# Patient Record
Sex: FEMALE | Race: WHITE | NOT HISPANIC OR LATINO | Employment: UNEMPLOYED | URBAN - METROPOLITAN AREA
[De-identification: names, ages, dates, MRNs, and addresses within clinical notes are randomized per-mention and may not be internally consistent; named-entity substitution may affect disease eponyms.]

---

## 2017-01-05 ENCOUNTER — RADIANT APPOINTMENT (OUTPATIENT)
Dept: GENERAL RADIOLOGY | Facility: CLINIC | Age: 22
End: 2017-01-05
Attending: ORTHOPAEDIC SURGERY
Payer: COMMERCIAL

## 2017-01-05 ENCOUNTER — TELEPHONE (OUTPATIENT)
Dept: ORTHOPEDICS | Facility: CLINIC | Age: 22
End: 2017-01-05

## 2017-01-05 ENCOUNTER — OFFICE VISIT (OUTPATIENT)
Dept: ORTHOPEDICS | Facility: CLINIC | Age: 22
End: 2017-01-05
Payer: COMMERCIAL

## 2017-01-05 VITALS — HEIGHT: 69 IN | WEIGHT: 167.5 LBS | BODY MASS INDEX: 24.81 KG/M2 | TEMPERATURE: 97.5 F

## 2017-01-05 DIAGNOSIS — M25.361 PATELLOFEMORAL INSTABILITY OF RIGHT KNEE WITH PAIN: ICD-10-CM

## 2017-01-05 DIAGNOSIS — M22.8X1 MALTRACKING OF RIGHT PATELLA: Primary | ICD-10-CM

## 2017-01-05 DIAGNOSIS — M25.561 PATELLOFEMORAL INSTABILITY OF RIGHT KNEE WITH PAIN: ICD-10-CM

## 2017-01-05 PROCEDURE — 99214 OFFICE O/P EST MOD 30 MIN: CPT | Performed by: ORTHOPAEDIC SURGERY

## 2017-01-05 PROCEDURE — 73560 X-RAY EXAM OF KNEE 1 OR 2: CPT | Mod: TC

## 2017-01-05 ASSESSMENT — PAIN SCALES - GENERAL: PAINLEVEL: SEVERE PAIN (7)

## 2017-01-05 NOTE — NURSING NOTE
"Chief Complaint   Patient presents with     RECHECK     right knee patellofemoral maltracking       Initial Temp(Src) 97.5  F (36.4  C) (Temporal)  Ht 1.753 m (5' 9\")  Wt 75.978 kg (167 lb 8 oz)  BMI 24.72 kg/m2 Estimated body mass index is 24.72 kg/(m^2) as calculated from the following:    Height as of this encounter: 1.753 m (5' 9\").    Weight as of this encounter: 75.978 kg (167 lb 8 oz).  BP completed using cuff size: NA (Not Taken)  Nettie/CATHY     "

## 2017-01-05 NOTE — TELEPHONE ENCOUNTER
Surgery Scheduled    Date of Surgery 01/20/17 Time of Surgery   Procedure: Right knee arthroscopy with arthroscopic lateral retinacular release  Hospital/Surgical Facility: [facility]  Surgeon: Dr. Graf  Type of Anesthesia : General  Pre-op 01/10/17 with Dr. Gray  Post op:02/01/17 with Dr. Graf        Surgery packet mailed to patient's home address. Patients instructed to arrive 1 hours prior to surgery. Patient understood and agrees to plan.    Yvonne Brian  Surgery Scheduler

## 2017-01-05 NOTE — PROGRESS NOTES
Office Visit-Follow up    Chief Complaint: Mary Valdez is a 21 year old female who is being seen for   Chief Complaint   Patient presents with     RECHECK     right knee patellofemoral maltracking       History of Present Illness:   Today's visit:  Arrives for continued right anterior knee pain.  Pain is been present for  Approximately 1 year. To date she has attempted formal physical therapy, home physical therapy, anti-inflammatories, rest, activity modification,  Aspirin, bracing, and taping. Taping provided the most relief. Continues to have pain that impacts the quality of her life. It bothers her with most activities including walking at times. She is grown very frustrated ofHer symptoms. She's never had a kneecap dislocation.  March 7, 2016 visit:  Returns for evaluation of right knee. She reports continues with some anterior pain. He has gotten better with the meloxicam. Currently rates it as moderate. He describes it as achy. She also reports she feels a transient quick catch with extension of the knee. Physical therapy has been on hold since last visit.  February 22, 2016:  Patient is seen today for follow-up of right knee pain. She complains pain is worsening since last office visit. The pain is located lateral, anterior. She has been attending physical therapy in [place] for approximately three weeks; she is doing many side leg raises. She is using patellofemoral stabilizing brace, which does help. She is using Mobic.    2/4/2016 office visit:  Mechanism of Injury: No trauma or inciting event.  Location: right knee anterior  Duration of Pain: 3 week(s)  Rating of Pain: moderate.   Pain Quality: aching and sharp  Pain is better with: Rest  Pain is worse with: squatting  Treatment so far consists of: rest, Ibuprofen.   Associated Features: Weakness  Prior history of related problems:   Problems with her knees on and off over the years. This become much more consistent over the last 3  "weeks.        REVIEW OF SYSTEMS  General: negative for, night sweats, dizziness, fatigue  Resp: No shortness of breath and no cough  CV: negative for chest pain, syncope or near-syncope  GI: negative for nausea, vomiting and diarrhea  : negative for dysuria and hematuria  Musculoskeletal: as above  Neurologic: negative for syncope   Hematologic: negative for bleeding disorder    Physical Exam:  Vitals: Temp(Src) 97.5  F (36.4  C) (Temporal)  Ht 5' 9\" (1.753 m)  Wt 167 lb 8 oz (75.978 kg)  BMI 24.72 kg/m2  BMI= Body mass index is 24.72 kg/(m^2).  Constitutional: healthy, alert and no acute distress   Psychiatric: mentation appears normal and affect normal/bright  NEURO: no focal deficits  RESP: Normal with easy respirations and no use of accessory muscles to breathe, no audible wheezing or retractions  CV: RLE: no edema         Regular rate and rhythm by palpation  SKIN: No erythema, rashes, excoriation, or breakdown. No evidence of infection.   JOINT/EXTREMITIES:right Knee: 0-130  active range of motion. Tenderness along the lateral patellar facet. No joint line pain. No effusion. Lateral patellar maltracking. Some tightness of lateral retinaculum when compared to the contralateral knee. Increased tibia tubercle sulcus angle.  Negative Onel. Negative Lachman. No instability of varus and valgus testing.    GAIT: non-antalgic            Diagnostic Modalities:  My previous diagnostic modalities results were reviewed.  Independent visualization of the images was performed.      Impression: right Knee patellar maltracking    Plan:  All of the above pertinent physical exam and imaging modalities findings was reviewed with Mary.    We discussed her treatment options. Pain is been present for approximately 1 year. She has attempted formal physical therapy, home physical therapy, anti-inflammatories, rest, activity modification,  Aspirin, bracing, and taping. She continues to have symptoms impact the quality of her " life on a daily basis. We discussed surgical treatment options. We discussed lateral retinacular release, patellar realignment, medial patellofemoral ligament reconstruction.I reviewed the pros and cons and risks and benefits of all. We discussed isolated procedure such as lateral retinacular release. I reviewed the recovery for the procedures. Once fully reviewed. Proceed with right knee arthroscopy with arthroscopic lateral retinacular release. She has not had any patellofemoral dislocations.She understands she may continue having symptoms and progressed to having a patellar realignment surgery. However based in recovery she is willing to take that chance.    Risks, benefits, complications, limitations, and anticipated postoperative course were discussed. Risks including infections (requiring possible repeat surgery and antibiotics), bleeding, blood clots, hemarthrosis, cartilage degeneration (arthritis), scar, scar tenderness, stiffness, skin problems and failure to relieve all symptoms were reviewed. I also reviewed the risks of heart attack, stroke, death. Anticipated anesthesia is general    We also Discussed recovery taking up to 8-12 weeks.    Return to clinic 10 days post-operatively. , or sooner as needed for changes.  Re-x-ray on return: No    Magdaleno Graf D.O.          Please schedule for surgery, pre op H&P, and post ops.      Patient Name:  Mary Valdez ([id number]).  :  1995  Gender:  female  Patient Type:  Same Day Surgery  Surgeon:  Yevgeniy Graf DO  Physician requests assist from:  Single scrub tech    Procedures:    Right knee arthroscopy with arthroscopic lateral retinacular release   Approach:  NA  Diagnosis:  Maltracking of right patella  C-arm:  No  Mini C-arm:   No  Pathology Scheduled:  No  Special instruments/supplies:  None  Vendor Rep:  na  Anesthesia:  General  Block:  No   Block type:na  Time needed:  30 minutes    [facility] Discussed:  Not Applicable    Post  op 1:

## 2017-01-10 ENCOUNTER — OFFICE VISIT (OUTPATIENT)
Dept: FAMILY MEDICINE | Facility: CLINIC | Age: 22
End: 2017-01-10
Payer: COMMERCIAL

## 2017-01-10 VITALS
TEMPERATURE: 97.4 F | BODY MASS INDEX: 24.75 KG/M2 | HEART RATE: 130 BPM | HEIGHT: 69 IN | OXYGEN SATURATION: 98 % | DIASTOLIC BLOOD PRESSURE: 56 MMHG | WEIGHT: 167.1 LBS | SYSTOLIC BLOOD PRESSURE: 114 MMHG | RESPIRATION RATE: 16 BRPM

## 2017-01-10 DIAGNOSIS — Z01.818 PREOP GENERAL PHYSICAL EXAM: Primary | ICD-10-CM

## 2017-01-10 DIAGNOSIS — M22.8X1 MALTRACKING OF RIGHT PATELLA: ICD-10-CM

## 2017-01-10 PROCEDURE — 99214 OFFICE O/P EST MOD 30 MIN: CPT | Performed by: FAMILY MEDICINE

## 2017-01-10 ASSESSMENT — PAIN SCALES - GENERAL: PAINLEVEL: SEVERE PAIN (7)

## 2017-01-10 NOTE — MR AVS SNAPSHOT
After Visit Summary   1/10/2017    Mary Valdez    MRN: [id number]           Patient Information     Date Of Birth          1995        Visit Information        Provider Department      1/10/2017 11:20 AM Estela Gray MD [facility]        Today's Diagnoses     Preop general physical exam    -  1     Maltracking of right patella           Care Instructions      Before Your Surgery      Call your surgeon if there is any change in your health. This includes signs of a cold or flu (such as a sore throat, runny nose, cough, rash or fever).    Do not smoke, drink alcohol or take over the counter medicine (unless your surgeon or primary care doctor tells you to) for the 24 hours before and after surgery.    If you take prescribed drugs: Follow your doctor s orders about which medicines to take and which to stop until after surgery.    Eating and drinking prior to surgery: follow the instructions from your surgeon    Take a shower or bath the night before surgery. Use the soap your surgeon gave you to gently clean your skin. If you do not have soap from your surgeon, use your regular soap. Do not shave or scrub the surgery site.  Wear clean pajamas and have clean sheets on your bed.         Follow-ups after your visit        Your next 10 appointments already scheduled     Jan 20, 2017   [facility]    [address]  [place] [id number] [phone]           From [address]: Exit at [address] on [place] side of [place]. Turn right on [address]. Turn left at stoplight on [address]. [facility] will be in view two blocks ahead            Feb 01, 2017  9:00 AM   [facility] Visit with Yevgeniy Graf DO   [facility]    [address]  [place] [id number] [phone]              Who to contact      "If you have questions or need follow up information about today's clinic visit or your schedule please contact [facility] directly at [phone].  Normal or non-critical lab and imaging results will be communicated to you by MyChart, letter or phone within 4 business days after the clinic has received the results. If you do not hear from us within 7 days, please contact the clinic through HowDoh[facility] or phone. If you have a critical or abnormal lab result, we will notify you by phone as soon as possible.  Submit refill requests through [employer] or call your pharmacy and they will forward the refill request to us. Please allow 3 business days for your refill to be completed.          Additional Information About Your Visit        HowDoharPaktor Information     [employer] gives you secure access to your electronic health record. If you see a primary care provider, you can also send messages to your care team and make appointments. If you have questions, please call your primary care clinic.  If you do not have a primary care provider, please call [phone] and they will assist you.        Care EveryWhere ID     This is your Care EveryWhere ID. This could be used by other organizations to access your [facility] medical records  [id number]        Your Vitals Were     Pulse Temperature Respirations    130 97.4  F (36.3  C) (Temporal) 16    Height BMI (Body Mass Index) Pulse Oximetry    5' 8.75\" (1.746 m) 24.86 kg/m2 98%    Breastfeeding?          No         Blood Pressure from Last 3 Encounters:   01/10/17 114/56   05/19/16 115/76   04/15/16 112/68    Weight from Last 3 Encounters:   01/10/17 167 lb 1.6 oz (75.796 kg) (91.21 %*)   01/05/17 167 lb 8 oz (75.978 kg) (91.37 %*)   05/19/16 156 lb (70.761 kg) (87.06 %*)     * Growth percentiles are based on CDC 2-20 Years data.              Today, you had the following     No orders found for display         Today's Medication Changes          These changes are accurate " as of: 1/10/17 11:34 AM.  If you have any questions, ask your nurse or doctor.               Stop taking these medicines if you haven't already. Please contact your care team if you have questions.     ibuprofen 200 MG tablet   Generic drug:  ibuprofen   Stopped by:  Estela Gray MD           indomethacin 25 MG capsule   Commonly known as:  INDOCIN   Stopped by:  Estela Gray MD                    Primary Care Provider Office Phone # Fax #    Estela Mcfadden Mai, [phone]       [address]   [facility] [address]        Thank you!     Thank you for choosing [facility]  for your care. Our goal is always to provide you with excellent care. Hearing back from our patients is one way we can continue to improve our services. Please take a few minutes to complete the written survey that you may receive in the mail after your visit with us. Thank you!             Your Updated Medication List - Protect others around you: Learn how to safely use, store and throw away your medicines at www.disposemymeds.org.          This list is accurate as of: 1/10/17 11:34 AM.  Always use your most recent med list.                   Brand Name Dispense Instructions for use    MULTI-VITAMIN DAILY PO      Take 1 tablet by mouth daily.

## 2017-01-10 NOTE — PROGRESS NOTES
[address] [address]  [phone]  Dept: [phone]    PRE-OP EVALUATION:  Today's date: 1/10/2017    Mary Valdez (: 1995) presents for pre-operative evaluation assessment as requested by Dr. Graf.  She requires evaluation and anesthesia risk assessment prior to undergoing surgery/procedure for treatment of Maltracking of right patella .  Proposed procedure: Right knee arthroscopy with arthroscopic lateral retinacular release    Date of Surgery/ Procedure: 17  Time of Surgery/ Procedure: [facility]  Hospital/Surgical Facility: [facility]  Primary Physician: Estela Gray  Type of Anesthesia Anticipated: to be determined    Patient has a Health Care Directive or Living Will:  NO    1. NO - Do you have a history of heart attack, stroke, stent, bypass or surgery on an artery in the head, neck, heart or legs?  2. NO - Do you ever have any pain or discomfort in your chest?  3. NO - Do you have a history of  Heart Failure?  4. NO - Are you troubled by shortness of breath when: walking on the level, up a slight hill or at night?  5. NO - Do you currently have a cold, bronchitis or other respiratory infection?  6. NO - Do you have a cough, shortness of breath or wheezing?  7. NO - Do you sometimes get pains in the calves of your legs when you walk?  8. NO - Do you or anyone in your family have previous history of blood clots?  9. NO - Do you or does anyone in your family have a serious bleeding problem such as prolonged bleeding following surgeries or cuts?  10. YES - Have you ever had problems with anemia or been told to take iron pills?  11. NO - Have you had any abnormal blood loss such as black, tarry or bloody stools, or abnormal vaginal bleeding?  12. NO - Have you ever had a blood transfusion?  13. YES - Have you or any of your relatives ever had problems with anesthesia? Shaking and panic  14. NO - Do you have sleep  apnea, excessive snoring or daytime drowsiness?  15. NO - Do you have any prosthetic heart valves?  16. NO - Do you have prosthetic joints?  17. NO - Is there any chance that you may be pregnant?      HPI:                                                      Brief HPI related to upcoming procedure:     Stated that that she has the right knee pain for a while that did not responded to the conservative treatment, including PT.  She has been seeing Orthopedics who recommended her for arthroscopy. It expected be the same day procedure with general anesthesia. There is no personal although she get panicking and shaking when she gets out of anesthesia. No family history of anesthesia complication. There is no family or personal history of pre-matured CAD or MI. Generally is healthy, not on any medication chronically.  Has not been on steroid orally in the last 6 months.  She takes aspirin and Ibuprofen as needed for the pain and the last dose was couple days ago.   Stated that she was well informed about the procedure and is ready to have the procedure done.    She generally is doing well and has no concern today. No headache or dizziness. No URI symptoms include running nose, nasal congestion, ST, coughing, fever or chill.  No chest pain or SOB.  No N/V/D/C and denies of having problem with urination.  She does not smoke and denies of having breathing problem.         MEDICAL HISTORY:                                                      Patient Active Problem List    Diagnosis Date Noted     Maltracking of right patella 01/05/2017     Priority: Medium     NENO (generalized anxiety disorder) 05/05/2016     Priority: Medium     PTSD (post-traumatic stress disorder) 05/05/2016     Priority: Medium     Contraception 12/05/2014     Priority: Medium     Anxiety state 07/07/2014     Priority: Medium     Problem list name updated by automated process. Provider to review       Major depression in complete remission (H) 07/07/2014  "    Priority: Medium     Migratory pain 10/13/2013     Priority: Medium     Psoriasis 07/23/2010     Priority: Medium      Past Medical History   Diagnosis Date     Depression      Venice Meyer, psychologist, [employer] in [place]     Bell's palsy 7 y/o     Due to trauma, neg Lyme     Psoriasis      scalp only     Major depression in complete remission (H) 7/7/2014     Past Surgical History   Procedure Laterality Date     C nonspecific procedure  02/12/01     adenoidectomy     Dental surgery       Current Outpatient Prescriptions   Medication Sig Dispense Refill     ibuprofen (IBUPROFEN) 200 MG tablet Take 400 mg by mouth as needed for mild pain       indomethacin (INDOCIN) 25 MG capsule Take 1 capsule (25 mg) by mouth 3 times daily as needed for moderate pain Take with food 90 capsule 1     Multiple Vitamin (MULTI-VITAMIN DAILY PO) Take 1 tablet by mouth daily.       OTC products: None, except as noted above    Allergies   Allergen Reactions     No Known Drug Allergies       Latex Allergy: NO    Social History   Substance Use Topics     Smoking status: Never Smoker      Smokeless tobacco: Never Used     Alcohol Use: No     History   Drug Use No       REVIEW OF SYSTEMS:                                                    Constitutional, HEENT, cardiovascular, pulmonary, gi and gu systems are negative, except as otherwise noted.    EXAM:                                                    /56 mmHg  Pulse 130  Temp(Src) 97.4  F (36.3  C) (Temporal)  Resp 16  Ht 5' 8.75\" (1.746 m)  Wt 167 lb 1.6 oz (75.796 kg)  BMI 24.86 kg/m2  SpO2 98%  Breastfeeding? No      GENERAL APPEARANCE: healthy, alert and no distress     EYES: EOMI,- PERRL     HENT: ear canals and TM's normal and nose and mouth without ulcers or lesions. Nares are non-congested. Oropharynx is pink and moist. No tender with palpation to the sinuses.     NECK: no adenopathy, no asymmetry and thyroid normal to palpation.  No tender with palpation to " the cervical spine and its para-spinous muscle bilaterally.     RESP: lungs clear to auscultation - no rales, rhonchi or wheezes     CV: regular rates and rhythm and no murmur.     ABDOMEN:  soft, nontender, no palpable masses and bowel sounds normal     MS: extremities normal- no gross deformities noted.  No edema.  All 4 extremities  are equally in strength.     NEURO: Normal strength and tone,  mentation intact and speech normal     PSYCH: mentation appears normal. and affect normal/bright     LYMPHATICS: No cervical adenopathy.      DIAGNOSTICS:                                                      EKG: Not indicated due to non-vascular surgery and low risk of event (age <65 and without cardiac risk factors)  Labs Resulted Today:   Results for orders placed or performed in visit on 01/05/17   X-ray rt knee 1 to 2 view*    Narrative    RIGHT KNEE ONE TO TWO VIEWS  1/5/2017 9:49 AM     HISTORY:  Other instability, right knee. Pain in right knee.    COMPARISON: A single sunrise view is compared with 2/4/2016.      Impression    IMPRESSION: Minimal medial patellar subluxation, improved.    JENNIFER ADMAS MD     Labs Drawn and in Process:   Unresulted Labs Ordered in the Past 30 Days of this Admission     No orders found from 11/12/2016 to 1/11/2017.          Recent Labs   Lab Test  04/07/16   1528  03/13/16   1108  10/26/15   1905   HGB   --   11.5*  11.9   PLT   --   197  226   NA  141   --   138   POTASSIUM  4.3   --   3.7   CR  0.68   --   0.72        IMPRESSION:                                                    Reason for surgery/procedure: maltracking of the right patella.  Diagnosis/reason for consult: pre-op physical to evaluate for anesthesia and its fercho-operative risks.    The proposed surgical procedure is considered INTERMEDIATE risk.    REVISED CARDIAC RISK INDEX  The patient has the following serious cardiovascular risks for perioperative complications such as (MI, PE, VFib and 3  AV Block):  No serious  cardiac risks  INTERPRETATION: 1 risks: Class II (low risk - 0.9% complication rate)    The patient has the following additional risks for perioperative complications:  No identified additional risks      ICD-10-CM    1. Preop general physical exam Z01.818    2. Maltracking of right patella M22.8X1        RECOMMENDATIONS:                                                    APPROVAL GIVEN to proceed with proposed procedure, without further diagnostic evaluation    Mary is overall doing well.  She is clear for the arthroscopy procedure as scheduled.  No further work up is needed.  No pregnancy test is needed as she on a liable BC method (Depo provera injection).  Instructed her to fast at least 8 hrs before the procedure time. Not take any blood thinner.   I recommend to stay away from ASA and NSAIDs for 7 days before the procedure. She does not take medication chronically. A written instruction was given as well. Recommend appropriate DVT prophylactic during and after the surgery per hospital's protocol.  All of her questions were answered.      Signed Electronically by: Estela Mcfadden Mai, MD    Copy of this evaluation report is provided to requesting physician.    [facility] Preop Guidelines

## 2017-01-10 NOTE — NURSING NOTE
"Chief Complaint   Patient presents with     Pre-Op Exam       Initial /56 mmHg  Pulse 130  Temp(Src) 97.4  F (36.3  C) (Temporal)  Resp 16  Ht 5' 8.75\" (1.746 m)  Wt 167 lb 1.6 oz (75.796 kg)  BMI 24.86 kg/m2  SpO2 98%  Breastfeeding? No Estimated body mass index is 24.86 kg/(m^2) as calculated from the following:    Height as of this encounter: 5' 8.75\" (1.746 m).    Weight as of this encounter: 167 lb 1.6 oz (75.796 kg).  BP completed using cuff size: regular    Health Maintenance Due   Topic Date Due     HPV IMMUNIZATION (1 of 3 - Female 3 Dose Series) 11/12/2008     INFLUENZA VACCINE (SYSTEM ASSIGNED)  09/01/2016     PHQ-9 Q6 MONTHS (NO INBASKET)  11/04/2016       Logan Solorio CMA      "

## 2017-01-11 ASSESSMENT — PATIENT HEALTH QUESTIONNAIRE - PHQ9: SUM OF ALL RESPONSES TO PHQ QUESTIONS 1-9: 0

## 2017-01-19 ENCOUNTER — ANESTHESIA EVENT (OUTPATIENT)
Dept: SURGERY | Facility: CLINIC | Age: 22
End: 2017-01-19
Payer: COMMERCIAL

## 2017-01-19 NOTE — H&P (VIEW-ONLY)
[address] [address]  [phone]  Dept: [phone]    PRE-OP EVALUATION:  Today's date: 1/10/2017    Mary Valdez (: 1995) presents for pre-operative evaluation assessment as requested by Dr. Graf.  She requires evaluation and anesthesia risk assessment prior to undergoing surgery/procedure for treatment of Maltracking of right patella .  Proposed procedure: Right knee arthroscopy with arthroscopic lateral retinacular release    Date of Surgery/ Procedure: 17  Time of Surgery/ Procedure: [facility]  Hospital/Surgical Facility: [facility]  Primary Physician: Estela Gray  Type of Anesthesia Anticipated: to be determined    Patient has a Health Care Directive or Living Will:  NO    1. NO - Do you have a history of heart attack, stroke, stent, bypass or surgery on an artery in the head, neck, heart or legs?  2. NO - Do you ever have any pain or discomfort in your chest?  3. NO - Do you have a history of  Heart Failure?  4. NO - Are you troubled by shortness of breath when: walking on the level, up a slight hill or at night?  5. NO - Do you currently have a cold, bronchitis or other respiratory infection?  6. NO - Do you have a cough, shortness of breath or wheezing?  7. NO - Do you sometimes get pains in the calves of your legs when you walk?  8. NO - Do you or anyone in your family have previous history of blood clots?  9. NO - Do you or does anyone in your family have a serious bleeding problem such as prolonged bleeding following surgeries or cuts?  10. YES - Have you ever had problems with anemia or been told to take iron pills?  11. NO - Have you had any abnormal blood loss such as black, tarry or bloody stools, or abnormal vaginal bleeding?  12. NO - Have you ever had a blood transfusion?  13. YES - Have you or any of your relatives ever had problems with anesthesia? Shaking and panic  14. NO - Do you have sleep  apnea, excessive snoring or daytime drowsiness?  15. NO - Do you have any prosthetic heart valves?  16. NO - Do you have prosthetic joints?  17. NO - Is there any chance that you may be pregnant?      HPI:                                                      Brief HPI related to upcoming procedure:     Stated that that she has the right knee pain for a while that did not responded to the conservative treatment, including PT.  She has been seeing Orthopedics who recommended her for arthroscopy. It expected be the same day procedure with general anesthesia. There is no personal although she get panicking and shaking when she gets out of anesthesia. No family history of anesthesia complication. There is no family or personal history of pre-matured CAD or MI. Generally is healthy, not on any medication chronically.  Has not been on steroid orally in the last 6 months.  She takes aspirin and Ibuprofen as needed for the pain and the last dose was couple days ago.   Stated that she was well informed about the procedure and is ready to have the procedure done.    She generally is doing well and has no concern today. No headache or dizziness. No URI symptoms include running nose, nasal congestion, ST, coughing, fever or chill.  No chest pain or SOB.  No N/V/D/C and denies of having problem with urination.  She does not smoke and denies of having breathing problem.         MEDICAL HISTORY:                                                      Patient Active Problem List    Diagnosis Date Noted     Maltracking of right patella 01/05/2017     Priority: Medium     NENO (generalized anxiety disorder) 05/05/2016     Priority: Medium     PTSD (post-traumatic stress disorder) 05/05/2016     Priority: Medium     Contraception 12/05/2014     Priority: Medium     Anxiety state 07/07/2014     Priority: Medium     Problem list name updated by automated process. Provider to review       Major depression in complete remission (H) 07/07/2014  "    Priority: Medium     Migratory pain 10/13/2013     Priority: Medium     Psoriasis 07/23/2010     Priority: Medium      Past Medical History   Diagnosis Date     Depression      Venice Meyer, psychologist, [facility] in [place]     Bell's palsy 5 y/o     Due to trauma, neg Lyme     Psoriasis      scalp only     Major depression in complete remission (H) 7/7/2014     Past Surgical History   Procedure Laterality Date     C nonspecific procedure  02/12/01     adenoidectomy     Dental surgery       Current Outpatient Prescriptions   Medication Sig Dispense Refill     ibuprofen (IBUPROFEN) 200 MG tablet Take 400 mg by mouth as needed for mild pain       indomethacin (INDOCIN) 25 MG capsule Take 1 capsule (25 mg) by mouth 3 times daily as needed for moderate pain Take with food 90 capsule 1     Multiple Vitamin (MULTI-VITAMIN DAILY PO) Take 1 tablet by mouth daily.       OTC products: None, except as noted above    Allergies   Allergen Reactions     No Known Drug Allergies       Latex Allergy: NO    Social History   Substance Use Topics     Smoking status: Never Smoker      Smokeless tobacco: Never Used     Alcohol Use: No     History   Drug Use No       REVIEW OF SYSTEMS:                                                    Constitutional, HEENT, cardiovascular, pulmonary, gi and gu systems are negative, except as otherwise noted.    EXAM:                                                    /56 mmHg  Pulse 130  Temp(Src) 97.4  F (36.3  C) (Temporal)  Resp 16  Ht 5' 8.75\" (1.746 m)  Wt 167 lb 1.6 oz (75.796 kg)  BMI 24.86 kg/m2  SpO2 98%  Breastfeeding? No      GENERAL APPEARANCE: healthy, alert and no distress     EYES: EOMI,- PERRL     HENT: ear canals and TM's normal and nose and mouth without ulcers or lesions. Nares are non-congested. Oropharynx is pink and moist. No tender with palpation to the sinuses.     NECK: no adenopathy, no asymmetry and thyroid normal to palpation.  No tender with palpation to " the cervical spine and its para-spinous muscle bilaterally.     RESP: lungs clear to auscultation - no rales, rhonchi or wheezes     CV: regular rates and rhythm and no murmur.     ABDOMEN:  soft, nontender, no palpable masses and bowel sounds normal     MS: extremities normal- no gross deformities noted.  No edema.  All 4 extremities  are equally in strength.     NEURO: Normal strength and tone,  mentation intact and speech normal     PSYCH: mentation appears normal. and affect normal/bright     LYMPHATICS: No cervical adenopathy.      DIAGNOSTICS:                                                      EKG: Not indicated due to non-vascular surgery and low risk of event (age <65 and without cardiac risk factors)  Labs Resulted Today:   Results for orders placed or performed in visit on 01/05/17   X-ray rt knee 1 to 2 view*    Narrative    RIGHT KNEE ONE TO TWO VIEWS  1/5/2017 9:49 AM     HISTORY:  Other instability, right knee. Pain in right knee.    COMPARISON: A single sunrise view is compared with 2/4/2016.      Impression    IMPRESSION: Minimal medial patellar subluxation, improved.    JENNIFER ADAMS MD     Labs Drawn and in Process:   Unresulted Labs Ordered in the Past 30 Days of this Admission     No orders found from 11/12/2016 to 1/11/2017.          Recent Labs   Lab Test  04/07/16   1528  03/13/16   1108  10/26/15   1905   HGB   --   11.5*  11.9   PLT   --   197  226   NA  141   --   138   POTASSIUM  4.3   --   3.7   CR  0.68   --   0.72        IMPRESSION:                                                    Reason for surgery/procedure: maltracking of the right patella.  Diagnosis/reason for consult: pre-op physical to evaluate for anesthesia and its fercho-operative risks.    The proposed surgical procedure is considered INTERMEDIATE risk.    REVISED CARDIAC RISK INDEX  The patient has the following serious cardiovascular risks for perioperative complications such as (MI, PE, VFib and 3  AV Block):  No serious  cardiac risks  INTERPRETATION: 1 risks: Class II (low risk - 0.9% complication rate)    The patient has the following additional risks for perioperative complications:  No identified additional risks      ICD-10-CM    1. Preop general physical exam Z01.818    2. Maltracking of right patella M22.8X1        RECOMMENDATIONS:                                                    APPROVAL GIVEN to proceed with proposed procedure, without further diagnostic evaluation    Mary is overall doing well.  She is clear for the arthroscopy procedure as scheduled.  No further work up is needed.  No pregnancy test is needed as she on a liable BC method (Depo provera injection).  Instructed her to fast at least 8 hrs before the procedure time. Not take any blood thinner.   I recommend to stay away from ASA and NSAIDs for 7 days before the procedure. She does not take medication chronically. A written instruction was given as well. Recommend appropriate DVT prophylactic during and after the surgery per hospital's protocol.  All of her questions were answered.      Signed Electronically by: Estela Mcfadden Mai, MD    Copy of this evaluation report is provided to requesting physician.    [facility] Preop Guidelines

## 2017-01-20 ENCOUNTER — ANESTHESIA (OUTPATIENT)
Dept: SURGERY | Facility: CLINIC | Age: 22
End: 2017-01-20
Payer: COMMERCIAL

## 2017-01-20 ENCOUNTER — HOSPITAL ENCOUNTER (OUTPATIENT)
Facility: CLINIC | Age: 22
Discharge: HOME | End: 2017-01-20
Attending: ORTHOPAEDIC SURGERY | Admitting: ORTHOPAEDIC SURGERY
Payer: COMMERCIAL

## 2017-01-20 VITALS
SYSTOLIC BLOOD PRESSURE: 122 MMHG | DIASTOLIC BLOOD PRESSURE: 70 MMHG | OXYGEN SATURATION: 96 % | RESPIRATION RATE: 12 BRPM | TEMPERATURE: 97.9 F

## 2017-01-20 DIAGNOSIS — M22.8X1 MALTRACKING OF RIGHT PATELLA: Primary | ICD-10-CM

## 2017-01-20 LAB — HCG SERPL QL: NEGATIVE

## 2017-01-20 PROCEDURE — 25000566 ZZH SEVOFLURANE, EA 15 MIN: Performed by: ORTHOPAEDIC SURGERY

## 2017-01-20 PROCEDURE — 29873 ARTHRS KNEE SURG W/LAT RLS: CPT | Mod: RT | Performed by: ORTHOPAEDIC SURGERY

## 2017-01-20 PROCEDURE — 37000008 ZZH ANESTHESIA TECHNICAL FEE, 1ST 30 MIN: Performed by: ORTHOPAEDIC SURGERY

## 2017-01-20 PROCEDURE — 25000125 ZZHC RX 250: Mod: ZNDC | Performed by: ORTHOPAEDIC SURGERY

## 2017-01-20 PROCEDURE — 27210794 ZZH OR GENERAL SUPPLY STERILE: Performed by: ORTHOPAEDIC SURGERY

## 2017-01-20 PROCEDURE — 25000125 ZZHC RX 250: Performed by: NURSE ANESTHETIST, CERTIFIED REGISTERED

## 2017-01-20 PROCEDURE — 37000009 ZZH ANESTHESIA TECHNICAL FEE, EACH ADDTL 15 MIN: Performed by: ORTHOPAEDIC SURGERY

## 2017-01-20 PROCEDURE — 84703 CHORIONIC GONADOTROPIN ASSAY: CPT | Performed by: ORTHOPAEDIC SURGERY

## 2017-01-20 PROCEDURE — 71000014 ZZH RECOVERY PHASE 1 LEVEL 2 FIRST HR: Performed by: ORTHOPAEDIC SURGERY

## 2017-01-20 PROCEDURE — 40000306 ZZH STATISTIC PRE PROC ASSESS II: Performed by: ORTHOPAEDIC SURGERY

## 2017-01-20 PROCEDURE — 36000058 ZZH SURGERY LEVEL 3 EA 15 ADDTL MIN: Performed by: ORTHOPAEDIC SURGERY

## 2017-01-20 PROCEDURE — 25000125 ZZHC RX 250: Performed by: ORTHOPAEDIC SURGERY

## 2017-01-20 PROCEDURE — 36000056 ZZH SURGERY LEVEL 3 1ST 30 MIN: Performed by: ORTHOPAEDIC SURGERY

## 2017-01-20 PROCEDURE — 25800025 ZZH RX 258: Performed by: NURSE ANESTHETIST, CERTIFIED REGISTERED

## 2017-01-20 PROCEDURE — 71000027 ZZH RECOVERY PHASE 2 EACH 15 MINS: Performed by: ORTHOPAEDIC SURGERY

## 2017-01-20 RX ORDER — DIMENHYDRINATE 50 MG/ML
25 INJECTION, SOLUTION INTRAMUSCULAR; INTRAVENOUS
Status: DISCONTINUED | OUTPATIENT
Start: 2017-01-20 | End: 2017-01-20 | Stop reason: HOSPADM

## 2017-01-20 RX ORDER — MEPERIDINE HYDROCHLORIDE 25 MG/ML
12.5 INJECTION INTRAMUSCULAR; INTRAVENOUS; SUBCUTANEOUS
Status: DISCONTINUED | OUTPATIENT
Start: 2017-01-20 | End: 2017-01-20 | Stop reason: HOSPADM

## 2017-01-20 RX ORDER — DEXAMETHASONE SODIUM PHOSPHATE 10 MG/ML
INJECTION, SOLUTION INTRAMUSCULAR; INTRAVENOUS PRN
Status: DISCONTINUED | OUTPATIENT
Start: 2017-01-20 | End: 2017-01-20

## 2017-01-20 RX ORDER — NALOXONE HYDROCHLORIDE 0.4 MG/ML
.1-.4 INJECTION, SOLUTION INTRAMUSCULAR; INTRAVENOUS; SUBCUTANEOUS
Status: DISCONTINUED | OUTPATIENT
Start: 2017-01-20 | End: 2017-01-20 | Stop reason: HOSPADM

## 2017-01-20 RX ORDER — OXYCODONE HYDROCHLORIDE 5 MG/1
5-10 TABLET ORAL EVERY 4 HOURS PRN
Qty: 60 TABLET | Refills: 0 | Status: SHIPPED | OUTPATIENT
Start: 2017-01-20 | End: 2017-03-08

## 2017-01-20 RX ORDER — OXYCODONE HYDROCHLORIDE 5 MG/1
15 TABLET ORAL EVERY 4 HOURS PRN
Status: DISCONTINUED | OUTPATIENT
Start: 2017-01-20 | End: 2017-01-20 | Stop reason: HOSPADM

## 2017-01-20 RX ORDER — LIDOCAINE 40 MG/G
CREAM TOPICAL
Status: DISCONTINUED | OUTPATIENT
Start: 2017-01-20 | End: 2017-01-20 | Stop reason: HOSPADM

## 2017-01-20 RX ORDER — CEFAZOLIN SODIUM 1 G/3ML
1 INJECTION, POWDER, FOR SOLUTION INTRAMUSCULAR; INTRAVENOUS SEE ADMIN INSTRUCTIONS
Status: DISCONTINUED | OUTPATIENT
Start: 2017-01-20 | End: 2017-01-20 | Stop reason: HOSPADM

## 2017-01-20 RX ORDER — SODIUM CHLORIDE, SODIUM LACTATE, POTASSIUM CHLORIDE, CALCIUM CHLORIDE 600; 310; 30; 20 MG/100ML; MG/100ML; MG/100ML; MG/100ML
500 INJECTION, SOLUTION INTRAVENOUS CONTINUOUS
Status: DISCONTINUED | OUTPATIENT
Start: 2017-01-20 | End: 2017-01-20 | Stop reason: HOSPADM

## 2017-01-20 RX ORDER — LIDOCAINE HYDROCHLORIDE 20 MG/ML
INJECTION, SOLUTION INFILTRATION; PERINEURAL PRN
Status: DISCONTINUED | OUTPATIENT
Start: 2017-01-20 | End: 2017-01-20

## 2017-01-20 RX ORDER — ONDANSETRON 4 MG/1
4 TABLET, ORALLY DISINTEGRATING ORAL EVERY 30 MIN PRN
Status: DISCONTINUED | OUTPATIENT
Start: 2017-01-20 | End: 2017-01-20 | Stop reason: HOSPADM

## 2017-01-20 RX ORDER — MEDROXYPROGESTERONE ACETATE 150 MG/ML
150 INJECTION, SUSPENSION INTRAMUSCULAR
COMMUNITY
End: 2018-02-06

## 2017-01-20 RX ORDER — ONDANSETRON 2 MG/ML
INJECTION INTRAMUSCULAR; INTRAVENOUS PRN
Status: DISCONTINUED | OUTPATIENT
Start: 2017-01-20 | End: 2017-01-20

## 2017-01-20 RX ORDER — PROPOFOL 10 MG/ML
INJECTION, EMULSION INTRAVENOUS PRN
Status: DISCONTINUED | OUTPATIENT
Start: 2017-01-20 | End: 2017-01-20

## 2017-01-20 RX ORDER — MORPHINE SULFATE 1 MG/ML
INJECTION, SOLUTION EPIDURAL; INTRATHECAL; INTRAVENOUS PRN
Status: DISCONTINUED | OUTPATIENT
Start: 2017-01-20 | End: 2017-01-20 | Stop reason: HOSPADM

## 2017-01-20 RX ORDER — ONDANSETRON 2 MG/ML
4 INJECTION INTRAMUSCULAR; INTRAVENOUS EVERY 30 MIN PRN
Status: DISCONTINUED | OUTPATIENT
Start: 2017-01-20 | End: 2017-01-20 | Stop reason: HOSPADM

## 2017-01-20 RX ORDER — CEFAZOLIN SODIUM 2 G/100ML
2 INJECTION, SOLUTION INTRAVENOUS
Status: COMPLETED | OUTPATIENT
Start: 2017-01-20 | End: 2017-01-20

## 2017-01-20 RX ORDER — HYDROMORPHONE HYDROCHLORIDE 1 MG/ML
.2-.5 INJECTION, SOLUTION INTRAMUSCULAR; INTRAVENOUS; SUBCUTANEOUS EVERY 10 MIN PRN
Status: DISCONTINUED | OUTPATIENT
Start: 2017-01-20 | End: 2017-01-20 | Stop reason: HOSPADM

## 2017-01-20 RX ORDER — OXYCODONE HYDROCHLORIDE 5 MG/1
5-10 TABLET ORAL
Status: DISCONTINUED | OUTPATIENT
Start: 2017-01-20 | End: 2017-01-20 | Stop reason: HOSPADM

## 2017-01-20 RX ORDER — BUPIVACAINE HYDROCHLORIDE 2.5 MG/ML
INJECTION, SOLUTION INFILTRATION; PERINEURAL PRN
Status: DISCONTINUED | OUTPATIENT
Start: 2017-01-20 | End: 2017-01-20 | Stop reason: HOSPADM

## 2017-01-20 RX ORDER — ACETAMINOPHEN 10 MG/ML
INJECTION, SOLUTION INTRAVENOUS PRN
Status: DISCONTINUED | OUTPATIENT
Start: 2017-01-20 | End: 2017-01-20

## 2017-01-20 RX ORDER — SODIUM CHLORIDE, SODIUM LACTATE, POTASSIUM CHLORIDE, CALCIUM CHLORIDE 600; 310; 30; 20 MG/100ML; MG/100ML; MG/100ML; MG/100ML
1000 INJECTION, SOLUTION INTRAVENOUS CONTINUOUS
Status: DISCONTINUED | OUTPATIENT
Start: 2017-01-20 | End: 2017-01-20 | Stop reason: HOSPADM

## 2017-01-20 RX ORDER — FENTANYL CITRATE 50 UG/ML
25-50 INJECTION, SOLUTION INTRAMUSCULAR; INTRAVENOUS
Status: DISCONTINUED | OUTPATIENT
Start: 2017-01-20 | End: 2017-01-20 | Stop reason: HOSPADM

## 2017-01-20 RX ORDER — FENTANYL CITRATE 50 UG/ML
INJECTION, SOLUTION INTRAMUSCULAR; INTRAVENOUS PRN
Status: DISCONTINUED | OUTPATIENT
Start: 2017-01-20 | End: 2017-01-20

## 2017-01-20 RX ORDER — OXYCODONE HYDROCHLORIDE 5 MG/1
10 TABLET ORAL EVERY 4 HOURS PRN
Status: DISCONTINUED | OUTPATIENT
Start: 2017-01-20 | End: 2017-01-20 | Stop reason: HOSPADM

## 2017-01-20 RX ADMIN — ACETAMINOPHEN 1000 MG: 10 INJECTION, SOLUTION INTRAVENOUS at 07:45

## 2017-01-20 RX ADMIN — FENTANYL CITRATE 50 MCG: 50 INJECTION, SOLUTION INTRAMUSCULAR; INTRAVENOUS at 07:29

## 2017-01-20 RX ADMIN — DEXAMETHASONE SODIUM PHOSPHATE 10 MG: 10 INJECTION, SOLUTION INTRAMUSCULAR; INTRAVENOUS at 07:44

## 2017-01-20 RX ADMIN — MIDAZOLAM HYDROCHLORIDE 2 MG: 1 INJECTION, SOLUTION INTRAMUSCULAR; INTRAVENOUS at 07:22

## 2017-01-20 RX ADMIN — CEFAZOLIN SODIUM 2 G: 2 INJECTION, SOLUTION INTRAVENOUS at 07:32

## 2017-01-20 RX ADMIN — ONDANSETRON 4 MG: 2 INJECTION INTRAMUSCULAR; INTRAVENOUS at 07:46

## 2017-01-20 RX ADMIN — FENTANYL CITRATE 50 MCG: 50 INJECTION, SOLUTION INTRAMUSCULAR; INTRAVENOUS at 08:04

## 2017-01-20 RX ADMIN — LIDOCAINE HYDROCHLORIDE 40 MG: 20 INJECTION, SOLUTION INFILTRATION; PERINEURAL at 07:29

## 2017-01-20 RX ADMIN — FENTANYL CITRATE 100 MCG: 50 INJECTION, SOLUTION INTRAMUSCULAR; INTRAVENOUS at 07:46

## 2017-01-20 RX ADMIN — HYDROMORPHONE HYDROCHLORIDE 0.5 MG: 1 INJECTION, SOLUTION INTRAMUSCULAR; INTRAVENOUS; SUBCUTANEOUS at 08:44

## 2017-01-20 RX ADMIN — PROPOFOL 200 MG: 10 INJECTION, EMULSION INTRAVENOUS at 07:29

## 2017-01-20 RX ADMIN — FENTANYL CITRATE 50 MCG: 50 INJECTION, SOLUTION INTRAMUSCULAR; INTRAVENOUS at 08:07

## 2017-01-20 RX ADMIN — SODIUM CHLORIDE, POTASSIUM CHLORIDE, SODIUM LACTATE AND CALCIUM CHLORIDE: 600; 310; 30; 20 INJECTION, SOLUTION INTRAVENOUS at 06:58

## 2017-01-20 NOTE — BRIEF OP NOTE
[facility] Brief Operative Note    Pre-operative diagnosis: maltracking right patella   Post-operative diagnosis: right knee patellar maltracking    Procedure: Procedure(s):  ARTHROSCOPY KNEE WITH RETINACULAR RELEASE LATERAL   Surgeon: Yevgeniy Graf DO   Assistant(s): None   Estimated blood loss:  Fluids: Less than 10 ml  900 ml Crystalloids   Specimens: None   Findings: See dictated operative report for full details [id number]     Magdaleno Graf D.O.

## 2017-01-20 NOTE — IP AVS SNAPSHOT
MRN:[id number]                      After Visit Summary   1/20/2017    Mary Valdez    MRN: [id number]           Thank you!     Thank you for choosing [facility] for your care. Our goal is always to provide you with excellent care. Hearing back from our patients is one way we can continue to improve our services. Please take a few minutes to complete the written survey that you may receive in the mail after you visit with us. Thank you!        Patient Information     Date Of Birth          1995        About your hospital stay     You were admitted on:  January 20, 2017 You last received care in the:  [facility]    You were discharged on:  January 20, 2017       Who to Call     For medical emergencies, please call 911.  For non-urgent questions about your medical care, please call your primary care provider or clinic, [phone]  For questions related to your surgery, please call your surgery clinic        Attending Provider     Provider    Yevgeniy Graf,        Primary Care Provider Office Phone # Fax #    Estela Mcfadden Mai, [phone]       [address]   [facility] [address]        After Care Instructions      Diet as Tolerated       Return to diet before surgery, unless instructed otherwise.            Discharge Instructions - Lifting Limit (specify)       Lifting limit  0 pounds until seen at Post-op follow up appointment.            Discharge Instructions       Review outpatient procedure discharge instructions with patient as directed by Provider            Dressing Change       Change dressing on third day after surgery.            Ice to affected area       Ice pack to surgical site every 15 minutes per hour for 24 hours            No Alcohol       For 24 hours post procedure            No driving or operating machinery        until the day after procedure            Notify Provider       For signs and  symptoms of infection: Fever greater than 101, redness, swelling, heat at site, drainage, pus.            Return to clinic       Return to clinic in 10 days            Weight bearing - As tolerated           Wound care       Do not immerse wound in water until sutures removed                  Your next 10 appointments already scheduled     Feb 01, 2017  9:00 AM   [facility]    [address] [facility] [phone] [phone]              Further instructions from your care team       General Knee Arthroscopy Discharge Instructions                                     [phone]  Bone and Joint Service Line for issues or concerns          General Care:  After surgery you may feel tired/sleepy. This is normal. Please have someone stay with you for 24 hours after surgery. You should avoid driving for 1-2 days after surgery, as your reaction time may be slow. You should not drive at all if you have had surgery on your arms, right leg and/or are taking narcotic pain medications until released by your doctor. If you have any question along the way please contact the office. If you feel it is an issue cannot wait for normal office hours, contact the on-call physician.  Elevate your leg with a couple of pillows placed under your ankle/calf area. Do this for the first couple days frequently.     Bandages:   Change your bandage after the first 72 hours. You may use Band-Aids or sterile gauze with a small amount of tape. It s normal to have some blood-tinged fluid on your bandages, this will usually continue for the first day or two. Keep the area clean and dry. Do not apply any ointments. Use the ACE wrap from your foot to thigh until you are seen at your follow up.     Bathing:  Do not submerge your incision in water such as a bath or pool. It is ok to shower after removing your initial bandage after the first 2 days from  surgery. Avoid any excessively hot showers, baths, or hot tubes after surgery.     Follow up:  Your follow up appointment should already be scheduled. If its not, please call the office to verify an appointment 10 days after surgery.     Diet:  Start with non-alcoholic liquids at first, particularly water or sports drinks after surgery. Progress to bland foods such as crackers and bread and finally to your normal diet if you have no problems.     Pain control:  Take your pain medications as prescribed. These medications may make you sleepy. Do not drive, operate equipment, or drink alcohol when taking these.  You may take Tylenol (Generic name is acetaminophen) or NSAIDs (Motrin, Ibuprofen, Aleve, Naproxen) as directed on the bottle for additional relief or in place of the prescribed pain medications as your pain gets better. If the medications cause a reaction such as nausea or skin rash, stop taking them and contact your doctor. Please plan accordingly, pain medications will not be re-filled on the weekends or at night. Call the office during the day if you need more medications.    Crutches:  Use crutches/walker/cane only if needed after surgery. You can stop using these when you feel stable on your feet.     Braces:  Some surgeries will require the use of a brace. Use this brace as directed.         Physical Therapy:  Depending on your surgery, physical therapy may start within a few days or be delayed 4-6 weeks. At your first post-operative visit, your doctor will direct you on your personal therapy program if needed.     Activity:  Unless otherwise instructed, you can weight bear as tolerated. While laying or sitting down you should straighten your knee all the way out and then gently work on bending the knee back. Do not worry at first if your knee feels stiff and will not bend like normal, this will get better.     Normal findings after surgery:  Numbness and tenderness around the incisions is normal.  You  may have bruising around the incisions.  Your knee will be swollen for 3 weeks after surgery. It will feel  tight  to move.   Low grade fevers less than 100.5 degrees Fahrenheit are normal.       When to call the Office:  Temperature greater than 101.5 degrees Fahreheit.  Fever, chills, and increasing pain in the knee.  Excessive drainage from the incisions that include bright red blood.  Drainage from the incisions sites that appear yellow, pus-like, or foul smelling.  Increasing pain the knee not relieved by the prescribed pain medications or ice.  Pain or swelling in your calf area (in back above your ankle)  Any other effects you feel are significant.    [facility]   Adult Discharge Orders & Instructions     For 24 hours after surgery    1. Get plenty of rest.  A responsible adult must stay with you for at least 24 hours after you leave the hospital.   2. Do not drive or use heavy equipment.  If you have weakness or tingling, don't drive or use heavy equipment until this feeling goes away.  3. Do not drink alcohol.  4. Avoid strenuous or risky activities.  Ask for help when climbing stairs.   5. You may feel lightheaded.  IF so, sit for a few minutes before standing.  Have someone help you get up.   6. If you have nausea (feel sick to your stomach): Drink only clear liquids such as apple juice, ginger ale, broth or 7-Up.  Rest may also help.  Be sure to drink enough fluids.  Move to a regular diet as you feel able.  7. You may have a slight fever. Call the doctor if your fever is over 100 F (37.7 C) (taken under the tongue) or lasts longer than 24 hours.  8. You may have a dry mouth, a sore throat, muscle aches or trouble sleeping.  These should go away after 24 hours.  9. Do not make important or legal decisions.   Call your doctor for any of the followin.  Signs of infection (fever, growing tenderness at the surgery site, a large amount of drainage or bleeding, severe pain, foul-smelling  drainage, redness, swelling).    2. It has been over 8 to 10 hours since surgery and you are still not able to urinate (pass water).        Pending Results     No orders found from 1/19/2017 to 1/21/2017.            Admission Information        Provider Department Dept Phone    1/20/2017 Yevgeniy Graf, DO Ph Preop/Phase [phone]      Your Vitals Were     Blood Pressure Temperature Respirations Pulse Oximetry          123/74 mmHg 97.9  F (36.6  C) (Axillary) 17 96%        MyChart Information     [employer] gives you secure access to your electronic health record. If you see a primary care provider, you can also send messages to your care team and make appointments. If you have questions, please call your primary care clinic.  If you do not have a primary care provider, please call [phone] and they will assist you.        Care EveryWhere ID     This is your Care EveryWhere ID. This could be used by other organizations to access your [facility] medical records  [id number]           Review of your medicines      START taking        Dose / Directions    oxyCODONE 5 MG IR tablet   Commonly known as:  ROXICODONE   Used for:  Maltracking of right patella        Dose:  5-10 mg   Take 1-2 tablets (5-10 mg) by mouth every 4 hours as needed for pain or other (Moderate to Severe)   Quantity:  60 tablet   Refills:  0         CONTINUE these medicines which have NOT CHANGED        Dose / Directions    IRON SUPPLEMENT PO        Dose:  325 mg   Take 325 mg by mouth daily (with breakfast)   Refills:  0       medroxyPROGESTERone 150 MG/ML injection   Commonly known as:  DEPO-PROVERA        Dose:  150 mg   Inject 150 mg into the muscle every 3 months   Refills:  0       MULTI-VITAMIN DAILY PO        Dose:  1 tablet   Take 1 tablet by mouth daily.   Refills:  0            Where to get your medicines      Some of these will need a paper prescription and others can be bought over the counter. Ask your nurse if you have  questions.     Bring a paper prescription for each of these medications    - oxyCODONE 5 MG IR tablet             Protect others around you: Learn how to safely use, store and throw away your medicines at www.disposemymeds.org.             Medication List: This is a list of all your medications and when to take them. Check marks below indicate your daily home schedule. Keep this list as a reference.      Medications           Morning Afternoon Evening Bedtime As Needed    IRON SUPPLEMENT PO   Take 325 mg by mouth daily (with breakfast)                                medroxyPROGESTERone 150 MG/ML injection   Commonly known as:  DEPO-PROVERA   Inject 150 mg into the muscle every 3 months                                MULTI-VITAMIN DAILY PO   Take 1 tablet by mouth daily.                                oxyCODONE 5 MG IR tablet   Commonly known as:  ROXICODONE   Take 1-2 tablets (5-10 mg) by mouth every 4 hours as needed for pain or other (Moderate to Severe)

## 2017-01-20 NOTE — DISCHARGE INSTRUCTIONS
General Knee Arthroscopy Discharge Instructions                                     [phone]  [facility] Line for issues or concerns          General Care:  After surgery you may feel tired/sleepy. This is normal. Please have someone stay with you for 24 hours after surgery. You should avoid driving for 1-2 days after surgery, as your reaction time may be slow. You should not drive at all if you have had surgery on your arms, right leg and/or are taking narcotic pain medications until released by your doctor. If you have any question along the way please contact the office. If you feel it is an issue cannot wait for normal office hours, contact the on-call physician.  Elevate your leg with a couple of pillows placed under your ankle/calf area. Do this for the first couple days frequently.     Bandages:   Change your bandage after the first 72 hours. You may use Band-Aids or sterile gauze with a small amount of tape. It s normal to have some blood-tinged fluid on your bandages, this will usually continue for the first day or two. Keep the area clean and dry. Do not apply any ointments. Use the ACE wrap from your foot to thigh until you are seen at your follow up.     Bathing:  Do not submerge your incision in water such as a bath or pool. It is ok to shower after removing your initial bandage after the first 2 days from surgery. Avoid any excessively hot showers, baths, or hot tubes after surgery.     Follow up:  Your follow up appointment should already be scheduled. If its not, please call the office to verify an appointment 10 days after surgery.     Diet:  Start with non-alcoholic liquids at first, particularly water or sports drinks after surgery. Progress to bland foods such as crackers and bread and finally to your normal diet if you have no problems.     Pain control:  Take your pain medications as prescribed. These medications may make you sleepy. Do not drive, operate equipment, or drink  alcohol when taking these.  You may take Tylenol (Generic name is acetaminophen) or NSAIDs (Motrin, Ibuprofen, Aleve, Naproxen) as directed on the bottle for additional relief or in place of the prescribed pain medications as your pain gets better. If the medications cause a reaction such as nausea or skin rash, stop taking them and contact your doctor. Please plan accordingly, pain medications will not be re-filled on the weekends or at night. Call the office during the day if you need more medications.    Crutches:  Use crutches/walker/cane only if needed after surgery. You can stop using these when you feel stable on your feet.     Braces:  Some surgeries will require the use of a brace. Use this brace as directed.         Physical Therapy:  Depending on your surgery, physical therapy may start within a few days or be delayed 4-6 weeks. At your first post-operative visit, your doctor will direct you on your personal therapy program if needed.     Activity:  Unless otherwise instructed, you can weight bear as tolerated. While laying or sitting down you should straighten your knee all the way out and then gently work on bending the knee back. Do not worry at first if your knee feels stiff and will not bend like normal, this will get better.     Normal findings after surgery:  Numbness and tenderness around the incisions is normal.  You may have bruising around the incisions.  Your knee will be swollen for 3 weeks after surgery. It will feel  tight  to move.   Low grade fevers less than 100.5 degrees Fahrenheit are normal.       When to call the Office:  Temperature greater than 101.5 degrees Fahreheit.  Fever, chills, and increasing pain in the knee.  Excessive drainage from the incisions that include bright red blood.  Drainage from the incisions sites that appear yellow, pus-like, or foul smelling.  Increasing pain the knee not relieved by the prescribed pain medications or ice.  Pain or swelling in your calf  area (in back above your ankle)  Any other effects you feel are significant.    [facility]   Adult Discharge Orders & Instructions     For 24 hours after surgery    1. Get plenty of rest.  A responsible adult must stay with you for at least 24 hours after you leave the hospital.   2. Do not drive or use heavy equipment.  If you have weakness or tingling, don't drive or use heavy equipment until this feeling goes away.  3. Do not drink alcohol.  4. Avoid strenuous or risky activities.  Ask for help when climbing stairs.   5. You may feel lightheaded.  IF so, sit for a few minutes before standing.  Have someone help you get up.   6. If you have nausea (feel sick to your stomach): Drink only clear liquids such as apple juice, ginger ale, broth or 7-Up.  Rest may also help.  Be sure to drink enough fluids.  Move to a regular diet as you feel able.  7. You may have a slight fever. Call the doctor if your fever is over 100 F (37.7 C) (taken under the tongue) or lasts longer than 24 hours.  8. You may have a dry mouth, a sore throat, muscle aches or trouble sleeping.  These should go away after 24 hours.  9. Do not make important or legal decisions.   Call your doctor for any of the followin.  Signs of infection (fever, growing tenderness at the surgery site, a large amount of drainage or bleeding, severe pain, foul-smelling drainage, redness, swelling).    2. It has been over 8 to 10 hours since surgery and you are still not able to urinate (pass water).

## 2017-01-20 NOTE — IP AVS SNAPSHOT
[facility] Phase II    [address]     [place] [address]    Phone:  [phone]                                       After Visit Summary   1/20/2017    Mary Valdez    MRN: [id number]           After Visit Summary Signature Page     I have received my discharge instructions, and my questions have been answered. I have discussed any challenges I see with this plan with the nurse or doctor.    ..........................................................................................................................................  Patient/Patient Representative Signature      ..........................................................................................................................................  Patient Representative Print Name and Relationship to Patient    ..................................................               ................................................  Date                                            Time    ..........................................................................................................................................  Reviewed by Signature/Title    ...................................................              ..............................................  Date                                                            Time

## 2017-01-20 NOTE — ANESTHESIA POSTPROCEDURE EVALUATION
Patient: Mary Valdez    ARTHROSCOPY KNEE WITH RETINACULAR RELEASE MEDIAL OR LATERAL (Right Knee)  Additional InformationProcedure(s):  right knee arthroscopy with arthroscopic lateral retinacular release - Wound Class: I-Clean    Diagnosis:maltracking right patella  Diagnosis Additional Information: No value filed.    Anesthesia Type:  General, LMA    Note:  Anesthesia Post Evaluation    Patient location during evaluation: Phase 2  Patient participation: Able to fully participate in evaluation  Level of consciousness: awake and alert  Pain management: adequate  Airway patency: patent  Cardiovascular status: acceptable  Respiratory status: acceptable  Hydration status: acceptable  PONV: none     Anesthetic complications: None          Last vitals:  Filed Vitals:    01/20/17 0638 01/20/17 0805   BP: 125/80 134/72   Temp: 98.4  F (36.9  C) 97.7  F (36.5  C)   Resp: 16 20   SpO2: 97%        Electronically Signed By: MERVIN Davidson CRNA  January 20, 2017  8:44 AM

## 2017-01-20 NOTE — ANESTHESIA PREPROCEDURE EVALUATION
Anesthesia Evaluation     .        ROS/MED HX    ENT/Pulmonary:  - neg pulmonary ROS     Neurologic: Comment: Bell's Palsy      Cardiovascular:  - neg cardiovascular ROS       METS/Exercise Tolerance:     Hematologic:  - neg hematologic  ROS       Musculoskeletal:   (+) , , other musculoskeletal- Knee problems      GI/Hepatic:  - neg GI/hepatic ROS       Renal/Genitourinary:  - ROS Renal section negative       Endo:  - neg endo ROS       Psychiatric: Comment: PTSD    (+) anxiety and depression      Infectious Disease:  - neg infectious disease ROS       Malignancy:      - no malignancy   Other:    - neg other ROS           Physical Exam  Normal systems: cardiovascular, pulmonary and dental    Airway   Mallampati: I  TM distance: >3 FB  Neck ROM: full    Dental     Cardiovascular   Rhythm and rate: regular and normal      Pulmonary    breath sounds clear to auscultation                    Anesthesia Plan      History & Physical Review  History and physical reviewed and following examination; no interval change.    ASA Status:  2 .    NPO Status:  > 8 hours    Plan for General and LMA with Intravenous and Propofol induction. Maintenance will be Inhalation.    PONV prophylaxis:  Ondansetron (or other 5HT-3) and Dexamethasone or Solumedrol       Postoperative Care  Postoperative pain management:  IV analgesics and Oral pain medications.      Consents  Anesthetic plan, risks, benefits and alternatives discussed with:  Patient..                          .

## 2017-01-20 NOTE — OP NOTE
DATE OF PROCEDURE:  01/20/2017.      PREOPERATIVE DIAGNOSIS:  Right knee patellar maltracking.      POSTOPERATIVE DIAGNOSIS:  Right knee patellar maltracking.      PROCEDURE:  Right knee arthroscopy with arthroscopic lateral retinacular release.      SURGEON:  Yevgeniy Graf DO      FIRST ASSISTANT:  None.      ANESTHESIA:  General.      COMPLICATIONS:  None.      ESTIMATED BLOOD LOSS:  Less than 50 mL.      FLUIDS:  900 mL crystalloids.      COUNTS:  Correct.      DISPOSITION:  To PACU in stable condition.      GROSS FINDINGS:  The articular cartilage was pristine in all 3 compartments.  Medial meniscus was intact with no tearing.  The lateral meniscus did show to be hypermobile; however, there were no discrete tears.  Given her preoperative symptoms and clinical exam which was more consistent with patellar maltracking, I opted to forego any fixation to the lateral meniscus.  ACL, PCL were stable with probing.  There was gross lateral subluxation of the patella.  With the release with flexion, the patella tracked more medial and central down the trochlea.      NOTE:  Mary Valdez is a pleasant 21-year-old female with complaints of right knee pain.  She has had pain in this knee since early 2016.  She has attempted formal physical therapy, home physical therapy, anti-inflammatories, rest, activity modification, aspirin, bracing, Johnson taping.  The taping provided the most relief; however, it was transient once the tape was removed.  Pain impacted the quality of her life.  She had pain that bothered her with most activities including walking.  She was undergoing frustrations from her symptoms.  Exam showed gross lateral maltracking.  We discussed treatment options from lateral retinacular release to tibial tubercle osteotomy.  I reviewed the risks, benefits, pros and cons of each.  I also discussed recovery time with each.  Once we reviewed this, she opted to proceed with lateral retinacular release.  Risks,  benefits, complications, limitations and anticipated postoperative course discussed.  Risks including infection, bleeding, blood clots, hemarthrosis, cartilage degeneration, scar, scar tenderness, skin problems, failure to relieve all symptoms reviewed.  I also discussed the risks of anesthesia, as well as the postoperative time for recovery.  Once thoroughly reviewed, she opted to proceed.      DETAILS OF PROCEDURE:  She was met preoperatively, and again, informed consent was verified.  Appropriate extremity was marked.  She was wheeled to OP suite #1, transferred off the cart to the OR table without incidence.  All bony prominences were padded.  When deemed appropriate anesthesia, right lower extremity was prepped and draped in a normal manner.  Prior to incision, a timeout was performed and the patient, surgery and extremity were verified, antibiotics administered.  Portal sites were superficially anesthetized with some Marcaine.  A lateral portal was established.  The trocar was gradually easily introduced intra-articular.  No significant resistance.  Under direct visualization with an outside-in technique using a spinal needle, a medial portal was established.  The medial and lateral compartments were thoroughly probed.  The posterior horn of the lateral meniscus was hypermobile.  However, there were no definitive tears.  Based on her preoperative exam and symptoms, I opted to forego any treatment here as her symptoms were more consistent with a patellar maltracking.  Patellofemoral compartment was carefully probed and visualized.  There was lateral subluxation of patella.  The lateral retinacular area was visualized.  I placed a spinal needle on the superior pole of the patella for marking.  I then utilized an Ambient wand and performed a lateral release.  Great care was taken to preserve hemostasis.  Once completed, I gently flexed the knee and the patella tracked more normally.  The knee was copiously  irrigated, suctioned dry multiple times.  There were no bleeders noted.  The instruments removed.  Portal sites were closed with nylon and sterile dressings applied.  The knee was injected with a mixture of morphine and Marcaine.  Subsequently transferred to the PACU in stable condition.      PLAN:  She will be discharged home on oxycodone for pain.  Followup has been scheduled, discharge instructions provided.         JERRELL YOUNGBLOOD DO             D: 2017 08:17   T: 2017 10:21   MT:       Name:     OLLIE TALAMANTES   MRN:      [id number]        Account:        [id number]   :      1995           Procedure Date: 2017      Document: [id number]       cc: Jerrell Youngblood DO

## 2017-01-20 NOTE — ANESTHESIA CARE TRANSFER NOTE
Patient: Mary Hollins Ulferyayo    ARTHROSCOPY KNEE WITH RETINACULAR RELEASE MEDIAL OR LATERAL (Right Knee)  Additional InformationProcedure(s):  right knee arthroscopy with arthroscopic lateral retinacular release - Wound Class: I-Clean    Diagnosis: maltracking right patella  Diagnosis Additional Information: No value filed.    Anesthesia Type:   General, LMA     Note:  Airway :Nasal Cannula  Patient transferred to:PACU        Vitals: (Last set prior to Anesthesia Care Transfer)              Electronically Signed By: MERVIN Davidson CRNA  January 20, 2017  8:09 AM

## 2017-01-20 NOTE — INTERVAL H&P NOTE
This H&P has been reviewed and there are no clinically significant changes in the patient s condition.  The patient was evaluated by myself as well as Dr. Estela Gray prior to surgery. The Patient is approved for surgery.

## 2017-02-01 ENCOUNTER — OFFICE VISIT (OUTPATIENT)
Dept: ORTHOPEDICS | Facility: CLINIC | Age: 22
End: 2017-02-01
Payer: COMMERCIAL

## 2017-02-01 VITALS — TEMPERATURE: 97.7 F | HEIGHT: 69 IN | WEIGHT: 167 LBS | BODY MASS INDEX: 24.73 KG/M2

## 2017-02-01 DIAGNOSIS — M22.8X1 MALTRACKING OF RIGHT PATELLA: Primary | ICD-10-CM

## 2017-02-01 PROCEDURE — 99024 POSTOP FOLLOW-UP VISIT: CPT | Performed by: ORTHOPAEDIC SURGERY

## 2017-02-01 ASSESSMENT — PAIN SCALES - GENERAL: PAINLEVEL: NO PAIN (1)

## 2017-02-01 NOTE — PROGRESS NOTES
Orthopedic Clinic Post-Operative Note    CHIEF COMPLAINT:   Chief Complaint   Patient presents with     Surgical Followup     DOS: 1/20/17~Right knee arthroscopy with arthroscopic lateral retinacular release~12 days postop       HISTORY OF PRESENT ILLNESS  Location: right knee   Rating of Pain:  mild  Pain is better with:  Rest  Pain improving overall: Yes  Associated Features: None  Patient concerns: Very happy with outcome.    Patient's past medical, surgical, social and family histories reviewed.     Past Medical History   Diagnosis Date     Depression      Venice Meyer, psychologist, [employer] in [place]     Bell's palsy 5 y/o     Due to trauma, neg Lyme     Psoriasis      scalp only     Major depression in complete remission (H) 7/7/2014     Depressive disorder        Past Surgical History   Procedure Laterality Date     C nonspecific procedure  02/12/01     adenoidectomy     Dental surgery       Adenoidectomy       Mouth surgery       Arthroscopy knee with retinacular release medial or lateral Right 1/20/2017     Procedure: ARTHROSCOPY KNEE WITH RETINACULAR RELEASE MEDIAL OR LATERAL;  Surgeon: Yevgeniy Graf DO;  Location: [facility] OR       Medications:    Current Outpatient Prescriptions on File Prior to Visit:  medroxyPROGESTERone (DEPO-PROVERA) 150 MG/ML injection Inject 150 mg into the muscle every 3 months   oxyCODONE (ROXICODONE) 5 MG IR tablet Take 1-2 tablets (5-10 mg) by mouth every 4 hours as needed for pain or other (Moderate to Severe)   Multiple Vitamin (MULTI-VITAMIN DAILY PO) Take 1 tablet by mouth daily.   Ferrous Sulfate (IRON SUPPLEMENT PO) Take 325 mg by mouth daily (with breakfast)     No current facility-administered medications on file prior to visit.    Allergies   Allergen Reactions     No Known Drug Allergies        Social History     Occupational History     Not on file.     Social History Main Topics     Smoking status: Never Smoker      Smokeless tobacco: Never Used      "Alcohol Use: No     Drug Use: No     Sexual Activity:     Partners: Male     Birth Control/ Protection: Injection      Comment: single, no children.  work - home health care       Family History   Problem Relation Age of Onset     Other - See Comments Father      benign nerve spinal tumor      Hypertension Father      DIABETES Maternal Grandmother      Depression Maternal Grandmother      Seizure Disorder Brother        REVIEW OF SYSTEMS  General: negative for, night sweats, dizziness, fatigue  Resp: No shortness of breath and no cough  CV: negative for chest pain, syncope or near-syncope  GI: negative for nausea, vomiting and diarrhea  : negative for dysuria and hematuria  Musculoskeletal: as above  Neurologic: negative for syncope   Hematologic: negative for bleeding disorder    Physical Exam:  Vitals: Temp(Src) 97.7  F (36.5  C) (Temporal)  Ht 5' 8.75\" (1.746 m)  Wt 167 lb (75.751 kg)  BMI 24.85 kg/m2  BMI= Body mass index is 24.85 kg/(m^2).  Constitutional: healthy, alert and no acute distress   Psychiatric: mentation appears normal and affect normal/bright  NEURO: no focal deficits  SKIN: .healing well, well approximated skin edges, without signs of infection including no erythema, incision breakdown or purlent drainage  JOINT/EXTREMITIES: some terminal extension lag. Small effusion. Appropriate tenderness around the knee.No peripheral edema.  GAIT: antalgic    Diagnostic Modalities:  None today.  Independent visualization of the images was performed.      Impression:   Chief Complaint   Patient presents with     Surgical Followup     DOS: 1/20/17~Right knee arthroscopy with arthroscopic lateral retinacular release~12 days postop   Doing well. Arthroscopy pictures reviewed.    Plan:   Activity: Limited repetitive use  Staples/Sutures out: Yes.  Pain controlled: Yes. Continue to use: Nothing  Immobilzation: No  Physical Therapy: Address the anterior knee program status post lateral retinacular " release  Rest, Ice  Return to clinic 4-6 , week(s), or sooner as needed for changes.    Re-x-ray on return: No    Magdaleno Graf D.O.

## 2017-02-01 NOTE — NURSING NOTE
"Chief Complaint   Patient presents with     Surgical Followup     DOS: 1/20/17~Right knee arthroscopy with arthroscopic lateral retinacular release~12 days postop       Initial Temp(Src) 97.7  F (36.5  C) (Temporal)  Ht 1.746 m (5' 8.75\")  Wt 75.751 kg (167 lb)  BMI 24.85 kg/m2 Estimated body mass index is 24.85 kg/(m^2) as calculated from the following:    Height as of this encounter: 1.746 m (5' 8.75\").    Weight as of this encounter: 75.751 kg (167 lb).  BP completed using cuff size: NA (Not Taken)  Nettie/CATHY       "

## 2017-02-08 ENCOUNTER — HOSPITAL ENCOUNTER (OUTPATIENT)
Dept: PHYSICAL THERAPY | Facility: CLINIC | Age: 22
Setting detail: THERAPIES SERIES
End: 2017-02-08
Attending: FAMILY MEDICINE
Payer: COMMERCIAL

## 2017-02-08 DIAGNOSIS — M22.8X1 MALTRACKING OF RIGHT PATELLA: Primary | ICD-10-CM

## 2017-02-08 PROCEDURE — 97110 THERAPEUTIC EXERCISES: CPT | Mod: GP | Performed by: PHYSICAL THERAPIST

## 2017-02-08 PROCEDURE — 40000718 ZZHC STATISTIC PT DEPARTMENT ORTHO VISIT: Performed by: PHYSICAL THERAPIST

## 2017-02-08 PROCEDURE — 97161 PT EVAL LOW COMPLEX 20 MIN: CPT | Mod: GP | Performed by: PHYSICAL THERAPIST
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
## 2017-02-28 ENCOUNTER — TELEPHONE (OUTPATIENT)
Dept: PHYSICAL THERAPY | Facility: CLINIC | Age: 22
End: 2017-02-28

## 2017-03-02 ENCOUNTER — TELEPHONE (OUTPATIENT)
Dept: FAMILY MEDICINE | Facility: CLINIC | Age: 22
End: 2017-03-02

## 2017-03-02 NOTE — TELEPHONE ENCOUNTER
RN tried to reach out to patient to discuss her current SX and to see if waiting until her scheduled appt with Janis Harrell, PAC, on 3/8/17 is appropriate, but NA. Unable to leave message.........LEONARD Gonzalez

## 2017-03-02 NOTE — TELEPHONE ENCOUNTER
----- Message from Ollie Valdez sent at 3/1/2017  5:37 PM CST -----  Regarding: Appointment scheduled from [facility]  Contact: [phone]  Appointment For: OLLIE VALDEZ ([id number])  Visit Type: [facility] OFFICE VISIT SHORT (910)    3/8/2017     2:15 PM  15 mins.  Janis vEans, PAAlkaC  [facility]    Patient Comments:  Primary Care  Episodes of blurry vision, headache, dizziness,   confusion and fatigue. Also, always hungry even if I   just ate, and usually tired regardless of sleep.

## 2017-03-03 NOTE — TELEPHONE ENCOUNTER
Second attempt to contact patient today, but NA. Unable to leave message................................LEONARD Gonzalez

## 2017-03-03 NOTE — TELEPHONE ENCOUNTER
Patient is informed via MyChart that if she is having these symptoms, she should go to the ED ASAP.  She is informed to call back and speak to a triage nurse.    2 calls have been made, and no answer.  Therefore, MyChart sent.  Leaving open for return call or MyChart.  Kierra Mcdowell RN

## 2017-03-08 ENCOUNTER — MYC MEDICAL ADVICE (OUTPATIENT)
Dept: FAMILY MEDICINE | Facility: CLINIC | Age: 22
End: 2017-03-08

## 2017-03-08 ENCOUNTER — OFFICE VISIT (OUTPATIENT)
Dept: FAMILY MEDICINE | Facility: CLINIC | Age: 22
End: 2017-03-08
Payer: COMMERCIAL

## 2017-03-08 VITALS
SYSTOLIC BLOOD PRESSURE: 116 MMHG | TEMPERATURE: 98.2 F | HEART RATE: 85 BPM | RESPIRATION RATE: 18 BRPM | DIASTOLIC BLOOD PRESSURE: 70 MMHG | WEIGHT: 170 LBS | BODY MASS INDEX: 25.29 KG/M2 | OXYGEN SATURATION: 98 %

## 2017-03-08 DIAGNOSIS — R63.1 EXCESSIVE THIRST: ICD-10-CM

## 2017-03-08 DIAGNOSIS — H53.9 VISUAL DISTURBANCE: Primary | ICD-10-CM

## 2017-03-08 LAB
B-HCG SERPL-ACNC: <1 IU/L (ref 0–5)
BASOPHILS # BLD AUTO: 0 10E9/L (ref 0–0.2)
BASOPHILS NFR BLD AUTO: 0.5 %
CRP SERPL-MCNC: <2.9 MG/L (ref 0–8)
DIFFERENTIAL METHOD BLD: NORMAL
EOSINOPHIL # BLD AUTO: 0.3 10E9/L (ref 0–0.7)
EOSINOPHIL NFR BLD AUTO: 2.9 %
ERYTHROCYTE [DISTWIDTH] IN BLOOD BY AUTOMATED COUNT: 13.1 % (ref 10–15)
ERYTHROCYTE [SEDIMENTATION RATE] IN BLOOD BY WESTERGREN METHOD: 7 MM/H (ref 0–20)
HCT VFR BLD AUTO: 40.3 % (ref 35–47)
HGB BLD-MCNC: 13.1 G/DL (ref 11.7–15.7)
IMM GRANULOCYTES # BLD: 0 10E9/L (ref 0–0.4)
IMM GRANULOCYTES NFR BLD: 0.2 %
LYMPHOCYTES # BLD AUTO: 3.5 10E9/L (ref 0.8–5.3)
LYMPHOCYTES NFR BLD AUTO: 39.7 %
MCH RBC QN AUTO: 28.1 PG (ref 26.5–33)
MCHC RBC AUTO-ENTMCNC: 32.5 G/DL (ref 31.5–36.5)
MCV RBC AUTO: 86 FL (ref 78–100)
MONOCYTES # BLD AUTO: 0.7 10E9/L (ref 0–1.3)
MONOCYTES NFR BLD AUTO: 8 %
NEUTROPHILS # BLD AUTO: 4.3 10E9/L (ref 1.6–8.3)
NEUTROPHILS NFR BLD AUTO: 48.7 %
PLATELET # BLD AUTO: 244 10E9/L (ref 150–450)
RBC # BLD AUTO: 4.67 10E12/L (ref 3.8–5.2)
TSH SERPL DL<=0.05 MIU/L-ACNC: 1.58 MU/L (ref 0.4–4)
WBC # BLD AUTO: 8.8 10E9/L (ref 4–11)

## 2017-03-08 PROCEDURE — 84702 CHORIONIC GONADOTROPIN TEST: CPT | Performed by: PHYSICIAN ASSISTANT

## 2017-03-08 PROCEDURE — 99214 OFFICE O/P EST MOD 30 MIN: CPT | Performed by: PHYSICIAN ASSISTANT

## 2017-03-08 PROCEDURE — 84443 ASSAY THYROID STIM HORMONE: CPT | Performed by: PHYSICIAN ASSISTANT

## 2017-03-08 PROCEDURE — 86140 C-REACTIVE PROTEIN: CPT | Performed by: PHYSICIAN ASSISTANT

## 2017-03-08 PROCEDURE — 85652 RBC SED RATE AUTOMATED: CPT | Performed by: PHYSICIAN ASSISTANT

## 2017-03-08 PROCEDURE — 85025 COMPLETE CBC W/AUTO DIFF WBC: CPT | Performed by: PHYSICIAN ASSISTANT

## 2017-03-08 PROCEDURE — 80053 COMPREHEN METABOLIC PANEL: CPT | Performed by: PHYSICIAN ASSISTANT

## 2017-03-08 PROCEDURE — 36415 COLL VENOUS BLD VENIPUNCTURE: CPT | Performed by: PHYSICIAN ASSISTANT

## 2017-03-08 ASSESSMENT — PAIN SCALES - GENERAL: PAINLEVEL: NO PAIN (0)

## 2017-03-08 NOTE — MR AVS SNAPSHOT
After Visit Summary   3/8/2017    Mary Valdez    MRN: [id number]           Patient Information     Date Of Birth          1995        Visit Information        Provider Department      3/8/2017 2:15 PM Janis Evans PA-C [facility]        Care Instructions    Suggested Magnesium 400mg daily along with Riboflavin (Vitamin B2) 200mg daily.  Both of these products help prevent migraine headaches.     Migraines with aura          Follow-ups after your visit        Your next 10 appointments already scheduled     Mar 14, 2017 11:15 AM CDT   Treatment 45 with Julianne Garcia, PT   [facility] ([facility])    [address] [place] [id number] [phone]            Mar 21, 2017 11:15 AM CDT   Treatment 45 with Julianne Garcia, PT   [facility] ([facility])    [address] [place] [address] [phone]            Mar 28, 2017 10:45 AM CDT   Treatment 45 with Julianne Garcia PT   [facility] ([facility])    [address] [place] [address]   [phone]              Who to contact     If you have questions or need follow up information about today's clinic visit or your schedule please contact [facility] directly at [phone].  Normal or non-critical lab and imaging results will be communicated to you by MyChart, letter or phone within 4 business days after the clinic has received the results. If you do not hear from us within 7 days, please contact the clinic through MyChart or phone. If you have a critical or abnormal lab result, we will notify you by phone as soon as possible.  Submit refill requests through [email or web address] or call your pharmacy and they will forward the refill request to us. Please allow 3 business days for your refill to be completed.          Additional Information About Your Visit         [employer]x Information     [employer] gives you secure access to your electronic health record. If you see a primary care provider, you can also send messages to your care team and make appointments. If you have questions, please call your primary care clinic.  If you do not have a primary care provider, please call [phone] and they will assist you.        Care EveryWhere ID     This is your Care EveryWhere ID. This could be used by other organizations to access your [place] medical records  [id number]        Your Vitals Were     Pulse Temperature Respirations Pulse Oximetry BMI (Body Mass Index)       85 98.2  F (36.8  C) (Tympanic) 18 98% 25.29 kg/m2        Blood Pressure from Last 3 Encounters:   03/08/17 116/70   01/20/17 122/70   01/10/17 114/56    Weight from Last 3 Encounters:   03/08/17 170 lb (77.1 kg) (92 %)*   02/01/17 167 lb (75.8 kg) (91 %)*   01/10/17 167 lb 1.6 oz (75.8 kg) (91 %)*     * Growth percentiles are based on [facility] 2-20 Years data.              Today, you had the following     No orders found for display       Primary Care Provider Office Phone # Fax #    Estela Mcfadden Mai, [phone]       [address] [place] [address]        Thank you!     Thank you for choosing [facility]  for your care. Our goal is always to provide you with excellent care. Hearing back from our patients is one way we can continue to improve our services. Please take a few minutes to complete the written survey that you may receive in the mail after your visit with us. Thank you!             Your Updated Medication List - Protect others around you: Learn how to safely use, store and throw away your medicines at www.disposemymeds.org.          This list is accurate as of: 3/8/17  3:11 PM.  Always use your most recent med list.                   Brand Name Dispense Instructions for use    medroxyPROGESTERone 150 MG/ML injection    DEPO-PROVERA     Inject 150  mg into the muscle every 3 months       MULTI-VITAMIN DAILY PO      Take 1 tablet by mouth daily Reported on 3/8/2017

## 2017-03-08 NOTE — NURSING NOTE
"Chief Complaint   Patient presents with     Dizziness     blurry vision,headaches        Initial /70  Pulse 85  Temp 98.2  F (36.8  C) (Tympanic)  Resp 18  Wt 170 lb (77.1 kg)  SpO2 98%  BMI 25.29 kg/m2 Estimated body mass index is 25.29 kg/(m^2) as calculated from the following:    Height as of 2/1/17: 5' 8.75\" (1.746 m).    Weight as of this encounter: 170 lb (77.1 kg).  BP completed using cuff size: marixa Alonso MA      "

## 2017-03-08 NOTE — PROGRESS NOTES
"  SUBJECTIVE:                                                    Mary Valdez is a 21 year old female here today with her s.o. who presents to clinic today for the following health issues:      Blurry vision,headaches and dizziness - feels \"lesser version my whole self\". Past 2 months, intensified symptoms, but now noticing similar symptoms even after eating. Feels shaky.  Feels dazed, confused, fatigued  - not daily & new symptoms. 1-2x weekly minimumquiets down, stares in one spot, spacy.     Does not use OTC supplements, illegal or prescribed drugs, or drink EtOH.  Is worried about diabetes.  Was in mid 2016 and had a big set of labs done - found to be hypoglycemic with glucose of 63.  Uses Depo provera (x1.5 yrs)  for contraception. Does not have periods. Is sexually active. Has noted a slight weight gain since starting Depo went from 145 & is now 170.      Family hx positive for grand and petit mal seizures in her brother & MGMo with Type 2 diabetes.         Problem list and histories reviewed & adjusted, as indicated.  Additional history: as documented    Past Medical History   Diagnosis Date     Bell's palsy 7 y/o     Due to trauma, neg Lyme     Depression      Venice Meyer, psychologist, [employer] in [place]     Depressive disorder      Major depression in complete remission (H) 7/7/2014     Psoriasis      scalp only     Past Surgical History   Procedure Laterality Date     C nonspecific procedure  02/12/01     adenoidectomy     Dental surgery       Adenoidectomy       Mouth surgery       Arthroscopy knee with retinacular release medial or lateral Right 1/20/2017     Procedure: ARTHROSCOPY KNEE WITH RETINACULAR RELEASE MEDIAL OR LATERAL;  Surgeon: Yevgeniy Graf DO;  Location:  OR     Social History   Substance Use Topics     Smoking status: Never Smoker     Smokeless tobacco: Never Used     Alcohol use No     Family History   Problem Relation Age of Onset     Other - See Comments Father "      benign nerve spinal tumor      Hypertension Father      DIABETES Maternal Grandmother      Depression Maternal Grandmother      Seizure Disorder Brother         Allergies   Allergen Reactions     No Known Drug Allergies      Current Outpatient Prescriptions   Medication Sig Dispense Refill     medroxyPROGESTERone (DEPO-PROVERA) 150 MG/ML injection Inject 150 mg into the muscle every 3 months       Multiple Vitamin (MULTI-VITAMIN DAILY PO) Take 1 tablet by mouth daily Reported on 3/8/2017             Reviewed and updated as needed this visit by clinical staff       Reviewed and updated as needed this visit by Provider         ROS:  Constitutional, HEENT, cardiovascular, pulmonary, GI, , musculoskeletal, neuro, skin, endocrine and psych systems are negative, except as otherwise noted.    OBJECTIVE:                                                    /70  Pulse 85  Temp 98.2  F (36.8  C) (Tympanic)  Resp 18  Wt 170 lb (77.1 kg)  SpO2 98%  BMI 25.29 kg/m2  Body mass index is 25.29 kg/(m^2).   GENERAL: healthy, alert and no distress  EYES: Eyes grossly normal to inspection, PERRL and conjunctivae and sclerae normal  HENT: ear canals and TM's normal, nose and mouth without ulcers or lesions  NECK: no adenopathy, no asymmetry, masses, or scars and thyroid normal to palpation  RESP: lungs clear to auscultation - no rales, rhonchi or wheezes  CV: regular rate and rhythm, normal S1 S2, no S3 or S4, no murmur, click or rub, no peripheral edema and peripheral pulses strong  ABDOMEN: soft, nontender, no hepatosplenomegaly, no masses and bowel sounds normal  MS: no gross musculoskeletal defects noted, no edema  SKIN: no suspicious lesions or rashes  NEURO: Normal strength and tone, sensory exam grossly normal, mentation intact, oriented times 3, speech normal, cranial nerves 2-12 intact, DTR's normal and symmetric , gait normal including heel/toe/tandem walking, Romberg normal and rapid alternating movements  normal  PSYCH: mentation appears normal, affect normal/bright    Diagnostic Test Results:  Results for orders placed or performed in visit on 03/08/17   HCG quantitative pregnancy   Result Value Ref Range    HCG Quantitative Serum <1 0 - 5 IU/L   CBC with platelets differential   Result Value Ref Range    WBC 8.8 4.0 - 11.0 10e9/L    RBC Count 4.67 3.8 - 5.2 10e12/L    Hemoglobin 13.1 11.7 - 15.7 g/dL    Hematocrit 40.3 35.0 - 47.0 %    MCV 86 78 - 100 fl    MCH 28.1 26.5 - 33.0 pg    MCHC 32.5 31.5 - 36.5 g/dL    RDW 13.1 10.0 - 15.0 %    Platelet Count 244 150 - 450 10e9/L    Diff Method Automated Method     % Neutrophils 48.7 %    % Lymphocytes 39.7 %    % Monocytes 8.0 %    % Eosinophils 2.9 %    % Basophils 0.5 %    % Immature Granulocytes 0.2 %    Absolute Neutrophil 4.3 1.6 - 8.3 10e9/L    Absolute Lymphocytes 3.5 0.8 - 5.3 10e9/L    Absolute Monocytes 0.7 0.0 - 1.3 10e9/L    Absolute Eosinophils 0.3 0.0 - 0.7 10e9/L    Absolute Basophils 0.0 0.0 - 0.2 10e9/L    Abs Immature Granulocytes 0.0 0 - 0.4 10e9/L   CRP inflammation   Result Value Ref Range    CRP Inflammation <2.9 0.0 - 8.0 mg/L   Erythrocyte sedimentation rate auto   Result Value Ref Range    Sed Rate 7 0 - 20 mm/h   TSH   Result Value Ref Range    TSH 1.58 0.40 - 4.00 mU/L   Comprehensive metabolic panel (BMP + Alb, Alk Phos, ALT, AST, Total. Bili, TP)   Result Value Ref Range    Sodium 143 133 - 144 mmol/L    Potassium 4.4 3.4 - 5.3 mmol/L    Chloride 109 96 - 110 mmol/L    Carbon Dioxide 23 20 - 32 mmol/L    Anion Gap 11 3 - 14 mmol/L    Glucose 74 70 - 99 mg/dL    Urea Nitrogen 11 7 - 30 mg/dL    Creatinine 0.75 0.50 - 1.00 mg/dL    GFR Estimate >90  Non  GFR Calc   >60 mL/min/1.7m2    GFR Estimate If Black >90   GFR Calc   >60 mL/min/1.7m2    Calcium 8.7 8.5 - 10.1 mg/dL    Bilirubin Total 0.3 0.2 - 1.3 mg/dL    Albumin 4.4 3.4 - 5.0 g/dL    Protein Total 8.1 6.8 - 8.8 g/dL    Alkaline Phosphatase 64 40 - 150  U/L    ALT 24 0 - 50 U/L    AST 16 0 - 35 U/L        ASSESSMENT:                                                       Visual disturbance  Excessive thirst      PLAN:                                                        ICD-10-CM    1. Visual disturbance H53.9 HCG quantitative pregnancy     CBC with platelets differential     CRP inflammation     Erythrocyte sedimentation rate auto     TSH     Comprehensive metabolic panel (BMP + Alb, Alk Phos, ALT, AST, Total. Bili, TP)   2. Excessive thirst R63.1 HCG quantitative pregnancy     CBC with platelets differential     CRP inflammation     Erythrocyte sedimentation rate auto     TSH     Comprehensive metabolic panel (BMP + Alb, Alk Phos, ALT, AST, Total. Bili, TP)           These symptoms may be related to aura surrounding silent migraines.  Did suggest she have a visual exam done by specialist. Will have her start Suggested Magnesium 400mg daily along with Riboflavin (Vitamin B2) 200mg daily.  Both of these products help prevent migraine headaches.   If over the next 2-3 months, this does not seem to help, may consider MRI of head and move forward with possible neurology consultation.      Continue with frequent meals every 2 hours to maintain blood glucose levels. Water intake reviewed with her as well. journaling symptoms may be beneficial in sorting through any triggers.     Janis Evans PA-C  [facility]    GREATER THAN 50% OF TIME SPENT IN COUNSELING & CARE COORDINATION - TOTAL FACE TO FACE TIME  30 MINUTES.    Orders Placed This Encounter     HCG quantitative pregnancy     CBC with platelets differential     CRP inflammation     Erythrocyte sedimentation rate auto     TSH     Comprehensive metabolic panel (BMP + Alb, Alk Phos, ALT, AST, Total. Bili, TP)       Chart documentation done in part with Dragon Voice recognition Software. Although reviewed after completion, some word and grammatical error may remain.  AVS given to patient upon  discharge today.  Electronically signed by Janis Evans PA-C  March 10, 2017  3:12 PM

## 2017-03-08 NOTE — PATIENT INSTRUCTIONS
Suggested Magnesium 400mg daily along with Riboflavin (Vitamin B2) 200mg daily.  Both of these products help prevent migraine headaches.     Migraines with aura

## 2017-03-08 NOTE — PROGRESS NOTES
Mary - your labs are completely normal. Will proceed with our plan & if worsening symptoms will get MRI.

## 2017-03-09 LAB
ALBUMIN SERPL-MCNC: 4.4 G/DL (ref 3.4–5)
ALP SERPL-CCNC: 64 U/L (ref 40–150)
ALT SERPL W P-5'-P-CCNC: 24 U/L (ref 0–50)
ANION GAP SERPL CALCULATED.3IONS-SCNC: 11 MMOL/L (ref 3–14)
AST SERPL W P-5'-P-CCNC: 16 U/L (ref 0–35)
BILIRUB SERPL-MCNC: 0.3 MG/DL (ref 0.2–1.3)
BUN SERPL-MCNC: 11 MG/DL (ref 7–30)
CALCIUM SERPL-MCNC: 8.7 MG/DL (ref 8.5–10.1)
CHLORIDE SERPL-SCNC: 109 MMOL/L (ref 96–110)
CO2 SERPL-SCNC: 23 MMOL/L (ref 20–32)
CREAT SERPL-MCNC: 0.75 MG/DL (ref 0.5–1)
GFR SERPL CREATININE-BSD FRML MDRD: NORMAL ML/MIN/1.7M2
GLUCOSE SERPL-MCNC: 74 MG/DL (ref 70–99)
POTASSIUM SERPL-SCNC: 4.4 MMOL/L (ref 3.4–5.3)
PROT SERPL-MCNC: 8.1 G/DL (ref 6.8–8.8)
SODIUM SERPL-SCNC: 143 MMOL/L (ref 133–144)

## 2017-03-09 NOTE — PROGRESS NOTES
Mary - your chemistry panel is completely normal.  Glucose is 74 and normal so no diabetes - all indices normal.  Plan will be to have you use the Vit B2 & Magnesium for the next few weeks, journal your symptoms and if continuing to have problems mychart me and I will order an MRI of your head.

## 2017-03-20 NOTE — DISCHARGE SUMMARY
Outpatient Physical Therapy Discharge Note     Patient: Mary Valdez  : 1995    Beginning/End Dates of Reporting Period:  One visit-initial evaluation    Referring Provider: Dr. Yevgeniy Graf    Therapy Diagnosis: Decreased R knee extension and ability to perform functional activities for work eg squat, kneel and descending steps     Client Self Report: Please see initial evaluation. She has not kept her follow up appts and did not respond to voice message left on cell phone.     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   Target Date 17   Date Met      Progress:     Goal Identifier Descending steps   Goal Description Mary will be able to descend 10-12 steps with reciprocal pattern, with 1 rail as needed for balance and safely for negotiating steps at home and curbs in community   Target Date 17   Date Met      Progress:     Goal Identifier Yoga   Goal Description Mary will be able to psrticipate in her Yoga class for flexibility and strength at 50% effort with all poses   Target Date 17   Date Met      Progress:     Goal Identifier Ice Skating   Goal Description Mary will be able to ice skate x 10 minute intervals x 2 for her enjoyment without increased knee pain   Target Date 17   Date Met      Progress:     Goal Identifier Paddle Boarding   Goal Description Mary will have good  control and 1 minute balance bilaterally so she can paddle board x 30 minutes this summer   Target Date 17   Date Met      Progress:           Progress Toward Goals:   Not assessed this period.          Plan:  Discharge from therapy.    Discharge:    Reason for Discharge: Patient has failed to schedule further appointments.     Equipment Issued: none    Discharge Plan: Patient to  continue home program.    Thank you for referring Mary  to [facility]    Julianne Garcia, PT  [phone]

## 2017-04-17 ENCOUNTER — TELEPHONE (OUTPATIENT)
Dept: FAMILY MEDICINE | Facility: OTHER | Age: 22
End: 2017-04-17

## 2017-04-17 ENCOUNTER — HOSPITAL ENCOUNTER (EMERGENCY)
Facility: CLINIC | Age: 22
Discharge: HOME | End: 2017-04-17
Attending: PHYSICIAN ASSISTANT | Admitting: PHYSICIAN ASSISTANT
Payer: MEDICAID

## 2017-04-17 VITALS
DIASTOLIC BLOOD PRESSURE: 94 MMHG | SYSTOLIC BLOOD PRESSURE: 135 MMHG | TEMPERATURE: 98.4 F | RESPIRATION RATE: 16 BRPM | OXYGEN SATURATION: 98 %

## 2017-04-17 DIAGNOSIS — R10.9 FLANK PAIN: ICD-10-CM

## 2017-04-17 DIAGNOSIS — N39.0 URINARY TRACT INFECTION, SITE UNSPECIFIED: ICD-10-CM

## 2017-04-17 LAB
ALBUMIN UR-MCNC: NEGATIVE MG/DL
APPEARANCE UR: CLEAR
BILIRUB UR QL STRIP: NEGATIVE
COLOR UR AUTO: ABNORMAL
GLUCOSE UR STRIP-MCNC: NEGATIVE MG/DL
HCG UR QL: NEGATIVE
HGB UR QL STRIP: NEGATIVE
KETONES UR STRIP-MCNC: NEGATIVE MG/DL
LEUKOCYTE ESTERASE UR QL STRIP: NEGATIVE
MUCOUS THREADS #/AREA URNS LPF: PRESENT /LPF
NITRATE UR QL: NEGATIVE
PH UR STRIP: 6 PH (ref 5–7)
RBC #/AREA URNS AUTO: 0 /HPF (ref 0–2)
SP GR UR STRIP: 1 (ref 1–1.03)
SQUAMOUS #/AREA URNS AUTO: <1 /HPF (ref 0–1)
URN SPEC COLLECT METH UR: ABNORMAL
UROBILINOGEN UR STRIP-MCNC: 0 MG/DL (ref 0–2)
WBC #/AREA URNS AUTO: 0 /HPF (ref 0–2)

## 2017-04-17 PROCEDURE — 81025 URINE PREGNANCY TEST: CPT | Performed by: PHYSICIAN ASSISTANT

## 2017-04-17 PROCEDURE — 99283 EMERGENCY DEPT VISIT LOW MDM: CPT | Mod: Z6 | Performed by: PHYSICIAN ASSISTANT

## 2017-04-17 PROCEDURE — 99283 EMERGENCY DEPT VISIT LOW MDM: CPT | Performed by: PHYSICIAN ASSISTANT

## 2017-04-17 PROCEDURE — 81001 URINALYSIS AUTO W/SCOPE: CPT | Performed by: PHYSICIAN ASSISTANT

## 2017-04-17 RX ORDER — CIPROFLOXACIN 500 MG/1
500 TABLET, FILM COATED ORAL 2 TIMES DAILY
Qty: 6 TABLET | Refills: 0 | Status: SHIPPED | OUTPATIENT
Start: 2017-04-17 | End: 2017-04-20

## 2017-04-17 ASSESSMENT — ENCOUNTER SYMPTOMS
FEVER: 1
VOMITING: 0
APPETITE CHANGE: 1
FREQUENCY: 1
FLANK PAIN: 1
NAUSEA: 1
DYSURIA: 0

## 2017-04-17 NOTE — ED AVS SNAPSHOT
[facility] Emergency Department    [address] [place] [address]    Phone:  [phone]    Fax:  [phone]                                       Mary Valdez   MRN: [id number]    Department:  [facility] Emergency Department   Date of Visit:  4/17/2017           Patient Information     Date Of Birth          1995        Your diagnoses for this visit were:     Flank pain     Urinary tract infection, site unspecified        You were seen by Ruiz Negrete PA-C.      Follow-up Information     Follow up with Estela Gray MD.    Specialty:  Family Practice    Why:  As needed, If symptoms worsen or do not improve    Contact information:    [facility]  [address] [place] [address] [phone]          Discharge Instructions       1.  Please start taking the Ciprofloxacin ( antibiotic ).  2.  Please keep drinking a lot of water to cleanse your urinary tract.  3.  Please return for repeat evaluation if your symptoms are not improving with the antibiotic treatment.      Future Appointments        Provider Department Dept Phone Center    4/18/2017 11:00 AM MERVIN Gomez [facility] [phone] [facility]      24 Hour Appointment Hotline       To make an appointment at any [facility], call [phone] ([phone]). If you don't have a family doctor or clinic, we will help you find one. [facility] are conveniently located to serve the needs of you and your family.             Review of your medicines      START taking        Dose / Directions Last dose taken    ciprofloxacin 500 MG tablet   Commonly known as:  CIPRO   Dose:  500 mg   Quantity:  6 tablet        Take 1 tablet (500 mg) by mouth 2 times daily for 3 days   Refills:  0          Our records show that you are taking the medicines listed below. If these are incorrect, please call your family doctor or clinic.        Dose / Directions Last dose  taken    medroxyPROGESTERone 150 MG/ML injection   Commonly known as:  DEPO-PROVERA   Dose:  150 mg        Inject 150 mg into the muscle every 3 months   Refills:  0        MULTI-VITAMIN DAILY PO   Dose:  1 tablet        Take 1 tablet by mouth daily Reported on 3/8/2017   Refills:  0                Prescriptions were sent or printed at these locations (1 Prescription)                   [facility] - [place], [place] - [address]   [address], [facility] [address]    Telephone:  [phone]   Fax:  [phone]   Hours:                  E-Prescribed (1 of 1)         ciprofloxacin (CIPRO) 500 MG tablet                Procedures and tests performed during your visit     HCG qualitative urine    UA with Microscopic      Orders Needing Specimen Collection     None      Pending Results     No orders found from 4/15/2017 to 4/18/2017.            Pending Culture Results     No orders found from 4/15/2017 to 4/18/2017.            Thank you for choosing [facility]       Thank you for choosing [facility] for your care. Our goal is always to provide you with excellent care. Hearing back from our patients is one way we can continue to improve our services. Please take a few minutes to complete the written survey that you may receive in the mail after you visit with us. Thank you!        [facility] Information     [facility] gives you secure access to your electronic health record. If you see a primary care provider, you can also send messages to your care team and make appointments. If you have questions, please call your primary care clinic.  If you do not have a primary care provider, please call [phone] and they will assist you.        Care EveryWhere ID     This is your Care EveryWhere ID. This could be used by other organizations to access your [facility] medical records  [id number]        After Visit Summary       This is your record. Keep this with you and show to your community pharmacist(s) and doctor(s) at your next  visit.

## 2017-04-17 NOTE — ED AVS SNAPSHOT
[facility] Emergency Department    [address] [place] [address]    Phone:  [phone]    Fax:  [phone]                                       Mary Valdez   MRN: [id number]    Department:  [facility] Emergency Department   Date of Visit:  4/17/2017           After Visit Summary Signature Page     I have received my discharge instructions, and my questions have been answered. I have discussed any challenges I see with this plan with the nurse or doctor.    ..........................................................................................................................................  Patient/Patient Representative Signature      ..........................................................................................................................................  Patient Representative Print Name and Relationship to Patient    ..................................................               ................................................  Date                                            Time    ..........................................................................................................................................  Reviewed by Signature/Title    ...................................................              ..............................................  Date                                                            Time

## 2017-04-17 NOTE — ED PROVIDER NOTES
"  History     Chief Complaint   Patient presents with     Flank Pain     The history is provided by the patient.     Mary Valdez is a 21 year old female who presents to the emergency department with bilateral flank pain. She states that for the last 3 weeks she has had worsening bilateral flank pain. Today, the pain is at its worst. She says movement and touch to flanks causes significant pain. Patient also reports frequency, urgency, decreased urine output, foul odor with urine and decreased appetite. She says that she had a fever two days ago and yesterday (highest of 101.6 degrees) with associated nausea but the fever and nausea have cleared today. She denies dysuria, rashes or vomiting. Patient has not had ibuprofen or Tylenol today. Patient states she has drinking \"lots of water\" and cranberry juice.     I have reviewed the Medications, Allergies, Past Medical and Surgical History, and Social History in the Epic system.    Patient Active Problem List   Diagnosis     Psoriasis     Migratory pain     Anxiety state     Major depression in complete remission (H)     Contraception     NENO (generalized anxiety disorder)     PTSD (post-traumatic stress disorder)     Maltracking of right patella     Past Medical History:   Diagnosis Date     Bell's palsy 5 y/o    Due to trauma, neg Lyme     Depression     Venice Meyer, psychologist, [employer] in [place]     Depressive disorder      Major depression in complete remission (H) 7/7/2014     Psoriasis     scalp only       Past Surgical History:   Procedure Laterality Date     ADENOIDECTOMY       ARTHROSCOPY KNEE WITH RETINACULAR RELEASE Right 1/20/2017    Procedure: ARTHROSCOPY KNEE WITH RETINACULAR RELEASE MEDIAL OR LATERAL;  Surgeon: Yevgeniy Graf DO;  Location: [facility] OR     C NONSPECIFIC PROCEDURE  02/12/01    adenoidectomy     DENTAL SURGERY       MOUTH SURGERY         Family History   Problem Relation Age of Onset     Other - See Comments Father      " benign nerve spinal tumor      Hypertension Father      DIABETES Maternal Grandmother      Depression Maternal Grandmother      Seizure Disorder Brother        Social History   Substance Use Topics     Smoking status: Never Smoker     Smokeless tobacco: Never Used     Alcohol use No        Immunization History   Administered Date(s) Administered     DPT 01/13/1998, 03/19/1998, 05/13/1998, 11/17/1998     DTAP (<7y) 03/24/2003     HIB 01/13/1998, 03/19/1998, 09/30/1998, 11/17/1998     Hepatitis B 1995, 01/13/1998, 09/30/1998     IPV 03/24/2003     Influenza (IIV3) 10/26/2012     MMR 11/17/1998, 03/24/2003     Mantoux 11/26/2012, 03/04/2013     Meningococcal (Menactra ) 07/23/2010     OPV 01/13/1998, 03/19/1998, 05/13/1998     TDAP Vaccine (Boostrix) 07/23/2010     Varicella 11/17/1998, 12/30/2011     Varicella Not Indicated - By Hx 01/01/1999          Allergies   Allergen Reactions     No Known Drug Allergies        Current Outpatient Prescriptions   Medication Sig Dispense Refill     ciprofloxacin (CIPRO) 500 MG tablet Take 1 tablet (500 mg) by mouth 2 times daily for 3 days 6 tablet 0     medroxyPROGESTERone (DEPO-PROVERA) 150 MG/ML injection Inject 150 mg into the muscle every 3 months       Multiple Vitamin (MULTI-VITAMIN DAILY PO) Take 1 tablet by mouth daily Reported on 3/8/2017       Review of Systems   Constitutional: Positive for appetite change (decreased) and fever.   Gastrointestinal: Positive for nausea. Negative for vomiting.   Genitourinary: Positive for decreased urine volume, flank pain (bilateral), frequency and urgency. Negative for dysuria.   Skin: Negative for rash.   All other systems reviewed and are negative.      Physical Exam   BP: (!) 135/94  Heart Rate: 78  Temp: 98.4  F (36.9  C)  Resp: 16  SpO2: 98 %    Physical Exam   Constitutional: She is oriented to person, place, and time. She appears well-developed and well-nourished. No distress.   HENT:   Head: Normocephalic and atraumatic.    Eyes: Conjunctivae and EOM are normal.   Neck: Normal range of motion. Neck supple.   Cardiovascular: Normal rate, regular rhythm, normal heart sounds and intact distal pulses.    No murmur heard.  Pulmonary/Chest: Effort normal and breath sounds normal. No respiratory distress. She has no wheezes. She has no rales. She exhibits no tenderness.   Abdominal: Soft. Bowel sounds are normal. She exhibits no distension and no mass. There is no tenderness. There is CVA tenderness (right greater than left). There is no rebound and no guarding.   Musculoskeletal: Normal range of motion.   Neurological: She is alert and oriented to person, place, and time.   Skin: Skin is warm and dry. No rash noted. No erythema.   Psychiatric: She has a normal mood and affect. Her behavior is normal.   Nursing note and vitals reviewed.      ED Course     ED Course     Procedures             Critical Care time:  none              Results for orders placed or performed during the hospital encounter of 04/17/17   UA with Microscopic   Result Value Ref Range    Color Urine Straw     Appearance Urine Clear     Glucose Urine Negative NEG mg/dL    Bilirubin Urine Negative NEG    Ketones Urine Negative NEG mg/dL    Specific Gravity Urine 1.004 1.003 - 1.035    Blood Urine Negative NEG    pH Urine 6.0 5.0 - 7.0 pH    Protein Albumin Urine Negative NEG mg/dL    Urobilinogen mg/dL 0.0 0.0 - 2.0 mg/dL    Nitrite Urine Negative NEG    Leukocyte Esterase Urine Negative NEG    Source Midstream Urine     WBC Urine 0 0 - 2 /HPF    RBC Urine 0 0 - 2 /HPF    Squamous Epithelial /HPF Urine <1 0 - 1 /HPF    Mucous Urine Present (A) NEG /LPF   HCG qualitative urine   Result Value Ref Range    HCG Qual Urine Negative NEG        No results found for this or any previous visit (from the past 24 hour(s)).    Medications - No data to display    Assessments & Plan (with Medical Decision Making)     Flank pain  Urinary tract infection, site unspecified     Mary burroughs  21 year old female who presents to the ED with complaints of worsening bilateral flank pain over the last 3 weeks with associated urgency, frequency, decreased urine volume, and foul urine odor. Upon arrival to the ED, patient was vitally stable and afebrile. On exam, patient exhibited bilateral CVA tenderness but no other abnormalities on exam. A urinalysis and HCG were ordered. HCG was negative. Urinalysis was negative. Patient states that she's been drinking an excessive amount of water to try and cleanse her urinary tract. I question if we have an accurate specimen due to this. She has significant historical evidence to support UTI given frequency, urgency, foul urine odor, reported low grade fever at home, and worsening flank discomfort. No hematuria on exam to suggest stones. Given these findings, I think treatment with a short course of ciprofloxacin is most appropriate. If this does not help her symptoms, then she should return for further workup and evaluation. The patient was in agreement with this plan and was suitable for discharge.       I have reviewed the nursing notes.    I have reviewed the findings, diagnosis, plan and need for follow up with the patient.    Discharge Medication List as of 4/17/2017  1:37 PM      START taking these medications    Details   ciprofloxacin (CIPRO) 500 MG tablet Take 1 tablet (500 mg) by mouth 2 times daily for 3 days, Disp-6 tablet, R-0, E-Prescribe             Final diagnoses:   Flank pain   Urinary tract infection, site unspecified   This document serves as a record of services personally performed by Ruiz Negrete PA-C. It was created on their behalf by Roula Edwards, a trained medical scribe. The creation of this record is based on the provider's personal observations and the statements of the patient. This document has been checked and approved by the attending provider.     Note: Chart documentation done in part with Dragon Voice Recognition software.  Although reviewed after completion, some word and grammatical errors may remain.    4/17/2017   Ruiz Negrete PA-C   [facility] EMERGENCY DEPARTMENT     Ruiz Negrete PA-C  04/18/17 2023

## 2017-04-17 NOTE — DISCHARGE INSTRUCTIONS
1.  Please start taking the Ciprofloxacin ( antibiotic ).  2.  Please keep drinking a lot of water to cleanse your urinary tract.  3.  Please return for repeat evaluation if your symptoms are not improving with the antibiotic treatment.

## 2017-04-17 NOTE — ED NOTES
Has had kidney pain for over 3 weeks, fever this weekend, states urine has a foul odor and is voiding frequently. Here today because the pain has gotten worse.

## 2017-04-20 ENCOUNTER — TELEPHONE (OUTPATIENT)
Dept: FAMILY MEDICINE | Facility: CLINIC | Age: 22
End: 2017-04-20

## 2017-04-20 NOTE — TELEPHONE ENCOUNTER
Spoke to the patient. She said she was seen in the ED on Sunday for urinary symptoms and flank pain. Patient said she was also running a fever but that broke in the ED. She said she was started on Cipro (UA was negative but provider started based on her symptoms). Patient said she finished the antibiotics. She said all week her flank pain has not gone away. She said she has no more urinary symptoms. She said today she spiked a fever again. Highest has been 100.3. Patient said she just cant seem to get comfortable because of the pain.     Patient denies any pain that radiates to the back or abdomen. She also denies cool/clammy skin or profuse sweating. Patient denies any urinary symptoms as well.     Patient said she does not know what to do about this flank pain. She said it has not gotten any better since Sunday. She thinks it is actually slightly worse. No openings in clinic. Patient does not want to go back to the ED at this time. Patient is requesting a message to be sent to Dr. Gray to see what he would recommend.     Will route to provider to advise.     Cassandra Mendoza RN  [facility]

## 2017-04-24 ENCOUNTER — OFFICE VISIT (OUTPATIENT)
Dept: FAMILY MEDICINE | Facility: CLINIC | Age: 22
End: 2017-04-24
Payer: MEDICAID

## 2017-04-24 VITALS
TEMPERATURE: 98.4 F | SYSTOLIC BLOOD PRESSURE: 112 MMHG | DIASTOLIC BLOOD PRESSURE: 70 MMHG | WEIGHT: 177.4 LBS | HEART RATE: 60 BPM | BODY MASS INDEX: 26.39 KG/M2

## 2017-04-24 DIAGNOSIS — R10.9 FLANK PAIN: Primary | ICD-10-CM

## 2017-04-24 LAB
ALBUMIN SERPL-MCNC: 4.1 G/DL (ref 3.4–5)
ALBUMIN UR-MCNC: NEGATIVE MG/DL
ALP SERPL-CCNC: 75 U/L (ref 40–150)
ALT SERPL W P-5'-P-CCNC: 60 U/L (ref 0–50)
ANION GAP SERPL CALCULATED.3IONS-SCNC: 8 MMOL/L (ref 3–14)
APPEARANCE UR: CLEAR
AST SERPL W P-5'-P-CCNC: 33 U/L (ref 0–35)
BILIRUB SERPL-MCNC: 0.3 MG/DL (ref 0.2–1.3)
BILIRUB UR QL STRIP: NEGATIVE
BUN SERPL-MCNC: 10 MG/DL (ref 7–30)
CALCIUM SERPL-MCNC: 8.7 MG/DL (ref 8.5–10.1)
CHLORIDE SERPL-SCNC: 109 MMOL/L (ref 96–110)
CO2 SERPL-SCNC: 25 MMOL/L (ref 20–32)
COLOR UR AUTO: YELLOW
CREAT SERPL-MCNC: 0.69 MG/DL (ref 0.5–1)
ERYTHROCYTE [DISTWIDTH] IN BLOOD BY AUTOMATED COUNT: 12.6 % (ref 10–15)
GFR SERPL CREATININE-BSD FRML MDRD: ABNORMAL ML/MIN/1.7M2
GLUCOSE SERPL-MCNC: 86 MG/DL (ref 70–99)
GLUCOSE UR STRIP-MCNC: NEGATIVE MG/DL
HCT VFR BLD AUTO: 40.5 % (ref 35–47)
HGB BLD-MCNC: 13.2 G/DL (ref 11.7–15.7)
HGB UR QL STRIP: NEGATIVE
KETONES UR STRIP-MCNC: NEGATIVE MG/DL
LEUKOCYTE ESTERASE UR QL STRIP: NEGATIVE
MCH RBC QN AUTO: 28.9 PG (ref 26.5–33)
MCHC RBC AUTO-ENTMCNC: 32.6 G/DL (ref 31.5–36.5)
MCV RBC AUTO: 89 FL (ref 78–100)
MUCOUS THREADS #/AREA URNS LPF: PRESENT /LPF
NITRATE UR QL: NEGATIVE
PH UR STRIP: 5 PH (ref 5–7)
PLATELET # BLD AUTO: 271 10E9/L (ref 150–450)
POTASSIUM SERPL-SCNC: 4.2 MMOL/L (ref 3.4–5.3)
PROT SERPL-MCNC: 7.9 G/DL (ref 6.8–8.8)
RBC # BLD AUTO: 4.56 10E12/L (ref 3.8–5.2)
RBC #/AREA URNS AUTO: <1 /HPF (ref 0–2)
SODIUM SERPL-SCNC: 142 MMOL/L (ref 133–144)
SP GR UR STRIP: 1.01 (ref 1–1.03)
SQUAMOUS #/AREA URNS AUTO: <1 /HPF (ref 0–1)
URN SPEC COLLECT METH UR: ABNORMAL
UROBILINOGEN UR STRIP-MCNC: 0 MG/DL (ref 0–2)
WBC # BLD AUTO: 8.9 10E9/L (ref 4–11)
WBC #/AREA URNS AUTO: 0 /HPF (ref 0–2)

## 2017-04-24 PROCEDURE — 85027 COMPLETE CBC AUTOMATED: CPT | Performed by: FAMILY MEDICINE

## 2017-04-24 PROCEDURE — 36415 COLL VENOUS BLD VENIPUNCTURE: CPT | Performed by: FAMILY MEDICINE

## 2017-04-24 PROCEDURE — 99213 OFFICE O/P EST LOW 20 MIN: CPT | Performed by: FAMILY MEDICINE

## 2017-04-24 PROCEDURE — 80053 COMPREHEN METABOLIC PANEL: CPT | Performed by: FAMILY MEDICINE

## 2017-04-24 PROCEDURE — 81001 URINALYSIS AUTO W/SCOPE: CPT | Performed by: FAMILY MEDICINE

## 2017-04-24 RX ORDER — MAGNESIUM 200 MG
TABLET ORAL
COMMUNITY
End: 2019-05-24

## 2017-04-24 ASSESSMENT — PAIN SCALES - GENERAL: PAINLEVEL: SEVERE PAIN (6)

## 2017-04-24 NOTE — PROGRESS NOTES
SUBJECTIVE:                                                    Mary Valdez is a 21 year old female who presents to clinic today for the following health issues:    ED/UC Followup:    Facility:  [facility]  Date of visit: 4/17/17  Reason for visit: Flank Pain  Current Status: not better     Mary reports 5 weeks of flank pain bilaterally. She remembers it was preceded with one week of foul smelling urine.  The smell cleared and the flank pain ensued.  Pain described as a swollen or full feeling that is constant and worsens when lying on sides or side bending.  Nothing has helped the pain.  Chemistries, HCG, TSH, Glucose, CBC, Sed Rate and Urine were all unrevealing.  Patient admits fever just prior to being seen in the ED, no recent illness otherwise, headache, vision changes, chest pain, shortness of breath, cough, abdominal pain, diarrhea, dysuria, hematuria, pyuria, foul smelling urine presently. No trauma.  No lower back pain. Stated that she has had similar symptoms in the past and work up include kidney ultrasound was normal.  Normal appetite. No other concerns.    Problem list and histories reviewed & adjusted, as indicated.  Additional history: as documented    Patient Active Problem List   Diagnosis     Psoriasis     Migratory pain     Anxiety state     Major depression in complete remission (H)     Contraception     NENO (generalized anxiety disorder)     PTSD (post-traumatic stress disorder)     Maltracking of right patella     Past Surgical History:   Procedure Laterality Date     ADENOIDECTOMY       ARTHROSCOPY KNEE WITH RETINACULAR RELEASE Right 1/20/2017    Procedure: ARTHROSCOPY KNEE WITH RETINACULAR RELEASE MEDIAL OR LATERAL;  Surgeon: Yevgeniy Graf DO;  Location: [facility] OR     C NONSPECIFIC PROCEDURE  02/12/01    adenoidectomy     DENTAL SURGERY       MOUTH SURGERY         Social History   Substance Use Topics     Smoking status: Never Smoker     Smokeless tobacco: Never Used      Alcohol use No     Family History   Problem Relation Age of Onset     Other - See Comments Father      benign nerve spinal tumor      Hypertension Father      DIABETES Maternal Grandmother      Depression Maternal Grandmother      Seizure Disorder Brother          Current Outpatient Prescriptions   Medication Sig Dispense Refill     magnesium 200 MG TABS        Cyanocobalamin (B-12) 1000 MCG CAPS        medroxyPROGESTERone (DEPO-PROVERA) 150 MG/ML injection Inject 150 mg into the muscle every 3 months       Multiple Vitamin (MULTI-VITAMIN DAILY PO) Take 1 tablet by mouth daily Reported on 3/8/2017       Allergies   Allergen Reactions     No Known Drug Allergies      Recent Labs   Lab Test  03/08/17   1515  04/07/16   1528   09/13/13   1527   ALT  24  23   --   24   CR  0.75  0.68   < >  0.68   GFRESTIMATED  >90  Non  GFR Calc    >90  Non  GFR Calc     < >  GFR not calculated, patient <16 years old.   GFRESTBLACK  >90   GFR Calc    >90   GFR Calc     < >  GFR not calculated, patient <16 years old.   POTASSIUM  4.4  4.3   < >  4.1   TSH  1.58  1.33   --    --     < > = values in this interval not displayed.      Labs reviewed in EPIC    Reviewed and updated as needed this visit by clinical staff  Tobacco  Allergies  Soc Hx      Reviewed and updated as needed this visit by Provider         ROS:  Constitutional, HEENT, cardiovascular, pulmonary, gi and gu systems are negative, except as otherwise noted.    OBJECTIVE:                                                    /70  Pulse 60  Temp 98.4  F (36.9  C) (Tympanic)  Wt 177 lb 6.4 oz (80.5 kg)  BMI 26.39 kg/m2  Body mass index is 26.39 kg/(m^2).  General: A&Ox3, not toxic appearing, in no acute distress  Lungs: Respirations easy, CTA bilaterally with good respiratory effort bilaterally  Heart: RRR w/o rubs, murmurs, S3 or S4  Abdomen: Bowel sounds active, no tenderness to palpation.  No CVA  or flank tenderness with palpation.  Integument: Intact, no rashes, no edema or sores present       Diagnostic Test Results:  No results found for this or any previous visit (from the past 24 hour(s)).     ASSESSMENT/PLAN:                                                    1. Flank pain  Patient has a history of frequency and flank pain a few years ago.  Renal US at that time was unrevealing.  Patient's symptoms are not improving.  Urinalysis will be repeated today, along with CBC and CMP.  Renal ultrasound also ordered.  DDx includes pyelonephritis (unlikley), renal abscess/mass, hydronephrosis, kidney stones, musculoskeletal strain vs other.  - US Renal Complete  - UA with Microscopic reflex to Culture  - CBC with platelets  - Comprehensive metabolic panel (BMP + Alb, Alk Phos, ALT, AST, Total. Bili, TP)  - If all testing is unrevealing and patient symptoms remain will consider abdominal CT.  - Will call patient with results as they return.  - Follow up to be discussed as results return.    Note scribed by Camron Aguilar, MS4.  Patient examined, interviewed, as well as patient note and plan reviewed by attending physician Estela Mcfadden Mai, MD.    Estela Mcfadden Mai, MD  [facility]

## 2017-04-24 NOTE — NURSING NOTE
"Chief Complaint   Patient presents with     ER F/U       Initial /70  Pulse 60  Temp 98.4  F (36.9  C) (Tympanic)  Wt 177 lb 6.4 oz (80.5 kg)  BMI 26.39 kg/m2 Estimated body mass index is 26.39 kg/(m^2) as calculated from the following:    Height as of 2/1/17: 5' 8.75\" (1.746 m).    Weight as of this encounter: 177 lb 6.4 oz (80.5 kg).  Medication Reconciliation: complete      Ashia KRISHNANA    "

## 2017-04-24 NOTE — MR AVS SNAPSHOT
After Visit Summary   4/24/2017    Mary Valdez    MRN: [id number]           Patient Information     Date Of Birth          1995        Visit Information        Provider Department      4/24/2017 2:00 PM Estela Gray MD [facility]        Today's Diagnoses     Flank pain    -  1       Follow-ups after your visit        Follow-up notes from your care team     Return if symptoms worsen or fail to improve.      Who to contact     If you have questions or need follow up information about today's clinic visit or your schedule please contact [facility] directly at [phone].  Normal or non-critical lab and imaging results will be communicated to you by MyChart, letter or phone within 4 business days after the clinic has received the results. If you do not hear from us within 7 days, please contact the clinic through [employer] or phone. If you have a critical or abnormal lab result, we will notify you by phone as soon as possible.  Submit refill requests through [employer] or call your pharmacy and they will forward the refill request to us. Please allow 3 business days for your refill to be completed.          Additional Information About Your Visit        MyChart Information     [employer] gives you secure access to your electronic health record. If you see a primary care provider, you can also send messages to your care team and make appointments. If you have questions, please call your primary care clinic.  If you do not have a primary care provider, please call [phone] and they will assist you.        Care EveryWhere ID     This is your Care EveryWhere ID. This could be used by other organizations to access your [facility] medical records  [id number]        Your Vitals Were     Pulse Temperature BMI (Body Mass Index)             60 98.4  F (36.9  C) (Tympanic) 26.39 kg/m2          Blood Pressure from Last 3 Encounters:   04/24/17 112/70   04/17/17 (!) 135/94    03/08/17 116/70    Weight from Last 3 Encounters:   04/24/17 177 lb 6.4 oz (80.5 kg) (94 %)*   03/08/17 170 lb (77.1 kg) (92 %)*   02/01/17 167 lb (75.8 kg) (91 %)*     * Growth percentiles are based on [facility] 2-20 Years data.              We Performed the Following     CBC with platelets     Comprehensive metabolic panel (BMP + Alb, Alk Phos, ALT, AST, Total. Bili, TP)     UA with Microscopic reflex to Culture (Kaci Joshua; [facility])        Primary Care Provider Office Phone # Fax #    Estela Mcfadden Mai, [phone]       [address]   [facility] [address]        Thank you!     Thank you for choosing [facility]  for your care. Our goal is always to provide you with excellent care. Hearing back from our patients is one way we can continue to improve our services. Please take a few minutes to complete the written survey that you may receive in the mail after your visit with us. Thank you!             Your Updated Medication List - Protect others around you: Learn how to safely use, store and throw away your medicines at www.disposemymeds.org.          This list is accurate as of: 4/24/17 11:59 PM.  Always use your most recent med list.                   Brand Name Dispense Instructions for use    B-12 1000 MCG Caps          magnesium 200 MG Tabs          medroxyPROGESTERone 150 MG/ML injection    DEPO-PROVERA     Inject 150 mg into the muscle every 3 months       MULTI-VITAMIN DAILY PO      Take 1 tablet by mouth daily Reported on 3/8/2017

## 2017-04-26 ENCOUNTER — HOSPITAL ENCOUNTER (OUTPATIENT)
Dept: ULTRASOUND IMAGING | Facility: CLINIC | Age: 22
Discharge: HOME | End: 2017-04-26
Attending: FAMILY MEDICINE | Admitting: FAMILY MEDICINE
Payer: MEDICAID

## 2017-04-26 DIAGNOSIS — R10.9 FLANK PAIN: ICD-10-CM

## 2017-04-26 PROCEDURE — 76770 US EXAM ABDO BACK WALL COMP: CPT

## 2017-05-10 ENCOUNTER — APPOINTMENT (OUTPATIENT)
Dept: CT IMAGING | Facility: CLINIC | Age: 22
End: 2017-05-10
Attending: PHYSICIAN ASSISTANT
Payer: MEDICAID

## 2017-05-10 ENCOUNTER — HOSPITAL ENCOUNTER (EMERGENCY)
Facility: CLINIC | Age: 22
Discharge: HOME | End: 2017-05-11
Attending: PHYSICIAN ASSISTANT | Admitting: PHYSICIAN ASSISTANT
Payer: MEDICAID

## 2017-05-10 DIAGNOSIS — R10.9 FLANK PAIN: ICD-10-CM

## 2017-05-10 DIAGNOSIS — K59.09 OTHER CONSTIPATION: ICD-10-CM

## 2017-05-10 LAB
ALBUMIN SERPL-MCNC: 3.7 G/DL (ref 3.4–5)
ALBUMIN UR-MCNC: NEGATIVE MG/DL
ALP SERPL-CCNC: 67 U/L (ref 40–150)
ALT SERPL W P-5'-P-CCNC: 27 U/L (ref 0–50)
ANION GAP SERPL CALCULATED.3IONS-SCNC: 10 MMOL/L (ref 3–14)
APPEARANCE UR: CLEAR
AST SERPL W P-5'-P-CCNC: 16 U/L (ref 0–35)
BASOPHILS # BLD AUTO: 0 10E9/L (ref 0–0.2)
BASOPHILS NFR BLD AUTO: 0.4 %
BILIRUB SERPL-MCNC: 0.1 MG/DL (ref 0.2–1.3)
BILIRUB UR QL STRIP: NEGATIVE
BUN SERPL-MCNC: 11 MG/DL (ref 7–30)
CALCIUM SERPL-MCNC: 8.6 MG/DL (ref 8.5–10.1)
CHLORIDE SERPL-SCNC: 112 MMOL/L (ref 96–110)
CO2 SERPL-SCNC: 23 MMOL/L (ref 20–32)
COLOR UR AUTO: YELLOW
CREAT SERPL-MCNC: 0.76 MG/DL (ref 0.5–1)
DIFFERENTIAL METHOD BLD: ABNORMAL
EOSINOPHIL # BLD AUTO: 0.3 10E9/L (ref 0–0.7)
EOSINOPHIL NFR BLD AUTO: 3.2 %
ERYTHROCYTE [DISTWIDTH] IN BLOOD BY AUTOMATED COUNT: 12.3 % (ref 10–15)
GFR SERPL CREATININE-BSD FRML MDRD: ABNORMAL ML/MIN/1.7M2
GLUCOSE SERPL-MCNC: 89 MG/DL (ref 70–99)
GLUCOSE UR STRIP-MCNC: NEGATIVE MG/DL
HCG UR QL: NEGATIVE
HCT VFR BLD AUTO: 35.4 % (ref 35–47)
HGB BLD-MCNC: 11.5 G/DL (ref 11.7–15.7)
HGB UR QL STRIP: NEGATIVE
IMM GRANULOCYTES # BLD: 0 10E9/L (ref 0–0.4)
IMM GRANULOCYTES NFR BLD: 0.2 %
KETONES UR STRIP-MCNC: NEGATIVE MG/DL
LEUKOCYTE ESTERASE UR QL STRIP: NEGATIVE
LYMPHOCYTES # BLD AUTO: 3.6 10E9/L (ref 0.8–5.3)
LYMPHOCYTES NFR BLD AUTO: 43.8 %
MCH RBC QN AUTO: 28.9 PG (ref 26.5–33)
MCHC RBC AUTO-ENTMCNC: 32.5 G/DL (ref 31.5–36.5)
MCV RBC AUTO: 89 FL (ref 78–100)
MONOCYTES # BLD AUTO: 0.8 10E9/L (ref 0–1.3)
MONOCYTES NFR BLD AUTO: 9.4 %
MUCOUS THREADS #/AREA URNS LPF: PRESENT /LPF
NEUTROPHILS # BLD AUTO: 3.5 10E9/L (ref 1.6–8.3)
NEUTROPHILS NFR BLD AUTO: 43 %
NITRATE UR QL: NEGATIVE
PH UR STRIP: 7 PH (ref 5–7)
PLATELET # BLD AUTO: 241 10E9/L (ref 150–450)
POTASSIUM SERPL-SCNC: 3.8 MMOL/L (ref 3.4–5.3)
PROT SERPL-MCNC: 6.8 G/DL (ref 6.8–8.8)
RBC # BLD AUTO: 3.98 10E12/L (ref 3.8–5.2)
RBC #/AREA URNS AUTO: 0 /HPF (ref 0–2)
SODIUM SERPL-SCNC: 145 MMOL/L (ref 133–144)
SP GR UR STRIP: 1.01 (ref 1–1.03)
URN SPEC COLLECT METH UR: ABNORMAL
UROBILINOGEN UR STRIP-MCNC: 0 MG/DL (ref 0–2)
WBC # BLD AUTO: 8.2 10E9/L (ref 4–11)
WBC #/AREA URNS AUTO: 1 /HPF (ref 0–2)

## 2017-05-10 PROCEDURE — 85025 COMPLETE CBC W/AUTO DIFF WBC: CPT | Performed by: PHYSICIAN ASSISTANT

## 2017-05-10 PROCEDURE — 25000128 H RX IP 250 OP 636: Performed by: PHYSICIAN ASSISTANT

## 2017-05-10 PROCEDURE — 36415 COLL VENOUS BLD VENIPUNCTURE: CPT | Performed by: PHYSICIAN ASSISTANT

## 2017-05-10 PROCEDURE — 99285 EMERGENCY DEPT VISIT HI MDM: CPT | Mod: Z6 | Performed by: PHYSICIAN ASSISTANT

## 2017-05-10 PROCEDURE — 80053 COMPREHEN METABOLIC PANEL: CPT | Performed by: PHYSICIAN ASSISTANT

## 2017-05-10 PROCEDURE — 81025 URINE PREGNANCY TEST: CPT | Performed by: PHYSICIAN ASSISTANT

## 2017-05-10 PROCEDURE — 81001 URINALYSIS AUTO W/SCOPE: CPT | Performed by: PHYSICIAN ASSISTANT

## 2017-05-10 PROCEDURE — 74176 CT ABD & PELVIS W/O CONTRAST: CPT

## 2017-05-10 PROCEDURE — 99285 EMERGENCY DEPT VISIT HI MDM: CPT | Mod: 25 | Performed by: PHYSICIAN ASSISTANT

## 2017-05-10 PROCEDURE — 96374 THER/PROPH/DIAG INJ IV PUSH: CPT | Performed by: PHYSICIAN ASSISTANT

## 2017-05-10 RX ORDER — HYDROMORPHONE HYDROCHLORIDE 1 MG/ML
.5-1 INJECTION, SOLUTION INTRAMUSCULAR; INTRAVENOUS; SUBCUTANEOUS ONCE
Status: COMPLETED | OUTPATIENT
Start: 2017-05-10 | End: 2017-05-10

## 2017-05-10 RX ORDER — LIDOCAINE 40 MG/G
CREAM TOPICAL
Status: DISCONTINUED | OUTPATIENT
Start: 2017-05-10 | End: 2017-05-11 | Stop reason: HOSPADM

## 2017-05-10 RX ADMIN — HYDROMORPHONE HYDROCHLORIDE 0.5 MG: 1 INJECTION, SOLUTION INTRAMUSCULAR; INTRAVENOUS; SUBCUTANEOUS at 23:14

## 2017-05-10 NOTE — ED AVS SNAPSHOT
[facility] Emergency Department    [address] [place] [address]    Phone:  [phone]    Fax:  [phone]                                       Mary Valdez   MRN: [id number]    Department:  [facility] Emergency Department   Date of Visit:  5/10/2017           After Visit Summary Signature Page     I have received my discharge instructions, and my questions have been answered. I have discussed any challenges I see with this plan with the nurse or doctor.    ..........................................................................................................................................  Patient/Patient Representative Signature      ..........................................................................................................................................  Patient Representative Print Name and Relationship to Patient    ..................................................               ................................................  Date                                            Time    ..........................................................................................................................................  Reviewed by Signature/Title    ...................................................              ..............................................  Date                                                            Time

## 2017-05-10 NOTE — ED AVS SNAPSHOT
[facility] Emergency Department    [address] [place] [address]    Phone:  [phone]    Fax:  [phone]                                       Mary Valdez   MRN: [id number]    Department:  [facility] Emergency Department   Date of Visit:  5/10/2017           Patient Information     Date Of Birth          1995        Your diagnoses for this visit were:     Flank pain     Other constipation        You were seen by Hanh Chavira PA-C.      Follow-up Information     Follow up with Estela Gray MD In 5 days.    Specialty:  Family Practice    Why:  For ER follow up    Contact information:    [facility]  [address] [place] [address]   [place] [address] [phone]          Follow up with [facility] Emergency Department.    Specialty:  EMERGENCY MEDICINE    Why:  If symptoms worsen    Contact information:    [address] [place] [address]   [place] [address] [phone]    Additional information:    From [address]: Exit at [address] on south side of [place]. Turn right on [address]. Turn left at stoplight on [address]. [facility] will be in view two blocks ahead        Discharge Instructions       Try taking MiraLAX 1-2 times daily until having loose stools, then decrease the amount to once daily or as needed.  If your pain is due to constipation, that should help. I would like you to follow up with her primary care provider early next week to discuss the results from today's tests and see how you're doing.  If things significantly worsen or change, please do not hesitate to return to the emergency department.      Thank you for choosing [facility]'s Emergency Department. It was a pleasure taking care of you today. If you have any questions, please call [phone].    Hanh Chavira PA-C      *Abdominal Pain, Unknown Cause (Female)    The exact cause of your abdominal (stomach) pain is not certain. This does not  mean that this is something to worry about, or the right tests were not done. Everyone likes to know the exact cause of the problem, but sometimes with abdominal pain, there is no clear-cut cause, and this could be a good thing. The good news is that your symptoms can be treated, and you will feel better.   Your condition does not seem serious now; however, sometimes the signs of a serious problem may take more time to appear. For this reason, it is important for you to watch for any new symptoms, problems, or worsening of your condition.  Over the next few days, the abdominal pain may come and go, or be continuous. Other common symptoms can include nausea and vomiting. Sometimes it can be difficult to tell if you feel nauseous, you may just feel bad and not associate that feeling with nausea. Constipation, diarrhea, and a fever may go along with the pain.  The pain may continue even if treated correctly over the following days. Depending on how things go, sometimes the cause can become clear and may require further or different treatment. Additional evaluations, medications, or tests may be needed.  Home care  Your health care provider may prescribe medications for pain, symptoms, or an infection.  Follow the health care provider's instructions for taking these medications.  General care    Rest until your next exam. No strenuous activities.    Try to find positions that ease discomfort. A small pillow placed on the abdomen may help relieve pain.    Something warm on your abdomen (such as a heating pad) may help, but be careful not to burn yourself.  Diet    Do not force yourself to eat, especially if having cramps, vomiting, or diarrhea.    Water is important so you do not get dehydrated. Soup may also be good. Sports drinks may also help, especially if they are not too acidic. Make sure you don't drink sugary drinks as this can make things worse. Take liquids in small amounts. Do not guzzle them.    Caffeine  sometimes makes the pain and cramping worse.    Avoid dairy products if you have vomiting or diarrhea.    Don't eat large amounts at a time. Wait a few minutes between bites.    Eat a diet low in fiber (called a low-residue diet). Foods allowed include refined breads, white rice, fruit and vegetable juices without pulp, tender meats. These foods will pass more easily through the intestine.    Avoid fried or fatty foods, dairy, alcohol and spicy foods until your symptoms go away.  Follow-up care  Follow up with your health care provider as instructed, or if your pain does not begin to improve in the next 24 hours.  When to seek medical care  Seek prompt medical care if any of the following occur:    Pain gets worse or moves to the right lower abdomen    New or worsening vomiting or diarrhea    Swelling of the abdomen    Unable to pass stool for more than three days    New fever over 101  F (38.3 C), or rising fever    Blood in vomit or bowel movements (dark red or black color)    Jaundice (yellow color of eyes and skin)    Weakness, dizziness    Chest, arm, back, neck or jaw pain    Unexpected vaginal bleeding or missed period  Call 911  Call emergency services if any of the following occur:    Trouble breathing    Confusion    Fainting or loss of consciousness    Rapid heart rate    Seizure            Future Appointments        Provider Department Dept Phone Center    5/11/2017  3:30 PM [facility] CT ROOM 1 [facility] CT Scan [phone] [facility]    5/16/2017 2:15 PM MERVIN Santacruz [facility] [phone] [facility]    5/18/2017 9:20 AM Estela Mcfadden Mai, MD [facility] [phone] [facility]      24 Hour Appointment Hotline       To make an appointment at any [facility], call [phone] ([phone]). If you don't have a family doctor or clinic, we will help you find one. [facility] are conveniently located to serve the needs of you  and your family.             Review of your medicines      Our records show that you are taking the medicines listed below. If these are incorrect, please call your family doctor or clinic.        Dose / Directions Last dose taken    B-12 1000 MCG Caps        Refills:  0        magnesium 200 MG Tabs        Refills:  0        medroxyPROGESTERone 150 MG/ML injection   Commonly known as:  DEPO-PROVERA   Dose:  150 mg        Inject 150 mg into the muscle every 3 months   Refills:  0        MULTI-VITAMIN DAILY PO   Dose:  1 tablet        Take 1 tablet by mouth daily Reported on 3/8/2017   Refills:  0        TYLENOL PO   Dose:  1000 mg        Take 1,000 mg by mouth once   Refills:  0                Procedures and tests performed during your visit     CBC with platelets differential    CT Abdomen Pelvis w/o Contrast (stone protocol)    Comprehensive metabolic panel    HCG qualitative urine    Peripheral IV catheter    UA with Microscopic      Orders Needing Specimen Collection     None      Pending Results     No orders found for last 3 day(s).            Pending Culture Results     No orders found for last 3 day(s).            Pending Results Instructions     If you had any lab results that were not finalized at the time of your Discharge, you can call the ED Lab Result RN at [phone]. You will be contacted by this team for any positive Lab results or changes in treatment. The nurses are available 7 days a week from 10A to 6:30P.  You can leave a message 24 hours per day and they will return your call.        Thank you for choosing [facility]       Thank you for choosing [facility] for your care. Our goal is always to provide you with excellent care. Hearing back from our patients is one way we can continue to improve our services. Please take a few minutes to complete the written survey that you may receive in the mail after you visit with us. Thank you!        [employer] Information     [employer] gives you secure access to  your electronic health record. If you see a primary care provider, you can also send messages to your care team and make appointments. If you have questions, please call your primary care clinic.  If you do not have a primary care provider, please call [phone] and they will assist you.        Care EveryWhere ID     This is your Care EveryWhere ID. This could be used by other organizations to access your [facility] medical records  [id number]        After Visit Summary       This is your record. Keep this with you and show to your community pharmacist(s) and doctor(s) at your next visit.

## 2017-05-11 VITALS
HEIGHT: 69 IN | DIASTOLIC BLOOD PRESSURE: 91 MMHG | HEART RATE: 68 BPM | TEMPERATURE: 98.2 F | WEIGHT: 175 LBS | BODY MASS INDEX: 25.92 KG/M2 | OXYGEN SATURATION: 98 % | SYSTOLIC BLOOD PRESSURE: 133 MMHG | RESPIRATION RATE: 20 BRPM

## 2017-05-11 ASSESSMENT — ENCOUNTER SYMPTOMS
FEVER: 1
VOMITING: 0
APPETITE CHANGE: 1
CHILLS: 1
DIARRHEA: 0
FLANK PAIN: 1
SHORTNESS OF BREATH: 0
CONSTIPATION: 1
NAUSEA: 1
DYSURIA: 0
HEMATURIA: 0
ABDOMINAL PAIN: 0

## 2017-05-11 NOTE — ED NOTES
Pt here with rt flank pain and fever. Has been having trouble with this for the past month. Was scheduled to have a CT of abdomen in AM.

## 2017-05-11 NOTE — DISCHARGE INSTRUCTIONS
Try taking MiraLAX 1-2 times daily until having loose stools, then decrease the amount to once daily or as needed.  If your pain is due to constipation, that should help. I would like you to follow up with her primary care provider early next week to discuss the results from today's tests and see how you're doing.  If things significantly worsen or change, please do not hesitate to return to the emergency department.      Thank you for choosing [facility] Emergency Department. It was a pleasure taking care of you today. If you have any questions, please call [phone].    Hanh Chavira PA-C      *Abdominal Pain, Unknown Cause (Female)    The exact cause of your abdominal (stomach) pain is not certain. This does not mean that this is something to worry about, or the right tests were not done. Everyone likes to know the exact cause of the problem, but sometimes with abdominal pain, there is no clear-cut cause, and this could be a good thing. The good news is that your symptoms can be treated, and you will feel better.   Your condition does not seem serious now; however, sometimes the signs of a serious problem may take more time to appear. For this reason, it is important for you to watch for any new symptoms, problems, or worsening of your condition.  Over the next few days, the abdominal pain may come and go, or be continuous. Other common symptoms can include nausea and vomiting. Sometimes it can be difficult to tell if you feel nauseous, you may just feel bad and not associate that feeling with nausea. Constipation, diarrhea, and a fever may go along with the pain.  The pain may continue even if treated correctly over the following days. Depending on how things go, sometimes the cause can become clear and may require further or different treatment. Additional evaluations, medications, or tests may be needed.  Home care  Your health care provider may prescribe medications for pain, symptoms, or an  infection.  Follow the health care provider's instructions for taking these medications.  General care    Rest until your next exam. No strenuous activities.    Try to find positions that ease discomfort. A small pillow placed on the abdomen may help relieve pain.    Something warm on your abdomen (such as a heating pad) may help, but be careful not to burn yourself.  Diet    Do not force yourself to eat, especially if having cramps, vomiting, or diarrhea.    Water is important so you do not get dehydrated. Soup may also be good. Sports drinks may also help, especially if they are not too acidic. Make sure you don't drink sugary drinks as this can make things worse. Take liquids in small amounts. Do not guzzle them.    Caffeine sometimes makes the pain and cramping worse.    Avoid dairy products if you have vomiting or diarrhea.    Don't eat large amounts at a time. Wait a few minutes between bites.    Eat a diet low in fiber (called a low-residue diet). Foods allowed include refined breads, white rice, fruit and vegetable juices without pulp, tender meats. These foods will pass more easily through the intestine.    Avoid fried or fatty foods, dairy, alcohol and spicy foods until your symptoms go away.  Follow-up care  Follow up with your health care provider as instructed, or if your pain does not begin to improve in the next 24 hours.  When to seek medical care  Seek prompt medical care if any of the following occur:    Pain gets worse or moves to the right lower abdomen    New or worsening vomiting or diarrhea    Swelling of the abdomen    Unable to pass stool for more than three days    New fever over 101  F (38.3 C), or rising fever    Blood in vomit or bowel movements (dark red or black color)    Jaundice (yellow color of eyes and skin)    Weakness, dizziness    Chest, arm, back, neck or jaw pain    Unexpected vaginal bleeding or missed period  Call 911  Call emergency services if any of the following  occur:    Trouble breathing    Confusion    Fainting or loss of consciousness    Rapid heart rate    Seizure

## 2017-05-11 NOTE — ED PROVIDER NOTES
"  History     Chief Complaint   Patient presents with     Flank Pain     HPI  Mary Valdez is a 21 year old female who presents to the emergency department with bilateral flank pain. This has been on ongoing issue for her for the last couple months and she's had several workups, including lab studies and a renal ultrasound, all of which have been normal.  She is supposed to have an abdominal CT tomorrow morning as a next step in the evaluation process.  Today however, she developed worsening pain, worse on the right side.  She spiked a fever at home and took Tylenol which broke the fever but the pain has persisted and has been constant. Nothing has helped it. She feels nauseous when she drinks water, but has not vomited.  She denies urinary symptoms, abdominal pain. Bowel movements are very irregular but she thinks last BM was yesterday. Non-bloody BMs.  Denies any abdominal surgeries.    I have reviewed the Medications, Allergies, Past Medical and Surgical History, and Social History in the Epic system.    Review of Systems   Constitutional: Positive for appetite change (decreased), chills (hot and cold spells) and fever.   Respiratory: Negative for shortness of breath.    Cardiovascular: Negative for chest pain.   Gastrointestinal: Positive for constipation (irregular BMs) and nausea. Negative for abdominal pain, diarrhea and vomiting.   Genitourinary: Positive for flank pain. Negative for dysuria and hematuria.   All other systems reviewed and are negative.      Physical Exam   BP: 140/73  Pulse: 68  Temp: 98.4  F (36.9  C)  Resp: 12  Height: 175.3 cm (5' 9\")  Weight: 79.4 kg (175 lb)  SpO2: 98 %  Physical Exam   Constitutional: She is oriented to person, place, and time. She appears well-developed and well-nourished. No distress.   HENT:   Head: Normocephalic and atraumatic.   Mouth/Throat: Oropharynx is clear and moist. No oropharyngeal exudate.   Eyes: Pupils are equal, round, and reactive to light. No " scleral icterus.   Neck: Normal range of motion. Neck supple.   Cardiovascular: Normal rate, regular rhythm, normal heart sounds and intact distal pulses.    Pulmonary/Chest: Effort normal and breath sounds normal. No respiratory distress.   Abdominal: Soft. Bowel sounds are normal. There is tenderness (right upper lateral abdomen with deep palpation). There is CVA tenderness (bilateral). There is no tenderness at McBurney's point.   Musculoskeletal: She exhibits no edema or tenderness.   Neurological: She is alert and oriented to person, place, and time.   Skin: Skin is warm and dry. No rash noted. She is not diaphoretic.   Psychiatric: She has a normal mood and affect.   Nursing note and vitals reviewed.      ED Course     ED Course     Procedures  None    Results for orders placed or performed during the hospital encounter of 05/10/17 (from the past 24 hour(s))   UA with Microscopic   Result Value Ref Range    Color Urine Yellow     Appearance Urine Clear     Glucose Urine Negative NEG mg/dL    Bilirubin Urine Negative NEG    Ketones Urine Negative NEG mg/dL    Specific Gravity Urine 1.013 1.003 - 1.035    Blood Urine Negative NEG    pH Urine 7.0 5.0 - 7.0 pH    Protein Albumin Urine Negative NEG mg/dL    Urobilinogen mg/dL 0.0 0.0 - 2.0 mg/dL    Nitrite Urine Negative NEG    Leukocyte Esterase Urine Negative NEG    Source Unspecified Urine     WBC Urine 1 0 - 2 /HPF    RBC Urine 0 0 - 2 /HPF    Mucous Urine Present (A) NEG /LPF   HCG qualitative urine   Result Value Ref Range    HCG Qual Urine Negative NEG   CBC with platelets differential   Result Value Ref Range    WBC 8.2 4.0 - 11.0 10e9/L    RBC Count 3.98 3.8 - 5.2 10e12/L    Hemoglobin 11.5 (L) 11.7 - 15.7 g/dL    Hematocrit 35.4 35.0 - 47.0 %    MCV 89 78 - 100 fl    MCH 28.9 26.5 - 33.0 pg    MCHC 32.5 31.5 - 36.5 g/dL    RDW 12.3 10.0 - 15.0 %    Platelet Count 241 150 - 450 10e9/L    Diff Method Automated Method     % Neutrophils 43.0 %    %  Lymphocytes 43.8 %    % Monocytes 9.4 %    % Eosinophils 3.2 %    % Basophils 0.4 %    % Immature Granulocytes 0.2 %    Absolute Neutrophil 3.5 1.6 - 8.3 10e9/L    Absolute Lymphocytes 3.6 0.8 - 5.3 10e9/L    Absolute Monocytes 0.8 0.0 - 1.3 10e9/L    Absolute Eosinophils 0.3 0.0 - 0.7 10e9/L    Absolute Basophils 0.0 0.0 - 0.2 10e9/L    Abs Immature Granulocytes 0.0 0 - 0.4 10e9/L   Comprehensive metabolic panel   Result Value Ref Range    Sodium 145 (H) 133 - 144 mmol/L    Potassium 3.8 3.4 - 5.3 mmol/L    Chloride 112 (H) 96 - 110 mmol/L    Carbon Dioxide 23 20 - 32 mmol/L    Anion Gap 10 3 - 14 mmol/L    Glucose 89 70 - 99 mg/dL    Urea Nitrogen 11 7 - 30 mg/dL    Creatinine 0.76 0.50 - 1.00 mg/dL    GFR Estimate >90  Non  GFR Calc   >60 mL/min/1.7m2    GFR Estimate If Black >90   GFR Calc   >60 mL/min/1.7m2    Calcium 8.6 8.5 - 10.1 mg/dL    Bilirubin Total 0.1 (L) 0.2 - 1.3 mg/dL    Albumin 3.7 3.4 - 5.0 g/dL    Protein Total 6.8 6.8 - 8.8 g/dL    Alkaline Phosphatase 67 40 - 150 U/L    ALT 27 0 - 50 U/L    AST 16 0 - 35 U/L   CT Abdomen Pelvis w/o Contrast (stone protocol)    Narrative    CT ABDOMEN AND PELVIS WITHOUT CONTRAST  5/10/2017 11:38 PM     HISTORY: Right flank pain and fever.    COMPARISON: None.    TECHNIQUE: Without intravenous or oral contrast, helical sections were  acquired from the top of the diaphragm through the pubic symphysis.  Coronal reconstructions were generated. Radiation dose for this scan  was reduced using automated exposure control, adjustment of the mA  and/or kV according to the patient's size, or iterative reconstruction  technique. (Renal stone protocol).    FINDINGS:  Right urinary tract: No renal or ureteral calculi. No dilatation of  the intrarenal collecting system or ureter.    Left urinary tract: No renal or ureteral calculi. No dilatation of the  intrarenal collecting system or ureter.    Urinary bladder: No visualized  calculi.    Remainder of the abdomen and pelvis: The liver, spleen, pancreas and  adrenal glands are unremarkable to the limits of a noncontrast CT  scan. The gallbladder is present. The small and large bowel are normal  in caliber. A moderate amount of stool is present in the colon. The  appendix is unremarkable. No bowel wall thickening, pneumatosis or  free intraperitoneal gas. The uterus is present. No enlarged lymph  nodes or free fluid in the pelvis.    Scan through the lower chest is unremarkable.      Impression    IMPRESSION:  1. No renal or ureteral calculi or evidence of urinary obstruction.  2. No other cause of acute pain identified in the abdomen or pelvis.    DOLLY PERLA MD       Medications   lidocaine 1 % 1 mL (not administered)   lidocaine (LMX4) kit (not administered)   sodium chloride (PF) 0.9% PF flush 3 mL (3 mLs Intracatheter Given 5/10/17 8687)   sodium chloride (PF) 0.9% PF flush 3 mL (not administered)   HYDROmorphone (PF) (DILAUDID) injection 0.5-1 mg (0.5 mg Intravenous Given 5/10/17 2317)        Assessments & Plan (with Medical Decision Making)  Mary Valdez is a 21 year old female who presented to the emergency department with flank pain, right worse than left.  This is been an ongoing issue for her for the last couple months.  This evening the pain suddenly worsened and she developed a fever.  Fever improved with Tylenol for pain persisted so she came here.  On arrival she was afebrile with normal vital signs.  Exam notable for bilateral flank tenderness and right lateral abdominal tenderness. Patient given 0.5mg IV dilaudid here in the ED with good relief of pain. She already had a renal ultrasound performed last month which was normal.  She is scheduled to have a CT scan done tomorrow.  Because she is here with worsened pain, we decided to obtain CT this evening along with labs.  Labs studies including CMP, CBC, and UA were all unremarkable.  Her CT abdomen/pelvis was also  negative for acute abnormalities explaining her pain, but she was noted to have quite a bit of stool throughout her colon, worse on the right.  I explained the results of these studies with the patient. Cause of her pain is not clear, but it may be related to constipation. It does not appear to be anything emergent this evening. I recommended she try taking miralax 1-2 times daily until having soft stools, then decrease as indicated. I did not want to give her narcotics for her pain as this can exacerbate constipation, so I recommended continued tylenol and ibuprofen use as needed. I advised she follow up early next week with her PCP to discuss results from this evening and for reevaluation. She was given instructions on when to return to the emergency department. All questions were answered and patient was agreeable to this plan and to discharge.     I have reviewed the nursing notes.    I have reviewed the findings, diagnosis, plan and need for follow up with the patient.    New Prescriptions    No medications on file       Final diagnoses:   Flank pain   Other constipation       5/10/2017   [facility] EMERGENCY DEPARTMENT     Hanh Chavira PA-C  05/11/17 0028

## 2017-05-16 ENCOUNTER — OFFICE VISIT (OUTPATIENT)
Dept: FAMILY MEDICINE | Facility: CLINIC | Age: 22
End: 2017-05-16
Payer: MEDICAID

## 2017-05-16 VITALS
DIASTOLIC BLOOD PRESSURE: 66 MMHG | SYSTOLIC BLOOD PRESSURE: 120 MMHG | TEMPERATURE: 98.2 F | HEART RATE: 60 BPM | WEIGHT: 180 LBS | BODY MASS INDEX: 26.58 KG/M2

## 2017-05-16 DIAGNOSIS — K59.00 CONSTIPATION, UNSPECIFIED CONSTIPATION TYPE: ICD-10-CM

## 2017-05-16 DIAGNOSIS — Z00.00 ROUTINE GENERAL MEDICAL EXAMINATION AT A HEALTH CARE FACILITY: Primary | ICD-10-CM

## 2017-05-16 PROCEDURE — 99395 PREV VISIT EST AGE 18-39: CPT | Performed by: NURSE PRACTITIONER

## 2017-05-16 PROCEDURE — 87491 CHLMYD TRACH DNA AMP PROBE: CPT | Performed by: NURSE PRACTITIONER

## 2017-05-16 PROCEDURE — 87591 N.GONORRHOEAE DNA AMP PROB: CPT | Performed by: NURSE PRACTITIONER

## 2017-05-16 NOTE — PROGRESS NOTES
Answers for HPI/ROS submitted by the patient on 5/9/2017   Annual Exam:  Getting at least 3 servings of Calcium per day:: NO  Bi-annual eye exam:: Yes  Dental care twice a year:: NO  Sleep apnea or symptoms of sleep apnea:: None  Diet:: Regular (no restrictions)  Frequency of exercise:: 2-3 days/week  Taking medications regularly:: Yes  Medication side effects:: None  Additional concerns today:: YES  Q1: Little interest or pleasure in doing things: 0=Not at all  Q2: Feeling down, depressed or hopeless: 0=Not at all  PHQ-2 Score: 0  Duration of exercise:: 30-45 minutes

## 2017-05-16 NOTE — NURSING NOTE
"Chief Complaint   Patient presents with     Physical       Initial There were no vitals taken for this visit. Estimated body mass index is 25.84 kg/(m^2) as calculated from the following:    Height as of 5/10/17: 5' 9\" (1.753 m).    Weight as of 5/10/17: 175 lb (79.4 kg).  Medication Reconciliation: complete  "

## 2017-05-16 NOTE — PROGRESS NOTES
SUBJECTIVE:     CC: Mary Valdez is an 21 year old woman who presents for preventive health visit.     Physical   Annual:     Getting at least 3 servings of Calcium per day::  NO    Bi-annual eye exam::  Yes    Dental care twice a year::  NO    Sleep apnea or symptoms of sleep apnea::  None    Diet::  Regular (no restrictions)    Frequency of exercise::  2-3 days/week    Duration of exercise::  30-45 minutes    Taking medications regularly::  Yes    Medication side effects::  None    Additional concerns today::  YES            Today's PHQ-2 Score:   PHQ-2 ( 1999 Pfizer) 5/9/2017   Little interest or pleasure in doing things Not at all   Feeling down, depressed or hopeless Not at all   PHQ-2 Score 0       Abuse: Current or Past(Physical, Sexual or Emotional)- No  Do you feel safe in your environment - Yes    Social History   Substance Use Topics     Smoking status: Never Smoker     Smokeless tobacco: Never Used     Alcohol use No     The patient does not drink >3 drinks per day nor >7 drinks per week.    Recent Labs   Lab Test  11/02/10   1005   CHOL  115       Reviewed orders with patient.  Reviewed health maintenance and updated orders accordingly - Yes    Mammo Decision Support:  Mammogram not appropriate for this patient based on age.    Pertinent mammograms are reviewed under the imaging tab.  History of abnormal Pap smear: NO - under age 21, PAP not appropriate for age    Reviewed and updated as needed this visit by clinical staff  Tobacco  Allergies  Meds  Med Hx  Surg Hx  Fam Hx  Soc Hx        Reviewed and updated as needed this visit by Provider        Past Medical History:   Diagnosis Date     Bell's palsy 5 y/o    Due to trauma, neg Lyme     Depression     Venice Meyer, psychologist, [employer] in [place]     Depressive disorder      Major depression in complete remission ([facility]) 7/7/2014     Psoriasis     scalp only      Past Surgical History:   Procedure Laterality Date     ADENOIDECTOMY        ARTHROSCOPY KNEE WITH RETINACULAR RELEASE Right 1/20/2017    Procedure: ARTHROSCOPY KNEE WITH RETINACULAR RELEASE MEDIAL OR LATERAL;  Surgeon: Yevgeniy Graf DO;  Location: [facility] OR     C NONSPECIFIC PROCEDURE  02/12/01    adenoidectomy     DENTAL SURGERY       MOUTH SURGERY         ROS:  C: NEGATIVE for fever, chills, change in weight  I: NEGATIVE for worrisome rashes, moles or lesions  E: NEGATIVE for vision changes or irritation  ENT: NEGATIVE for ear, mouth and throat problems  R: NEGATIVE for significant cough or SOB  B: NEGATIVE for masses, tenderness or discharge  CV: NEGATIVE for chest pain, palpitations or peripheral edema  GI: POSITIVE for constipation  : NEGATIVE for unusual urinary or vaginal symptoms. Periods are regular.  M: NEGATIVE for significant arthralgias or myalgia  N: NEGATIVE for weakness, dizziness or paresthesias  E: NEGATIVE for temperature intolerance, skin/hair changes  P: NEGATIVE for changes in mood or affect    Problem list, Medication list, Allergies, and Medical/Social/Surgical histories reviewed in [facility] and updated as appropriate.  Labs reviewed in EPIC  BP Readings from Last 3 Encounters:   05/16/17 120/66   05/11/17 (!) 133/91   04/24/17 112/70    Wt Readings from Last 3 Encounters:   05/16/17 180 lb (81.6 kg) (95 %)*   05/10/17 175 lb (79.4 kg) (93 %)*   04/24/17 177 lb 6.4 oz (80.5 kg) (94 %)*     * Growth percentiles are based on CDC 2-20 Years data.                  OBJECTIVE:     /66  Pulse 60  Temp 98.2  F (36.8  C) (Tympanic)  Wt 180 lb (81.6 kg)  BMI 26.58 kg/m2  EXAM:  GENERAL: healthy, alert and no distress  EYES: Eyes grossly normal to inspection, PERRL and conjunctivae and sclerae normal  HENT: ear canals and TM's normal, nose and mouth without ulcers or lesions  NECK: no adenopathy, no asymmetry, masses, or scars and thyroid normal to palpation  RESP: lungs clear to auscultation - no rales, rhonchi or wheezes  BREAST: normal without masses,  "tenderness or nipple discharge and no palpable axillary masses or adenopathy  CV: regular rate and rhythm, normal S1 S2, no S3 or S4, no murmur, click or rub, no peripheral edema and peripheral pulses strong  ABDOMEN: soft, nontender, no hepatosplenomegaly, no masses and bowel sounds normal  MS: no gross musculoskeletal defects noted, no edema  SKIN: no suspicious lesions or rashes  NEURO: Normal strength and tone, mentation intact and speech normal  PSYCH: mentation appears normal, affect normal/bright    ASSESSMENT/PLAN:         ICD-10-CM    1. Routine general medical examination at a health care facility Z00.00 NEISSERIA GONORRHOEA PCR     CHLAMYDIA TRACHOMATIS PCR   2. Constipation, unspecified constipation type K59.00      The patient was recently in the ER with flank pain, she was concerned about possible kidney stones. Urine was negative, CBC was normal, borderline anemic, which is a chronic condition for her. CT scan was negative for evidence of renal calculi or bladder calculi. There was moderate amount of stool noted throughout the colon, she was discharged home with instructions to take MiraLAX. She is taking MiraLAX, has had bowel movements. Still has significant cramping discomfort, but it has improved.    Discussed high-fiber diet, injury adequate oral fluid intake. Suggested she may want to take a dose of magnesium citrate and drink several glasses of water, as this will clear her constipation issue rather quickly.    COUNSELING:  Reviewed preventive health counseling, as reflected in patient instructions       Regular exercise       Healthy diet/nutrition       Osteoporosis Prevention/Bone Health       Safe sex practices/STD prevention         reports that she has never smoked. She has never used smokeless tobacco.    Estimated body mass index is 26.58 kg/(m^2) as calculated from the following:    Height as of 5/10/17: 5' 9\" (1.753 m).    Weight as of this encounter: 180 lb (81.6 kg).   Weight " management plan: Discussed healthy diet and exercise guidelines and patient will follow up in 12 months in clinic to re-evaluate.    Counseling Resources:  ATP IV Guidelines  Pooled Cohorts Equation Calculator  Breast Cancer Risk Calculator  FRAX Risk Assessment  ICSI Preventive Guidelines  Dietary Guidelines for Americans, 2010  USDA's MyPlate  ASA Prophylaxis  Lung CA Screening    MERVIN Santacruz CNP  [facility]  Answers for HPI/ROS submitted by the patient on 5/9/2017   Q1: Little interest or pleasure in doing things: 0=Not at all  Q2: Feeling down, depressed or hopeless: 0=Not at all  PHQ-2 Score: 0

## 2017-05-16 NOTE — MR AVS SNAPSHOT
After Visit Summary   5/16/2017    Mary Valdez    MRN: [id number]           Patient Information     Date Of Birth          1995        Visit Information        Provider Department      5/16/2017 2:15 PM Cherrie Hills APRN CNP [facility]        Today's Diagnoses     Routine general medical examination at a health care facility    -  1    Constipation, unspecified constipation type           Follow-ups after your visit        Your next 10 appointments already scheduled     May 23, 2017  2:40 PM CDT   MyChart Skin Evaluation with Estela Mcfadden Mai, MD   [facility] ([facility]    [address] [place] [id number] [phone]              Who to contact     If you have questions or need follow up information about today's clinic visit or your schedule please contact [facility] directly at [phone].  Normal or non-critical lab and imaging results will be communicated to you by MyChart, letter or phone within 4 business days after the clinic has received the results. If you do not hear from us within 7 days, please contact the clinic through [employer] or phone. If you have a critical or abnormal lab result, we will notify you by phone as soon as possible.  Submit refill requests through [employer] or call your pharmacy and they will forward the refill request to us. Please allow 3 business days for your refill to be completed.          Additional Information About Your Visit        MyChart Information     [employer] gives you secure access to your electronic health record. If you see a primary care provider, you can also send messages to your care team and make appointments. If you have questions, please call your primary care clinic.  If you do not have a primary care provider, please call [phone] and they will assist you.        Care EveryWhere ID     This is your Care EveryWhere ID. This could be used by other  organizations to access your [facility] medical records  [id number]        Your Vitals Were     Pulse Temperature BMI (Body Mass Index)             60 98.2  F (36.8  C) (Tympanic) 26.58 kg/m2          Blood Pressure from Last 3 Encounters:   05/16/17 120/66   05/11/17 (!) 133/91   04/24/17 112/70    Weight from Last 3 Encounters:   05/16/17 180 lb (81.6 kg) (95 %)*   05/10/17 175 lb (79.4 kg) (93 %)*   04/24/17 177 lb 6.4 oz (80.5 kg) (94 %)*     * Growth percentiles are based on [facility] 2-20 Years data.              We Performed the Following     CHLAMYDIA TRACHOMATIS PCR     NEISSERIA GONORRHOEA PCR        Primary Care Provider Office Phone # Fax #    Estela Mcfadden Mai, [phone]       [address] [place] [address]        Thank you!     Thank you for choosing [facility]  for your care. Our goal is always to provide you with excellent care. Hearing back from our patients is one way we can continue to improve our services. Please take a few minutes to complete the written survey that you may receive in the mail after your visit with us. Thank you!             Your Updated Medication List - Protect others around you: Learn how to safely use, store and throw away your medicines at www.disposemymeds.org.          This list is accurate as of: 5/16/17 11:59 PM.  Always use your most recent med list.                   Brand Name Dispense Instructions for use    B-12 1000 MCG Caps          magnesium 200 MG Tabs          medroxyPROGESTERone 150 MG/ML injection    DEPO-PROVERA     Inject 150 mg into the muscle every 3 months       MULTI-VITAMIN DAILY PO      Take 1 tablet by mouth daily Reported on 3/8/2017       TYLENOL PO      Take 1,000 mg by mouth once

## 2017-05-17 LAB
C TRACH DNA SPEC QL NAA+PROBE: NORMAL
N GONORRHOEA DNA SPEC QL NAA+PROBE: NORMAL
SPECIMEN SOURCE: NORMAL
SPECIMEN SOURCE: NORMAL

## 2017-05-23 ENCOUNTER — OFFICE VISIT (OUTPATIENT)
Dept: FAMILY MEDICINE | Facility: CLINIC | Age: 22
End: 2017-05-23
Payer: MEDICAID

## 2017-05-23 VITALS
OXYGEN SATURATION: 100 % | SYSTOLIC BLOOD PRESSURE: 100 MMHG | TEMPERATURE: 97.9 F | DIASTOLIC BLOOD PRESSURE: 72 MMHG | RESPIRATION RATE: 14 BRPM | BODY MASS INDEX: 26.29 KG/M2 | WEIGHT: 178 LBS | HEART RATE: 104 BPM

## 2017-05-23 DIAGNOSIS — L40.9 PSORIASIS: Primary | ICD-10-CM

## 2017-05-23 DIAGNOSIS — M54.6 CHRONIC BILATERAL THORACIC BACK PAIN: ICD-10-CM

## 2017-05-23 DIAGNOSIS — G89.29 CHRONIC BILATERAL THORACIC BACK PAIN: ICD-10-CM

## 2017-05-23 PROCEDURE — 99214 OFFICE O/P EST MOD 30 MIN: CPT | Performed by: FAMILY MEDICINE

## 2017-05-23 RX ORDER — TRIAMCINOLONE ACETONIDE 5 MG/G
CREAM TOPICAL
Qty: 30 G | Refills: 0 | Status: SHIPPED | OUTPATIENT
Start: 2017-05-23 | End: 2018-02-06

## 2017-05-23 ASSESSMENT — PAIN SCALES - GENERAL: PAINLEVEL: NO PAIN (0)

## 2017-05-23 NOTE — MR AVS SNAPSHOT
After Visit Summary   5/23/2017    Mary Valdez    MRN: [id number]           Patient Information     Date Of Birth          1995        Visit Information        Provider Department      5/23/2017 2:40 PM Estela Gray MD [facility]        Today's Diagnoses     Psoriasis    -  1    Chronic bilateral thoracic back pain           Follow-ups after your visit        Additional Services     CHIROPRACTIC REFERRAL       Your provider has referred you to: [facility]    Please be aware that coverage of these services is subject to the terms and limitations of your health insurance plan.  Call member services at your health plan with any benefit or coverage questions.      Please bring the following to your appointment:    >>   Any x-rays, CTs or MRIs which have been performed.  Contact the facility where they were done to arrange for  prior to your scheduled appointment.    >>   List of current medications   >>   This referral request   >>   Any documents/labs given to you for this referral            DERMATOLOGY REFERRAL       Your provider has referred you to: [facility]: List of [place] hospitals according to the [facility] [phone]   [email or web address]    Please be aware that coverage of these services is subject to the terms and limitations of your health insurance plan.  Call member services at your health plan with any benefit or coverage questions.      Please bring the following with you to your appointment:    (1) Any X-Rays, CTs or MRIs which have been performed.  Contact the facility where they were done to arrange for  prior to your scheduled appointment.    (2) List of current medications  (3) This referral request   (4) Any documents/labs given to you for this referral                  Follow-up notes from your care team     Return if symptoms worsen or fail to improve.      Your next 10 appointments  already scheduled     May 24, 2017  1:45 PM CDT   [facility] Chiropractor with Daja Candelario DC   [facility] ([facility])    [address] [address] [phone]              Who to contact     If you have questions or need follow up information about today's clinic visit or your schedule please contact [facility] directly at [phone].  Normal or non-critical lab and imaging results will be communicated to you by [employer], letter or phone within 4 business days after the clinic has received the results. If you do not hear from us within 7 days, please contact the clinic through [facility] or phone. If you have a critical or abnormal lab result, we will notify you by phone as soon as possible.  Submit refill requests through [facility] or call your pharmacy and they will forward the refill request to us. Please allow 3 business days for your refill to be completed.          Additional Information About Your Visit        [facility] Information     [facility] gives you secure access to your electronic health record. If you see a primary care provider, you can also send messages to your care team and make appointments. If you have questions, please call your primary care clinic.  If you do not have a primary care provider, please call [phone] and they will assist you.        Care EveryWhere ID     This is your Care EveryWhere ID. This could be used by other organizations to access your [place] medical records  [id number]        Your Vitals Were     Pulse Temperature Respirations Pulse Oximetry BMI (Body Mass Index)       104 97.9  F (36.6  C) (Temporal) 14 100% 26.29 kg/m2        Blood Pressure from Last 3 Encounters:   05/23/17 100/72   05/16/17 120/66   05/11/17 (!) 133/91    Weight from Last 3 Encounters:   05/23/17 178 lb (80.7 kg) (94 %)*   05/16/17 180 lb (81.6 kg) (95 %)*   05/10/17 175 lb (79.4 kg) (93 %)*     * Growth percentiles are based on  [facility] 2-20 Years data.              We Performed the Following     CHIROPRACTIC REFERRAL     DERMATOLOGY REFERRAL          Today's Medication Changes          These changes are accurate as of: 5/23/17 11:59 PM.  If you have any questions, ask your nurse or doctor.               Start taking these medicines.        Dose/Directions    triamcinolone 0.5 % cream   Commonly known as:  KENALOG   Used for:  Psoriasis   Started by:  Estela Gray MD        Apply sparingly to affected area two times daily as needed   Quantity:  30 g   Refills:  0            Where to get your medicines      Some of these will need a paper prescription and others can be bought over the counter.  Ask your nurse if you have questions.     Bring a paper prescription for each of these medications     triamcinolone 0.5 % cream                Primary Care Provider Office Phone # Fax #    Estela Mcfadden Mai, [phone]       [address] [place] [address]        Thank you!     Thank you for choosing [facility]  for your care. Our goal is always to provide you with excellent care. Hearing back from our patients is one way we can continue to improve our services. Please take a few minutes to complete the written survey that you may receive in the mail after your visit with us. Thank you!             Your Updated Medication List - Protect others around you: Learn how to safely use, store and throw away your medicines at www.disposemymeds.org.          This list is accurate as of: 5/23/17 11:59 PM.  Always use your most recent med list.                   Brand Name Dispense Instructions for use    B-12 1000 MCG Caps          magnesium 200 MG Tabs          medroxyPROGESTERone 150 MG/ML injection    DEPO-PROVERA     Inject 150 mg into the muscle every 3 months       MULTI-VITAMIN DAILY PO      Take 1 tablet by mouth daily Reported on 3/8/2017       triamcinolone 0.5 % cream    KENALOG    30 g     Apply sparingly to affected area two times daily as needed       TYLENOL PO      Take 1,000 mg by mouth once Reported on 5/23/2017

## 2017-05-23 NOTE — NURSING NOTE
"Chief Complaint   Patient presents with     Psoriasis       Initial /72 (BP Location: Left arm, Patient Position: Chair, Cuff Size: Adult Regular)  Pulse 104  Temp 97.9  F (36.6  C) (Temporal)  Resp 14  Wt 178 lb (80.7 kg)  SpO2 100%  BMI 26.29 kg/m2 Estimated body mass index is 26.29 kg/(m^2) as calculated from the following:    Height as of 5/10/17: 5' 9\" (1.753 m).    Weight as of this encounter: 178 lb (80.7 kg).  Medication Reconciliation: complete     Katrin Mercado MA 5/23/2017        "

## 2017-05-23 NOTE — PROGRESS NOTES
SUBJECTIVE:                                                    Mary Valdez is a 21 year old female who presents to clinic today for the following health issues:    Psoriasis    Amount of exercise or physical activity: 2-3 days/week for an average of 15-30 minutes    Problems taking medications regularly: No    Medication side effects: none    Diet: regular (no restrictions)    Mary is here today for couple of concerns. First is about the psoriasis that she has had for most of her life. It was diagnosed by a pediatrician. She was given some cream that she used on and off with some effect.  Has not been on the cream for years. The rash has gotten worse and it is pretty much all over her body, include the scalp, trunks and extremities. It is itchy; has pain at times. It is affecting her life significantly. Never seen a dermatologist before. Not on any medication for it. She been using over-the-counter lotion with no effect. She would like to be referred to dermatologist.    She also has the chronic pain on her back around the kidney area for a while.  It was thought due to her kidney problem and extensive workup including ultrasound and CT scan and they were normal. Workup also showed no indication of pyelonephritis or UTI. Continue to have the pain - not better or worse. Stated that since she has it for so long, she has adjusted and learned to live with it. Initially it usually comes and goes but now it is a constant pain. More of an achy pain, but it would become sharp with certain activity. Denies of low back pain or back stiffness. No unusual activities or trauma. No dysuria, urinary frequency or urgency. No nausea or vomiting. No diarrhea or constipation and denies of fever or chills. No other concern.      Problem list and histories reviewed & adjusted, as indicated.  Additional history: none    Patient Active Problem List   Diagnosis     Psoriasis     Migratory pain     Anxiety state     Major  depression in complete remission (H)     Contraception     NENO (generalized anxiety disorder)     PTSD (post-traumatic stress disorder)     Maltracking of right patella     Past Surgical History:   Procedure Laterality Date     ADENOIDECTOMY       ARTHROSCOPY KNEE WITH RETINACULAR RELEASE Right 1/20/2017    Procedure: ARTHROSCOPY KNEE WITH RETINACULAR RELEASE MEDIAL OR LATERAL;  Surgeon: Yevgeniy Graf DO;  Location: [facility] OR     C NONSPECIFIC PROCEDURE  02/12/01    adenoidectomy     DENTAL SURGERY       MOUTH SURGERY         Social History   Substance Use Topics     Smoking status: Never Smoker     Smokeless tobacco: Never Used     Alcohol use No     Family History   Problem Relation Age of Onset     Other - See Comments Father      benign nerve spinal tumor      Hypertension Father      DIABETES Maternal Grandmother      Depression Maternal Grandmother      Seizure Disorder Brother          Current Outpatient Prescriptions   Medication Sig Dispense Refill     triamcinolone (KENALOG) 0.5 % cream Apply sparingly to affected area two times daily as needed 30 g 0     magnesium 200 MG TABS        Cyanocobalamin (B-12) 1000 MCG CAPS        medroxyPROGESTERone (DEPO-PROVERA) 150 MG/ML injection Inject 150 mg into the muscle every 3 months       Multiple Vitamin (MULTI-VITAMIN DAILY PO) Take 1 tablet by mouth daily Reported on 3/8/2017       Acetaminophen (TYLENOL PO) Take 1,000 mg by mouth once Reported on 5/23/2017       Allergies   Allergen Reactions     No Known Drug Allergies        Reviewed and updated as needed this visit by clinical staff  Tobacco  Allergies  Meds  Soc Hx      Reviewed and updated as needed this visit by Provider         ROS:  Constitutional, HEENT, cardiovascular, pulmonary, gi and gu systems are negative, except as otherwise noted.    OBJECTIVE:                                                    /72 (BP Location: Left arm, Patient Position: Chair, Cuff Size: Adult Regular)   Pulse 104  Temp 97.9  F (36.6  C) (Temporal)  Resp 14  Wt 178 lb (80.7 kg)  SpO2 100%  BMI 26.29 kg/m2  Body mass index is 26.29 kg/(m^2).  GENERAL: healthy, alert and no distress  RESP: lungs clear to auscultation - no rales, rhonchi or wheezes  CV: regular rate and rhythm, no murmur.  ABDOMEN: soft, non-tender and bowel sounds normal.  No CVA tenderness. No flank pain with palpation.  MS: no gross musculoskeletal defects noted, no edema.  Walk without limping. Hips and knees exam were normal. Back is straight, no lordosis or scoliosis. No tender with patient to the lumbar thoracic spine. Tender with palpation to the paraspinous muscle at the lower thoracic and upper lumbar spine area - around the kidney bilaterally with no guarding or rebound. The muscle feels slightly tight but no significant muscle spasm appreciated.  SKIN: deep red, scaly rash noted on back, abdomen and legs.  Minimal on the scalp.    Diagnostic Test Results:  No results found for this or any previous visit (from the past 24 hour(s)).     ASSESSMENT/PLAN:                                                    1. Psoriasis  Discussed with her about the nature of the condition. Will have her try the triamcinolone cream as needed. Side effects discussed. Also refer her to dermatologist for further evaluation and management especially with the scalp involvement.      - DERMATOLOGY REFERRAL  - triamcinolone (KENALOG) 0.5 % cream; Apply sparingly to affected area two times daily as needed  Dispense: 30 g; Refill: 0    2. Chronic bilateral thoracic back pain    Extensive workup does not suggest of intra-abdominal pathology.  Physical exam today suggested that it is more skeletal in nature. Refer her to chiropractic for further management option. Normal activities as tolerated. Stretching exercises recommended. Tylenol or Motrin as needed for pain.    - CHIROPRACTIC REFERRAL    Estela Mcfadden Mai, MD  [facility]

## 2017-05-24 ENCOUNTER — THERAPY VISIT (OUTPATIENT)
Dept: CHIROPRACTIC MEDICINE | Facility: CLINIC | Age: 22
End: 2017-05-24
Payer: MEDICAID

## 2017-05-24 ENCOUNTER — TELEPHONE (OUTPATIENT)
Dept: FAMILY MEDICINE | Facility: CLINIC | Age: 22
End: 2017-05-24

## 2017-05-24 DIAGNOSIS — R10.9 FLANK PAIN: ICD-10-CM

## 2017-05-24 DIAGNOSIS — M99.02 SEGMENTAL DYSFUNCTION OF THORACIC REGION: Primary | ICD-10-CM

## 2017-05-24 DIAGNOSIS — M99.03 SEGMENTAL DYSFUNCTION OF LUMBAR REGION: ICD-10-CM

## 2017-05-24 DIAGNOSIS — M99.04 SEGMENTAL DYSFUNCTION OF SACRAL REGION: ICD-10-CM

## 2017-05-24 PROCEDURE — 99203 OFFICE O/P NEW LOW 30 MIN: CPT | Mod: 25 | Performed by: CHIROPRACTOR

## 2017-05-24 PROCEDURE — 97035 APP MDLTY 1+ULTRASOUND EA 15: CPT | Performed by: CHIROPRACTOR

## 2017-05-24 PROCEDURE — 98941 CHIROPRACT MANJ 3-4 REGIONS: CPT | Mod: AT | Performed by: CHIROPRACTOR

## 2017-05-24 NOTE — TELEPHONE ENCOUNTER
----- Message from Estela Mcfadden Mai, MD sent at 5/24/2017  7:52 AM CDT -----  Please refer her to the dermatologist. Note completed. Order placed. Thank you

## 2017-05-24 NOTE — TELEPHONE ENCOUNTER
Patient was informed that [facility] would be calling her with the appointment. Number also given to call if she does not here from them within 5 working business days. Staff message sent to [place] of need to schedule with Dermatology. Senait Block LPN

## 2017-05-24 NOTE — MR AVS SNAPSHOT
After Visit Summary   5/24/2017    Mary Valdez    MRN: [id number]           Patient Information     Date Of Birth          1995        Visit Information        Provider Department      5/24/2017 1:45 PM Daja Candelario DC [facility]        Today's Diagnoses     Segmental dysfunction of thoracic region    -  1    Segmental dysfunction of lumbar region        Segmental dysfunction of sacral region        Flank pain           Follow-ups after your visit        Your next 10 appointments already scheduled     May 26, 2017  1:00 PM CDT   [facility] Chiropractor with Daja Candelario DC   [facility] ([facility])    [address] [address] [phone]              Who to contact     If you have questions or need follow up information about today's clinic visit or your schedule please contact [facility] directly at [phone].  Normal or non-critical lab and imaging results will be communicated to you by Local[employer], letter or phone within 4 business days after the clinic has received the results. If you do not hear from us within 7 days, please contact the clinic through [employer] or phone. If you have a critical or abnormal lab result, we will notify you by phone as soon as possible.  Submit refill requests through [employer] or call your pharmacy and they will forward the refill request to us. Please allow 3 business days for your refill to be completed.          Additional Information About Your Visit        MyChart Information     [employer] gives you secure access to your electronic health record. If you see a primary care provider, you can also send messages to your care team and make appointments. If you have questions, please call your primary care clinic.  If you do not have a primary care provider, please call [phone] and they will assist you.        Care EveryWhere ID     This is your Care  EveryWhere ID. This could be used by other organizations to access your [place] medical records  [id number]         Blood Pressure from Last 3 Encounters:   05/23/17 100/72   05/16/17 120/66   05/11/17 (!) 133/91    Weight from Last 3 Encounters:   05/23/17 80.7 kg (178 lb) (94 %)*   05/16/17 81.6 kg (180 lb) (95 %)*   05/10/17 79.4 kg (175 lb) (93 %)*     * Growth percentiles are based on [facility] 2-20 Years data.              We Performed the Following     CHIROPRAC MANIP,SPINAL,3-4 REGIONS     OFFICE/OUTPT VISIT,NEW,LEVL III     ULTRASOUND THERAPY        Primary Care Provider Office Phone # Fax #    Estela Mcfadden Mai, [phone]       [address]   [place] [address]        Thank you!     Thank you for choosing [facility]  for your care. Our goal is always to provide you with excellent care. Hearing back from our patients is one way we can continue to improve our services. Please take a few minutes to complete the written survey that you may receive in the mail after your visit with us. Thank you!             Your Updated Medication List - Protect others around you: Learn how to safely use, store and throw away your medicines at www.disposemymeds.org.          This list is accurate as of: 5/24/17  2:48 PM.  Always use your most recent med list.                   Brand Name Dispense Instructions for use    B-12 1000 MCG Caps          magnesium 200 MG Tabs          medroxyPROGESTERone 150 MG/ML injection    DEPO-PROVERA     Inject 150 mg into the muscle every 3 months       MULTI-VITAMIN DAILY PO      Take 1 tablet by mouth daily Reported on 3/8/2017       triamcinolone 0.5 % cream    KENALOG    30 g    Apply sparingly to affected area two times daily as needed       TYLENOL PO      Take 1,000 mg by mouth once Reported on 5/23/2017

## 2017-05-24 NOTE — PROGRESS NOTES
"Initial Chiropractic Clinic Visit    PCP: Estela Gray Gunjan Valdez is a 21 year old female who is seen in consultation at the request of Dr. Gray. Patient is presenting with bilateral flank pain starting 2 months ago. It started when she had a few symptoms of a UTI (increased frequency and urgency plus foul smelling urine), she drank cranberry juice which helps with those UTI symptoms. She then had a fever and flank pain pretty badly so she ended up going to the ED and they did a CT scan which only found some stool in her bowel but not enough to cause bilateral pain. She was still having pain so went into see her primary who then ordered a Ultrasound which came back negative. She took antibiotics which didn't help. The pain is constant and is very tender to the touch. She doesn't have a period currently, since she is on depo. Prior to the depo her periods were pretty \"harsh\" and she would have very bad cramps. She describes the pain in her flanks as dull but if it is touched or when baring down causes the pain becomes sharp. She denies radiation of pain to any other area. She denies any pain with intercourse. When the pain is really bad she can feel it \"pulses\". She has been to a chiropractor before, a long time ago but not for this incident. She denies any urgency, frequency or blood when urinating. She hasn't had a fever for a few weeks now, since she went to the ED. She doesn't play sports but does work out, but she states that it is pretty mild, she had knee surgery in January so she has been taking it easy. She currently rates the pain as 6/10 but at its worse it has been up to a 7/10. Heat helps with the pain and pushing on it makes worse. Since the pain started 2 months ago it has been getting worse. She states that has always had irregular bowel movements. She is currently taking miralax which is helping. She had to take ex-lax once to clear her out but now takes miralax daily.       Injury: No " "injuries stated     Location of Pain: bilateral flank   Duration of Pain: 2 months   Rating of Pain at worst: 7/10  Rating of Pain Currently: 6/10  Symptoms are better with: Heat  Symptoms are worse with: pressure      Health History  as reported by the patient:    How does the patient rate their own health:   Good    Current or past medical history:   Abdominal pulsating mass, Changes in bowel- taking miralax daily now which has things moving, has had problems with regularity in the past, Depression- well maintained and Sleep disorder- insomina- described as cant turn her mind off     Medical allergies  No known allergies    Past Traumas/Surgeries:  Right knee- lateral release   Fell at age 5 which led to bells palsy for a week  Psora sis     Family History  Depression, anxiety, HTN, ovarian cancer, breast cancer, diabetes, brother has epilepsy      Medications:  birth control     Occupation:  Student & [employer]     Primary job tasks:   Lifting/carrying, Prolonged standing, Pushing/pulling and Repetitive tasks    Barriers as home/work:   Doesn't stop her from doing anything, \"just not fun\"              Mary was asked to complete the Jose Guadalupe Start Back screening tool. Jose Guadalupe Start Total Score:3 Sub Score: 3     Review of Systems  Musculoskeletal: as above  Remainder of review of systems is negative including constitutional, CV, pulmonary, GI, Skin and Neurologic except as noted in HPI or medical history.    Past Medical History:   Diagnosis Date     Bell's palsy 7 y/o    Due to trauma, neg Lyme     Depression     Venice Meyer, psychologist, [facility] in [place]     Depressive disorder      Major depression in complete remission (H) 7/7/2014     Psoriasis     scalp only     Past Surgical History:   Procedure Laterality Date     ADENOIDECTOMY       ARTHROSCOPY KNEE WITH RETINACULAR RELEASE Right 1/20/2017    Procedure: ARTHROSCOPY KNEE WITH RETINACULAR RELEASE MEDIAL OR LATERAL;  Surgeon: Yevgeniy Graf " DO Daniel;  Location: [facility]     C NONSPECIFIC PROCEDURE  02/12/01    adenoidectomy     DENTAL SURGERY       MOUTH SURGERY       Objective  There were no vitals taken for this visit.      GENERAL APPEARANCE: healthy, alert and no distress   GAIT: NORMAL  SKIN: no suspicious lesions or rashes  NEURO: Normal strength and tone, mentation intact and speech normal  PSYCH:  mentation appears normal and affect normal/bright    Low back exam:    Inspection:       Hyperlordotic in lumbar spine    ROM:  WNL, Extension causes pain, some pain with RR and RLF    Tender:  Bilateral QL worse on right      Strength:       ankle dorsiflexion 5/5       ankle plantarflexion 5/5       dorsiflexion of the great toe 5/5    Reflexes:       patellar (L3, L4) symmetric normal       achilles tendons (S1) symmetric normal    Sensation:      grossly intact throughout lower extremities    Special tests:  La's Sign - positive for pain over kidneys  [facility] test- negative  Rovsing's - negative   Rebound tenderness- Positive for pain in the abdomen     Segmental spinal dysfunction/restrictions found at::  T5 Right rotation restricted  T12 Extension restriction  L4 Right rotation restricted  PSIS Left Extension restriction.      Muscle spasm found in:Quad lumb      Radiology:  CT scan reviewed - positive for moderate amount of stool - no other findings    Assessment:    No diagnosis found.    RX ordered/plan of care: Unsure of cause of pain. Likely mechanical type pain in TL junction with associated myospasm and intersegmental dysfunction.   Anticipated outcomes: Patient is expected to get some benefit with care.   Possible risks and side effects: Minimal soreness expected post-adjustment.    After discussing the risk and benefits of care, patient consented to treatment.    Prognosis: Good      Patient's condition:  Patient had restrictions pre-manipulation    Treatment effectiveness:  Post manipulation there is better  intersegmental movement and Patient claims to feel looser post manipulation      Plan:    Procedures:  Evaluation and Management:  13813 Moderate level exam 30 min    CMT:  46671 Chiropractic manipulative treatment 3-4 regions performed   Thoracic: Diversified, T5, T12, Prone  Lumbar: Diversified, L4, Side posture  Pelvis: Diversified, PSIS Left , Side posture    Modalities:  00424: US:  1.0 Geller/cm squared for 5 minutes at 1mhz  continuous pulsed, Location:right flank     Therapeutic procedures:  None    Response to Treatment  Reduction in symptoms as reported by patient      Treatment plan and goals:  Goals:  Determine cause of pain  Decrease pain from 6/10 to 3/10 in 6 visits.     Frequency of care  Duration of care is estimated to be 4 weeks, from the initial treatment.  It is estimated that the patient will need a total of 6 visits to resolve this episode.  For the initial therapeutic trial of care, the frequency is recommended at 1-2 X week, once daily.  A reevaluation would be clinically appropriate in 6 visits, to determine progress and further course of care.    In-Office Treatment  Spinal Chiropractic Manipulative Therapy:  Trial of conservative management, re-evaluate in 6 visits.         Recommendations:    Instructions:ice 20 minutes every other hour as needed    Follow-up:  Return to care Friday.       Discussed the assessment with the patient.      Disclaimer: This note consists of symbols derived from keyboarding, dictation and/or voice recognition software. As a result, there may be errors in the script that have gone undetected. Please consider this when interpreting information found in this chart.

## 2017-05-26 ENCOUNTER — THERAPY VISIT (OUTPATIENT)
Dept: CHIROPRACTIC MEDICINE | Facility: CLINIC | Age: 22
End: 2017-05-26
Payer: MEDICAID

## 2017-05-26 DIAGNOSIS — R10.9 FLANK PAIN: ICD-10-CM

## 2017-05-26 DIAGNOSIS — M99.04 SEGMENTAL DYSFUNCTION OF SACRAL REGION: ICD-10-CM

## 2017-05-26 DIAGNOSIS — M99.02 SEGMENTAL DYSFUNCTION OF THORACIC REGION: ICD-10-CM

## 2017-05-26 DIAGNOSIS — M99.03 SEGMENTAL DYSFUNCTION OF LUMBAR REGION: ICD-10-CM

## 2017-05-26 PROCEDURE — 97035 APP MDLTY 1+ULTRASOUND EA 15: CPT | Performed by: CHIROPRACTOR

## 2017-05-26 PROCEDURE — 98941 CHIROPRACT MANJ 3-4 REGIONS: CPT | Mod: AT | Performed by: CHIROPRACTOR

## 2017-05-26 NOTE — MR AVS SNAPSHOT
After Visit Summary   5/26/2017    Mary Valdez    MRN: [id number]           Patient Information     Date Of Birth          1995        Visit Information        Provider Department      5/26/2017 1:00 PM Daja Candelario DC [place] [facility]        Today's Diagnoses     Segmental dysfunction of thoracic region        Segmental dysfunction of lumbar region        Segmental dysfunction of sacral region        Flank pain           Follow-ups after your visit        Who to contact     If you have questions or need follow up information about today's clinic visit or your schedule please contact [facility] directly at [phone].  Normal or non-critical lab and imaging results will be communicated to you by ClickHomehart, letter or phone within 4 business days after the clinic has received the results. If you do not hear from us within 7 days, please contact the clinic through [employer] or phone. If you have a critical or abnormal lab result, we will notify you by phone as soon as possible.  Submit refill requests through ForeSee or call your pharmacy and they will forward the refill request to us. Please allow 3 business days for your refill to be completed.          Additional Information About Your Visit        MyChart Information     ForeSee gives you secure access to your electronic health record. If you see a primary care provider, you can also send messages to your care team and make appointments. If you have questions, please call your primary care clinic.  If you do not have a primary care provider, please call [phone] and they will assist you.        Care EveryWhere ID     This is your Care EveryWhere ID. This could be used by other organizations to access your [place] medical records  [id number]         Blood Pressure from Last 3 Encounters:   05/23/17 100/72   05/16/17 120/66   05/11/17 (!) 133/91    Weight from Last 3 Encounters:    05/23/17 80.7 kg (178 lb) (94 %)*   05/16/17 81.6 kg (180 lb) (95 %)*   05/10/17 79.4 kg (175 lb) (93 %)*     * Growth percentiles are based on [facility] 2-20 Years data.              We Performed the Following     CHIROPRAC MANIP,SPINAL,3-4 REGIONS     ULTRASOUND THERAPY        Primary Care Provider Office Phone # Fax #    Estela Mcfadden Mai, [phone]       [address]   [place] [address]        Thank you!     Thank you for choosing [facility]  for your care. Our goal is always to provide you with excellent care. Hearing back from our patients is one way we can continue to improve our services. Please take a few minutes to complete the written survey that you may receive in the mail after your visit with us. Thank you!             Your Updated Medication List - Protect others around you: Learn how to safely use, store and throw away your medicines at www.disposemymeds.org.          This list is accurate as of: 5/26/17  1:17 PM.  Always use your most recent med list.                   Brand Name Dispense Instructions for use    B-12 1000 MCG Caps          magnesium 200 MG Tabs          medroxyPROGESTERone 150 MG/ML injection    DEPO-PROVERA     Inject 150 mg into the muscle every 3 months       MULTI-VITAMIN DAILY PO      Take 1 tablet by mouth daily Reported on 3/8/2017       triamcinolone 0.5 % cream    KENALOG    30 g    Apply sparingly to affected area two times daily as needed       TYLENOL PO      Take 1,000 mg by mouth once Reported on 5/23/2017

## 2017-05-26 NOTE — PROGRESS NOTES
"Visit #:  2 of 8 based on treatment plan 5/24/2017    Subjective:  Mary Valdez is a 21 year old female who is seen in f/u up for:        Segmental dysfunction of thoracic region  Segmental dysfunction of lumbar region  Segmental dysfunction of sacral region  Flank pain.     Since last visit on 5/24/2017,  Mary Valdez reports the following changes: Patient presents and states that what we did felt great and her back feels good, but her sides still feel the same. She did not notice any change in her pain in her bilateral sides. She states that yesterday was pretty unbearable, and she actually \"felt like something was really wrong.\" She rates the pain 5/10 today, and states that it is \"not too bad.\"       Objective:  The following was observed:    P: pain elicited on palpation, bilateral QL region, \"kidney\" area    A: static palpation demonstrates intersegmental asymmetry, as noted    R: motion palpation notes restricted motion    T: localized muscle spasm at: Quad lumb Bilaterally      Assessment:    Segmental spinal dysfunction/restrictions found at:  T5 LR, RRR  T12 E, FR  L4 LR, RRR  Right SI posterior    Diagnoses:      1. Segmental dysfunction of thoracic region    2. Segmental dysfunction of lumbar region    3. Segmental dysfunction of sacral region    4. Flank pain        Patient's condition:  Patient had restrictions pre-manipulation    Treatment effectiveness:  Post manipulation there is better intersegmental movement and Patient claims to feel looser post manipulation      Procedures:  CMT:  92490 Chiropractic manipulative treatment 3-4 regions performed   Thoracic: Diversified, T5, T12, Prone  Lumbar: Diversified, L4, Side posture  Pelvis: Diversified, PSIS Right , Side posture    Modalities:  32170: US:  1.0 Geller/cm squared for 8 minutes at 1.0mhz  Continuous  pulsed, Location: right QL/kidney region    Therapeutic procedures:  RockTape applied to right QL region for " costochondritis      Prognosis: Guarded - unsure of cause of pain    Progress towards Goals: Patient has not made progress towards goal of:  Determine cause of pain  Decrease pain from 6/10 to 3/10 in 6 visits.      Response to Treatment:   No Change in symptoms with 1 treatment      Recommendations:    Instructions:ice 20 minutes every other hour as needed    Follow-up:  Return to care next week.

## 2017-05-30 ENCOUNTER — THERAPY VISIT (OUTPATIENT)
Dept: CHIROPRACTIC MEDICINE | Facility: CLINIC | Age: 22
End: 2017-05-30
Payer: MEDICAID

## 2017-05-30 DIAGNOSIS — M99.04 SEGMENTAL DYSFUNCTION OF SACRAL REGION: ICD-10-CM

## 2017-05-30 DIAGNOSIS — R10.9 FLANK PAIN: ICD-10-CM

## 2017-05-30 DIAGNOSIS — M99.02 SEGMENTAL DYSFUNCTION OF THORACIC REGION: ICD-10-CM

## 2017-05-30 DIAGNOSIS — M99.03 SEGMENTAL DYSFUNCTION OF LUMBAR REGION: ICD-10-CM

## 2017-05-30 PROCEDURE — 98940 CHIROPRACT MANJ 1-2 REGIONS: CPT | Mod: AT | Performed by: CHIROPRACTOR

## 2017-05-30 NOTE — PROGRESS NOTES
"Visit #:  3 of 8 based on treatment plan 5/24/2017    Subjective:  Mary Valdez is a 21 year old female who is seen in f/u up for:        Segmental dysfunction of thoracic region  Segmental dysfunction of lumbar region  Segmental dysfunction of sacral region  Flank pain.     Since last visit on 5/26/2017,  Mary Valdez reports the following changes: Patient presents and states that her pain is gone! When she went to bed on Friday night, she realized that her pain was gone after the 2nd adjustment. She still gets occasional sharp pains, but the constant nagging pain is completely gone. She notes that she gets sharp pains every hour or so, but nothing like she was. She currently has no pain. She says that is the first time she can say that is several months.        Objective:  The following was observed:    P: pain elicited on palpation, bilateral QL region, \"kidney\" area    A: static palpation demonstrates intersegmental asymmetry, as noted    R: motion palpation notes restricted motion    T: localized muscle spasm at: Quad lumb Bilaterally      Assessment:    Segmental spinal dysfunction/restrictions found at:  T5 E, FR  T12 E, FR  L2 RR, LRR      Diagnoses:      1. Segmental dysfunction of thoracic region    2. Segmental dysfunction of lumbar region    3. Segmental dysfunction of sacral region    4. Flank pain        Patient's condition:  Patient had restrictions pre-manipulation    Treatment effectiveness:  Post manipulation there is better intersegmental movement and Patient claims to feel looser post manipulation      Procedures:  CMT:  93950 Chiropractic manipulative treatment 1-2 regions performed   Thoracic: Diversified, T5, T12, Prone  Lumbar: Diversified,Side posture    Modalities:  MSTM to affected musculature, 3min    Therapeutic procedures:  RockTape applied to right QL region for costochondritis      Prognosis: Favorable with improvement    Progress towards Goals: Patient has made progress " towards goal of:  Determine cause of pain  Decrease pain from 6/10 to 3/10 in 6 visits.      Response to Treatment:   No pain after 2 treatments and RockTape      Recommendations:    Instructions:ice 20 minutes every other hour as needed    Follow-up:  Return to care next week.

## 2017-05-30 NOTE — MR AVS SNAPSHOT
After Visit Summary   5/30/2017    Mary Valdez    MRN: [id number]           Patient Information     Date Of Birth          1995        Visit Information        Provider Department      5/30/2017 9:30 AM Daja Candelario DC [place] [facility]        Today's Diagnoses     Segmental dysfunction of thoracic region        Segmental dysfunction of lumbar region        Segmental dysfunction of sacral region        Flank pain           Follow-ups after your visit        Your next 10 appointments already scheduled     Jun 05, 2017  1:30 PM CDT   [facility] Chiropractor with Daja Candelario DC   [place] [facility] ([facility]    [address] [address]   [phone]            Jul 24, 2017  3:00 PM CDT   New Visit with Frankie Cody MD   [facility]    [address] [place] [address] [phone]              Who to contact     If you have questions or need follow up information about today's clinic visit or your schedule please contact [facility] directly at [phone].  Normal or non-critical lab and imaging results will be communicated to you by MyChart, letter or phone within 4 business days after the clinic has received the results. If you do not hear from us within 7 days, please contact the clinic through Chanyoujihart or phone. If you have a critical or abnormal lab result, we will notify you by phone as soon as possible.  Submit refill requests through Parkya or call your pharmacy and they will forward the refill request to us. Please allow 3 business days for your refill to be completed.          Additional Information About Your Visit        MyChart Information     Parkya gives you secure access to your electronic health record. If you see a primary care provider, you can also send messages to your care team and make appointments. If you  have questions, please call your primary care clinic.  If you do not have a primary care provider, please call [phone] and they will assist you.        Care EveryWhere ID     This is your Care EveryWhere ID. This could be used by other organizations to access your [place] medical records  [id number]         Blood Pressure from Last 3 Encounters:   05/23/17 100/72   05/16/17 120/66   05/11/17 (!) 133/91    Weight from Last 3 Encounters:   05/23/17 80.7 kg (178 lb) (94 %)*   05/16/17 81.6 kg (180 lb) (95 %)*   05/10/17 79.4 kg (175 lb) (93 %)*     * Growth percentiles are based on [facility] 2-20 Years data.              We Performed the Following     CHIROPRAC MANIP,SPINAL,1-2 REGIONS        Primary Care Provider Office Phone # Fax #    Estela Mcfadden Mai, [phone]       [address]   [facility] [address]        Thank you!     Thank you for choosing [facility]  for your care. Our goal is always to provide you with excellent care. Hearing back from our patients is one way we can continue to improve our services. Please take a few minutes to complete the written survey that you may receive in the mail after your visit with us. Thank you!             Your Updated Medication List - Protect others around you: Learn how to safely use, store and throw away your medicines at www.disposemymeds.org.          This list is accurate as of: 5/30/17  9:40 AM.  Always use your most recent med list.                   Brand Name Dispense Instructions for use    B-12 1000 MCG Caps          magnesium 200 MG Tabs          medroxyPROGESTERone 150 MG/ML injection    DEPO-PROVERA     Inject 150 mg into the muscle every 3 months       MULTI-VITAMIN DAILY PO      Take 1 tablet by mouth daily Reported on 3/8/2017       triamcinolone 0.5 % cream    KENALOG    30 g    Apply sparingly to affected area two times daily as needed       TYLENOL PO      Take 1,000 mg by mouth once  Reported on 5/23/2017

## 2017-06-05 ENCOUNTER — THERAPY VISIT (OUTPATIENT)
Dept: CHIROPRACTIC MEDICINE | Facility: CLINIC | Age: 22
End: 2017-06-05
Payer: COMMERCIAL

## 2017-06-05 DIAGNOSIS — R10.9 FLANK PAIN: ICD-10-CM

## 2017-06-05 DIAGNOSIS — M99.02 SEGMENTAL DYSFUNCTION OF THORACIC REGION: ICD-10-CM

## 2017-06-05 DIAGNOSIS — M99.04 SEGMENTAL DYSFUNCTION OF SACRAL REGION: ICD-10-CM

## 2017-06-05 DIAGNOSIS — M99.03 SEGMENTAL DYSFUNCTION OF LUMBAR REGION: ICD-10-CM

## 2017-06-05 PROCEDURE — 98940 CHIROPRACT MANJ 1-2 REGIONS: CPT | Mod: AT | Performed by: CHIROPRACTOR

## 2017-06-05 NOTE — MR AVS SNAPSHOT
After Visit Summary   6/5/2017    Mary Valdez    MRN: [id number]           Patient Information     Date Of Birth          1995        Visit Information        Provider Department      6/5/2017 1:30 PM Daja Candelario DC [place] [facility]        Today's Diagnoses     Segmental dysfunction of thoracic region        Segmental dysfunction of lumbar region        Segmental dysfunction of sacral region        Flank pain           Follow-ups after your visit        Your next 10 appointments already scheduled     Jul 24, 2017  3:00 PM CDT   New Visit with Frankie Cody MD   [facility]    [id number] [place] [address] [phone]              Who to contact     If you have questions or need follow up information about today's clinic visit or your schedule please contact [facility] directly at [phone].  Normal or non-critical lab and imaging results will be communicated to you by MyChart, letter or phone within 4 business days after the clinic has received the results. If you do not hear from us within 7 days, please contact the clinic through hField Technologieshart or phone. If you have a critical or abnormal lab result, we will notify you by phone as soon as possible.  Submit refill requests through Revolver or call your pharmacy and they will forward the refill request to us. Please allow 3 business days for your refill to be completed.          Additional Information About Your Visit        MyChart Information     Revolver gives you secure access to your electronic health record. If you see a primary care provider, you can also send messages to your care team and make appointments. If you have questions, please call your primary care clinic.  If you do not have a primary care provider, please call [phone] and they will assist you.        Care EveryWhere ID     This is your Care  EveryWhere ID. This could be used by other organizations to access your [place] medical records  [id number]         Blood Pressure from Last 3 Encounters:   05/23/17 100/72   05/16/17 120/66   05/11/17 (!) 133/91    Weight from Last 3 Encounters:   05/23/17 80.7 kg (178 lb) (94 %)*   05/16/17 81.6 kg (180 lb) (95 %)*   05/10/17 79.4 kg (175 lb) (93 %)*     * Growth percentiles are based on [facility] 2-20 Years data.              We Performed the Following     CHIROPRAC MANIP,SPINAL,1-2 REGIONS        Primary Care Provider Office Phone # Fax #    Estela Mcfadden Mai, [phone]       [address] [place] [address]        Thank you!     Thank you for choosing [facility]  for your care. Our goal is always to provide you with excellent care. Hearing back from our patients is one way we can continue to improve our services. Please take a few minutes to complete the written survey that you may receive in the mail after your visit with us. Thank you!             Your Updated Medication List - Protect others around you: Learn how to safely use, store and throw away your medicines at www.disposemymeds.org.          This list is accurate as of: 6/5/17  1:44 PM.  Always use your most recent med list.                   Brand Name Dispense Instructions for use    B-12 1000 MCG Caps          magnesium 200 MG Tabs          medroxyPROGESTERone 150 MG/ML injection    DEPO-PROVERA     Inject 150 mg into the muscle every 3 months       MULTI-VITAMIN DAILY PO      Take 1 tablet by mouth daily Reported on 3/8/2017       triamcinolone 0.5 % cream    KENALOG    30 g    Apply sparingly to affected area two times daily as needed       TYLENOL PO      Take 1,000 mg by mouth once Reported on 5/23/2017

## 2017-06-05 NOTE — PROGRESS NOTES
"Visit #:  4 of 8 based on treatment plan 5/24/2017    Subjective:  Mary Valdez is a 21 year old female who is seen in f/u up for:        Segmental dysfunction of thoracic region  Segmental dysfunction of lumbar region  Segmental dysfunction of sacral region  Flank pain.     Since last visit on 5/30/2017,  Mary Valdez reports the following changes: Patient presents and states that her pain started to come back this past weekend while she was paddle boarding. She had to cut her outing short due to the pain. She states that her pain was not as severe this weekend, but was there more than it had been. She states that her current pain is \"very mild.\"       Objective:  The following was observed:    P: pain elicited on palpation, bilateral QL region, \"kidney\" area    A: static palpation demonstrates intersegmental asymmetry, as noted    R: motion palpation notes restricted motion    T: localized muscle spasm at: Quad lumb Bilaterally      Assessment:    Segmental spinal dysfunction/restrictions found at:  T5 E, FR  T12 E, FR  L2 RR, LRR  L4 LR, RRR      Diagnoses:      1. Segmental dysfunction of thoracic region    2. Segmental dysfunction of lumbar region    3. Segmental dysfunction of sacral region    4. Flank pain        Patient's condition:  Patient had restrictions pre-manipulation    Treatment effectiveness:  Post manipulation there is better intersegmental movement and Patient claims to feel looser post manipulation      Procedures:  CMT:  07781 Chiropractic manipulative treatment 1-2 regions performed   Thoracic: Diversified, T5, T12, Prone  Lumbar: Diversified, L2, L4, Side posture    Modalities:  US to bilateral QLs, 1.0 urena, 8 min, continuous    Therapeutic procedures:  None today.      Prognosis: Favorable with improvement    Progress towards Goals: Patient has made progress towards goal of:  Determine cause of pain  Decrease pain from 6/10 to 3/10 in 6 visits.      Response to Treatment:   No " pain after 2 treatments and [employer]  Exacerbation with paddle boarding      Recommendations:    Instructions:ice 20 minutes every other hour as needed    Follow-up:  Return to care in 1 week.

## 2017-06-14 ENCOUNTER — OFFICE VISIT (OUTPATIENT)
Dept: ORTHOPEDICS | Facility: CLINIC | Age: 22
End: 2017-06-14
Payer: COMMERCIAL

## 2017-06-14 VITALS — TEMPERATURE: 98.6 F | WEIGHT: 183.5 LBS | HEIGHT: 69 IN | BODY MASS INDEX: 27.18 KG/M2

## 2017-06-14 DIAGNOSIS — M25.561 ACUTE PAIN OF RIGHT KNEE: Primary | ICD-10-CM

## 2017-06-14 PROCEDURE — 99213 OFFICE O/P EST LOW 20 MIN: CPT | Performed by: ORTHOPAEDIC SURGERY

## 2017-06-14 ASSESSMENT — PAIN SCALES - GENERAL: PAINLEVEL: MODERATE PAIN (5)

## 2017-06-14 NOTE — MR AVS SNAPSHOT
After Visit Summary   6/14/2017    Mary Valdez    MRN: [id number]           Patient Information     Date Of Birth          1995        Visit Information        Provider Department      6/14/2017 1:00 PM Yevgeniy Graf DO [facility]        Today's Diagnoses     Acute pain of right knee    -  1       Follow-ups after your visit        Your next 10 appointments already scheduled     Jul 24, 2017  3:00 PM CDT   New Visit with Frankie Cody MD   [facility]    [address] [place] [id number] [phone]              Future tests that were ordered for you today     Open Future Orders        Priority Expected Expires Ordered    MR Knee Right w/o Contrast Routine  6/14/2018 6/14/2017            Who to contact     If you have questions or need follow up information about today's clinic visit or your schedule please contact [facility] directly at [phone].  Normal or non-critical lab and imaging results will be communicated to you by [employer], letter or phone within 4 business days after the clinic has received the results. If you do not hear from us within 7 days, please contact the clinic through [employer] or phone. If you have a critical or abnormal lab result, we will notify you by phone as soon as possible.  Submit refill requests through [employer] or call your pharmacy and they will forward the refill request to us. Please allow 3 business days for your refill to be completed.          Additional Information About Your Visit        Be[employer] Information     [employer] gives you secure access to your electronic health record. If you see a primary care provider, you can also send messages to your care team and make appointments. If you have questions, please call your primary care clinic.  If you do not have a primary care provider, please call [phone] and they will assist you.       "  Care EveryWhere ID     This is your Care EveryWhere ID. This could be used by other organizations to access your [facility] medical records  [id number]        Your Vitals Were     Temperature Height BMI (Body Mass Index)             98.6  F (37  C) (Temporal) 5' 9\" (1.753 m) 27.1 kg/m2          Blood Pressure from Last 3 Encounters:   05/23/17 100/72   05/16/17 120/66   05/11/17 (!) 133/91    Weight from Last 3 Encounters:   06/14/17 183 lb 8 oz (83.2 kg) (95 %)*   05/23/17 178 lb (80.7 kg) (94 %)*   05/16/17 180 lb (81.6 kg) (95 %)*     * Growth percentiles are based on CDC 2-20 Years data.               Primary Care Provider Office Phone # Fax #    Estela Mcfadden Mai, [phone]       [address]   [facility] [address]        Thank you!     Thank you for choosing [facility]  for your care. Our goal is always to provide you with excellent care. Hearing back from our patients is one way we can continue to improve our services. Please take a few minutes to complete the written survey that you may receive in the mail after your visit with us. Thank you!             Your Updated Medication List - Protect others around you: Learn how to safely use, store and throw away your medicines at www.disposemymeds.org.          This list is accurate as of: 6/14/17  1:15 PM.  Always use your most recent med list.                   Brand Name Dispense Instructions for use    B-12 1000 MCG Caps          magnesium 200 MG Tabs          medroxyPROGESTERone 150 MG/ML injection    DEPO-PROVERA     Inject 150 mg into the muscle every 3 months       MULTI-VITAMIN DAILY PO      Take 1 tablet by mouth daily Reported on 3/8/2017       triamcinolone 0.5 % cream    KENALOG    30 g    Apply sparingly to affected area two times daily as needed       TYLENOL PO      Take 1,000 mg by mouth once Reported on 5/23/2017         "

## 2017-06-14 NOTE — LETTER
"  6/14/2017       RE: Mary Valdez  [address]  [address]  [facility] [address]           Dear Colleague,    Thank you for referring your patient, Mary Valdez, to the [facility]. Please see a copy of my visit note below.    Office Visit-Follow up    Chief Complaint: Mary Valdez is a 21 year old female who is being seen for   Chief Complaint   Patient presents with     RECHECK     Right knee follow up     Surgical Followup     DOS: 1/20/17~Right knee arthroscopy with arthroscopic lateral retinacular release~6 months postop       History of Present Illness:   \"I forgot I had knee problems\". She reports she is back to full activity with no issues in her knee. Unfortunately she stepped in a hole twisting her knee last week. She had some minimal pain that she iced. This last Friday she had a pivoting twisting type injury causing acute pain in her right knee. Associated with this has been some swelling. Rates her pain as moderate. Worse with weightbearing. She's been taking ibuprofen with minimal relief.    REVIEW OF SYSTEMS  General: negative for, night sweats, dizziness, fatigue  Resp: No shortness of breath and no cough  CV: negative for chest pain, syncope or near-syncope  GI: negative for nausea, vomiting and diarrhea  : negative for dysuria and hematuria  Musculoskeletal: as above  Neurologic: negative for syncope   Hematologic: negative for bleeding disorder    Physical Exam:  Vitals: Temp 98.6  F (37  C) (Temporal)  Ht 5' 9\" (1.753 m)  Wt 183 lb 8 oz (83.2 kg)  BMI 27.1 kg/m2  BMI= Body mass index is 27.1 kg/(m^2).  Constitutional: healthy, alert and no acute distress   Psychiatric: mentation appears normal and affect normal/bright  NEURO: no focal deficits  RESP: Normal with easy respirations and no use of accessory muscles to breathe, no audible wheezing or retractions  CV: RLE: no edema         Regular rate and rhythm by palpation  SKIN: No erythema, rashes, " excoriation, or breakdown. No evidence of infection.   JOINT/EXTREMITIES:right knee: Full range of motion. Small effusion. Medial joint line tenderness to palpation. No pain or instability with varus and valgus testing. Negative Lachman. Positive medial Onel.  GAIT: not tested             Diagnostic Modalities:  None today.  Independent visualization of the images was performed.      Impression: right medial sided knee pain    Plan:  All of the above pertinent physical exam and imaging modalities findings was reviewed with Mary.    She reports this pain is completely different than her previous pain. Exam is consistent with meniscal findings. Recommend MRI full evaluation.        Return to clinic to discuss test results, or sooner as needed for changes.  Re-x-ray on return: No    Magdaleno Graf D.O.          Again, thank you for allowing me to participate in the care of your patient.        Sincerely,              Yevgeniy Graf, DO

## 2017-06-14 NOTE — NURSING NOTE
"Chief Complaint   Patient presents with     RECHECK     Right knee follow up     Surgical Followup     DOS: 1/20/17~Right knee arthroscopy with arthroscopic lateral retinacular release~6 months postop       Initial Temp 98.6  F (37  C) (Temporal)  Ht 1.753 m (5' 9\")  Wt 83.2 kg (183 lb 8 oz)  BMI 27.1 kg/m2 Estimated body mass index is 27.1 kg/(m^2) as calculated from the following:    Height as of this encounter: 1.753 m (5' 9\").    Weight as of this encounter: 83.2 kg (183 lb 8 oz).  Medication Reconciliation: complete   Nettie/CATHY     "

## 2017-06-14 NOTE — PROGRESS NOTES
"Office Visit-Follow up    Chief Complaint: Mary Valdez is a 21 year old female who is being seen for   Chief Complaint   Patient presents with     RECHECK     Right knee follow up     Surgical Followup     DOS: 1/20/17~Right knee arthroscopy with arthroscopic lateral retinacular release~6 months postop       History of Present Illness:   \"I forgot I had knee problems\". She reports she is back to full activity with no issues in her knee. Unfortunately she stepped in a hole twisting her knee last week. She had some minimal pain that she iced. This last Friday she had a pivoting twisting type injury causing acute pain in her right knee. Associated with this has been some swelling. Rates her pain as moderate. Worse with weightbearing. She's been taking ibuprofen with minimal relief.    REVIEW OF SYSTEMS  General: negative for, night sweats, dizziness, fatigue  Resp: No shortness of breath and no cough  CV: negative for chest pain, syncope or near-syncope  GI: negative for nausea, vomiting and diarrhea  : negative for dysuria and hematuria  Musculoskeletal: as above  Neurologic: negative for syncope   Hematologic: negative for bleeding disorder    Physical Exam:  Vitals: Temp 98.6  F (37  C) (Temporal)  Ht 5' 9\" (1.753 m)  Wt 183 lb 8 oz (83.2 kg)  BMI 27.1 kg/m2  BMI= Body mass index is 27.1 kg/(m^2).  Constitutional: healthy, alert and no acute distress   Psychiatric: mentation appears normal and affect normal/bright  NEURO: no focal deficits  RESP: Normal with easy respirations and no use of accessory muscles to breathe, no audible wheezing or retractions  CV: RLE: no edema         Regular rate and rhythm by palpation  SKIN: No erythema, rashes, excoriation, or breakdown. No evidence of infection.   JOINT/EXTREMITIES:right knee: Full range of motion. Small effusion. Medial joint line tenderness to palpation. No pain or instability with varus and valgus testing. Negative Lachman. Positive medial " Onel.  GAIT: not tested             Diagnostic Modalities:  None today.  Independent visualization of the images was performed.      Impression: right medial sided knee pain    Plan:  All of the above pertinent physical exam and imaging modalities findings was reviewed with Mary.    She reports this pain is completely different than her previous pain. Exam is consistent with meniscal findings. Recommend MRI full evaluation.        Return to clinic to discuss test results, or sooner as needed for changes.  Re-x-ray on return: No    Magdaleno Graf D.O.

## 2017-06-16 ENCOUNTER — HOSPITAL ENCOUNTER (OUTPATIENT)
Dept: MRI IMAGING | Facility: CLINIC | Age: 22
Discharge: HOME | End: 2017-06-16
Attending: ORTHOPAEDIC SURGERY | Admitting: ORTHOPAEDIC SURGERY
Payer: COMMERCIAL

## 2017-06-16 DIAGNOSIS — M25.561 ACUTE PAIN OF RIGHT KNEE: ICD-10-CM

## 2017-06-16 PROCEDURE — 73721 MRI JNT OF LWR EXTRE W/O DYE: CPT | Mod: RT

## 2017-06-21 ENCOUNTER — OFFICE VISIT (OUTPATIENT)
Dept: ORTHOPEDICS | Facility: CLINIC | Age: 22
End: 2017-06-21
Payer: COMMERCIAL

## 2017-06-21 VITALS — TEMPERATURE: 97.9 F | HEIGHT: 69 IN | WEIGHT: 182 LBS | BODY MASS INDEX: 26.96 KG/M2

## 2017-06-21 DIAGNOSIS — S83.421A SPRAIN OF LATERAL COLLATERAL LIGAMENT OF RIGHT KNEE, INITIAL ENCOUNTER: Primary | ICD-10-CM

## 2017-06-21 PROCEDURE — 99213 OFFICE O/P EST LOW 20 MIN: CPT | Performed by: ORTHOPAEDIC SURGERY

## 2017-06-21 NOTE — NURSING NOTE
"Chief Complaint   Patient presents with     RECHECK     MRI results of Right knee     Surgical Followup     DOS: 1/20/17~Right knee arthroscopy with arthroscopic lateral retinacular release~5 months postop       Initial Temp 97.9  F (36.6  C) (Temporal)  Ht 1.753 m (5' 9\")  Wt 82.6 kg (182 lb)  BMI 26.88 kg/m2 Estimated body mass index is 26.88 kg/(m^2) as calculated from the following:    Height as of this encounter: 1.753 m (5' 9\").    Weight as of this encounter: 82.6 kg (182 lb).  Medication Reconciliation: complete    "

## 2017-06-21 NOTE — LETTER
"  6/21/2017       RE: Mary Valdez  [address]  [address]  [facility] [address]           Dear Colleague,    Thank you for referring your patient, Mary Valdez, to the [facility]. Please see a copy of my visit note below.    Office Visit-Follow up    Chief Complaint: Mary Valdez is a 21 year old female who is being seen for   Chief Complaint   Patient presents with     RECHECK     MRI results of Right knee     Surgical Followup     DOS: 1/20/17~Right knee arthroscopy with arthroscopic lateral retinacular release~5 months postop       History of Present Illness:  Today's visit:  Patient is seen for follow-up of right knee and to review MRI results.  She reports that her pain has improved a \"little bit\".  Pain is located lateral.  Worse with twisting/pivoting motions.  Better with rest.       6/14/2017  \"I forgot I had knee problems\". She reports she is back to full activity with no issues in her knee. Unfortunately she stepped in a hole twisting her knee last week. She had some minimal pain that she iced. This last Friday she had a pivoting twisting type injury causing acute pain in her right knee. Associated with this has been some swelling. Rates her pain as moderate. Worse with weightbearing. She's been taking ibuprofen with minimal relief.    REVIEW OF SYSTEMS  General: negative for, night sweats, dizziness, fatigue  Resp: No shortness of breath and no cough  CV: negative for chest pain, syncope or near-syncope  GI: negative for nausea, vomiting and diarrhea  : negative for dysuria and hematuria  Musculoskeletal: as above  Neurologic: negative for syncope   Hematologic: negative for bleeding disorder    Physical Exam:  Vitals: Temp 97.9  F (36.6  C) (Temporal)  Ht 1.753 m (5' 9\")  Wt 82.6 kg (182 lb)  BMI 26.88 kg/m2  BMI= Body mass index is 26.88 kg/(m^2).  Constitutional: healthy, alert and no acute distress   Psychiatric: mentation appears normal and affect " normal/bright  NEURO: no focal deficits  SKIN: no excoriation or erythema. No signs of infection.  JOINT/EXTREMITIES:right Knee Exam: Inspection: AP/lateral alignment normal.  No effusion.  Tender: LCL, distal IT band, lateral joint line  Non-tender: lateral patellar facet, medial patellar facet, inferior pole patella, medial joint line  Active Range of Motion: full flexion, full extension  Special tests: normal Valgus stress test, normal Varus, negative Lachman's test, positive Onel's    GAIT:not tested     Diagnostic Modalities:  right knee MRI:  No meniscus tear. No ACL or PCL tear. No significant soft tissue or osseous pathology.  Mild edema over lateral tibia. And LCL complex  Independent visualization of the images was performed.      Impression: right knee lateral strain in a 21-year-old female    Plan:  All of the above pertinent physical exam and imaging modalities findings was reviewed with Mary.    Discussed MRI findings as well as treatment options.  She has edema laterally on the MRI scan, which correlates with her exam.  No acute tears.      I have recommended physical therapy to work on decreasing inflammation.  I have also recommended a hinge knee brace to provide extra support/stability.  I would expect for her pain to gradually improve over the next month.    Return to clinic 4, week(s), or sooner as needed for changes.  Re-x-ray on return: No    Scribed by  Soniya Ying PA-C  6/21/2017  2:15 PM    I attest I have seen and evaluated the patient. I agree with above impression and plan.    Yevgeniy Graf D.O.              Again, thank you for allowing me to participate in the care of your patient.        Sincerely,              Yevgeniy Graf, DO

## 2017-06-21 NOTE — MR AVS SNAPSHOT
"              After Visit Summary   6/21/2017    Mary Valdez    MRN: [id number]           Patient Information     Date Of Birth          1995        Visit Information        Provider Department      6/21/2017 2:00 PM Yevgeniy Graf,  [facility]        Today's Diagnoses     Sprain of lateral collateral ligament of right knee, initial encounter    -  1       Follow-ups after your visit        Additional Services     PHYSICAL THERAPY REFERRAL       *This therapy referral will be filtered to a centralized scheduling office at [facility] and the patient will receive a call to schedule an appointment at a [facility] location most convenient for them. *     [facility] provides Physical Therapy evaluation and treatment and many specialty services across the [facility] system.  If requesting a specialty program, please choose from the list below.    If you have not heard from the scheduling office within 2 business days, please call [phone] for all locations, with the exception of [place], please call [phone].  Treatment: Evaluation & Treatment  Special Instructions/Modalities: decrease inflammation  Dos: 1/20/17  Special Programs:   Please be aware that coverage of these services is subject to the terms and limitations of your health insurance plan.  Call member services at your health plan with any benefit or coverage questions.      **Note to Provider:  If you are referring outside of [facility] for the therapy appointment, please list the name of the location in the \"special instructions\" above, print the referral and give to the patient to schedule the appointment.                  Your next 10 appointments already scheduled     Jul 24, 2017  3:00 PM CDT   New Visit with Frankie Cody MD   [facility]    [address] [place] [address] [phone]              Who " "to contact     If you have questions or need follow up information about today's clinic visit or your schedule please contact [facility] directly at [phone].  Normal or non-critical lab and imaging results will be communicated to you by MyChart, letter or phone within 4 business days after the clinic has received the results. If you do not hear from us within 7 days, please contact the clinic through [employer] or phone. If you have a critical or abnormal lab result, we will notify you by phone as soon as possible.  Submit refill requests through [employer] or call your pharmacy and they will forward the refill request to us. Please allow 3 business days for your refill to be completed.          Additional Information About Your Visit        [employer] Information     [employer] gives you secure access to your electronic health record. If you see a primary care provider, you can also send messages to your care team and make appointments. If you have questions, please call your primary care clinic.  If you do not have a primary care provider, please call [phone] and they will assist you.        Care EveryWhere ID     This is your Care EveryWhere ID. This could be used by other organizations to access your [facility] medical records  [id number]        Your Vitals Were     Temperature Height BMI (Body Mass Index)             97.9  F (36.6  C) (Temporal) 5' 9\" (1.753 m) 26.88 kg/m2          Blood Pressure from Last 3 Encounters:   05/23/17 100/72   05/16/17 120/66   05/11/17 (!) 133/91    Weight from Last 3 Encounters:   06/21/17 182 lb (82.6 kg) (95 %)*   06/14/17 183 lb 8 oz (83.2 kg) (95 %)*   05/23/17 178 lb (80.7 kg) (94 %)*     * Growth percentiles are based on CDC 2-20 Years data.              We Performed the Following     PHYSICAL THERAPY REFERRAL        Primary Care Provider Office Phone # Fax #    Estela Mcfadden Mai, [phone]       [address] " [place] [address]        Equal Access to Services     [facility] Hospital[facility] : Hadii aad ku hadfranciamarcus Sharp, matthew carr, shari ramírez. So [facility] [phone].    ATENCIÓN: Si habla español, tiene a garcia disposición servicios gratuitos de asistencia lingüística. Llame al [phone].    We comply with applicable federal civil rights laws and [place] laws. We do not discriminate on the basis of race, color, national origin, age, disability sex, sexual orientation or gender identity.            Thank you!     Thank you for choosing [facility]  for your care. Our goal is always to provide you with excellent care. Hearing back from our patients is one way we can continue to improve our services. Please take a few minutes to complete the written survey that you may receive in the mail after your visit with us. Thank you!             Your Updated Medication List - Protect others around you: Learn how to safely use, store and throw away your medicines at www.disposemymeds.org.          This list is accurate as of: 6/21/17  2:27 PM.  Always use your most recent med list.                   Brand Name Dispense Instructions for use Diagnosis    B-12 1000 MCG Caps           magnesium 200 MG Tabs           medroxyPROGESTERone 150 MG/ML injection    DEPO-PROVERA     Inject 150 mg into the muscle every 3 months        MULTI-VITAMIN DAILY PO      Take 1 tablet by mouth daily Reported on 3/8/2017        triamcinolone 0.5 % cream    KENALOG    30 g    Apply sparingly to affected area two times daily as needed    Psoriasis       TYLENOL PO      Take 1,000 mg by mouth once Reported on 5/23/2017

## 2017-06-21 NOTE — PROGRESS NOTES
"Office Visit-Follow up    Chief Complaint: Mary Valdez is a 21 year old female who is being seen for   Chief Complaint   Patient presents with     RECHECK     MRI results of Right knee     Surgical Followup     DOS: 1/20/17~Right knee arthroscopy with arthroscopic lateral retinacular release~5 months postop       History of Present Illness:  Today's visit:  Patient is seen for follow-up of right knee and to review MRI results.  She reports that her pain has improved a \"little bit\".  Pain is located lateral.  Worse with twisting/pivoting motions.  Better with rest.       6/14/2017  \"I forgot I had knee problems\". She reports she is back to full activity with no issues in her knee. Unfortunately she stepped in a hole twisting her knee last week. She had some minimal pain that she iced. This last Friday she had a pivoting twisting type injury causing acute pain in her right knee. Associated with this has been some swelling. Rates her pain as moderate. Worse with weightbearing. She's been taking ibuprofen with minimal relief.    REVIEW OF SYSTEMS  General: negative for, night sweats, dizziness, fatigue  Resp: No shortness of breath and no cough  CV: negative for chest pain, syncope or near-syncope  GI: negative for nausea, vomiting and diarrhea  : negative for dysuria and hematuria  Musculoskeletal: as above  Neurologic: negative for syncope   Hematologic: negative for bleeding disorder    Physical Exam:  Vitals: Temp 97.9  F (36.6  C) (Temporal)  Ht 1.753 m (5' 9\")  Wt 82.6 kg (182 lb)  BMI 26.88 kg/m2  BMI= Body mass index is 26.88 kg/(m^2).  Constitutional: healthy, alert and no acute distress   Psychiatric: mentation appears normal and affect normal/bright  NEURO: no focal deficits  SKIN: no excoriation or erythema. No signs of infection.  JOINT/EXTREMITIES:right Knee Exam: Inspection: AP/lateral alignment normal.  No effusion.  Tender: LCL, distal IT band, lateral joint line  Non-tender: lateral " patellar facet, medial patellar facet, inferior pole patella, medial joint line  Active Range of Motion: full flexion, full extension  Special tests: normal Valgus stress test, normal Varus, negative Lachman's test, positive Onel's    GAIT:not tested     Diagnostic Modalities:  right knee MRI:  No meniscus tear. No ACL or PCL tear. No significant soft tissue or osseous pathology.  Mild edema over lateral tibia. And LCL complex  Independent visualization of the images was performed.      Impression: right knee lateral strain in a 21-year-old female    Plan:  All of the above pertinent physical exam and imaging modalities findings was reviewed with Mary.    Discussed MRI findings as well as treatment options.  She has edema laterally on the MRI scan, which correlates with her exam.  No acute tears.      I have recommended physical therapy to work on decreasing inflammation.  I have also recommended a hinge knee brace to provide extra support/stability.  I would expect for her pain to gradually improve over the next month.    Return to clinic 4, week(s), or sooner as needed for changes.  Re-x-ray on return: No    Scribed by  Soniya Ying PA-C  6/21/2017  2:15 PM    I attest I have seen and evaluated the patient. I agree with above impression and plan.    Yevgeniy Graf D.O.

## 2017-06-27 ENCOUNTER — OFFICE VISIT (OUTPATIENT)
Dept: FAMILY MEDICINE | Facility: CLINIC | Age: 22
End: 2017-06-27
Payer: COMMERCIAL

## 2017-06-27 VITALS
DIASTOLIC BLOOD PRESSURE: 72 MMHG | OXYGEN SATURATION: 99 % | WEIGHT: 184.1 LBS | HEART RATE: 88 BPM | RESPIRATION RATE: 16 BRPM | TEMPERATURE: 97.4 F | BODY MASS INDEX: 27.19 KG/M2 | SYSTOLIC BLOOD PRESSURE: 122 MMHG

## 2017-06-27 DIAGNOSIS — Z86.39 HX OF HYPOGLYCEMIA: Primary | ICD-10-CM

## 2017-06-27 DIAGNOSIS — F32.5 MAJOR DEPRESSION IN COMPLETE REMISSION (H): ICD-10-CM

## 2017-06-27 DIAGNOSIS — F41.1 GAD (GENERALIZED ANXIETY DISORDER): ICD-10-CM

## 2017-06-27 PROCEDURE — 99213 OFFICE O/P EST LOW 20 MIN: CPT | Performed by: FAMILY MEDICINE

## 2017-06-27 ASSESSMENT — ANXIETY QUESTIONNAIRES
GAD7 TOTAL SCORE: 4
2. NOT BEING ABLE TO STOP OR CONTROL WORRYING: SEVERAL DAYS
1. FEELING NERVOUS, ANXIOUS, OR ON EDGE: MORE THAN HALF THE DAYS
3. WORRYING TOO MUCH ABOUT DIFFERENT THINGS: NOT AT ALL
5. BEING SO RESTLESS THAT IT IS HARD TO SIT STILL: SEVERAL DAYS
IF YOU CHECKED OFF ANY PROBLEMS ON THIS QUESTIONNAIRE, HOW DIFFICULT HAVE THESE PROBLEMS MADE IT FOR YOU TO DO YOUR WORK, TAKE CARE OF THINGS AT HOME, OR GET ALONG WITH OTHER PEOPLE: NOT DIFFICULT AT ALL
7. FEELING AFRAID AS IF SOMETHING AWFUL MIGHT HAPPEN: NOT AT ALL
6. BECOMING EASILY ANNOYED OR IRRITABLE: NOT AT ALL

## 2017-06-27 ASSESSMENT — PATIENT HEALTH QUESTIONNAIRE - PHQ9: 5. POOR APPETITE OR OVEREATING: NOT AT ALL

## 2017-06-27 ASSESSMENT — PAIN SCALES - GENERAL: PAINLEVEL: NO PAIN (0)

## 2017-06-27 NOTE — LETTER
My Depression Action Plan  Name: Mary Valdez   Date of Birth 1995  Date: 6/27/2017    My doctor: Estela Gray   My clinic: [address] [address] [phone]          GREEN    ZONE   Good Control    What it looks like:     Things are going generally well. You have normal up s and down s. You may even feel depressed from time to time, but bad moods usually last less than a day.   What you need to do:  1. Continue to care for yourself (see self care plan)  2. Check your depression survival kit and update it as needed  3. Follow your physician s recommendations including any medication.  4. Do not stop taking medication unless you consult with your physician first.           YELLOW         ZONE Getting Worse    What it looks like:     Depression is starting to interfere with your life.     It may be hard to get out of bed; you may be starting to isolate yourself from others.    Symptoms of depression are starting to last most all day and this has happened for several days.     You may have suicidal thoughts but they are not constant.   What you need to do:     1. Call your care team, your response to treatment will improve if you keep your care team informed of your progress. Yellow periods are signs an adjustment may need to be made.     2. Continue your self-care, even if you have to fake it!    3. Talk to someone in your support network    4. Open up your depression survival kit           RED    ZONE Medical Alert - Get Help    What it looks like:     Depression is seriously interfering with your life.     You may experience these or other symptoms: You can t get out of bed most days, can t work or engage in other necessary activities, you have trouble taking care of basic hygiene, or basic responsibilities, thoughts of suicide or death that will not go away, self-injurious behavior.     What you need to do:  1. Call your care team and  request a same-day appointment. If they are not available (weekends or after hours) call your local crisis line, emergency room or 911.      Electronically signed by: Logan Solorio, June 27, 2017    Depression Self Care Plan / Survival Kit    Self-Care for Depression  Here s the deal. Your body and mind are really not as separate as most people think.  What you do and think affects how you feel and how you feel influences what you do and think. This means if you do things that people who feel good do, it will help you feel better.  Sometimes this is all it takes.  There is also a place for medication and therapy depending on how severe your depression is, so be sure to consult with your medical provider and/ or Behavioral Health Consultant if your symptoms are worsening or not improving.     In order to better manage my stress, I will:    Exercise  Get some form of exercise, every day. This will help reduce pain and release endorphins, the  feel good  chemicals in your brain. This is almost as good as taking antidepressants!  This is not the same as joining a gym and then never going! (they count on that by the way ) It can be as simple as just going for a walk or doing some gardening, anything that will get you moving.      Hygiene   Maintain good hygiene (Get out of bed in the morning, Make your bed, Brush your teeth, Take a shower, and Get dressed like you were going to work, even if you are unemployed).  If your clothes don't fit try to get ones that do.    Diet  I will strive to eat foods that are good for me, drink plenty of water, and avoid excessive sugar, caffeine, alcohol, and other mood-altering substances.  Some foods that are helpful in depression are: complex carbohydrates, B vitamins, flaxseed, fish or fish oil, fresh fruits and vegetables.    Psychotherapy  I agree to participate in Individual Therapy (if recommended).    Medication  If prescribed medications, I agree to take them.  Missing doses  can result in serious side effects.  I understand that drinking alcohol, or other illicit drug use, may cause potential side effects.  I will not stop my medication abruptly without first discussing it with my provider.    Staying Connected With Others  I will stay in touch with my friends, family members, and my primary care provider/team.    Use your imagination  Be creative.  We all have a creative side; it doesn t matter if it s oil painting, sand castles, or mud pies! This will also kick up the endorphins.    Witness Beauty  (AKA stop and smell the roses) Take a look outside, even in mid-winter. Notice colors, textures. Watch the squirrels and birds.     Service to others  Be of service to others.  There is always someone else in need.  By helping others we can  get out of ourselves  and remember the really important things.  This also provides opportunities for practicing all the other parts of the program.    Humor  Laugh and be silly!  Adjust your TV habits for less news and crime-drama and more comedy.    Control your stress  Try breathing deep, massage therapy, biofeedback, and meditation. Find time to relax each day.     My support system    Clinic Contact:  Phone number:    Contact 1:  Phone number:    Contact 2:  Phone number:    Gnosticist/:  Phone number:    Therapist:  Phone number:    Local crisis center:    Phone number:    Other community support:  Phone number:

## 2017-06-27 NOTE — PROGRESS NOTES
SUBJECTIVE:                                                    Mary Valdez is a 21 year old female who presents to clinic today for the following health issues:    Concern - Low blood sugars  Onset: 2-3 months, worse over 2-3 weeks    Description:   Dizzy, confused, shaky, angry, maciel every morning in the last 2-3 months, getting worse over 2-3 weeks.      Intensity: moderate, severe    Progression of Symptoms:  worsening    Accompanying Signs & Symptoms:      Previous history of similar problem:       Precipitating factors:   Worsened by: exerting yourself, long periods without eating, sometimes even after she eats    Alleviating factors:  Improved by: food    Therapies Tried and outcome:       Stated that she has problem with low blood sugar on and off for most of her life. Lowest BS was in the 50s when she was fasting and was feeling better with food.  Symptoms for low blood sugar include dizzy, clammy and shaky. Never been diagnosed with diabetes. However there is a strong family historiy of diabetes which is to her mother and her grandparents.  She normally takes 3 meals and snacks in between. States that when this happened, she can concentrate and become very agitated easily. No heart palpitation. No vomiting, diarrhea or constipation. Denies being depressed. Her anxiety is controlled and is doing well. No other concerns.    Problem list and histories reviewed & adjusted, as indicated.  Additional history: as documented    Current Outpatient Prescriptions   Medication Sig Dispense Refill     triamcinolone (KENALOG) 0.5 % cream Apply sparingly to affected area two times daily as needed 30 g 0     Acetaminophen (TYLENOL PO) Take 1,000 mg by mouth once Reported on 5/23/2017       magnesium 200 MG TABS        Cyanocobalamin (B-12) 1000 MCG CAPS        medroxyPROGESTERone (DEPO-PROVERA) 150 MG/ML injection Inject 150 mg into the muscle every 3 months       Multiple Vitamin (MULTI-VITAMIN DAILY PO) Take 1  tablet by mouth daily Reported on 3/8/2017       Allergies   Allergen Reactions     No Known Drug Allergies        Reviewed and updated as needed this visit by clinical staff  Tobacco  Allergies  Meds  Soc Hx      Reviewed and updated as needed this visit by Provider         ROS:  Constitutional, HEENT, cardiovascular, pulmonary, gi and gu systems are negative, except as otherwise noted.    OBJECTIVE:     /72 (BP Location: Left arm, Patient Position: Chair, Cuff Size: Adult Regular)  Pulse 88  Temp 97.4  F (36.3  C) (Temporal)  Resp 16  Wt 184 lb 1.6 oz (83.5 kg)  SpO2 99%  Breastfeeding? No  BMI 27.19 kg/m2  Body mass index is 27.19 kg/(m^2).  GENERAL: healthy, alert and no distress  NECK: no adenopathy, no asymmetry, masses, or scars and thyroid normal to palpation  RESP: lungs clear to auscultation - no rales, rhonchi or wheezes  CV: regular rate and rhythm, normal S1 S2, no S3 or S4, no murmur, click or rub, no peripheral edema and peripheral pulses strong  ABDOMEN: soft, nontender, no hepatosplenomegaly, no masses and bowel sounds normal  MS: no gross musculoskeletal defects noted, no edema  PSYCH: mentation appears normal, affect normal/bright    Diagnostic Test Results:  No results found for this or any previous visit (from the past 24 hour(s)).    ASSESSMENT/PLAN:     1. Hx of hypoglycemia  She stated that she has had these symptoms on and off for most of her life - getting worse. Known to have low blood sugar.  Never been diagnosed with diabetes. She has a strong family history of diabetes which is to her mother in her grandparents.  Lab reviewed, her non-fasting blood sugar has been on the lower side.  will send for the glucose intolerant test and check for insulin level. If the insulin level is elevated, will refer to the endocrinologist for further evaluation and managemen. In the meantime, encouraged her to continue to eat meals regularly and at least 3 meals a day. She was also  encouraged to carry a sugar tablet or candy with her at all time. She feels comfortable with the plan.    - Insulin level; Future  - Glucose tolerance, std non preg; Future    2. Major depression in complete remission (H)  Stable and is doing well.  No on medication.  She was educated about the symptoms that need to be seen or call in.  - DEPRESSION ACTION PLAN (DAP)    3. NENO (generalized anxiety disorder)  Stable and is doing well.    - DEPRESSION ACTION PLAN (DAP)    Estela Mcfadden Mai, MD  [facility]

## 2017-06-27 NOTE — NURSING NOTE
"Chief Complaint   Patient presents with     Blood Sugar Problem     Dizzy, confused, shaky, angry, maciel x2-3 months, getting worse over 2-3 weeks       Initial /72 (BP Location: Left arm, Patient Position: Chair, Cuff Size: Adult Regular)  Pulse 88  Temp 97.4  F (36.3  C) (Temporal)  Resp 16  Wt 184 lb 1.6 oz (83.5 kg)  SpO2 99%  Breastfeeding? No  BMI 27.19 kg/m2 Estimated body mass index is 27.19 kg/(m^2) as calculated from the following:    Height as of 6/21/17: 5' 9\" (1.753 m).    Weight as of this encounter: 184 lb 1.6 oz (83.5 kg).  Medication Reconciliation: complete     Health Maintenance Due   Topic Date Due     HPV IMMUNIZATION (1 of 3 - Female 3 Dose Series) 11/12/2008     PHQ-9 Q6 MONTHS  07/10/2017     DEPRESSION ACTION PLAN Q1 YR  07/21/2017     Logan Solorio CMA      "

## 2017-06-27 NOTE — MR AVS SNAPSHOT
After Visit Summary   6/27/2017    Mary Valdez    MRN: [id number]           Patient Information     Date Of Birth          1995        Visit Information        Provider Department      6/27/2017 1:20 PM Estela Gray MD [facility]        Today's Diagnoses     Hx of hypoglycemia    -  1    Dizziness        Major depression in complete remission (H)        NENO (generalized anxiety disorder)           Follow-ups after your visit        Your next 10 appointments already scheduled     Jul 24, 2017  3:00 PM CDT   New Visit with Frankie Cody MD   [facility] ([facility])    [address] [place] [address] [phone]              Future tests that were ordered for you today     Open Future Orders        Priority Expected Expires Ordered    Glucose tolerance, std non preg Routine  7/11/2017 6/27/2017    Insulin level Routine  7/11/2017 6/27/2017            Who to contact     If you have questions or need follow up information about today's clinic visit or your schedule please contact [facility] directly at [phone].  Normal or non-critical lab and imaging results will be communicated to you by UpTohart, letter or phone within 4 business days after the clinic has received the results. If you do not hear from us within 7 days, please contact the clinic through UpTohart or phone. If you have a critical or abnormal lab result, we will notify you by phone as soon as possible.  Submit refill requests through Bespoke or call your pharmacy and they will forward the refill request to us. Please allow 3 business days for your refill to be completed.          Additional Information About Your Visit        MyChart Information     Bespoke gives you secure access to your electronic health record. If you see a primary care provider, you can also send messages to your care team and make appointments. If you have questions,  please call your primary care clinic.  If you do not have a primary care provider, please call [phone] and they will assist you.        Care EveryWhere ID     This is your Care EveryWhere ID. This could be used by other organizations to access your [place] medical records  [id number]        Your Vitals Were     Pulse Temperature Respirations Pulse Oximetry Breastfeeding? BMI (Body Mass Index)    88 97.4  F (36.3  C) (Temporal) 16 99% No 27.19 kg/m2       Blood Pressure from Last 3 Encounters:   06/27/17 122/72   05/23/17 100/72   05/16/17 120/66    Weight from Last 3 Encounters:   06/27/17 184 lb 1.6 oz (83.5 kg) (95 %)*   06/21/17 182 lb (82.6 kg) (95 %)*   06/14/17 183 lb 8 oz (83.2 kg) (95 %)*     * Growth percentiles are based on [facility] 2-20 Years data.              We Performed the Following     DEPRESSION ACTION PLAN (DAP)        Primary Care Provider Office Phone # Fax #    Estela Mcfadden Mai, [phone]       [address]   [facility] [address]        Equal Access to Services     KAYY SYLVESTER AH: Hadii bear sheao Somarcelo, waaxda luqadaha, qaybta kaalmada adeegyada, shari moncada. So [facility] [phone].    ATENCIÓN: Si habla español, tiene a garcia disposición servicios gratuitos de asistencia lingüística. Brianame al [phone].    We comply with applicable federal civil rights laws and [place] laws. We do not discriminate on the basis of race, color, national origin, age, disability sex, sexual orientation or gender identity.            Thank you!     Thank you for choosing [facility]  for your care. Our goal is always to provide you with excellent care. Hearing back from our patients is one way we can continue to improve our services. Please take a few minutes to complete the written survey that you may receive in the mail after your visit with us. Thank you!             Your Updated Medication List - Protect others  around you: Learn how to safely use, store and throw away your medicines at www.disposemymeds.org.          This list is accurate as of: 6/27/17  3:40 PM.  Always use your most recent med list.                   Brand Name Dispense Instructions for use Diagnosis    B-12 1000 MCG Caps           magnesium 200 MG Tabs           medroxyPROGESTERone 150 MG/ML injection    DEPO-PROVERA     Inject 150 mg into the muscle every 3 months        MULTI-VITAMIN DAILY PO      Take 1 tablet by mouth daily Reported on 3/8/2017        triamcinolone 0.5 % cream    KENALOG    30 g    Apply sparingly to affected area two times daily as needed    Psoriasis       TYLENOL PO      Take 1,000 mg by mouth once Reported on 5/23/2017

## 2017-06-28 DIAGNOSIS — Z86.39 HX OF HYPOGLYCEMIA: ICD-10-CM

## 2017-06-28 DIAGNOSIS — R42 DIZZINESS: ICD-10-CM

## 2017-06-28 LAB
GLUCOSE 2H P 75 G GLC PO SERPL-MCNC: 98 MG/DL (ref 60–200)
GLUCOSE PRE 75 G GLC PO SERPL-MCNC: 90 MG/DL (ref 60–126)
INSULIN SERPL-ACNC: 28.4 MU/L (ref 3–25)

## 2017-06-28 PROCEDURE — 83525 ASSAY OF INSULIN: CPT | Performed by: FAMILY MEDICINE

## 2017-06-28 PROCEDURE — 36415 COLL VENOUS BLD VENIPUNCTURE: CPT | Performed by: FAMILY MEDICINE

## 2017-06-28 PROCEDURE — 82950 GLUCOSE TEST: CPT | Performed by: FAMILY MEDICINE

## 2017-06-28 PROCEDURE — 82947 ASSAY GLUCOSE BLOOD QUANT: CPT | Performed by: FAMILY MEDICINE

## 2017-06-28 ASSESSMENT — ANXIETY QUESTIONNAIRES: GAD7 TOTAL SCORE: 4

## 2017-07-05 ENCOUNTER — TELEPHONE (OUTPATIENT)
Dept: FAMILY MEDICINE | Facility: CLINIC | Age: 22
End: 2017-07-05

## 2017-07-05 DIAGNOSIS — Z83.3 FAMILY HISTORY OF DIABETES MELLITUS: ICD-10-CM

## 2017-07-05 DIAGNOSIS — E16.2 HYPOGLYCEMIA: Primary | ICD-10-CM

## 2017-07-05 NOTE — TELEPHONE ENCOUNTER
Order placed for Endocrinology. Staff message sent to [facility] for scheduling. They will contact patient with the appointment. Senait Block LPN

## 2017-07-06 ENCOUNTER — MYC MEDICAL ADVICE (OUTPATIENT)
Dept: FAMILY MEDICINE | Facility: CLINIC | Age: 22
End: 2017-07-06

## 2017-07-06 DIAGNOSIS — Z83.3 FAMILY HISTORY OF DIABETES MELLITUS: ICD-10-CM

## 2017-07-06 DIAGNOSIS — E16.2 HYPOGLYCEMIA, UNSPECIFIED: Primary | ICD-10-CM

## 2017-07-07 NOTE — TELEPHONE ENCOUNTER
[facility]. Can she get a new referral for them? Please call and advise when this has been addressed.

## 2017-07-10 PROBLEM — E16.2 HYPOGLYCEMIA, UNSPECIFIED: Status: ACTIVE | Noted: 2017-07-10

## 2017-07-10 NOTE — TELEPHONE ENCOUNTER
Referral placed and faxed to [facility] Endocrinology. Message sent to patient to inform of this and message left to return call to clinic. Please inform of new referral being placed and faxed to [facility] Endocrinology. Senait Block LPN

## 2017-07-13 ENCOUNTER — CARE COORDINATION (OUTPATIENT)
Dept: CARE COORDINATION | Facility: CLINIC | Age: 22
End: 2017-07-13

## 2017-07-13 ENCOUNTER — HOSPITAL ENCOUNTER (EMERGENCY)
Facility: CLINIC | Age: 22
Discharge: HOME | End: 2017-07-13
Attending: FAMILY MEDICINE | Admitting: FAMILY MEDICINE
Payer: COMMERCIAL

## 2017-07-13 VITALS
RESPIRATION RATE: 14 BRPM | BODY MASS INDEX: 26.58 KG/M2 | SYSTOLIC BLOOD PRESSURE: 130 MMHG | DIASTOLIC BLOOD PRESSURE: 79 MMHG | TEMPERATURE: 98.7 F | WEIGHT: 180 LBS | OXYGEN SATURATION: 100 % | HEART RATE: 83 BPM

## 2017-07-13 DIAGNOSIS — E16.2 HYPOGLYCEMIA, UNSPECIFIED: ICD-10-CM

## 2017-07-13 DIAGNOSIS — R42 DIZZY SPELLS: ICD-10-CM

## 2017-07-13 LAB
ALBUMIN UR-MCNC: NEGATIVE MG/DL
AMPHETAMINES UR QL: NORMAL NG/ML
APPEARANCE UR: CLEAR
BARBITURATES UR QL SCN: NORMAL NG/ML
BENZODIAZ UR QL SCN: NORMAL NG/ML
BILIRUB UR QL STRIP: NEGATIVE
BUPRENORPHINE UR QL: NORMAL NG/ML
CANNABINOIDS UR QL: NORMAL NG/ML
COCAINE UR QL SCN: NORMAL NG/ML
COLOR UR AUTO: COLORLESS
D-METHAMPHET UR QL: NORMAL NG/ML
GLUCOSE BLDC GLUCOMTR-MCNC: 106 MG/DL (ref 70–99)
GLUCOSE UR STRIP-MCNC: NEGATIVE MG/DL
HGB UR QL STRIP: NEGATIVE
KETONES UR STRIP-MCNC: NEGATIVE MG/DL
LEUKOCYTE ESTERASE UR QL STRIP: NEGATIVE
METHADONE UR QL SCN: NORMAL NG/ML
NITRATE UR QL: NEGATIVE
OPIATES UR QL SCN: NORMAL NG/ML
OXYCODONE UR QL SCN: NORMAL NG/ML
PCP UR QL SCN: NORMAL NG/ML
PH UR STRIP: 6 PH (ref 5–7)
PROPOXYPH UR QL: NORMAL NG/ML
SP GR UR STRIP: 1 (ref 1–1.03)
TRICYCLICS UR QL SCN: NORMAL NG/ML
URN SPEC COLLECT METH UR: NORMAL
UROBILINOGEN UR STRIP-MCNC: 0 MG/DL (ref 0–2)

## 2017-07-13 PROCEDURE — 00000146 ZZHCL STATISTIC GLUCOSE BY METER IP

## 2017-07-13 PROCEDURE — 81003 URINALYSIS AUTO W/O SCOPE: CPT | Performed by: FAMILY MEDICINE

## 2017-07-13 PROCEDURE — 99284 EMERGENCY DEPT VISIT MOD MDM: CPT | Mod: Z6 | Performed by: FAMILY MEDICINE

## 2017-07-13 PROCEDURE — 80306 DRUG TEST PRSMV INSTRMNT: CPT | Performed by: FAMILY MEDICINE

## 2017-07-13 PROCEDURE — 93005 ELECTROCARDIOGRAM TRACING: CPT | Performed by: FAMILY MEDICINE

## 2017-07-13 PROCEDURE — 99284 EMERGENCY DEPT VISIT MOD MDM: CPT | Performed by: FAMILY MEDICINE

## 2017-07-13 NOTE — ED AVS SNAPSHOT
[facility]    [address] [place] [address]    Phone:  [phone]    Fax:  [phone]                                       Mary Valdez   MRN: [id number]    Department:  [facility]   Date of Visit:  7/13/2017           After Visit Summary Signature Page     I have received my discharge instructions, and my questions have been answered. I have discussed any challenges I see with this plan with the nurse or doctor.    ..........................................................................................................................................  Patient/Patient Representative Signature      ..........................................................................................................................................  Patient Representative Print Name and Relationship to Patient    ..................................................               ................................................  Date                                            Time    ..........................................................................................................................................  Reviewed by Signature/Title    ...................................................              ..............................................  Date                                                            Time

## 2017-07-13 NOTE — LETTER
[place] CARE COORDINATION  [address] [address]  [phone]        August 2, 2017      Mary Valdez  [address] [address]    Dear Mary,  I am the Clinic Care Coordinator that works with your primary care provider's clinic. I have been trying to reach you recently to introduce Clinic Care Coordination and to see if there was anything I could assist you with.  Below is a description of what Clinic Care Coordination is and how I can further assist you.     The Clinic Care Coordinator role is a Registered Nurse and/or  who understands the health care system. The goal of Clinic Care Coordination is to help you manage your health and improve access to the [facility] system in the most efficient manner.  The Registered Nurse can assist you in meeting your health care goals by providing education, coordinating services, and strengthening the communication among your providers. The  can assist you with financial, behavioral, psychosocial, and chemical dependency and counseling/psychiatric resources.    Please feel free to keep this letter and contact information to contact me at [phone] with any further questions or concerns that may arise. We at [facility] are focused on providing you with the highest-quality healthcare experience possible and that all starts with you.       Sincerely,       AKSHAT Dumont   Clinical Care Coordinator  [phone]

## 2017-07-13 NOTE — PROGRESS NOTES
Clinic Care Coordination Contact 7/13/17  [facility]/[place]-    Referral Source: [employer] Outreach  Clinical Data: Care Coordinator Outreach  Outreach attempted x 1.  Left message on [employer] with call back information and requested return call.  Plan: Care Coordinator will try to reach patient again in 3-5 business days.    AKSHAT Dumont  Care Coordinator Social Work    [facility] and [place]  [phone]  7/13/2017 1:43 PM

## 2017-07-13 NOTE — ED AVS SNAPSHOT
[facility] Emergency Department    [address]     [place] [address]    Phone:  [phone]    Fax:  [phone]                                       Mary Valdez   MRN: [id number]    Department:  [facility] Emergency Department   Date of Visit:  7/13/2017           Patient Information     Date Of Birth          1995        Your diagnoses for this visit were:     Dizzy spells        You were seen by Yair Mascorro MD.      Follow-up Information     Follow up with Estela Gray MD In 1 week.    Specialty:  Family Practice    Why:  if not improving    Contact information:    [facility]  [address]   [facility] [id number] [phone]          Follow up with [facility] Emergency Department.    Specialty:  EMERGENCY MEDICINE    Why:  If symptoms worsen    Contact information:    [facility]   [place] [id number] [phone]    Additional information:    From [facility] [address] [facility] [address]: Exit at [address] on south side of [place]. Turn right on [address]. Turn left at stoplight on [address]. [facility] will be in view two blocks ahead        Discharge Instructions       Thank you for giving us the opportunity to see you.  I am not convinced that your symptoms are due to hypoglycemia.    Please check your blood sugar at work the next time you have a severe episode.    Return to the emergency department if you have persistent dizziness or palpitations so we can check your blood pressure, heart rate, and blood sugar.    Follow-up with Dr. Gray if your symptoms persist for 1-2 weeks.     After discharge, please closely monitor for any new or worsening symptoms. Return to the Emergency Department at any time if your symptoms worsen.        Future Appointments        Provider Department Dept Phone Center    7/24/2017 3:00 PM Frankie Cody MD [facility] [phone] [place]    10/20/2017 8:45 AM Shahida Spicer MD  [facility] [phone] [place]      24 Hour Appointment Hotline       To make an appointment at any [facility], call [phone] ([phone]). If you don't have a family doctor or clinic, we will help you find one. [facility] are conveniently located to serve the needs of you and your family.             Review of your medicines      Our records show that you are taking the medicines listed below. If these are incorrect, please call your family doctor or clinic.        Dose / Directions Last dose taken    B-12 1000 MCG Caps        Refills:  0        magnesium 200 MG Tabs        Refills:  0        medroxyPROGESTERone 150 MG/ML injection   Commonly known as:  DEPO-PROVERA   Dose:  150 mg        Inject 150 mg into the muscle every 3 months   Refills:  0        MULTI-VITAMIN DAILY PO   Dose:  1 tablet        Take 1 tablet by mouth daily Reported on 3/8/2017   Refills:  0        triamcinolone 0.5 % cream   Commonly known as:  KENALOG   Quantity:  30 g        Apply sparingly to affected area two times daily as needed   Refills:  0        TYLENOL PO   Dose:  1000 mg        Take 1,000 mg by mouth once Reported on 5/23/2017   Refills:  0                Procedures and tests performed during your visit     EKG 12-lead, tracing only    Glucose by meter    Glucose monitor nursing POCT    UA reflex to Microscopic    Urine Drugs of Abuse Screen Panel 13      Orders Needing Specimen Collection     None      Pending Results     No orders found from 7/11/2017 to 7/14/2017.            Pending Culture Results     No orders found from 7/11/2017 to 7/14/2017.            Pending Results Instructions     If you had any lab results that were not finalized at the time of your Discharge, you can call the ED Lab Result RN at [phone]. You will be contacted by this team for any positive Lab results or changes in treatment. The nurses are available 7 days a week from 10A to 6:30P.  You can leave a  message 24 hours per day and they will return your call.        Thank you for choosing [facility]       Thank you for choosing [facility] for your care. Our goal is always to provide you with excellent care. Hearing back from our patients is one way we can continue to improve our services. Please take a few minutes to complete the written survey that you may receive in the mail after you visit with us. Thank you!        [employer] Information     [employer] gives you secure access to your electronic health record. If you see a primary care provider, you can also send messages to your care team and make appointments. If you have questions, please call your primary care clinic.  If you do not have a primary care provider, please call [phone] and they will assist you.        Care EveryWhere ID     This is your Care EveryWhere ID. This could be used by other organizations to access your [facility] medical records  [id number]        Equal Access to Services     KAYY SYLVESTER : Celena Sharp, matthew carr, shari ramírez. So [facility] [phone].    ATENCIÓN: Si habla español, tiene a garcia disposición servicios gratuitos de asistencia lingüística. Llame al [phone].    We comply with applicable federal civil rights laws and [place] laws. We do not discriminate on the basis of race, color, national origin, age, disability sex, sexual orientation or gender identity.            After Visit Summary       This is your record. Keep this with you and show to your community pharmacist(s) and doctor(s) at your next visit.

## 2017-07-14 NOTE — ED NOTES
Pt states she has an upcoming appointment with the endocrinologics for hypoglycemia.   States today she has been feeling dizzy, had a HA and is fearful her Blood sugars are low.  Pt tearful in triage.  Pt states he BS a few hours ago was 88.

## 2017-07-14 NOTE — DISCHARGE INSTRUCTIONS
Thank you for giving us the opportunity to see you.  I am not convinced that your symptoms are due to hypoglycemia.    Please check your blood sugar at work the next time you have a severe episode.    Return to the emergency department if you have persistent dizziness or palpitations so we can check your blood pressure, heart rate, and blood sugar.    Follow-up with Dr. Gray if your symptoms persist for 1-2 weeks.     After discharge, please closely monitor for any new or worsening symptoms. Return to the Emergency Department at any time if your symptoms worsen.

## 2017-07-14 NOTE — ED PROVIDER NOTES
"                                                            [facility] ED Provider Note   CC:     Chief Complaint   Patient presents with     Hypoglycemia     HPI:  Mary Valdez is a 21 year old female with a history of hypoglycemia, NENO, PTSD, anxiety, and depression who presented to the emergency department with hypoglycemia. Patent states that she has had hypoglycemia for her entire life and she has had a family history of anxiety, depression, obesity, hyperlipidemia, hypertension, and bipolarity. Patient states that her hypoglycemia has been slowly getting worse. Patient had her insulin tested 2-3 weeks ago and it was at 28.4, typically her normal insulin level is 3-25. She was referred to an endocrinologist in October. Her lowest blood sugar level has been recorded around the lower 70's but she feels like it could be lower while at work when she doesn't have her testing kit with her. Patient states that she felt awful the entire day today and says it is an \"ishy\" feeling. Patient was experiencing dizziness and she felt like she was going to pass out. She complains of always being hungry and thirsty. She felt nauseated and like she was floating. Mary also has been experiencing diaphoresis and anger/emotional outbursts. Patient has a headache currently that she states isn't associated with the \"ishy\" feeling. She has chest palpitations that are normal for her. Patient states that nothing helps these \"ishy\" feelings except sugary foods helps for about 5 minutes. She has a history of depression but it is controlled currently. Patient reports that she slept well last night. She did gain 10 lbs a year and a half ago when she started on Depo-provera. Patient also complains of psoriasis throughout her entire body but mainly the back. Patient denies abdominal pain, shortness of breath, hyperventilating, and any allergies to medications. Patient doesn't get periods and hasn't had a pregnancy. Dr. Gray is " her PCP. She recently had left knee surgery in January and her knee isn't causing her any issues now. Patient has no history of alcohol, tobacco, or drug use.     Problem List:  Patient Active Problem List    Diagnosis     Hypoglycemia, unspecified     Sprain of lateral collateral ligament of right knee, initial encounter     Segmental dysfunction of thoracic region     Segmental dysfunction of lumbar region     Segmental dysfunction of sacral region     Flank pain     Maltracking of right patella     NENO (generalized anxiety disorder)     PTSD (post-traumatic stress disorder)     Anxiety state     Major depression in complete remission (H)     Migratory pain     Psoriasis       MEDS:   Discharge Medication List as of 7/13/2017 11:16 PM      CONTINUE these medications which have NOT CHANGED    Details   triamcinolone (KENALOG) 0.5 % cream Apply sparingly to affected area two times daily as neededDisp-30 g, R-0Local Print      Acetaminophen (TYLENOL PO) Take 1,000 mg by mouth once Reported on 5/23/2017, Historical      magnesium 200 MG TABS Historical      Cyanocobalamin (B-12) 1000 MCG CAPS Historical      medroxyPROGESTERone (DEPO-PROVERA) 150 MG/ML injection Inject 150 mg into the muscle every 3 months, Historical      Multiple Vitamin (MULTI-VITAMIN DAILY PO) Take 1 tablet by mouth daily Reported on 3/8/2017, Historical             ALLERGIES:    Allergies   Allergen Reactions     No Known Drug Allergies        Past medical, surgical, family and social histories, triage and nursing notes were all reviewed.    Review of Systems   All other systems were reviewed and are negative    Physical Exam     Vitals were reviewed  Patient Vitals for the past 8 hrs:   BP Temp Temp src Pulse Heart Rate Resp SpO2 Weight   07/13/17 2322 130/79 - - - 69 14 100 % -   07/13/17 2101 (!) 153/93 98.7  F (37.1  C) Temporal 83 - 18 97 % 81.6 kg (180 lb)     GENERAL APPEARANCE: Alert and oriented ×3, no distress  FACE: normal  facies  EYES: Pupils are equal  HENT: normal external exam  NECK: no adenopathy or asymmetry  RESP: normal respiratory effort; clear breath sounds bilaterally  CV: regular rate and rhythm; no significant murmurs, gallops or rubs  ABD: soft, no tenderness; no rebound or guarding; bowel sounds are normal  MS: no gross deformities noted; normal muscle tone.  EXT: No calf tenderness or pitting edema  SKIN: no worrisome rash  NEURO: no facial droop; no focal deficits, speech is normal  PSYCH: normal mood and affect      Available Lab/Imaging Results     Results for orders placed or performed during the hospital encounter of 07/13/17 (from the past 24 hour(s))   Glucose by meter   Result Value Ref Range    Glucose 106 (H) 70 - 99 mg/dL   UA reflex to Microscopic   Result Value Ref Range    Color Urine Colorless     Appearance Urine Clear     Glucose Urine Negative NEG mg/dL    Bilirubin Urine Negative NEG    Ketones Urine Negative NEG mg/dL    Specific Gravity Urine 1.003 1.003 - 1.035    Blood Urine Negative NEG    pH Urine 6.0 5.0 - 7.0 pH    Protein Albumin Urine Negative NEG mg/dL    Urobilinogen mg/dL 0.0 0.0 - 2.0 mg/dL    Nitrite Urine Negative NEG    Leukocyte Esterase Urine Negative NEG    Source Midstream Urine    Urine Drugs of Abuse Screen Panel 13   Result Value Ref Range    Cannabinoids (12-gks-0-carboxy-9-THC)  NDET ng/mL     Not Detected   Cutoff for a negative cannabinoid is 50 ng/mL or less.      Phencyclidine (Phencyclidine)  NDET ng/mL     Not Detected   Cutoff for a negative PCP is 25 ng/mL or less.      Cocaine (Benzoylecgonine)  NDET ng/mL     Not Detected   Cutoff for a negative cocaine is 150 ng/ml or less.      Methamphetamine (d-Methamphetamine)  NDET ng/mL     Not Detected   Cutoff for a negative methamphetamine is 500 ng/ml or less.      Opiates (Morphine)  NDET ng/mL     Not Detected   Cutoff for a negative opiate is 100 ng/ml or less.      Amphetamine (d-Amphetamine)  NDET ng/mL     Not  Detected   Cutoff for a negative amphetamine is 500 ng/mL or less.      Benzodiazepines (Nordiazepam)  NDET ng/mL     Not Detected   Cutoff for a negative benzodiazepine is 150 ng/ml or less.      Tricyclic Antidepressants (Desipramine)  NDET ng/mL     Not Detected   Cutoff for a negative tricyclic antidepressant is 300 ng/ml or less.      Methadone (Methadone)  NDET ng/mL     Not Detected   Cutoff for a negative methadone is 200 ng/ml or less.      Barbiturates (Butalbital)  NDET ng/mL     Not Detected   Cutoff for a negative barbituate is 200 ng/ml or less.      Oxycodone (Oxycodone)  NDET ng/mL     Not Detected   Cutoff for a negative Oxycodone is 100 ng/mL or less.      Propoxyphene (Norpropoxyphene)  NDET ng/mL     Not Detected   Cutoff for a negative propoxyphene is 300 ng/ml or less      Buprenorphine (Buprenorphine)  NDET ng/mL     Not Detected   Cutoff for a negative buprenorphine is 10 ng/ml or less           Impression     Final diagnoses:   Dizzy spells       ED Course & Medical Decision Making   Mary Valdez is a 21 year old female who presented to the emergency department with intermittent dizzy spells that she attributed to hypoglycemia.  Interestingly, the patient's insulin level was slightly elevated, and she dates back to childhood when she was told that she might be hypoglycemic.  Her symptoms were most recently seemed to be worse as the day goes on, and it occurs both at work and at home.  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  When they do occur, she feels unsettled because it doesn't seem to go away even after she eats and raises her blood sugars.  She has not been able to check her blood sugar at work since she does not have her glucometer with her.  When she has checked at home her blood sugar levels have been 70 or higher.  Today her blood sugar was 77, and here in the emergency department 106.  Her workup so  far have included a CBC, comprehensive metabolic panel which were normal, but an insulin level that was slightly elevated.  The patient's blood pressure was initially 153/93, and rechecked at 130/79.  Temp is 98.7.  Heart rate is 69.  EKG reveals a normal EKG without any abnormal intervals, axis or ST changes.  Her urine tox screen is negative.  Previous TSH levels have been normal.  The patient's symptoms are likely not due to hypoglycemia as it does not seem to be responsive to eating.  I suggested that her symptoms might be due to anxiety.  I would like her to check her blood sugar at work when she has a recurrent spell, and to consider coming to the emergency department to have her blood pressure and heart rhythm monitored when she does have one of these episodes.  She can follow up with Dr. Gray and 1-2 weeks.  She has an appointment with an endocrinologist at the end of October.      Written after-visit summary and instructions were given at the time of discharge.      Discharge Medication List as of 7/13/2017 11:16 PM        This document serves as a record of services personally performed by Yair Mascorro MD. It was created on their behalf by Carlos Kennedy, a trained medical scribe. The creation of this record is based on the provider's personal observations and the statements of the patient. This document has been checked and approved by the attending provider.    This note was completed in part using Dragon voice recognition, and may contain word and grammatical errors.        Yair Mascorro MD  07/14/17 0321

## 2017-07-18 ENCOUNTER — TRANSFERRED RECORDS (OUTPATIENT)
Dept: HEALTH INFORMATION MANAGEMENT | Facility: CLINIC | Age: 22
End: 2017-07-18

## 2017-07-24 ENCOUNTER — OFFICE VISIT (OUTPATIENT)
Dept: DERMATOLOGY | Facility: CLINIC | Age: 22
End: 2017-07-24
Attending: FAMILY MEDICINE
Payer: COMMERCIAL

## 2017-07-24 DIAGNOSIS — L40.50 PSORIATIC ARTHRITIS (H): ICD-10-CM

## 2017-07-24 DIAGNOSIS — L40.4 GUTTATE PSORIASIS: Primary | ICD-10-CM

## 2017-07-24 DIAGNOSIS — Z79.899 MEDICATION MANAGEMENT: ICD-10-CM

## 2017-07-24 LAB
ALBUMIN SERPL-MCNC: 4 G/DL (ref 3.4–5)
ALP SERPL-CCNC: 71 U/L (ref 40–150)
ALT SERPL W P-5'-P-CCNC: 26 U/L (ref 0–50)
ANION GAP SERPL CALCULATED.3IONS-SCNC: 5 MMOL/L (ref 3–14)
AST SERPL W P-5'-P-CCNC: 14 U/L (ref 0–35)
BASOPHILS # BLD AUTO: 0 10E9/L (ref 0–0.2)
BASOPHILS NFR BLD AUTO: 0.4 %
BILIRUB SERPL-MCNC: 0.3 MG/DL (ref 0.2–1.3)
BUN SERPL-MCNC: 15 MG/DL (ref 7–30)
CALCIUM SERPL-MCNC: 8.8 MG/DL (ref 8.5–10.1)
CHLORIDE SERPL-SCNC: 111 MMOL/L (ref 96–110)
CO2 SERPL-SCNC: 25 MMOL/L (ref 20–32)
CREAT SERPL-MCNC: 0.78 MG/DL (ref 0.5–1)
DIFFERENTIAL METHOD BLD: NORMAL
EOSINOPHIL # BLD AUTO: 0.1 10E9/L (ref 0–0.7)
EOSINOPHIL NFR BLD AUTO: 1.9 %
ERYTHROCYTE [DISTWIDTH] IN BLOOD BY AUTOMATED COUNT: 12.7 % (ref 10–15)
GFR SERPL CREATININE-BSD FRML MDRD: ABNORMAL ML/MIN/1.7M2
GLUCOSE SERPL-MCNC: 92 MG/DL (ref 70–99)
HBV SURFACE AB SERPL IA-ACNC: 0.09 M[IU]/ML
HBV SURFACE AG SERPL QL IA: NONREACTIVE
HCT VFR BLD AUTO: 37.8 % (ref 35–47)
HCV AB SERPL QL IA: NORMAL
HGB BLD-MCNC: 12.7 G/DL (ref 11.7–15.7)
HIV 1+2 AB+HIV1 P24 AG SERPL QL IA: NORMAL
LYMPHOCYTES # BLD AUTO: 2 10E9/L (ref 0.8–5.3)
LYMPHOCYTES NFR BLD AUTO: 27.8 %
MCH RBC QN AUTO: 29.1 PG (ref 26.5–33)
MCHC RBC AUTO-ENTMCNC: 33.6 G/DL (ref 31.5–36.5)
MCV RBC AUTO: 87 FL (ref 78–100)
MONOCYTES # BLD AUTO: 0.5 10E9/L (ref 0–1.3)
MONOCYTES NFR BLD AUTO: 6.9 %
NEUTROPHILS # BLD AUTO: 4.5 10E9/L (ref 1.6–8.3)
NEUTROPHILS NFR BLD AUTO: 63 %
PLATELET # BLD AUTO: 226 10E9/L (ref 150–450)
POTASSIUM SERPL-SCNC: 4.1 MMOL/L (ref 3.4–5.3)
PROT SERPL-MCNC: 7.9 G/DL (ref 6.8–8.8)
RBC # BLD AUTO: 4.36 10E12/L (ref 3.8–5.2)
SODIUM SERPL-SCNC: 141 MMOL/L (ref 133–144)
WBC # BLD AUTO: 7.2 10E9/L (ref 4–11)

## 2017-07-24 PROCEDURE — 86803 HEPATITIS C AB TEST: CPT | Performed by: DERMATOLOGY

## 2017-07-24 PROCEDURE — G0499 HEPB SCREEN HIGH RISK INDIV: HCPCS | Performed by: DERMATOLOGY

## 2017-07-24 PROCEDURE — 85025 COMPLETE CBC W/AUTO DIFF WBC: CPT | Performed by: DERMATOLOGY

## 2017-07-24 PROCEDURE — 80053 COMPREHEN METABOLIC PANEL: CPT | Performed by: DERMATOLOGY

## 2017-07-24 PROCEDURE — 36415 COLL VENOUS BLD VENIPUNCTURE: CPT | Performed by: DERMATOLOGY

## 2017-07-24 PROCEDURE — 87389 HIV-1 AG W/HIV-1&-2 AB AG IA: CPT | Performed by: DERMATOLOGY

## 2017-07-24 PROCEDURE — 99203 OFFICE O/P NEW LOW 30 MIN: CPT | Performed by: DERMATOLOGY

## 2017-07-24 PROCEDURE — 86706 HEP B SURFACE ANTIBODY: CPT | Performed by: DERMATOLOGY

## 2017-07-24 PROCEDURE — 86480 TB TEST CELL IMMUN MEASURE: CPT | Performed by: DERMATOLOGY

## 2017-07-24 RX ORDER — TRIAMCINOLONE ACETONIDE 1 MG/G
OINTMENT TOPICAL
Qty: 454 G | Refills: 3 | Status: SHIPPED | OUTPATIENT
Start: 2017-07-24 | End: 2018-02-06

## 2017-07-24 NOTE — MR AVS SNAPSHOT
After Visit Summary   7/24/2017    Mary Valdez    MRN: [id number]           Patient Information     Date Of Birth          1995        Visit Information        Provider Department      7/24/2017 8:45 AM Frankie Cody MD [facility]        Today's Diagnoses     Guttate psoriasis    -  1    Psoriatic arthritis (H)        Medication management           Follow-ups after your visit        Your next 10 appointments already scheduled     Aug 23, 2017  8:45 AM CDT   [facility]ie [facility]   [facility]    [id number] [place] [id number] [phone]            Oct 20, 2017  8:45 AM CDT   New Visit with Shahida Spicer MD   [facility]    [id number] [place] [id number] [phone]              Who to contact     If you have questions or need follow up information about today's clinic visit or your schedule please contact [facility] directly at [phone].  Normal or non-critical lab and imaging results will be communicated to you by MyChart, letter or phone within 4 business days after the clinic has received the results. If you do not hear from us within 7 days, please contact the clinic through [email or web address] or phone. If you have a critical or abnormal lab result, we will notify you by phone as soon as possible.  Submit refill requests through [employer] or call your pharmacy and they will forward the refill request to us. Please allow 3 business days for your refill to be completed.          Additional Information About Your Visit        MyChart Information     [employer] gives you secure access to your electronic health record. If you see a primary care provider, you can also send messages to your care team and make appointments. If you have questions, please call your primary care clinic.  If you do not have a primary  care provider, please call [phone] and they will assist you.      [employer] is an electronic gateway that provides easy, online access to your medical records. With [employer], you can request a clinic appointment, read your test results, renew a prescription or communicate with your care team.     To access your existing account, please contact your [facility] or call [phone] for assistance.        Care EveryWhere ID     This is your Care EveryWhere ID. This could be used by other organizations to access your [facility] medical records  [id number]         Blood Pressure from Last 3 Encounters:   07/13/17 130/79   06/27/17 122/72   05/23/17 100/72    Weight from Last 3 Encounters:   07/13/17 81.6 kg (180 lb) (95 %)*   06/27/17 83.5 kg (184 lb 1.6 oz) (95 %)*   06/21/17 82.6 kg (182 lb) (95 %)*     * Growth percentiles are based on [facility] 2-20 Years data.              We Performed the Following     CBC with platelets differential     Comprehensive metabolic panel     Hepatitis B Surface Antibody     Hepatitis B surface antigen     Hepatitis C antibody     HIV Antigen Antibody Combo     M Tuberculosis by Quantiferon          Today's Medication Changes          These changes are accurate as of: 7/24/17 10:06 AM.  If you have any questions, ask your nurse or doctor.               These medicines have changed or have updated prescriptions.        Dose/Directions    * triamcinolone 0.5 % cream   Commonly known as:  KENALOG   This may have changed:  Another medication with the same name was added. Make sure you understand how and when to take each.   Used for:  Psoriasis   Changed by:  Estela Gray MD        Apply sparingly to affected area two times daily as needed   Quantity:  30 g   Refills:  0       * triamcinolone 0.1 % ointment   Commonly known as:  KENALOG   This may have changed:  You were already taking a medication with the same name, and this prescription was added. Make sure  you understand how and when to take each.   Used for:  Guttate psoriasis, Psoriatic arthritis (H)   Changed by:  Frankie Cody MD        Apply to the affected area of the skin twice a day with use of a moisturizer before. CAn use for 4 weeks straight then need 1 week break   Quantity:  454 g   Refills:  3       * Notice:  This list has 2 medication(s) that are the same as other medications prescribed for you. Read the directions carefully, and ask your doctor or other care provider to review them with you.         Where to get your medicines      These medications were sent to [facility] [address] [place] [address] [address]  [address], [facility] [address]     Phone:  [phone]     triamcinolone 0.1 % ointment                Primary Care Provider Office Phone # Fax #    Leonbrionna Mcfadden Mai, [phone]       [address] [address]        Equal Access to Services     KAYY SYLVESTER AH: Hadii bear ku hadasho Soomaali, waaxda luqadaha, qaybta kaalmada adeegyada, waxay abiin haysherry escamilla . So [facility] [phone].    ATENCIÓN: Si habla español, tiene a garcia disposición servicios gratuitos de asistencia lingüística. Llame al [phone].    We comply with applicable federal civil rights laws and [place] laws. We do not discriminate on the basis of race, color, national origin, age, disability sex, sexual orientation or gender identity.            Thank you!     Thank you for choosing [facility]  for your care. Our goal is always to provide you with excellent care. Hearing back from our patients is one way we can continue to improve our services. Please take a few minutes to complete the written survey that you may receive in the mail after your visit with us. Thank you!             Your Updated Medication List - Protect others around you: Learn how to safely use, store and throw away your medicines at www.disposemymeds.org.           This list is accurate as of: 7/24/17 10:06 AM.  Always use your most recent med list.                   Brand Name Dispense Instructions for use Diagnosis    B-12 1000 MCG Caps           magnesium 200 MG Tabs           medroxyPROGESTERone 150 MG/ML injection    DEPO-PROVERA     Inject 150 mg into the muscle every 3 months        MULTI-VITAMIN DAILY PO      Take 1 tablet by mouth daily Reported on 3/8/2017        * triamcinolone 0.5 % cream    KENALOG    30 g    Apply sparingly to affected area two times daily as needed    Psoriasis       * triamcinolone 0.1 % ointment    KENALOG    454 g    Apply to the affected area of the skin twice a day with use of a moisturizer before. CAn use for 4 weeks straight then need 1 week break    Guttate psoriasis, Psoriatic arthritis (H)       TYLENOL PO      Take 1,000 mg by mouth once Reported on 5/23/2017        * Notice:  This list has 2 medication(s) that are the same as other medications prescribed for you. Read the directions carefully, and ask your doctor or other care provider to review them with you.

## 2017-07-24 NOTE — PROGRESS NOTES
[facility] Dermatology Note      Dermatology Problem List:  1.Guttate psoriasis with psoriatic arthritis  -Current tx: plan to start Humira pending labs, triamcinolone 0.1% ointment    Encounter Date: Jul 24, 2017    CC:  Chief Complaint   Patient presents with     Derm Problem     psorasis- all over little spots        History of Present Illness:  Ms. Mary Valdez is a 21 year old female who was referred by Dr. Estela Mcfadden Mai for psoriasis. She was diagnosed with psoriasis when she was 13 and states it has not been evaluated since. Her scalp, eyebrows, bra area, back, and posterior thighs are the areas most affected. She has been triamcinolone 0.5% cream without relief and she only got a tiny tube. No previous oral or injectable medications.    She also has arthritis that is generalized and is most present in the morning and after long periods of sitting. Intermittent pain induced by weather as well. Joint pain today is okay. The most recent bout of joint pain was about two weeks ago in her wrist. She does not take any OTC pain medications. Multiple joints tender.    She is with her boyfriend today and plans to get pregnant in the future but not in the next 5 years. Pt is pre-diabetic and recently started on oral therapy (not insulin) .    Past Medical History:   Patient Active Problem List   Diagnosis     Psoriasis     Migratory pain     Anxiety state     Major depression in complete remission (H)     NENO (generalized anxiety disorder)     PTSD (post-traumatic stress disorder)     Maltracking of right patella     Segmental dysfunction of thoracic region     Segmental dysfunction of lumbar region     Segmental dysfunction of sacral region     Flank pain     Sprain of lateral collateral ligament of right knee, initial encounter     Hypoglycemia, unspecified     Past Medical History:   Diagnosis Date     Bell's palsy 5 y/o    Due to trauma, neg Lyme     Depression     Venice Meyer,  psychologist, [employer] in [place]     Depressive disorder      Major depression in complete remission (H) 7/7/2014     Psoriasis     scalp only     Past Surgical History:   Procedure Laterality Date     ADENOIDECTOMY       ARTHROSCOPY KNEE WITH RETINACULAR RELEASE Right 1/20/2017    Procedure: ARTHROSCOPY KNEE WITH RETINACULAR RELEASE MEDIAL OR LATERAL;  Surgeon: Yevgeniy Graf DO;  Location: [facility] OR     C NONSPECIFIC PROCEDURE  02/12/01    adenoidectomy     DENTAL SURGERY       MOUTH SURGERY         Social History:  The patient works as a nutrition aide. The patient denies use of tanning beds.    Family History:  There is no family history of skin cancer.    Medications:  Current Outpatient Prescriptions   Medication Sig Dispense Refill     triamcinolone (KENALOG) 0.5 % cream Apply sparingly to affected area two times daily as needed 30 g 0     Acetaminophen (TYLENOL PO) Take 1,000 mg by mouth once Reported on 5/23/2017       magnesium 200 MG TABS        Cyanocobalamin (B-12) 1000 MCG CAPS        medroxyPROGESTERone (DEPO-PROVERA) 150 MG/ML injection Inject 150 mg into the muscle every 3 months       Multiple Vitamin (MULTI-VITAMIN DAILY PO) Take 1 tablet by mouth daily Reported on 3/8/2017         Allergies   Allergen Reactions     No Known Drug Allergies        Review of Systems:  -Skin/Heme New Pt: The patient admits to frequent sun exposure. The patient denies excessive scarring or problems healing except as per HPI. The patient denies excessive bleeding.  -Constitutional: The patient denies fatigue, fevers, chills, unintended weight loss, and night sweats.    Physical exam:  Vitals: There were no vitals taken for this visit.  GEN: This is a well developed, well-nourished female in no acute distress, in a pleasant mood.    NEURO: Alert and oriented  PSYCH: In pleasant mood, appropriate affect  SKIN: Total skin excluding the undergarment areas was performed. The exam included the head/face, neck,  both arms, chest, back, abdomen, both legs, digits and/or nails.   -scattered fine pitting seen in a couple of the nails bilaterally, no evidence of oil spots  -a few pink papules on the right elbow, left elbow is clear  -scattered pink, well-demarcated papules some with silvery scale on the back  -pink papules with silvery scale seen through much of the scalp  -knees are clear  -well-demarcated pink papules on the upper abdomen between the breasts  -No other lesions of concern on areas examined.   MSK: no bogginess or tenderness to the bilateral hands or achilles tenden..     Impression/Plan:  1. Guttate psoriasis with psoriatic arthritis (per history). Skin involvement is <3% TBSA. However, joint is involved. Pregnancy category B so ok for having a baby.    Start Humira pending lab results. 80mg week 1, 40mg week 2, 40mg every other week. This is pending lab results.    Humira home nurse program form completed.    Start triamcinolone 0.1% ointment to affected    Collect baseline labs today: CBC, TB, CMP, HIV antigen, Hep B & C    CC Estela Mcfadden Mai, MD on close of this encounter.  Follow-up in 1 month, earlier for new or changing lesions.       Staff Involved:  Scribe/Staff    Scribe Disclosure:   I, Joni Goldstein, am serving as a scribe to document services personally performed by Dr. Frankie Cody, based on data collection and the provider's statements to me.     Provider Disclosure:   I have reviewed the documentation recorded by the scribe and have edited it as needed. I have personally performed the services documented here and the documentation accurately represents those services and the decisions made by me.     Frankie Cody MD, MS    Department of Dermatology  [facility]: Phone: [phone], Fax:[phone]  [facility]: Phone: [phone], Fax: [phone]

## 2017-07-24 NOTE — NURSING NOTE
Dermatology Rooming Note    Mary Valdez's goals for this visit include:   Chief Complaint   Patient presents with     Derm Problem     psorasis- all over little spots        Is a scribe okay for this visit:YES    Are records needed for this visit(If yes, obtain release of information): No     Vitals: There were no vitals taken for this visit.    Referring Provider:  Estela Mcfadden Mai, MD  [address] [place] [address]

## 2017-07-24 NOTE — LETTER
7/24/2017       RE: Mary Valdez  [id number] [facility] [address]     Dear Colleague,    Thank you for referring your patient, Mary Valdez, to the [facility]. Please see a copy of my visit note below.    [facility] Dermatology Note      Dermatology Problem List:  1.Guttate psoriasis with psoriatic arthritis  -Current tx: plan to start Humira pending labs, triamcinolone 0.1% ointment    Encounter Date: Jul 24, 2017    CC:  Chief Complaint   Patient presents with     Derm Problem     psorasis- all over little spots        History of Present Illness:  Ms. Mary Valdez is a 21 year old female who was referred by Dr. Estela Mcfadden Mai for psoriasis. She was diagnosed with psoriasis when she was 13 and states it has not been evaluated since. Her scalp, eyebrows, bra area, back, and posterior thighs are the areas most affected. She has been triamcinolone 0.5% cream without relief and she only got a tiny tube. No previous oral or injectable medications.    She also has arthritis that is generalized and is most present in the morning and after long periods of sitting. Intermittent pain induced by weather as well. Joint pain today is okay. The most recent bout of joint pain was about two weeks ago in her wrist. She does not take any OTC pain medications. Multiple joints tender.    She is with her boyfriend today and plans to get pregnant in the future but not in the next 5 years. Pt is pre-diabetic and recently started on oral therapy (not insulin) .    Past Medical History:   Patient Active Problem List   Diagnosis     Psoriasis     Migratory pain     Anxiety state     Major depression in complete remission (H)     NENO (generalized anxiety disorder)     PTSD (post-traumatic stress disorder)     Maltracking of right patella     Segmental dysfunction of thoracic region     Segmental dysfunction of lumbar region      Segmental dysfunction of sacral region     Flank pain     Sprain of lateral collateral ligament of right knee, initial encounter     Hypoglycemia, unspecified     Past Medical History:   Diagnosis Date     Bell's palsy 5 y/o    Due to trauma, neg Lyme     Depression     Venice Meyer, psychologist, [employer] in [place]     Depressive disorder      Major depression in complete remission (H) 7/7/2014     Psoriasis     scalp only     Past Surgical History:   Procedure Laterality Date     ADENOIDECTOMY       ARTHROSCOPY KNEE WITH RETINACULAR RELEASE Right 1/20/2017    Procedure: ARTHROSCOPY KNEE WITH RETINACULAR RELEASE MEDIAL OR LATERAL;  Surgeon: Yevgeniy Graf DO;  Location: [facility]     C NONSPECIFIC PROCEDURE  02/12/01    adenoidectomy     DENTAL SURGERY       MOUTH SURGERY         Social History:  The patient works as a nutrition aide. The patient denies use of tanning beds.    Family History:  There is no family history of skin cancer.    Medications:  Current Outpatient Prescriptions   Medication Sig Dispense Refill     triamcinolone (KENALOG) 0.5 % cream Apply sparingly to affected area two times daily as needed 30 g 0     Acetaminophen (TYLENOL PO) Take 1,000 mg by mouth once Reported on 5/23/2017       magnesium 200 MG TABS        Cyanocobalamin (B-12) 1000 MCG CAPS        medroxyPROGESTERone (DEPO-PROVERA) 150 MG/ML injection Inject 150 mg into the muscle every 3 months       Multiple Vitamin (MULTI-VITAMIN DAILY PO) Take 1 tablet by mouth daily Reported on 3/8/2017         Allergies   Allergen Reactions     No Known Drug Allergies        Review of Systems:  -Skin/Heme New Pt: The patient admits to frequent sun exposure. The patient denies excessive scarring or problems healing except as per HPI. The patient denies excessive bleeding.  -Constitutional: The patient denies fatigue, fevers, chills, unintended weight loss, and night sweats.    Physical exam:  Vitals: There were no vitals taken for this  visit.  GEN: This is a well developed, well-nourished female in no acute distress, in a pleasant mood.    NEURO: Alert and oriented  PSYCH: In pleasant mood, appropriate affect  SKIN: Total skin excluding the undergarment areas was performed. The exam included the head/face, neck, both arms, chest, back, abdomen, both legs, digits and/or nails.   -scattered fine pitting seen in a couple of the nails bilaterally, no evidence of oil spots  -a few pink papules on the right elbow, left elbow is clear  -scattered pink, well-demarcated papules some with silvery scale on the back  -pink papules with silvery scale seen through much of the scalp  -knees are clear  -well-demarcated pink papules on the upper abdomen between the breasts  -No other lesions of concern on areas examined.   MSK: no bogginess or tenderness to the bilateral hands or achilles tenden..     Impression/Plan:  1. Guttate psoriasis with psoriatic arthritis (per history). Skin involvement is <3% TBSA. However, joint is involved. Pregnancy category B so ok for having a baby.    Start Humira pending lab results. 80mg week 1, 40mg week 2, 40mg every other week. This is pending lab results.    Humira home nurse program form completed.    Start triamcinolone 0.1% ointment to affected    Collect baseline labs today: CBC, TB, CMP, HIV antigen, Hep B & C    CC Estela Mcfadden Mai, MD on close of this encounter.  Follow-up in 1 month, earlier for new or changing lesions.       Staff Involved:  Scribe/Staff    Scribe Disclosure:   I, Joni Goldstein, am serving as a scribe to document services personally performed by Dr. Frankie Cody, based on data collection and the provider's statements to me.     Provider Disclosure:   I have reviewed the documentation recorded by the scribe and have edited it as needed. I have personally performed the services documented here and the documentation accurately represents those services and the decisions made by me.     Frankie Cody MD,  MS    Department of Dermatology  [facility]: Phone: [phone], Fax:[phone]  [facility]: Phone: [phone], Fax: [phone]          Results provided through [employer]. Please see [employer] message.     Copy of [employer] message copied and pasted below for convenience.      Dear Mary Valdez,    We are writing to inform you of your test results:     HIV negative, Hepatitis negative and not immune (there is a vaccine for Hepatitis B), Hepatitis C negative.    Complete metabolic panel (kidneys, liver) is normal. The elevated chloride is insignificant and just 1 over the top end.    The TB test takes the longest to come back. I will send another mychart when that result comes back.    Thank you for taking the time to be seen in our dermatology clinic. If you have further questions or concerns, please contact the clinic(see phone number listed below).      Sincerely,    Frankie Cody MD, MS    Department of Dermatology  [facility]: Phone: [phone], Fax:[phone]  [facility]: Phone: [phone], Fax: [phone]       Again, thank you for allowing me to participate in the care of your patient.      Sincerely,    Frankie Cody MD

## 2017-07-26 DIAGNOSIS — L40.4 GUTTATE PSORIASIS: ICD-10-CM

## 2017-07-26 DIAGNOSIS — L40.50 PSORIATIC ARTHRITIS (H): Primary | ICD-10-CM

## 2017-07-26 LAB
M TB TUBERC IFN-G BLD QL: NEGATIVE
M TB TUBERC IFN-G/MITOGEN IGNF BLD: 0.01 IU/ML

## 2017-07-26 NOTE — PROGRESS NOTES
Results provided through [employer]. Please see [employer] message.     Copy of [employer] message copied and pasted below for convenience.    Dear Mary Valdez,    We are writing to inform you of your test results:     TB test is negative.    I am sending the Humira to the [facility] and they can help with any prior authorizations if needed. After approval, the medication can be sent to any pharmacy of your choosing.     Thank you for taking the time to be seen in our dermatology clinic. If you have further questions or concerns, please contact the clinic(see phone number listed below).      Sincerely,    Frankie Cody MD, MS    Department of Dermatology  [facility]: Phone: [phone], Fax:[phone]  [facility]: Phone: [phone], Fax: [phone]

## 2017-07-26 NOTE — PROGRESS NOTES
Baseline labs normal. No TB, HIV, Hep B, Hep C.    Adalimumab ordered to [facility].    Frankie Cody MD, MS    Department of Dermatology  [facility]: Phone: [phone], Fax:[phone]  [facility]: Phone: [phone], Fax: [phone]

## 2017-07-26 NOTE — PROGRESS NOTES
Results provided through [employer]. Please see [employer] message.     Copy of [employer] message copied and pasted below for convenience.      Dear Mary Valdez,    We are writing to inform you of your test results:     HIV negative, Hepatitis negative and not immune (there is a vaccine for Hepatitis B), Hepatitis C negative.    Complete metabolic panel (kidneys, liver) is normal. The elevated chloride is insignificant and just 1 over the top end.    The TB test takes the longest to come back. I will send another [email or web address] when that result comes back.    Thank you for taking the time to be seen in our dermatology clinic. If you have further questions or concerns, please contact the clinic(see phone number listed below).      Sincerely,    Frankie Cody MD, MS    Department of Dermatology  [facility]: Phone: [phone], Fax:[phone]  [facility]: Phone: [phone], Fax: [phone]

## 2017-07-27 ENCOUNTER — TELEPHONE (OUTPATIENT)
Dept: DERMATOLOGY | Facility: CLINIC | Age: 22
End: 2017-07-27

## 2017-07-27 DIAGNOSIS — L40.50 PSORIATIC ARTHRITIS (H): ICD-10-CM

## 2017-07-27 DIAGNOSIS — L40.9 PSORIASIS: Primary | ICD-10-CM

## 2017-07-27 NOTE — TELEPHONE ENCOUNTER
Fax received from [facility] stating that PA is needed for Humira.    Nuha Griffin RN

## 2017-07-28 NOTE — TELEPHONE ENCOUNTER
[facility] Prior Authorization Team   Phone: [phone]  Fax: [phone]      PA Initiation    Medication: Humira  Insurance Company: [email or web address] - Phone [phone] Fax [phone]  Pharmacy Filling the Rx: [facility] - [place], [place] - [address]  Filling Pharmacy Phone: [phone]  Filling Pharmacy Fax: [phone]  Start Date: 7/27/2017

## 2017-08-02 NOTE — PROGRESS NOTES
Clinic Care Coordination Contact 8/2/17  [facility]/[employer]-    Referral Source: [employer] Outreach  Clinical Data: Care Coordinator Outreach  Outreach attempted x 2.  Left message on [employer] with call back information and requested return call.  Plan: Care Coordinator mailed out care coordination introduction letter on 8/2/17. Care Coordinator will do no further outreaches at this time.    UPDATE: Pt returned this writer's phone call. I introduced self and role and discussed whether or not she would benefit from my services at this time. Pt was recently referred to an endocrinologist in the [facility] system that she has already met with. She will notify her PCP if she feels she would benefit from CC at a later time.     AKSHAT Dumont  Care Coordinator Social Work    [facility]  [phone]  8/2/2017 1:36 PM

## 2017-08-07 NOTE — TELEPHONE ENCOUNTER
Dr. Cody advised contacting the PA team directly to see if the diagnosis that was associated was Psoriatic Arthritis and not just Psoriasis. PA please review and advise and resubmit if Dx was not Psoriatic Arthritis. Thank you....Jackie Wan RN

## 2017-08-07 NOTE — TELEPHONE ENCOUNTER
Received a fax from [email or web address] with [employer] [place] and [employer] stating the the Humira pen has been denied as pt did not meet the medical criteria. In order to approve this request program requirements must be met. Letter states the following information: You must have tried a biologic immunomodulator drug, standard therapy, or another drug called Otezla. Your prescription fill history must show a paid clam within the past 180 days. The requested drug is an example of a biologic immunomodulator drug. Some examples of standard drugs include:Methotrexate, azathioprine, corticosteroids such as prednisone, calcipotriene, tazarotene. Will route this message to Dr. Cody so he is aware and can advise on further instruction. Thank you...Jackie Wan RN

## 2017-08-08 ENCOUNTER — TELEPHONE (OUTPATIENT)
Dept: DERMATOLOGY | Facility: CLINIC | Age: 22
End: 2017-08-08

## 2017-08-08 DIAGNOSIS — L40.9 PSORIASIS: Primary | ICD-10-CM

## 2017-08-08 DIAGNOSIS — L40.50 PSORIATIC ARTHROPATHY (H): ICD-10-CM

## 2017-08-08 NOTE — TELEPHONE ENCOUNTER
[facility] Prior Authorization Team   Phone: [phone]  Fax: [phone]      PA Initiation    Medication: Otezla starter pack and maintenance dose  Insurance Company: [employer] - Phone [phone] Fax [phone]  Pharmacy Filling the Rx: [facility] - [place], [place] - [address]  Filling Pharmacy Phone: [phone]  Filling Pharmacy Fax: [phone]  Start Date: 8/8/2017

## 2017-08-08 NOTE — TELEPHONE ENCOUNTER
Dr Cody    I did not get the denial letter unfortunately I selected your fax and not ours, so I did not see the actual denial letter. That part did not make sense to me either, as yes humira is a biologic immunomodulator so I am guessing it must have said a few that the plan prefers (if you are able to see the denial letter it might state what biologic immunomodulator is preferred by the plan). I know that is not much help, but I agree it doesn't make sense why that would be one of the denial reasons.    Whitney  Patient  at [facility]

## 2017-08-08 NOTE — TELEPHONE ENCOUNTER
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 Let me know, I can certainly try, thank you!

## 2017-08-08 NOTE — TELEPHONE ENCOUNTER
I spoke with Mary and reviewed the following.  She verbalized understanding and agreed with the plan.      Please let pt know that insurance denied Humira and dictated that we use another medication first. Given that she is 21 and may get pregnant, opted for Otezla. Main side effect, bad nausea, stomach aches, and diarrhea as the medication gets to full dose. May trigger a little weight loss. Some folks may get worsening depression if they have depression. Also the medication works slowly. No need for lab monitoring.     If she fails this due to side effects then will re-send PA.     MEd sent to [facility].     Frankie Cody MD, MS

## 2017-08-11 NOTE — TELEPHONE ENCOUNTER
Called Mary and her mother on 8/11/17 and got VM without identification.    Will have nursing staff to try to call her still.    Humira denied.    Otezla not covered.    Insurance indicated that we much try other oral meds first.    Methotrexate is likely the one to try.    However, if pt is planning to become pregnant, we cannot use methotrexate or azathioprine. Only injectables will be ok.    If she is planning on becoming pregnant then [employer] instructed re-submitting the PA for Humira with the additional information.    Methotrexate can be used with no future impact to fertility. However, she has to be off MTX for 6 months before getting pregnant.      Frankie Cody MD, MS    Department of Dermatology  [facility]: Phone: [phone], Fax:[phone]  [facility]: Phone: [phone], Fax: [phone]

## 2017-08-11 NOTE — TELEPHONE ENCOUNTER
Left message for patient to call [facility] in [place] back at [phone]    Leticia Newman LPN

## 2017-08-14 NOTE — TELEPHONE ENCOUNTER
Pt called, No answer.  does not identify pt. Left general message for pt to call the [facility] clinic back at [phone]...Jackie Wan RN

## 2017-08-15 ENCOUNTER — APPOINTMENT (OUTPATIENT)
Dept: GENERAL RADIOLOGY | Facility: CLINIC | Age: 22
End: 2017-08-15
Attending: FAMILY MEDICINE
Payer: OTHER MISCELLANEOUS

## 2017-08-15 ENCOUNTER — HOSPITAL ENCOUNTER (EMERGENCY)
Facility: CLINIC | Age: 22
Discharge: HOME | End: 2017-08-15
Attending: FAMILY MEDICINE | Admitting: FAMILY MEDICINE
Payer: OTHER MISCELLANEOUS

## 2017-08-15 VITALS
WEIGHT: 180 LBS | HEART RATE: 79 BPM | DIASTOLIC BLOOD PRESSURE: 80 MMHG | SYSTOLIC BLOOD PRESSURE: 128 MMHG | HEIGHT: 69 IN | TEMPERATURE: 97.8 F | OXYGEN SATURATION: 100 % | RESPIRATION RATE: 16 BRPM | BODY MASS INDEX: 26.66 KG/M2

## 2017-08-15 DIAGNOSIS — S50.02XA LEFT ELBOW CONTUSION: ICD-10-CM
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
## 2017-08-15 PROCEDURE — 99282 EMERGENCY DEPT VISIT SF MDM: CPT | Mod: Z6 | Performed by: FAMILY MEDICINE

## 2017-08-15 PROCEDURE — 99283 EMERGENCY DEPT VISIT LOW MDM: CPT | Performed by: FAMILY MEDICINE

## 2017-08-15 PROCEDURE — 73080 X-RAY EXAM OF ELBOW: CPT | Mod: TC,LT

## 2017-08-15 NOTE — ED AVS SNAPSHOT
[facility] Emergency Department    [address] [place] [address]    Phone:  [phone]    Fax:  [phone]                                       Mary Valdez   MRN: [id number]    Department:  [facility] Emergency Department   Date of Visit:  8/15/2017           Patient Information     Date Of Birth          1995        Your diagnoses for this visit were:     Left elbow contusion        You were seen by Timoteo Theodore MD.      Follow-up Information     Follow up with Estela Gray MD. Schedule an appointment as soon as possible for a visit in 3 days.    Specialty:  Family Practice    Why:  If not improving.    Contact information:    [address] [place] [id number] [phone]          Discharge Instructions         Elbow Bruise  You have a bruise (contusion) of your elbow. A bruise causes local pain, swelling, and sometimes bruising. There are no broken bones. This injury takes a few days to a few weeks to heal. You may be given a sling for comfort and arm support.  You may notice color changes over the skin. It may change from reddish to bluish to greenish or yellowish before the bruising fades. The skin will then go back to its normal color.  Home care  Follow these guidelines when caring for yourself at home.    Keep your arm elevated to reduce pain and swelling. This is most important during the first 2 days (48 hours) after the injury.    Put an ice pack on the injured area. Do this for 20 minutes every 1 to 2 hours the first day. You can make an ice pack by wrapping a plastic bag of ice in a thin towel. You should continue to use the ice pack 3 to 4 times a day for the next 2 days. Then use the ice pack as needed to ease pain and swelling.    Don t use a heating pad.    Don t stick a needle into the contusion or bruising to drain it.    You may use acetaminophen or ibuprofen to control pain, unless another pain medicine was prescribed. If you have  chronic liver or kidney disease, talk with your healthcare provider before using these medicines. Also talk with your provider if you ve had a stomach ulcer or gastrointestinal bleeding.    If a sling was provided, you may take it off to shower or bathe. Don t wear it for more than 1 week or it may cause joint stiffness.  Follow-up care  Follow up with your healthcare provider, or as advised, if you are not starting to get better within the next 3 days.  When to seek medical advice  Call your healthcare provider right away if any of these occur:    Pain or swelling gets worse    The back of your elbow becomes very swollen where it almost looks like a gold ball or egg-like mass is growing there. This is a sign of olecrenon bursitis or septic bursitis which may need immediate treatment.    Redness, red streaks down the arm, warmth, or drainage from the bruise    Hand or fingers becomes cold, blue, numb, or tingly    New bruises, and you don t know what caused them    Contusion doesn t heal  Date Last Reviewed: 2/1/2017 2000-2017 [employer]. [address] [place] [address]. All rights reserved. This information is not intended as a substitute for professional medical care. Always follow your healthcare professional's instructions.          Future Appointments        Provider Department Dept Phone Center    8/24/2017 10:40 AM Estela Mcfadden Mai, MD [facility] [phone] [facility]    10/20/2017 8:45 AM Shahida Spicer MD [facility] [phone] [place]      24 Hour Appointment Hotline       To make an appointment at any [facility], call [phone] ([phone]). If you don't have a family doctor or clinic, we will help you find one. [facility] are conveniently located to serve the needs of you and your family.             Review of your medicines      Our records show that you are taking the medicines listed below. If these are incorrect,  please call your family doctor or clinic.        Dose / Directions Last dose taken    Apremilast 10 & 20 & 30 MG Tbpk   Commonly known as:  OTEZLA   Quantity:  27 tablet        Take one tablet in the morning on day 1, then take one tablet every 12 hours on days 2 thru 14, according to the instructions on the packet.   Refills:  0        B-12 1000 MCG Caps        Refills:  0        magnesium 200 MG Tabs        Refills:  0        medroxyPROGESTERone 150 MG/ML injection   Commonly known as:  DEPO-PROVERA   Dose:  150 mg        Inject 150 mg into the muscle every 3 months   Refills:  0        MULTI-VITAMIN DAILY PO   Dose:  1 tablet        Take 1 tablet by mouth daily Reported on 3/8/2017   Refills:  0        * triamcinolone 0.5 % cream   Commonly known as:  KENALOG   Quantity:  30 g        Apply sparingly to affected area two times daily as needed   Refills:  0        * triamcinolone 0.1 % ointment   Commonly known as:  KENALOG   Quantity:  454 g        Apply to the affected area of the skin twice a day with use of a moisturizer before. CAn use for 4 weeks straight then need 1 week break   Refills:  3        TYLENOL PO   Dose:  1000 mg        Take 1,000 mg by mouth once Reported on 5/23/2017   Refills:  0        * Notice:  This list has 2 medication(s) that are the same as other medications prescribed for you. Read the directions carefully, and ask your doctor or other care provider to review them with you.            Procedures and tests performed during your visit     XR Elbow Left G/E 3 Views      Orders Needing Specimen Collection     None      Pending Results     No orders found from 8/13/2017 to 8/16/2017.            Pending Culture Results     No orders found from 8/13/2017 to 8/16/2017.            Pending Results Instructions     If you had any lab results that were not finalized at the time of your Discharge, you can call the ED Lab Result RN at [phone]. You will be contacted by this team for any positive  Lab results or changes in treatment. The nurses are available 7 days a week from 10A to 6:30P.  You can leave a message 24 hours per day and they will return your call.        Thank you for choosing [facility]       Thank you for choosing [facility] for your care. Our goal is always to provide you with excellent care. Hearing back from our patients is one way we can continue to improve our services. Please take a few minutes to complete the written survey that you may receive in the mail after you visit with us. Thank you!        [facility] Information     [facility] gives you secure access to your electronic health record. If you see a primary care provider, you can also send messages to your care team and make appointments. If you have questions, please call your primary care clinic.  If you do not have a primary care provider, please call [phone] and they will assist you.        Care EveryWhere ID     This is your Care EveryWhere ID. This could be used by other organizations to access your [facility] medical records  [id number]        Equal Access to Services     KAYY SYLVESTER AH: Celena Sharp, waemilie carr, qaalejo kaalmabenita roe, shari moncada. So [facility] [phone].    ATENCIÓN: Si habla español, tiene a garcia disposición servicios gratuitos de asistencia lingüística. Llame al [phone].    We comply with applicable federal civil rights laws and [place] laws. We do not discriminate on the basis of race, color, national origin, age, disability sex, sexual orientation or gender identity.            After Visit Summary       This is your record. Keep this with you and show to your community pharmacist(s) and doctor(s) at your next visit.

## 2017-08-15 NOTE — LETTER
[facility] EMERGENCY DEPARTMENT  [address]   [place] [address]  [phone]      August 15, 2017    Mary Valdez  [address] [address]  [phone] (home) NONE (work)    : 1995      To Whom it may concern:    Mary Valdez was seen in our Emergency Department today, August 15, 2017 for an injury that was reported to be work related.      For the next 1 days she should not work    The employee might be taking medication so that they cannot operate moving machinery or perform activities that require balancing or working above ground.    After returning to work the following restrictions apply for 2 days:   no bending or lifting of the left elbow, elevate injured area and no lifting more than 15 pounds        Sincerely,    Timoteo Thoedore MD

## 2017-08-15 NOTE — ED AVS SNAPSHOT
[facility] Emergency Department    [address] [place] [address]    Phone:  [phone]    Fax:  [phone]                                       Mary Valdez   MRN: [id number]    Department:  [facility] Emergency Department   Date of Visit:  8/15/2017           After Visit Summary Signature Page     I have received my discharge instructions, and my questions have been answered. I have discussed any challenges I see with this plan with the nurse or doctor.    ..........................................................................................................................................  Patient/Patient Representative Signature      ..........................................................................................................................................  Patient Representative Print Name and Relationship to Patient    ..................................................               ................................................  Date                                            Time    ..........................................................................................................................................  Reviewed by Signature/Title    ...................................................              ..............................................  Date                                                            Time

## 2017-08-16 NOTE — DISCHARGE INSTRUCTIONS
Elbow Bruise  You have a bruise (contusion) of your elbow. A bruise causes local pain, swelling, and sometimes bruising. There are no broken bones. This injury takes a few days to a few weeks to heal. You may be given a sling for comfort and arm support.  You may notice color changes over the skin. It may change from reddish to bluish to greenish or yellowish before the bruising fades. The skin will then go back to its normal color.  Home care  Follow these guidelines when caring for yourself at home.    Keep your arm elevated to reduce pain and swelling. This is most important during the first 2 days (48 hours) after the injury.    Put an ice pack on the injured area. Do this for 20 minutes every 1 to 2 hours the first day. You can make an ice pack by wrapping a plastic bag of ice in a thin towel. You should continue to use the ice pack 3 to 4 times a day for the next 2 days. Then use the ice pack as needed to ease pain and swelling.    Don t use a heating pad.    Don t stick a needle into the contusion or bruising to drain it.    You may use acetaminophen or ibuprofen to control pain, unless another pain medicine was prescribed. If you have chronic liver or kidney disease, talk with your healthcare provider before using these medicines. Also talk with your provider if you ve had a stomach ulcer or gastrointestinal bleeding.    If a sling was provided, you may take it off to shower or bathe. Don t wear it for more than 1 week or it may cause joint stiffness.  Follow-up care  Follow up with your healthcare provider, or as advised, if you are not starting to get better within the next 3 days.  When to seek medical advice  Call your healthcare provider right away if any of these occur:    Pain or swelling gets worse    The back of your elbow becomes very swollen where it almost looks like a gold ball or egg-like mass is growing there. This is a sign of olecrenon bursitis or septic bursitis which may need immediate  treatment.    Redness, red streaks down the arm, warmth, or drainage from the bruise    Hand or fingers becomes cold, blue, numb, or tingly    New bruises, and you don t know what caused them    Contusion doesn t heal  Date Last Reviewed: 2/1/2017 2000-2017 [employer]. [address] [place] [address]. All rights reserved. This information is not intended as a substitute for professional medical care. Always follow your healthcare professional's instructions.

## 2017-08-16 NOTE — ED PROVIDER NOTES
"  History     Chief Complaint   Patient presents with     Elbow Pain     HPI  Mary Valdez is a 21 year old female who presents with left elbow pain that started after hitting her elbow while at work.  She headed on metal shelf.  She says she had pain that shot down her arm and since then she's been unable to move or are much secondary to the pain.  She denies any other injuries.  She's not had any previous problems with her elbow.  There was some slight bleeding noted.    I have reviewed the Medications, Allergies, Past Medical and Surgical History, and Social History in the Epic system.        Review of Systems   All other systems reviewed and are negative.      Physical Exam   BP: 134/82  Pulse: 79  Heart Rate: 79  Temp: 97.8  F (36.6  C)  Resp: 16  Height: 175.3 cm (5' 9\")  Weight: 81.6 kg (180 lb)  SpO2: 100 %  Physical Exam   Constitutional: She appears well-developed and well-nourished. No distress.   HENT:   Head: Normocephalic and atraumatic.   Musculoskeletal:        Left forearm: She exhibits tenderness and bony tenderness. She exhibits no swelling, no edema, no deformity and no laceration.   Patient has limited range of motion of the elbow secondary to pain   Skin: She is not diaphoretic.   Nursing note and vitals reviewed.      ED Course     ED Course     Procedures         Results for orders placed or performed during the hospital encounter of 08/15/17   XR Elbow Left G/E 3 Views    Narrative    LEFT ELBOW THREE VIEWS   8/15/2017 8:35 PM     HISTORY: Pain.    COMPARISON: None.      Impression    IMPRESSION:   1. No visualized acute fracture, malalignment or other acute osseous  abnormality of the left elbow.  2. No left elbow joint effusion.    DOLLY PERLA MD       x-ray was negative for fracture.  Patient most likely has a bad elbow contusion.  Will place ASAP along the elbow.  Recommend aggressive icing of the area.  Patiently use over-the-counter Tylenol and/or ibuprofen as needed for " discomfort.  Patient was told to follow-up with her doctor physical improvement over the next few days.  Assessments & Plan (with Medical Decision Making)  left elbow contusion      I have reviewed the nursing notes.    I have reviewed the findings, diagnosis, plan and need for follow up with the patient.      8/15/2017   [facility] EMERGENCY DEPARTMENT     Timoteo Theodore MD  08/15/17 2270

## 2017-08-16 NOTE — ED NOTES
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

## 2017-08-24 ENCOUNTER — OFFICE VISIT (OUTPATIENT)
Dept: FAMILY MEDICINE | Facility: CLINIC | Age: 22
End: 2017-08-24
Payer: COMMERCIAL

## 2017-08-24 VITALS
DIASTOLIC BLOOD PRESSURE: 70 MMHG | HEART RATE: 109 BPM | BODY MASS INDEX: 27.35 KG/M2 | OXYGEN SATURATION: 100 % | SYSTOLIC BLOOD PRESSURE: 112 MMHG | RESPIRATION RATE: 16 BRPM | TEMPERATURE: 97.4 F | WEIGHT: 185.2 LBS

## 2017-08-24 DIAGNOSIS — R10.2 PELVIC PAIN IN FEMALE: Primary | ICD-10-CM

## 2017-08-24 LAB
SPECIMEN SOURCE: NORMAL
WET PREP SPEC: NORMAL

## 2017-08-24 PROCEDURE — 87491 CHLMYD TRACH DNA AMP PROBE: CPT | Performed by: FAMILY MEDICINE

## 2017-08-24 PROCEDURE — 87210 SMEAR WET MOUNT SALINE/INK: CPT | Performed by: FAMILY MEDICINE

## 2017-08-24 PROCEDURE — 99213 OFFICE O/P EST LOW 20 MIN: CPT | Performed by: FAMILY MEDICINE

## 2017-08-24 PROCEDURE — 87591 N.GONORRHOEAE DNA AMP PROB: CPT | Performed by: FAMILY MEDICINE

## 2017-08-24 RX ORDER — ACARBOSE 25 MG/1
25 TABLET ORAL 2 TIMES DAILY WITH MEALS
COMMUNITY
Start: 2017-07-18 | End: 2017-10-17

## 2017-08-24 ASSESSMENT — PAIN SCALES - GENERAL: PAINLEVEL: MODERATE PAIN (5)

## 2017-08-24 ASSESSMENT — PATIENT HEALTH QUESTIONNAIRE - PHQ9: SUM OF ALL RESPONSES TO PHQ QUESTIONS 1-9: 3

## 2017-08-24 NOTE — NURSING NOTE
"Chief Complaint   Patient presents with     Pelvic Pain       Initial /70 (BP Location: Left arm, Patient Position: Chair, Cuff Size: Adult Regular)  Pulse 109  Temp 97.4  F (36.3  C) (Temporal)  Resp 16  Wt 185 lb 3.2 oz (84 kg)  SpO2 100%  Breastfeeding? No  BMI 27.35 kg/m2 Estimated body mass index is 27.35 kg/(m^2) as calculated from the following:    Height as of 8/15/17: 5' 9\" (1.753 m).    Weight as of this encounter: 185 lb 3.2 oz (84 kg).  Medication Reconciliation: complete     Health Maintenance Due   Topic Date Due     HPV IMMUNIZATION (1 of 3 - Female 3 Dose Series) 11/12/2008     PHQ-9 Q6 MONTHS  07/10/2017     Logan Solorio CMA      "

## 2017-08-24 NOTE — PROGRESS NOTES
SUBJECTIVE:   Mary Valdez is a 21 year old female who presents to clinic today for the following health issues:    Mary is here today with her significant other for 6 weeks history of dyspareunia. Never had this before. Not really sure what trigger. Stated that she has been feeling cramping/pain with intercourse. This happened during and after intercourse and it does not matter of the position. This also happens even when she is well ready and well lubricated. The discomfort is deep inside her pelvic area.  No bleeding or abnormal discharge. She is on depo provera for BC.  No fever or chills. No dysuria, diarrhea or constipation. It is affecting her romantic relationship. Denies of STI risks and she has no history of STI. No histories of ovarian cysts or endometriosis. No nipple discharge. Denies a headache or dizziness. No acute change in her vision. No other concern today.       Problem list and histories reviewed & adjusted, as indicated.  Additional history: as documented    Current Outpatient Prescriptions   Medication Sig Dispense Refill     acarbose (PRECOSE) 25 MG tablet Take 25 mg by mouth 2 times daily (with meals)       triamcinolone (KENALOG) 0.1 % ointment Apply to the affected area of the skin twice a day with use of a moisturizer before. CAn use for 4 weeks straight then need 1 week break 454 g 3     triamcinolone (KENALOG) 0.5 % cream Apply sparingly to affected area two times daily as needed 30 g 0     magnesium 200 MG TABS        Cyanocobalamin (B-12) 1000 MCG CAPS        medroxyPROGESTERone (DEPO-PROVERA) 150 MG/ML injection Inject 150 mg into the muscle every 3 months       Multiple Vitamin (MULTI-VITAMIN DAILY PO) Take 1 tablet by mouth daily Reported on 3/8/2017       Allergies   Allergen Reactions     No Known Drug Allergies        Reviewed and updated as needed this visit by clinical staff  Tobacco  Allergies  Meds  Soc Hx      Reviewed and updated as needed this visit by  Provider         ROS:  Constitutional, HEENT, cardiovascular, pulmonary, gi and gu systems are negative, except as otherwise noted.      OBJECTIVE:   /70 (BP Location: Left arm, Patient Position: Chair, Cuff Size: Adult Regular)  Pulse 109  Temp 97.4  F (36.3  C) (Temporal)  Resp 16  Wt 185 lb 3.2 oz (84 kg)  SpO2 100%  Breastfeeding? No  BMI 27.35 kg/m2  Body mass index is 27.35 kg/(m^2).  GENERAL: healthy, alert and no distress  NECK: no adenopathy, no asymmetry, masses and thyroid normal to palpation.  RESP: lungs clear to auscultation - no rales, rhonchi or wheezes  CV: regular rate and rhythm, no murmur.  ABDOMEN: soft, non-tender, non-distended, no palpable masses and bowel sounds normal.  No CVA tenderness. No tenderness with palpation to the supra pelvic area or the groin area.   (female): normal female external genitalia, normal urethral meatus, vaginal mucosa, normal cervix/adnexa/uterus without masses or discharge.  No cervical tenderness.  MS: no gross musculoskeletal defects noted, no edema.  Hips exam was normal. No tender with palpation to lower back/spine.    Diagnostic Test Results:  Results for orders placed or performed in visit on 08/24/17   NEISSERIA GONORRHOEA PCR   Result Value Ref Range    Specimen Descrip Cervix     N Gonorrhea PCR Negative NEG^Negative   CHLAMYDIA TRACHOMATIS PCR   Result Value Ref Range    Specimen Description Cervix     Chlamydia Trachomatis PCR Negative NEG^Negative   Wet prep   Result Value Ref Range    Specimen Description Vagina     Wet Prep Moderate  PMNs seen       Wet Prep No Trichomonas seen     Wet Prep No yeast seen     Wet Prep No clue cells seen        ASSESSMENT/PLAN:       ICD-10-CM    1. Pelvic pain in female R10.2 NEISSERIA GONORRHOEA PCR     CHLAMYDIA TRACHOMATIS PCR     Wet prep     Mary is here today with her significant other for 6 weeks history of dyspareunia/pelvic pain. It is not getting any better and it is affecting her  relationship and sexual enjoyment. No risks for STI. Workup for GC and wet prep were negative. No history of endometriosis or ovarian cyst. The discomfort persisted despite of being well lubricated before intercourse or practicing different sexual positions. She is on Depo-Provera for birth control and has been on it for about a year. Differential diagnosis including endometriosis, ovarian cyst, infections, skeletal muscle or others. Will send for pelvic ultrasound. If it is negative and the symptom persists, will refer to the GYN for further evaluation and management option. In the meantime, encouraged patient to explore different positions (allows her to control the depth and rhythm) to see if there is one that would reduce the discomfort/pain. Also recommended to be well lubricated before intercourse. Both patient and her significant other. All of their questions were answered.      Estela Mcfadden Mai, MD  [facility]

## 2017-08-24 NOTE — MR AVS SNAPSHOT
After Visit Summary   8/24/2017    Mary Valdez    MRN: [id number]           Patient Information     Date Of Birth          1995        Visit Information        Provider Department      8/24/2017 10:40 AM Estela Gray MD [facility]        Today's Diagnoses     Pelvic pain in female    -  1       Follow-ups after your visit        Your next 10 appointments already scheduled     Sep 28, 2017 10:30 AM CDT   New Visit with Shahida Spicer MD   [facility]    [address] [place] [id number] [phone]              Who to contact     If you have questions or need follow up information about today's clinic visit or your schedule please contact [facility] directly at [phone].  Normal or non-critical lab and imaging results will be communicated to you by MyChart, letter or phone within 4 business days after the clinic has received the results. If you do not hear from us within 7 days, please contact the clinic through MyChart or phone. If you have a critical or abnormal lab result, we will notify you by phone as soon as possible.  Submit refill requests through [employer] or call your pharmacy and they will forward the refill request to us. Please allow 3 business days for your refill to be completed.          Additional Information About Your Visit        MyChart Information     [employer] gives you secure access to your electronic health record. If you see a primary care provider, you can also send messages to your care team and make appointments. If you have questions, please call your primary care clinic.  If you do not have a primary care provider, please call [phone] and they will assist you.        Care EveryWhere ID     This is your Care EveryWhere ID. This could be used by other organizations to access your [facility] medical records  [id number]        Your Vitals Were     Pulse Temperature  Respirations Pulse Oximetry Breastfeeding? BMI (Body Mass Index)    109 97.4  F (36.3  C) (Temporal) 16 100% No 27.35 kg/m2       Blood Pressure from Last 3 Encounters:   08/24/17 112/70   08/15/17 128/80   07/13/17 130/79    Weight from Last 3 Encounters:   08/24/17 185 lb 3.2 oz (84 kg) (95 %)*   08/15/17 180 lb (81.6 kg) (94 %)*   07/13/17 180 lb (81.6 kg) (95 %)*     * Growth percentiles are based on [facility] 2-20 Years data.              We Performed the Following     CHLAMYDIA TRACHOMATIS PCR     NEISSERIA GONORRHOEA PCR     Wet prep          Today's Medication Changes          These changes are accurate as of: 8/24/17  4:02 PM.  If you have any questions, ask your nurse or doctor.               Stop taking these medicines if you haven't already. Please contact your care team if you have questions.     Apremilast 10 & 20 & 30 MG Tbpk   Commonly known as:  OTEZLA   Stopped by:  Estela Gray MD                    Primary Care Provider Office Phone # Fax #    Estela Mcfadden Mai, [phone] [address] [place] [address]        Equal Access to Services     KAYY SYLVESTER AH: Celena sheao Sopawanali, waaxda luqadaha, qaybta kaalmada adeegyada, shari moncada. So [facility] [phone].    ATENCIÓN: Si habla español, tiene a garcia disposición servicios gratuitos de asistencia lingüística. Llame al [phone].    We comply with applicable federal civil rights laws and [place] laws. We do not discriminate on the basis of race, color, national origin, age, disability sex, sexual orientation or gender identity.            Thank you!     Thank you for choosing [facility]  for your care. Our goal is always to provide you with excellent care. Hearing back from our patients is one way we can continue to improve our services. Please take a few minutes to complete the written survey that you may receive in the mail after your visit with us. Thank you!              Your Updated Medication List - Protect others around you: Learn how to safely use, store and throw away your medicines at www.disposemymeds.org.          This list is accurate as of: 8/24/17  4:02 PM.  Always use your most recent med list.                   Brand Name Dispense Instructions for use Diagnosis    acarbose 25 MG tablet    PRECOSE     Take 25 mg by mouth 2 times daily (with meals)        B-12 1000 MCG Caps           magnesium 200 MG Tabs           medroxyPROGESTERone 150 MG/ML injection    DEPO-PROVERA     Inject 150 mg into the muscle every 3 months        MULTI-VITAMIN DAILY PO      Take 1 tablet by mouth daily Reported on 3/8/2017        * triamcinolone 0.5 % cream    KENALOG    30 g    Apply sparingly to affected area two times daily as needed    Psoriasis       * triamcinolone 0.1 % ointment    KENALOG    454 g    Apply to the affected area of the skin twice a day with use of a moisturizer before. CAn use for 4 weeks straight then need 1 week break    Guttate psoriasis, Psoriatic arthritis (H)       * Notice:  This list has 2 medication(s) that are the same as other medications prescribed for you. Read the directions carefully, and ask your doctor or other care provider to review them with you.

## 2017-08-25 LAB
C TRACH DNA SPEC QL NAA+PROBE: NEGATIVE
N GONORRHOEA DNA SPEC QL NAA+PROBE: NEGATIVE
SPECIMEN SOURCE: NORMAL
SPECIMEN SOURCE: NORMAL

## 2017-08-29 ENCOUNTER — TRANSFERRED RECORDS (OUTPATIENT)
Dept: HEALTH INFORMATION MANAGEMENT | Facility: CLINIC | Age: 22
End: 2017-08-29

## 2017-09-05 ENCOUNTER — TELEPHONE (OUTPATIENT)
Dept: FAMILY MEDICINE | Facility: CLINIC | Age: 22
End: 2017-09-05

## 2017-09-05 NOTE — TELEPHONE ENCOUNTER
Tried to reach patient, left message for patient to call the clinic back.  Logan Solorio, [facility]

## 2017-09-05 NOTE — TELEPHONE ENCOUNTER
----- Message from Estela Mcfadden Mai, MD sent at 9/4/2017  9:52 AM CDT -----  Please let patient know that since her workup for infection was negative, the next step to evaluate for her symptoms is a pelvic ultrasound which  was ordered. Please help her to set it up and encourage her to get it done soon. Thank you

## 2017-09-06 ENCOUNTER — FCC EXTENDED DOCUMENTATION (OUTPATIENT)
Dept: PSYCHOLOGY | Facility: CLINIC | Age: 22
End: 2017-09-06
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
## 2017-09-06 NOTE — LETTER
9/6/2017      Mary Valdez  [address] [address] [address]        Dear Mary,    Your last date of service at [facility] was 5/24/16.  Since I have not heard from you regarding your desire to continue services with me through [facility], you will be discharged from my care at this time.  In the future, should you desire to seek counseling services again through myself or other providers in the [facility], please do not hesitate to call us at [phone] and schedule an appointment.      Sincerely,    Lizbet Garcia MA, [facility]

## 2017-09-06 NOTE — TELEPHONE ENCOUNTER
Tried to reach patient, left message for patient to call the clinic back.  Logan Solorio, [facility]

## 2017-09-06 NOTE — TELEPHONE ENCOUNTER
Tried to reach patient, left message for patient to call the clinic back.  Logan Solorio, [facility]

## 2017-09-11 NOTE — TELEPHONE ENCOUNTER
Patient returned call, message per Dr Gray was relayed to patient, patient sent to radiology to schedule appt.  Thank you,  Pat Ramsey  Patient Representative

## 2017-09-22 ENCOUNTER — TRANSFERRED RECORDS (OUTPATIENT)
Dept: HEALTH INFORMATION MANAGEMENT | Facility: CLINIC | Age: 22
End: 2017-09-22

## 2017-09-25 ENCOUNTER — OFFICE VISIT (OUTPATIENT)
Dept: URGENT CARE | Facility: RETAIL CLINIC | Age: 22
End: 2017-09-25
Payer: COMMERCIAL

## 2017-09-25 VITALS
DIASTOLIC BLOOD PRESSURE: 69 MMHG | HEART RATE: 70 BPM | TEMPERATURE: 98.3 F | OXYGEN SATURATION: 97 % | SYSTOLIC BLOOD PRESSURE: 127 MMHG

## 2017-09-25 DIAGNOSIS — R23.8 SKIN IRRITATION: Primary | ICD-10-CM

## 2017-09-25 PROCEDURE — 99213 OFFICE O/P EST LOW 20 MIN: CPT | Performed by: INTERNAL MEDICINE

## 2017-09-25 RX ORDER — MUPIROCIN 20 MG/G
OINTMENT TOPICAL 3 TIMES DAILY
Qty: 22 G | Refills: 1 | Status: SHIPPED | OUTPATIENT
Start: 2017-09-25 | End: 2017-09-30

## 2017-09-25 NOTE — PROGRESS NOTES
[facility] Progress Note        Maryam Montilla MD, MPH  09/25/2017    Mary Valdez is a pleasant 21 year old female seen for a rash involving left upper back w mild pruritis.There has not been any change in the diet or new detergent, soap or toiletries. No new medications. She recalls an insect bite 3 days ago. No fever or chills and no recent illness. No cough or shortness of breath ,stidor or wheezing is referred.  No nausea, vomiting or diarrhea.  No arthralgia or myalgia.  No tongue or lip swelling or swallowing problems.    Physical Exam   /69  Pulse 70  Temp 98.3  F (36.8  C) (Oral)  SpO2 97%     Constitutional: Patient is in no distress The patient is pleasant and cooperative.   HEENT: Head:  Head is atraumatic, normocephalic.    Eyes: Pupils are equal, round and reactive to light and accomodation.  Sclera is non-icteric. No conjunctival injection, or exudate noted. Extraocular motion is intact. Visual acuity is intact bilaterally.  Ears:  External acoustic canals are patent and clear.  There is no erythema and bulging( exudate)  of the ( R/L ) tympanic membrane(s ).   Nose:  No Nasal congestion w/o drainage or mucosal ulceration is noted.  Throat:  Oral mucosa is moist.  No oral lesions are noted.  No posterior pharyngeal hyperemia or exudate noted.     Neck Supple.  There is no cervical lymphadenopathy.  No nuchal rigidity noted.  There is no meningismus.     Cardiovascular: Heart is regular to rate and rhythm.  No murmur is noted.     Chest. Chest Symmetrical, no soft tissues, swelling, or tenderness upon palpation   Lungs: Clear in the anterior and posterior pulmonary fields.   Abdomen: Soft and non-tender.    Back No flank tenderness is noted.   Extremeties No edema, no calf tenderness.   Neuro: No focal deficit.   Skin No petechiae or purpura is noted.  There is a small focal papule in the left upper back w/o any developing celluitis or abscess ( no fluctuant  tissue or mass). No lymphangitis. No indurated skin lesion. No vesicle or pustule. No crusty lesion.   Mood Normal         Assessment & Plan        Skin irritation of left upper bak at the site of insect bite: no cellulitis or abscess formation.    - mupirocin (BACTROBAN) 2 % ointment; Apply topically 3 times daily for 5 days  Dispense: 22 g; Refill: 1     Follow-up with your PCP in 2-3 days, earlier if symptoms worsen.  Tylenol 650 mg po q 6 hours prn for pain.  Advised to avoid hot baths/showers.  Advised to avoid irritating soap/detergents.  Seek medical attention if there is any respiratory stress.  Use Benedryl every 8 hours as needed for pruritis ( discussed the sedative nature of benadryl and advised patient to avoid operating equipment/machinery and caution with driving is advised ).  Advised to go to ER if there is any dyspnea or chest tightness or call 911 immediately.

## 2017-09-25 NOTE — MR AVS SNAPSHOT
After Visit Summary   9/25/2017    Mary Valdez    MRN: [id number]           Patient Information     Date Of Birth          1995        Visit Information        Provider Department      9/25/2017 11:40 AM Maryam Montilla MD [facility]        Today's Diagnoses     Skin irritation    -  1       Follow-ups after your visit        Your next 10 appointments already scheduled     Sep 28, 2017 10:30 AM CDT   New Visit with Shahida Spicer MD   [facility]    [address] [place] [id number] [phone]              Who to contact     You can reach your care team any time of the day by calling [phone].  Notification of test results:  If you have an abnormal lab result, we will notify you by phone as soon as possible.         Additional Information About Your Visit        MyChart Information     [employer] gives you secure access to your electronic health record. If you see a primary care provider, you can also send messages to your care team and make appointments. If you have questions, please call your primary care clinic.  If you do not have a primary care provider, please call [phone] and they will assist you.        Care EveryWhere ID     This is your Care EveryWhere ID. This could be used by other organizations to access your [facility] medical records  [id number]        Your Vitals Were     Pulse Temperature Pulse Oximetry             70 98.3  F (36.8  C) (Oral) 97%          Blood Pressure from Last 3 Encounters:   09/25/17 127/69   08/24/17 112/70   08/15/17 128/80    Weight from Last 3 Encounters:   08/24/17 185 lb 3.2 oz (84 kg) (95 %)*   08/15/17 180 lb (81.6 kg) (94 %)*   07/13/17 180 lb (81.6 kg) (95 %)*     * Growth percentiles are based on CDC 2-20 Years data.              Today, you had the following     No orders found for display         Today's Medication Changes          These changes  are accurate as of: 9/25/17 12:02 PM.  If you have any questions, ask your nurse or doctor.               Start taking these medicines.        Dose/Directions    mupirocin 2 % ointment   Commonly known as:  BACTROBAN   Used for:  Skin irritation   Started by:  Maryam Montilla MD        Apply topically 3 times daily for 5 days   Quantity:  22 g   Refills:  1            Where to get your medicines      These medications were sent to [address]  [address] [facility] [address]     Phone:  [phone]     mupirocin 2 % ointment                Primary Care Provider Office Phone # Fax #    Estela Bogdan Gray [phone]       [address] [place] [address]        Equal Access to Services     [facility] Hospital[facility] : Hadii bear wheat hadasho Soomaali, waaxda luqadaha, qaybta kaalmada adeegyada, shari escamilla . So [facility] [phone].    ATENCIÓN: Si habla español, tiene a garcia disposición servicios gratuitos de asistencia lingüística. Llame al [phone].    We comply with applicable federal civil rights laws and [place] laws. We do not discriminate on the basis of race, color, national origin, age, disability sex, sexual orientation or gender identity.            Thank you!     Thank you for choosing [facility]  for your care. Our goal is always to provide you with excellent care. Hearing back from our patients is one way we can continue to improve our services. Please take a few minutes to complete the written survey that you may receive in the mail after your visit with us. Thank you!             Your Updated Medication List - Protect others around you: Learn how to safely use, store and throw away your medicines at www.disposemymeds.org.          This list is accurate as of: 9/25/17 12:02 PM.  Always use your most recent med list.                   Brand Name Dispense Instructions for use Diagnosis    acarbose 25 MG  tablet    PRECOSE     Take 25 mg by mouth 2 times daily (with meals)        B-12 1000 MCG Caps           magnesium 200 MG Tabs           medroxyPROGESTERone 150 MG/ML injection    DEPO-PROVERA     Inject 150 mg into the muscle every 3 months        MULTI-VITAMIN DAILY PO      Take 1 tablet by mouth daily Reported on 3/8/2017        mupirocin 2 % ointment    BACTROBAN    22 g    Apply topically 3 times daily for 5 days    Skin irritation       * triamcinolone 0.5 % cream    KENALOG    30 g    Apply sparingly to affected area two times daily as needed    Psoriasis       * triamcinolone 0.1 % ointment    KENALOG    454 g    Apply to the affected area of the skin twice a day with use of a moisturizer before. CAn use for 4 weeks straight then need 1 week break    Guttate psoriasis, Psoriatic arthritis (H)       * Notice:  This list has 2 medication(s) that are the same as other medications prescribed for you. Read the directions carefully, and ask your doctor or other care provider to review them with you.

## 2017-09-25 NOTE — NURSING NOTE
"Chief Complaint   Patient presents with     Insect Bites     stung on the back 4 days ago       Initial /69  Pulse 70  Temp 98.3  F (36.8  C) (Oral)  SpO2 97% Estimated body mass index is 27.35 kg/(m^2) as calculated from the following:    Height as of 8/15/17: 5' 9\" (1.753 m).    Weight as of 8/24/17: 185 lb 3.2 oz (84 kg).  Medication Reconciliation: complete   Lakisha Tolentino      "

## 2017-09-26 ENCOUNTER — OFFICE VISIT (OUTPATIENT)
Dept: FAMILY MEDICINE | Facility: OTHER | Age: 22
End: 2017-09-26
Payer: COMMERCIAL

## 2017-09-26 VITALS
SYSTOLIC BLOOD PRESSURE: 120 MMHG | HEART RATE: 77 BPM | BODY MASS INDEX: 27.08 KG/M2 | OXYGEN SATURATION: 97 % | RESPIRATION RATE: 16 BRPM | WEIGHT: 182.8 LBS | HEIGHT: 69 IN | DIASTOLIC BLOOD PRESSURE: 66 MMHG | TEMPERATURE: 98.7 F

## 2017-09-26 DIAGNOSIS — R51.9 NONINTRACTABLE HEADACHE, UNSPECIFIED CHRONICITY PATTERN, UNSPECIFIED HEADACHE TYPE: ICD-10-CM

## 2017-09-26 DIAGNOSIS — Z76.89 ENCOUNTER TO ESTABLISH CARE: ICD-10-CM

## 2017-09-26 DIAGNOSIS — T63.481A ALLERGIC REACTION TO INSECT STING, ACCIDENTAL OR UNINTENTIONAL, INITIAL ENCOUNTER: ICD-10-CM

## 2017-09-26 DIAGNOSIS — L98.9 SKIN LESION OF BACK: Primary | ICD-10-CM

## 2017-09-26 DIAGNOSIS — M35.3 POLYMYALGIA (H): ICD-10-CM

## 2017-09-26 DIAGNOSIS — R06.02 SOB (SHORTNESS OF BREATH): ICD-10-CM

## 2017-09-26 DIAGNOSIS — R05.9 COUGH: ICD-10-CM

## 2017-09-26 DIAGNOSIS — S40.262A: ICD-10-CM

## 2017-09-26 PROCEDURE — 99214 OFFICE O/P EST MOD 30 MIN: CPT | Performed by: STUDENT IN AN ORGANIZED HEALTH CARE EDUCATION/TRAINING PROGRAM

## 2017-09-26 PROCEDURE — 86618 LYME DISEASE ANTIBODY: CPT | Performed by: STUDENT IN AN ORGANIZED HEALTH CARE EDUCATION/TRAINING PROGRAM

## 2017-09-26 PROCEDURE — 36415 COLL VENOUS BLD VENIPUNCTURE: CPT | Performed by: STUDENT IN AN ORGANIZED HEALTH CARE EDUCATION/TRAINING PROGRAM

## 2017-09-26 RX ORDER — METHYLPREDNISOLONE 4 MG
TABLET, DOSE PACK ORAL
Qty: 21 TABLET | Refills: 0 | Status: SHIPPED | OUTPATIENT
Start: 2017-09-26 | End: 2017-10-17

## 2017-09-26 RX ORDER — EPINEPHRINE 0.3 MG/.3ML
0.3 INJECTION SUBCUTANEOUS PRN
Qty: 0.6 ML | Refills: 1 | Status: SHIPPED | OUTPATIENT
Start: 2017-09-26 | End: 2017-09-27

## 2017-09-26 ASSESSMENT — PATIENT HEALTH QUESTIONNAIRE - PHQ9
SUM OF ALL RESPONSES TO PHQ QUESTIONS 1-9: 1
SUM OF ALL RESPONSES TO PHQ QUESTIONS 1-9: 1
10. IF YOU CHECKED OFF ANY PROBLEMS, HOW DIFFICULT HAVE THESE PROBLEMS MADE IT FOR YOU TO DO YOUR WORK, TAKE CARE OF THINGS AT HOME, OR GET ALONG WITH OTHER PEOPLE: NOT DIFFICULT AT ALL

## 2017-09-26 ASSESSMENT — PAIN SCALES - GENERAL: PAINLEVEL: MODERATE PAIN (4)

## 2017-09-26 NOTE — PROGRESS NOTES
"  SUBJECTIVE:                                                    Mary Valdez is a 21 year old female who presents to clinic today for the following health issues:      HPI  Answers for HPI/ROS submitted by the patient on 9/26/2017   If you checked off any problems, how difficult have these problems made it for you to do your work, take care of things at home, or get along with other people?: Not difficult at all  PHQ9 TOTAL SCORE: 1    Patient was stung by a bee on Friday night on upper left back. Swelling went down and \"was a tiny red dot, but it is swelling a little more and more each day\". Took benadryl and put baking soda on it. Pain around bee sting radiates up to neck, patient states it feels like someone is squeezing her. Has had diarrhea, nausea, shortness of breath, has not been feeling well since bee sting. [facility] to [facility] and was told to wash it with soap and water 3 times a day.     Has pain in left shoulder blade where bite/sting occurred radiating up through neck with sensation that \"like if I had strep throat in those muscles\"  Previous bee sting when little, no allergic reaction. No family history of bee sting allergy. No other PMH of allergies.  After bite/sting, developed coughing, difficulty breathing. She has also noted that her joints hurt, tired, nausea, diarrhea, headaches.    Benadryl took 2 tabs - helped itching   Ibuprofen for pain  Depo shot 2.5 months ago  No know tick bite. When her s/o first saw the rash it was a quarter size, red, round, swollen with patchy redness surrounding it. Denies that it looked like a target lesion.    Problem list and histories reviewed & adjusted, as indicated.  Additional history: as documented    Labs reviewed in EPIC    ROS:  Constitutional, HEENT, cardiovascular, pulmonary, gi and gu systems are negative, except as otherwise noted.      OBJECTIVE:   /66 (BP Location: Right arm, Patient Position: Sitting, Cuff Size: Adult Regular)  Pulse " "77  Temp 98.7  F (37.1  C) (Oral)  Resp 16  Ht 5' 9.02\" (1.753 m)  Wt 182 lb 12.8 oz (82.9 kg)  SpO2 97%  BMI 26.98 kg/m2  Body mass index is 26.98 kg/(m^2).  GENERAL: healthy, alert and no distress  EYES: Eyes grossly normal to inspection, PERRL and conjunctivae and sclerae normal  HENT: ear canals and TM's normal, nose and mouth without ulcers or lesions  NECK: no adenopathy, no asymmetry, masses, or scars and thyroid normal to palpation  RESP: lungs clear to auscultation - no rales, rhonchi or wheezes  CV: regular rate and rhythm, normal S1 S2, no S3 or S4, no murmur, click or rub  MS: no gross musculoskeletal defects noted, no edema  SKIN: round 1 cm erythematous lesion on left upper back with central punctate wound, no purulent drainage, no induration.  NEURO: Normal strength and tone, mentation intact and speech normal  PSYCH: mentation appears normal, affect normal/bright    Diagnostic Test Results:  Lyme's, pending    ASSESSMENT/PLAN:   1. Skin lesion of back  5. Polymyalgia (H)  5. Polymyalgia (H)  6. Cough  7. SOB (shortness of breath)  Patient presents with rash/lesion on left upper back due to bite/sting from unknown insect. Did not feel a sting from a bee but itched the area and noticed local erythema and swelling. She became shortness of breath and had a constant cough. No history of allergies. She also endorses recent onset of headache, arthralgias, nausea, diarrhea. S/o states the rash did not appear like a target but concern for other constitutional symptoms and possible deer tick bite so will check for Lyme's. We discussed that whatever the cause of the rash/lesion, the associated shortness of breath and cough are concerning for airway involvement due to allergy and to be safe I ordered an EpiPen to carry with her. She would like to see allergy for consult to determine if she truly has an allergy to bee venom. I ordered a medrol dose pack to quell the inflammatory response. Recommended taping " benadryl to her EpiPen to take if she were to be stung and have symptoms of anaphylaxis. Lyme's test pending.   - Lyme Disease Allie with reflex to WB Serum  - EPINEPHrine (EPIPEN/ADRENACLICK/OR ANY BX GENERIC EQUIV) 0.3 MG/0.3ML injection 2-pack; Inject 0.3 mLs (0.3 mg) into the muscle as needed for anaphylaxis  Dispense: 0.6 mL; Refill: 1    2. Allergic reaction to insect sting, accidental or unintentional, initial encounter  - methylPREDNISolone (MEDROL DOSEPAK) 4 MG tablet; Follow package instructions  Dispense: 21 tablet; Refill: 0  - ALLERGY/ASTHMA ADULT REFERRAL    3. Nonintractable headache, unspecified chronicity pattern, unspecified headache type    4. Insect bite (nonvenomous) of left shoulder, initial encounter (CODE)  - methylPREDNISolone (MEDROL DOSEPAK) 4 MG tablet; Follow package instructions  Dispense: 21 tablet; Refill: 0  - ALLERGY/ASTHMA ADULT REFERRAL    8. Encounter to establish care  Patient requested to establish care.    Greater than 50% of 35 minute visit were spent on counseling or coordination of care regarding skin lesion, arthralgias, allergic reaction, headaches, cough, sob.     MERVIN Solorzano [facility]

## 2017-09-26 NOTE — PATIENT INSTRUCTIONS
Oral steroid - medrol dose pack    Lyme's test    Allergist    EpiPen sent to pharmacy.  Also take Benadryl 50 mg if bitten again and having similar symptoms.     BING LandaC

## 2017-09-26 NOTE — NURSING NOTE
"Chief Complaint   Patient presents with     Insect Bites       Initial /66 (BP Location: Right arm, Patient Position: Sitting, Cuff Size: Adult Regular)  Pulse 77  Temp 98.7  F (37.1  C) (Oral)  Resp 16  Ht 5' 9.02\" (1.753 m)  Wt 182 lb 12.8 oz (82.9 kg)  SpO2 97%  BMI 26.98 kg/m2 Estimated body mass index is 26.98 kg/(m^2) as calculated from the following:    Height as of this encounter: 5' 9.02\" (1.753 m).    Weight as of this encounter: 182 lb 12.8 oz (82.9 kg).  Medication Reconciliation: complete   Khadijah Weinstein CMA      "

## 2017-09-26 NOTE — MR AVS SNAPSHOT
After Visit Summary   9/26/2017    Mary Valdez    MRN: [id number]           Patient Information     Date Of Birth          1995        Visit Information        Provider Department      9/26/2017 10:40 AM Charisse Alexander APRN CNP [facility]        Today's Diagnoses     Lyme disease    -  1    Allergic reaction, initial encounter        Insect bite (nonvenomous) of left shoulder, initial encounter (CODE)        Anaphylaxis, initial encounter          Care Instructions    Oral steroid - medrol dose pack    Lyme's test    Allergist    EpiPen sent to pharmacy.  Also take Benadryl 50 mg if bitten again and having similar symptoms.     Charisse Alexander, NP-C            Follow-ups after your visit        Additional Services     ALLERGY/ASTHMA ADULT REFERRAL       Your provider has referred you to: [facility]: [facility] [phone] [email or web address]    Please be aware that coverage of these services is subject to the terms and limitations of your health insurance plan.  Call member services at your health plan with any benefit or coverage questions.      Please bring the following with you to your appointment:    (1) Any X-Rays, CTs or MRIs which have been performed.  Contact the facility where they were done to arrange for  prior to your scheduled appointment.    (2) List of current medications  (3) This referral request   (4) Any documents/labs given to you for this referral                  Your next 10 appointments already scheduled     Sep 28, 2017 10:30 AM CDT   New Visit with Shahida Spicer MD   [facility] ([facility]    [id number] [place] [address] [phone]              Who to contact     If you have questions or need follow up information about today's clinic visit or your schedule please contact [facility] directly at  "[phone].  Normal or non-critical lab and imaging results will be communicated to you by [employer], letter or phone within 4 business days after the clinic has received the results. If you do not hear from us within 7 days, please contact the clinic through [employer] or phone. If you have a critical or abnormal lab result, we will notify you by phone as soon as possible.  Submit refill requests through [employer] or call your pharmacy and they will forward the refill request to us. Please allow 3 business days for your refill to be completed.          Additional Information About Your Visit        [employer] Information     [employer] gives you secure access to your electronic health record. If you see a primary care provider, you can also send messages to your care team and make appointments. If you have questions, please call your primary care clinic.  If you do not have a primary care provider, please call [phone] and they will assist you.        Care EveryWhere ID     This is your Care EveryWhere ID. This could be used by other organizations to access your [facility] medical records  [id number]        Your Vitals Were     Pulse Temperature Respirations Height Pulse Oximetry BMI (Body Mass Index)    77 98.7  F (37.1  C) (Oral) 16 5' 9.02\" (1.753 m) 97% 26.98 kg/m2       Blood Pressure from Last 3 Encounters:   09/26/17 120/66   09/25/17 127/69   08/24/17 112/70    Weight from Last 3 Encounters:   09/26/17 182 lb 12.8 oz (82.9 kg) (95 %)*   08/24/17 185 lb 3.2 oz (84 kg) (95 %)*   08/15/17 180 lb (81.6 kg) (94 %)*     * Growth percentiles are based on CDC 2-20 Years data.              We Performed the Following     ALLERGY/ASTHMA ADULT REFERRAL     Lyme Disease Allie with reflex to WB Serum          Today's Medication Changes          These changes are accurate as of: 9/26/17 11:41 AM.  If you have any questions, ask your nurse or doctor.               Start taking these medicines.        Dose/Directions    EPINEPHrine " 0.3 MG/0.3ML injection 2-pack   Commonly known as:  EPIPEN 2-REJI   Used for:  Anaphylaxis, initial encounter   Started by:  Charisse Alexander APRN CNP        Dose:  0.3 mg   Inject 0.3 mLs (0.3 mg) into the muscle as needed for anaphylaxis   Quantity:  0.6 mL   Refills:  1       methylPREDNISolone 4 MG tablet   Commonly known as:  MEDROL DOSEPAK   Used for:  Allergic reaction, initial encounter, Insect bite (nonvenomous) of left shoulder, initial encounter (CODE)   Started by:  Charisse Alexander APRN CNP        Follow package instructions   Quantity:  21 tablet   Refills:  0            Where to get your medicines      These medications were sent to [facility] [address] [place] [address] [address]  [address] [facility] [address]     Phone:  [phone]     EPINEPHrine 0.3 MG/0.3ML injection 2-pack    methylPREDNISolone 4 MG tablet                Primary Care Provider Office Phone # Fax #    Estela Mcfadden Mai, [phone]       [facility]   [facility] [place] [address]        Equal Access to Services     [facility] AH: Hadii bear ku hadasho Soomaali, waaxda luqadaha, qaybta kaalmada adeegyada, shari escamilla . So [facility] [phone].    ATENCIÓN: Si habla español, tiene a garcia disposición servicios gratuitos de asistencia lingüística. [facility] [phone].    We comply with applicable federal civil rights laws and [place] laws. We do not discriminate on the basis of race, color, national origin, age, disability sex, sexual orientation or gender identity.            Thank you!     Thank you for choosing [facility]  for your care. Our goal is always to provide you with excellent care. Hearing back from our patients is one way we can continue to improve our services. Please take a few minutes to complete the written survey that you may receive in the mail after your visit with us. Thank you!             Your Updated Medication List - Protect  others around you: Learn how to safely use, store and throw away your medicines at www.disposemymeds.org.          This list is accurate as of: 9/26/17 11:41 AM.  Always use your most recent med list.                   Brand Name Dispense Instructions for use Diagnosis    acarbose 25 MG tablet    PRECOSE     Take 25 mg by mouth 2 times daily (with meals)        B-12 1000 MCG Caps           EPINEPHrine 0.3 MG/0.3ML injection 2-pack    EPIPEN 2-REJI    0.6 mL    Inject 0.3 mLs (0.3 mg) into the muscle as needed for anaphylaxis    Anaphylaxis, initial encounter       magnesium 200 MG Tabs           medroxyPROGESTERone 150 MG/ML injection    DEPO-PROVERA     Inject 150 mg into the muscle every 3 months        methylPREDNISolone 4 MG tablet    MEDROL DOSEPAK    21 tablet    Follow package instructions    Allergic reaction, initial encounter, Insect bite (nonvenomous) of left shoulder, initial encounter (CODE)       MULTI-VITAMIN DAILY PO      Take 1 tablet by mouth daily Reported on 3/8/2017        mupirocin 2 % ointment    BACTROBAN    22 g    Apply topically 3 times daily for 5 days    Skin irritation       * triamcinolone 0.5 % cream    KENALOG    30 g    Apply sparingly to affected area two times daily as needed    Psoriasis       * triamcinolone 0.1 % ointment    KENALOG    454 g    Apply to the affected area of the skin twice a day with use of a moisturizer before. CAn use for 4 weeks straight then need 1 week break    Guttate psoriasis, Psoriatic arthritis (H)       * Notice:  This list has 2 medication(s) that are the same as other medications prescribed for you. Read the directions carefully, and ask your doctor or other care provider to review them with you.

## 2017-09-27 PROBLEM — T63.481A: Status: ACTIVE | Noted: 2017-09-27

## 2017-09-27 LAB — B BURGDOR IGG+IGM SER QL: 0.09 (ref 0–0.89)

## 2017-09-27 RX ORDER — EPINEPHRINE 0.3 MG/.3ML
0.3 INJECTION SUBCUTANEOUS PRN
Qty: 0.6 ML | Refills: 1
Start: 2017-09-27 | End: 2019-08-21

## 2017-09-27 RX ORDER — EPINEPHRINE 0.15 MG/.3ML
0.15 INJECTION INTRAMUSCULAR PRN
Qty: 0.6 ML | Refills: 1
Start: 2017-09-27 | End: 2017-09-27

## 2017-09-27 ASSESSMENT — PATIENT HEALTH QUESTIONNAIRE - PHQ9: SUM OF ALL RESPONSES TO PHQ QUESTIONS 1-9: 1

## 2017-10-17 ENCOUNTER — HOSPITAL ENCOUNTER (EMERGENCY)
Facility: CLINIC | Age: 22
Discharge: HOME | End: 2017-10-17
Attending: PHYSICIAN ASSISTANT | Admitting: PHYSICIAN ASSISTANT
Payer: COMMERCIAL

## 2017-10-17 VITALS
SYSTOLIC BLOOD PRESSURE: 126 MMHG | BODY MASS INDEX: 27.4 KG/M2 | DIASTOLIC BLOOD PRESSURE: 79 MMHG | WEIGHT: 185 LBS | TEMPERATURE: 98.3 F | RESPIRATION RATE: 20 BRPM | OXYGEN SATURATION: 99 % | HEIGHT: 69 IN

## 2017-10-17 DIAGNOSIS — I49.3 VENTRICULAR PREMATURE BEATS: ICD-10-CM

## 2017-10-17 DIAGNOSIS — I49.3 PVC'S (PREMATURE VENTRICULAR CONTRACTIONS): ICD-10-CM

## 2017-10-17 DIAGNOSIS — R00.2 PALPITATIONS: ICD-10-CM

## 2017-10-17 LAB
ANION GAP SERPL CALCULATED.3IONS-SCNC: 10 MMOL/L (ref 3–14)
BASOPHILS # BLD AUTO: 0 10E9/L (ref 0–0.2)
BASOPHILS NFR BLD AUTO: 0.5 %
BUN SERPL-MCNC: 11 MG/DL (ref 7–30)
CALCIUM SERPL-MCNC: 8.6 MG/DL (ref 8.5–10.1)
CHLORIDE SERPL-SCNC: 111 MMOL/L (ref 96–110)
CO2 SERPL-SCNC: 23 MMOL/L (ref 20–32)
CREAT SERPL-MCNC: 0.8 MG/DL (ref 0.5–1)
DIFFERENTIAL METHOD BLD: NORMAL
EOSINOPHIL # BLD AUTO: 0.1 10E9/L (ref 0–0.7)
EOSINOPHIL NFR BLD AUTO: 1.6 %
ERYTHROCYTE [DISTWIDTH] IN BLOOD BY AUTOMATED COUNT: 12.4 % (ref 10–15)
GFR SERPL CREATININE-BSD FRML MDRD: >90 ML/MIN/1.7M2
GLUCOSE SERPL-MCNC: 79 MG/DL (ref 70–99)
HCT VFR BLD AUTO: 38.1 % (ref 35–47)
HGB BLD-MCNC: 12.3 G/DL (ref 11.7–15.7)
IMM GRANULOCYTES # BLD: 0 10E9/L (ref 0–0.4)
IMM GRANULOCYTES NFR BLD: 0.1 %
LYMPHOCYTES # BLD AUTO: 3.2 10E9/L (ref 0.8–5.3)
LYMPHOCYTES NFR BLD AUTO: 36.6 %
MAGNESIUM SERPL-MCNC: 2.3 MG/DL (ref 1.6–2.3)
MCH RBC QN AUTO: 28.5 PG (ref 26.5–33)
MCHC RBC AUTO-ENTMCNC: 32.3 G/DL (ref 31.5–36.5)
MCV RBC AUTO: 88 FL (ref 78–100)
MONOCYTES # BLD AUTO: 0.6 10E9/L (ref 0–1.3)
MONOCYTES NFR BLD AUTO: 6.8 %
NEUTROPHILS # BLD AUTO: 4.8 10E9/L (ref 1.6–8.3)
NEUTROPHILS NFR BLD AUTO: 54.4 %
PLATELET # BLD AUTO: 229 10E9/L (ref 150–450)
POTASSIUM SERPL-SCNC: 3.8 MMOL/L (ref 3.4–5.3)
RBC # BLD AUTO: 4.32 10E12/L (ref 3.8–5.2)
SODIUM SERPL-SCNC: 144 MMOL/L (ref 133–144)
TROPONIN I SERPL-MCNC: <0.015 UG/L (ref 0–0.04)
TSH SERPL DL<=0.005 MIU/L-ACNC: 1.34 MU/L (ref 0.4–4)
WBC # BLD AUTO: 8.8 10E9/L (ref 4–11)

## 2017-10-17 PROCEDURE — 85025 COMPLETE CBC W/AUTO DIFF WBC: CPT | Performed by: PHYSICIAN ASSISTANT

## 2017-10-17 PROCEDURE — 93010 ELECTROCARDIOGRAM REPORT: CPT | Mod: Z6 | Performed by: PHYSICIAN ASSISTANT

## 2017-10-17 PROCEDURE — 99284 EMERGENCY DEPT VISIT MOD MDM: CPT | Performed by: PHYSICIAN ASSISTANT

## 2017-10-17 PROCEDURE — 84484 ASSAY OF TROPONIN QUANT: CPT | Performed by: PHYSICIAN ASSISTANT

## 2017-10-17 PROCEDURE — 84443 ASSAY THYROID STIM HORMONE: CPT | Performed by: PHYSICIAN ASSISTANT

## 2017-10-17 PROCEDURE — 99284 EMERGENCY DEPT VISIT MOD MDM: CPT | Mod: 25 | Performed by: PHYSICIAN ASSISTANT

## 2017-10-17 PROCEDURE — 93005 ELECTROCARDIOGRAM TRACING: CPT | Performed by: PHYSICIAN ASSISTANT

## 2017-10-17 PROCEDURE — 80048 BASIC METABOLIC PNL TOTAL CA: CPT | Performed by: PHYSICIAN ASSISTANT

## 2017-10-17 PROCEDURE — 83735 ASSAY OF MAGNESIUM: CPT | Performed by: PHYSICIAN ASSISTANT

## 2017-10-17 ASSESSMENT — ENCOUNTER SYMPTOMS: SHORTNESS OF BREATH: 1

## 2017-10-17 NOTE — ED AVS SNAPSHOT
[facility]    [address] [place] [address]    Phone:  [phone]    Fax:  [phone]                                       Mary Valdez   MRN: [id number]    Department:  [facility]   Date of Visit:  10/17/2017           After Visit Summary Signature Page     I have received my discharge instructions, and my questions have been answered. I have discussed any challenges I see with this plan with the nurse or doctor.    ..........................................................................................................................................  Patient/Patient Representative Signature      ..........................................................................................................................................  Patient Representative Print Name and Relationship to Patient    ..................................................               ................................................  Date                                            Time    ..........................................................................................................................................  Reviewed by Signature/Title    ...................................................              ..............................................  Date                                                            Time

## 2017-10-17 NOTE — ED AVS SNAPSHOT
[facility]    [address] [place] [address]    Phone:  [phone]    Fax:  [phone]                                       Mary Valdez   MRN: [id number]    Department:  [facility] Department   Date of Visit:  10/17/2017           Patient Information     Date Of Birth          1995        Your diagnoses for this visit were:     Palpitations     PVC's (premature ventricular contractions)        You were seen by Ruiz Negrete PA-C.      Follow-up Information     Follow up with Estela Gray MD. Schedule an appointment as soon as possible for a visit in 1 week.    Specialty:  Family Practice    Why:  For palpitation recheck    Contact information:    [address] [place] [id number] [phone]          Discharge Instructions       Thankfully all of your testing came out okay today.  You are having PVC's ( premature ventricular contractions) that are causing your symptoms.  PVC's are not worrisome, but they can be bothersome.     1.  Please avoid caffeine products.  2.  Please work on stress relief if possible.    3.  Please see Dr. Gray for a recheck appointment next week.            Discharge References/Attachments     PREMATURE VENTRICULAR CONTRACTIONS (PVCS), TREATMENT FOR  (ENGLISH)    PREMATURE VENTRICULAR CONTRACTIONS (PVCS), UNDERSTANDING  (ENGLISH)      24 Hour Appointment Hotline       To make an appointment at any [facility], call [phone] ([phone]). If you don't have a family doctor or clinic, we will help you find one. [place] clinics are conveniently located to serve the needs of you and your family.             Review of your medicines      Our records show that you are taking the medicines listed below. If these are incorrect, please call your family doctor or clinic.        Dose / Directions Last dose taken    B-12 1000 MCG Caps        Refills:  0        EPINEPHrine 0.3 MG/0.3ML injection  2-pack   Commonly known as:  EPIPEN/ADRENACLICK/or ANY BX GENERIC EQUIV   Dose:  0.3 mg   Quantity:  0.6 mL        Inject 0.3 mLs (0.3 mg) into the muscle as needed for anaphylaxis   Refills:  1        magnesium 200 MG Tabs        Refills:  0        medroxyPROGESTERone 150 MG/ML injection   Commonly known as:  DEPO-PROVERA   Dose:  150 mg        Inject 150 mg into the muscle every 3 months   Refills:  0        MULTI-VITAMIN DAILY PO   Dose:  1 tablet        Take 1 tablet by mouth daily Reported on 3/8/2017   Refills:  0        * triamcinolone 0.5 % cream   Commonly known as:  KENALOG   Quantity:  30 g        Apply sparingly to affected area two times daily as needed   Refills:  0        * triamcinolone 0.1 % ointment   Commonly known as:  KENALOG   Quantity:  454 g        Apply to the affected area of the skin twice a day with use of a moisturizer before. CAn use for 4 weeks straight then need 1 week break   Refills:  3        * Notice:  This list has 2 medication(s) that are the same as other medications prescribed for you. Read the directions carefully, and ask your doctor or other care provider to review them with you.            Procedures and tests performed during your visit     Basic metabolic panel    CBC with platelets differential    EKG 12-lead, tracing only    Magnesium    Peripheral IV: Standard    TSH with free T4 reflex    Troponin I      Orders Needing Specimen Collection     None      Pending Results     No orders found from 10/15/2017 to 10/18/2017.            Pending Culture Results     No orders found from 10/15/2017 to 10/18/2017.            Pending Results Instructions     If you had any lab results that were not finalized at the time of your Discharge, you can call the ED Lab Result RN at [phone]. You will be contacted by this team for any positive Lab results or changes in treatment. The nurses are available 7 days a week from 10A to 6:30P.  You can leave a message 24 hours per day and  they will return your call.        Thank you for choosing [facility]       Thank you for choosing [facility] for your care. Our goal is always to provide you with excellent care. Hearing back from our patients is one way we can continue to improve our services. Please take a few minutes to complete the written survey that you may receive in the mail after you visit with us. Thank you!        [employer] Information     [employer] gives you secure access to your electronic health record. If you see a primary care provider, you can also send messages to your care team and make appointments. If you have questions, please call your primary care clinic.  If you do not have a primary care provider, please call [phone] and they will assist you.        Care EveryWhere ID     This is your Care EveryWhere ID. This could be used by other organizations to access your [facility] medical records  [id number]        Equal Access to Services     KAYY SYLVESTER : Celena Sharp, matthew carr, ritesh roe, shari moncada. So [facility] [phone].    ATENCIÓN: Si habla español, tiene a garcia disposición servicios gratuitos de asistencia lingüística. Neva al [phone].    We comply with applicable federal civil rights laws and [place] laws. We do not discriminate on the basis of race, color, national origin, age, disability, sex, sexual orientation, or gender identity.            After Visit Summary       This is your record. Keep this with you and show to your community pharmacist(s) and doctor(s) at your next visit.

## 2017-10-18 ENCOUNTER — TELEPHONE (OUTPATIENT)
Dept: FAMILY MEDICINE | Facility: OTHER | Age: 22
End: 2017-10-18

## 2017-10-18 NOTE — TELEPHONE ENCOUNTER
You placed a referral for patient to Allergy & Asthma on 9/26/17.  Patient has not scheduled as of yet.      Please review and forward to team if follow up with the patient is needed.     Thank you!  Jeaneth/Clinic Referrals Dyad II

## 2017-10-18 NOTE — ED PROVIDER NOTES
History     Chief Complaint   Patient presents with     Tachycardia     The history is provided by the patient.     Mary Valdez is a 21 year old female who presents to the emergency department with concerns of increased palpitations. Patient states that she has had palpitations for her whole life and she has been able to feel when her heart is beating irregularly. She states that this will happen for 1-2 hours at most, but she has been experiencing this for the past 2 days. She explains that it feels like her heart can't find a rhythm to beat to. At times it is harder for her to breath. She also reports that at times she has seen her heart rate in between 180-200 beats per minute from a heart monitor on her phone, but is unable to manually count due it being too fast. Patient has been stressed out about moving in November. She denies consumption of alcohol and caffeine as well as the use of street drugs.      I have reviewed the Medications, Allergies, Past Medical and Surgical History, and Social History in the Epic system.    Allergies:   Allergies   Allergen Reactions     No Known Drug Allergies          No current facility-administered medications on file prior to encounter.   Current Outpatient Prescriptions on File Prior to Encounter:  EPINEPHrine (EPIPEN/ADRENACLICK/OR ANY BX GENERIC EQUIV) 0.3 MG/0.3ML injection 2-pack Inject 0.3 mLs (0.3 mg) into the muscle as needed for anaphylaxis   triamcinolone (KENALOG) 0.1 % ointment Apply to the affected area of the skin twice a day with use of a moisturizer before. CAn use for 4 weeks straight then need 1 week break   triamcinolone (KENALOG) 0.5 % cream Apply sparingly to affected area two times daily as needed   magnesium 200 MG TABS    Cyanocobalamin (B-12) 1000 MCG CAPS    medroxyPROGESTERone (DEPO-PROVERA) 150 MG/ML injection Inject 150 mg into the muscle every 3 months   Multiple Vitamin (MULTI-VITAMIN DAILY PO) Take 1 tablet by mouth daily Reported on  "3/8/2017       Patient Active Problem List   Diagnosis     Psoriasis     Migratory pain     Anxiety state     Major depression in complete remission (H)     NENO (generalized anxiety disorder)     PTSD (post-traumatic stress disorder)     Maltracking of right patella     Segmental dysfunction of thoracic region     Segmental dysfunction of lumbar region     Segmental dysfunction of sacral region     Flank pain     Sprain of lateral collateral ligament of right knee, initial encounter     Hypoglycemia, unspecified     Allergic reaction to insect sting, accidental or unintentional, initial encounter       Past Surgical History:   Procedure Laterality Date     ADENOIDECTOMY       ARTHROSCOPY KNEE WITH RETINACULAR RELEASE Right 1/20/2017    Procedure: ARTHROSCOPY KNEE WITH RETINACULAR RELEASE MEDIAL OR LATERAL;  Surgeon: Yevgeniy Graf DO;  Location: [facility]     C NONSPECIFIC PROCEDURE  02/12/01    adenoidectomy     DENTAL SURGERY       MOUTH SURGERY         Social History   Substance Use Topics     Smoking status: Never Smoker     Smokeless tobacco: Never Used     Alcohol use No       Most Recent Immunizations   Administered Date(s) Administered     DPT 11/17/1998     DTAP (<7y) 03/24/2003     HIB 11/17/1998     HepB 09/30/1998     Influenza (IIV3) 10/26/2012     MMR 03/24/2003     Mantoux 03/04/2013     Meningococcal (Menactra ) 07/23/2010     OPV, trivalent, live 05/13/1998     Poliovirus, inactivated (IPV) 03/24/2003     TDAP Vaccine (Boostrix) 07/23/2010     Varicella 12/30/2011     Varicella Pt Report Hx of Varicella/Chicken Pox 01/01/1999       BMI: Estimated body mass index is 27.32 kg/(m^2) as calculated from the following:    Height as of this encounter: 1.753 m (5' 9\").    Weight as of this encounter: 83.9 kg (185 lb).      Review of Systems   Respiratory: Positive for shortness of breath.    Cardiovascular: Negative for leg swelling.   All other systems reviewed and are negative.      Physical Exam " "  BP: 143/83  Heart Rate: 75  Temp: 98.3  F (36.8  C)  Resp: 20  Height: 175.3 cm (5' 9\")  Weight: 83.9 kg (185 lb)  SpO2: 99 %       Physical Exam   Constitutional: She is oriented to person, place, and time. She appears well-developed and well-nourished.   HENT:   Head: Normocephalic and atraumatic.   Right Ear: External ear normal.   Left Ear: External ear normal.   Nose: Nose normal.   Mouth/Throat: Oropharynx is clear and moist. No oropharyngeal exudate.   Eyes: Conjunctivae and EOM are normal. Pupils are equal, round, and reactive to light. Right eye exhibits no discharge. Left eye exhibits no discharge. No scleral icterus.   Neck: Normal range of motion. Neck supple. No thyromegaly present.   Cardiovascular: Normal rate, regular rhythm and normal heart sounds.  Exam reveals no gallop and no friction rub.    No murmur heard.  Pulmonary/Chest: Effort normal and breath sounds normal. No respiratory distress. She has no wheezes. She has no rales. She exhibits no tenderness.   Abdominal: Soft. Bowel sounds are normal. She exhibits no distension and no mass. There is no tenderness. There is no rebound and no guarding.   Musculoskeletal: Normal range of motion. She exhibits no edema or tenderness.   Negative Juan's sign.   Lymphadenopathy:     She has no cervical adenopathy.   Neurological: She is alert and oriented to person, place, and time.   Skin: Skin is warm and dry. No rash noted. No erythema. No pallor.   Psychiatric: She has a normal mood and affect. Her behavior is normal.       ED Course     ED Course     Procedures               EKG Interpretation:      Interpreted by Ruiz Negrtee  Time reviewed: immediately after it was performed.  Symptoms at time of EKG: none   Rhythm: normal sinus   Rate: normal  Axis: normal  Ectopy: none  Conduction: normal  ST Segments/ T Waves: No ST-T wave changes  Q Waves: none  Comparison to prior: No old EKG available    Clinical Impression: normal " EKG            Critical Care time:  none              Results for orders placed or performed during the hospital encounter of 10/17/17   CBC with platelets differential   Result Value Ref Range    WBC 8.8 4.0 - 11.0 10e9/L    RBC Count 4.32 3.8 - 5.2 10e12/L    Hemoglobin 12.3 11.7 - 15.7 g/dL    Hematocrit 38.1 35.0 - 47.0 %    MCV 88 78 - 100 fl    MCH 28.5 26.5 - 33.0 pg    MCHC 32.3 31.5 - 36.5 g/dL    RDW 12.4 10.0 - 15.0 %    Platelet Count 229 150 - 450 10e9/L    Diff Method Automated Method     % Neutrophils 54.4 %    % Lymphocytes 36.6 %    % Monocytes 6.8 %    % Eosinophils 1.6 %    % Basophils 0.5 %    % Immature Granulocytes 0.1 %    Absolute Neutrophil 4.8 1.6 - 8.3 10e9/L    Absolute Lymphocytes 3.2 0.8 - 5.3 10e9/L    Absolute Monocytes 0.6 0.0 - 1.3 10e9/L    Absolute Eosinophils 0.1 0.0 - 0.7 10e9/L    Absolute Basophils 0.0 0.0 - 0.2 10e9/L    Abs Immature Granulocytes 0.0 0 - 0.4 10e9/L   Basic metabolic panel   Result Value Ref Range    Sodium 144 133 - 144 mmol/L    Potassium 3.8 3.4 - 5.3 mmol/L    Chloride 111 (H) 96 - 110 mmol/L    Carbon Dioxide 23 20 - 32 mmol/L    Anion Gap 10 3 - 14 mmol/L    Glucose 79 70 - 99 mg/dL    Urea Nitrogen 11 7 - 30 mg/dL    Creatinine 0.80 0.50 - 1.00 mg/dL    GFR Estimate >90 >60 mL/min/1.7m2    GFR Estimate If Black >90 >60 mL/min/1.7m2    Calcium 8.6 8.5 - 10.1 mg/dL   Troponin I   Result Value Ref Range    Troponin I ES <0.015 0.000 - 0.045 ug/L   TSH with free T4 reflex   Result Value Ref Range    TSH 1.34 0.40 - 4.00 mU/L   Magnesium   Result Value Ref Range    Magnesium 2.3 1.6 - 2.3 mg/dL        Results for orders placed or performed during the hospital encounter of 10/17/17 (from the past 24 hour(s))   CBC with platelets differential   Result Value Ref Range    WBC 8.8 4.0 - 11.0 10e9/L    RBC Count 4.32 3.8 - 5.2 10e12/L    Hemoglobin 12.3 11.7 - 15.7 g/dL    Hematocrit 38.1 35.0 - 47.0 %    MCV 88 78 - 100 fl    MCH 28.5 26.5 - 33.0 pg    MCHC 32.3  "31.5 - 36.5 g/dL    RDW 12.4 10.0 - 15.0 %    Platelet Count 229 150 - 450 10e9/L    Diff Method Automated Method     % Neutrophils 54.4 %    % Lymphocytes 36.6 %    % Monocytes 6.8 %    % Eosinophils 1.6 %    % Basophils 0.5 %    % Immature Granulocytes 0.1 %    Absolute Neutrophil 4.8 1.6 - 8.3 10e9/L    Absolute Lymphocytes 3.2 0.8 - 5.3 10e9/L    Absolute Monocytes 0.6 0.0 - 1.3 10e9/L    Absolute Eosinophils 0.1 0.0 - 0.7 10e9/L    Absolute Basophils 0.0 0.0 - 0.2 10e9/L    Abs Immature Granulocytes 0.0 0 - 0.4 10e9/L   Basic metabolic panel   Result Value Ref Range    Sodium 144 133 - 144 mmol/L    Potassium 3.8 3.4 - 5.3 mmol/L    Chloride 111 (H) 96 - 110 mmol/L    Carbon Dioxide 23 20 - 32 mmol/L    Anion Gap 10 3 - 14 mmol/L    Glucose 79 70 - 99 mg/dL    Urea Nitrogen 11 7 - 30 mg/dL    Creatinine 0.80 0.50 - 1.00 mg/dL    GFR Estimate >90 >60 mL/min/1.7m2    GFR Estimate If Black >90 >60 mL/min/1.7m2    Calcium 8.6 8.5 - 10.1 mg/dL   Troponin I   Result Value Ref Range    Troponin I ES <0.015 0.000 - 0.045 ug/L   TSH with free T4 reflex   Result Value Ref Range    TSH 1.34 0.40 - 4.00 mU/L   Magnesium   Result Value Ref Range    Magnesium 2.3 1.6 - 2.3 mg/dL       Medications - No data to display      Assessments & Plan (with Medical Decision Making)     Palpitations  PVC's (premature ventricular contractions)     21 year old female resents for evaluation of palpitations. Symptoms have worsened recently. She'll have episodes of a \"strong beat \"followed by occasional dyspnea that resolves with deep breathing and relaxation. It is become more frequent, therefore it bothers her. She states that her phone measures a heart rate of over 180 at times when the symptoms occur. These symptoms do not persist. The heart rate does not persist. She has tried Valsalva maneuver, but states that it does not necessarily help. She does not have any current chest pain. On exam her vital signs are normal with a blood " pressure  126/79, pulse 68, temperature 98.3, and oxygen saturations of 99%.  Cardiopulmonary exam normal as well as other normal exam components per above. While I was discussing her issue with her she had isolated PVCs on the monitor. She confirmed that she felt her above-noted symptoms with the isolated PVC. I was able to identify 10 different PVCs during a 10 minute conversation, and she felt her symptom every time this occurred. This appears to be the issue that is bothering her.  Laboratory evaluation did not show any etiology for her symptoms. They could be stress or caffeine induced. We discussed decreasing both of these if possible. I encouraged outpatient follow-up with her PCP in a week. If symptoms not improving, further evaluation with Holter monitor could be performed and/or consideration for low-dose beta blocker treatment. Patient does not have any symptoms, exam findings, or laboratory/EKG findings suggestive of underlying cardiac ischemia currently. I had a long conversation with the patient regarding this issue. Patient information handout was provided in the discharge instructions. She was in agreement with this plan and was suitable for discharge.      I have reviewed the nursing notes.    I have reviewed the findings, diagnosis, plan and need for follow up with the patient.       Discharge Medication List as of 10/17/2017  9:22 PM          Final diagnoses:   Palpitations   PVC's (premature ventricular contractions)     This document serves as a record of services personally performed by Ruiz Negrete PA-C. It was created on their behalf by Mary Cherry, a trained medical scribe. The creation of this record is based on the provider's personal observations and the statements of the patient. This document has been checked and approved by the attending provider.  Note: Chart documentation done in part with Dragon Voice Recognition software. Although reviewed after completion, some word and  grammatical errors may remain.  10/17/2017   Ruiz Negrete PA-C   [facility] EMERGENCY DEPARTMENT     Ruiz Negrete PA-C  10/18/17 0019

## 2017-10-18 NOTE — LETTER
[facility]  [id number] [place] [id number] [phone]        October 18, 2017    Mary Valdez  [address] [address] [facility] [place] [address]              Dear Mary Valdez    We recently noticed that your provider had referred you to Allergy and Asthma and you haven't scheduled yet. Please call [phone] to schedule your appointment. If you have questions, concerns or no longer need your appointment please call [phone].          Sincerely,        Charisse Crockett CNP

## 2017-10-18 NOTE — DISCHARGE INSTRUCTIONS
Thankfully all of your testing came out okay today.  You are having PVC's ( premature ventricular contractions) that are causing your symptoms.  PVC's are not worrisome, but they can be bothersome.     1.  Please avoid caffeine products.  2.  Please work on stress relief if possible.    3.  Please see Dr. Gray for a recheck appointment next week.

## 2017-10-18 NOTE — ED NOTES
Started two days ago with palpitations and they haven't gone away , noted some shortness of breath at times.

## 2017-10-20 ENCOUNTER — ALLIED HEALTH/NURSE VISIT (OUTPATIENT)
Dept: FAMILY MEDICINE | Facility: CLINIC | Age: 22
End: 2017-10-20
Payer: COMMERCIAL

## 2017-10-20 VITALS — DIASTOLIC BLOOD PRESSURE: 70 MMHG | SYSTOLIC BLOOD PRESSURE: 124 MMHG

## 2017-10-20 LAB — BETA HCG QUAL IFA URINE: NEGATIVE

## 2017-10-20 PROCEDURE — 84703 CHORIONIC GONADOTROPIN ASSAY: CPT | Performed by: FAMILY MEDICINE

## 2017-10-20 PROCEDURE — 96372 THER/PROPH/DIAG INJ SC/IM: CPT

## 2017-10-20 NOTE — MR AVS SNAPSHOT
After Visit Summary   10/20/2017    Mary Valdez    MRN: [id number]           Patient Information     Date Of Birth          1995        Visit Information        Provider Department      10/20/2017 2:15 PM NL FLOAT TEAM D [facility]        Today's Diagnoses     Contraception    -  1       Follow-ups after your visit        Your next 10 appointments already scheduled     Oct 25, 2017 10:40 AM CDT   Office Visit with MERVIN Guevara CNP   [facility] ([facility]    [address] [address] [facility] [address] [phone]           Bring a current list of meds and any records pertaining to this visit. For Physicals, please bring immunization records and any forms needing to be filled out. Please arrive 10 minutes early to complete paperwork.              Who to contact     If you have questions or need follow up information about today's clinic visit or your schedule please contact [facility] directly at [phone].  Normal or non-critical lab and imaging results will be communicated to you by [employer], letter or phone within 4 business days after the clinic has received the results. If you do not hear from us within 7 days, please contact the clinic through [employer] or phone. If you have a critical or abnormal lab result, we will notify you by phone as soon as possible.  Submit refill requests through [employer] or call your pharmacy and they will forward the refill request to us. Please allow 3 business days for your refill to be completed.          Additional Information About Your Visit        [employer] Information     [employer] gives you secure access to your electronic health record. If you see a primary care provider, you can also send messages to your care team and make appointments. If you have questions, please call your primary care clinic.  If you do not have a primary care provider, please call  [phone] and they will assist you.        Care EveryWhere ID     This is your Care EveryWhere ID. This could be used by other organizations to access your [facility] medical records  [id number]         Blood Pressure from Last 3 Encounters:   10/20/17 124/70   10/17/17 126/79   09/26/17 120/66    Weight from Last 3 Encounters:   10/17/17 185 lb (83.9 kg) (95 %)*   09/26/17 182 lb 12.8 oz (82.9 kg) (95 %)*   08/24/17 185 lb 3.2 oz (84 kg) (95 %)*     * Growth percentiles are based on [facility] 2-20 Years data.              We Performed the Following     Beta HCG qual IFA urine - [facility] and [facility]     INJECTION INTRAMUSCULAR OR SUB-Q     Medroxyprogesterone inj  1mg   (Depo Provera J-Code)        Primary Care Provider Office Phone # Fax #    Estela Mcfadden Mai, [phone]       [address] [place] [address]        Equal Access to Services     [facility]: Hadii bear ku hadasho Soomaali, waaxda luqadaha, qaybta kaalmada ademingyabenita, shari escamilla . So [facility] [phone].    ATENCIÓN: Si habla español, tiene a garcia disposición servicios gratuitos de asistencia lingüística. Llame al [phone].    We comply with applicable federal civil rights laws and [place] laws. We do not discriminate on the basis of race, color, national origin, age, disability, sex, sexual orientation, or gender identity.            Thank you!     Thank you for choosing [facility]  for your care. Our goal is always to provide you with excellent care. Hearing back from our patients is one way we can continue to improve our services. Please take a few minutes to complete the written survey that you may receive in the mail after your visit with us. Thank you!             Your Updated Medication List - Protect others around you: Learn how to safely use, store and throw away your medicines at www.disposemymeds.org.          This list is accurate as of: 10/20/17  2:49 PM.  Always use your most  recent med list.                   Brand Name Dispense Instructions for use Diagnosis    B-12 1000 MCG Caps           EPINEPHrine 0.3 MG/0.3ML injection 2-pack    EPIPEN/ADRENACLICK/or ANY BX GENERIC EQUIV    0.6 mL    Inject 0.3 mLs (0.3 mg) into the muscle as needed for anaphylaxis    Allergic reaction to insect sting, accidental or unintentional, initial encounter, Cough, SOB (shortness of breath)       magnesium 200 MG Tabs           medroxyPROGESTERone 150 MG/ML injection    DEPO-PROVERA     Inject 150 mg into the muscle every 3 months        MULTI-VITAMIN DAILY PO      Take 1 tablet by mouth daily Reported on 3/8/2017        * triamcinolone 0.5 % cream    KENALOG    30 g    Apply sparingly to affected area two times daily as needed    Psoriasis       * triamcinolone 0.1 % ointment    KENALOG    454 g    Apply to the affected area of the skin twice a day with use of a moisturizer before. CAn use for 4 weeks straight then need 1 week break    Guttate psoriasis, Psoriatic arthritis (H)       * Notice:  This list has 2 medication(s) that are the same as other medications prescribed for you. Read the directions carefully, and ask your doctor or other care provider to review them with you.

## 2017-10-20 NOTE — NURSING NOTE
Pt instructed to wait 20 min after giving injection.  Pt was verified by using first and last name and  before giving injection.    I talked with Mary and she states she has been getting the depo injection at [facility].  She did have the order renewed for a year.  Dr. Spence gave me a verbal to get a pregnancy test and then ok to give injection if negative.      Mary is informed to get her order from [facility] for us to put on file.    Nettie/CATHY
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
## 2017-10-25 ENCOUNTER — OFFICE VISIT (OUTPATIENT)
Dept: FAMILY MEDICINE | Facility: OTHER | Age: 22
End: 2017-10-25
Payer: COMMERCIAL

## 2017-10-25 VITALS
SYSTOLIC BLOOD PRESSURE: 112 MMHG | HEART RATE: 80 BPM | HEIGHT: 69 IN | WEIGHT: 184.9 LBS | RESPIRATION RATE: 20 BRPM | DIASTOLIC BLOOD PRESSURE: 60 MMHG | BODY MASS INDEX: 27.39 KG/M2 | TEMPERATURE: 97.8 F

## 2017-10-25 DIAGNOSIS — F32.5 MAJOR DEPRESSION IN COMPLETE REMISSION (H): ICD-10-CM

## 2017-10-25 DIAGNOSIS — F43.10 PTSD (POST-TRAUMATIC STRESS DISORDER): Primary | ICD-10-CM

## 2017-10-25 DIAGNOSIS — F41.1 GAD (GENERALIZED ANXIETY DISORDER): ICD-10-CM

## 2017-10-25 PROCEDURE — 99214 OFFICE O/P EST MOD 30 MIN: CPT | Performed by: STUDENT IN AN ORGANIZED HEALTH CARE EDUCATION/TRAINING PROGRAM

## 2017-10-25 RX ORDER — CITALOPRAM HYDROBROMIDE 10 MG/1
10 TABLET ORAL DAILY
Qty: 90 TABLET | Refills: 0 | Status: SHIPPED | OUTPATIENT
Start: 2017-10-25 | End: 2017-12-14

## 2017-10-25 ASSESSMENT — PATIENT HEALTH QUESTIONNAIRE - PHQ9
SUM OF ALL RESPONSES TO PHQ QUESTIONS 1-9: 4
10. IF YOU CHECKED OFF ANY PROBLEMS, HOW DIFFICULT HAVE THESE PROBLEMS MADE IT FOR YOU TO DO YOUR WORK, TAKE CARE OF THINGS AT HOME, OR GET ALONG WITH OTHER PEOPLE: SOMEWHAT DIFFICULT
SUM OF ALL RESPONSES TO PHQ QUESTIONS 1-9: 4

## 2017-10-25 ASSESSMENT — ANXIETY QUESTIONNAIRES
4. TROUBLE RELAXING: SEVERAL DAYS
2. NOT BEING ABLE TO STOP OR CONTROL WORRYING: MORE THAN HALF THE DAYS
3. WORRYING TOO MUCH ABOUT DIFFERENT THINGS: MORE THAN HALF THE DAYS
5. BEING SO RESTLESS THAT IT IS HARD TO SIT STILL: SEVERAL DAYS
1. FEELING NERVOUS, ANXIOUS, OR ON EDGE: MORE THAN HALF THE DAYS
7. FEELING AFRAID AS IF SOMETHING AWFUL MIGHT HAPPEN: NEARLY EVERY DAY
GAD7 TOTAL SCORE: 14
7. FEELING AFRAID AS IF SOMETHING AWFUL MIGHT HAPPEN: NEARLY EVERY DAY
GAD7 TOTAL SCORE: 14
6. BECOMING EASILY ANNOYED OR IRRITABLE: NEARLY EVERY DAY
GAD7 TOTAL SCORE: 14

## 2017-10-25 ASSESSMENT — PAIN SCALES - GENERAL: PAINLEVEL: NO PAIN (0)

## 2017-10-25 NOTE — NURSING NOTE
"Chief Complaint   Patient presents with     PTSD     Panel Management     KATY, Hpv, Flu, PHQ9, NENO       Initial /60 (BP Location: Right arm, Patient Position: Sitting, Cuff Size: Adult Regular)  Pulse 80  Temp 97.8  F (36.6  C) (Temporal)  Resp 20  Ht 5' 9.02\" (1.753 m)  Wt 184 lb 14.4 oz (83.9 kg)  BMI 27.29 kg/m2 Estimated body mass index is 27.29 kg/(m^2) as calculated from the following:    Height as of this encounter: 5' 9.02\" (1.753 m).    Weight as of this encounter: 184 lb 14.4 oz (83.9 kg).  Medication Reconciliation: complete   Khadijah Weinstein CMA      "

## 2017-10-25 NOTE — PATIENT INSTRUCTIONS
Citalopram (Celexa) 10 mg daily.    Follow up in the clinic in 3 months or sooner if needed.    Call if you want to look into counseling.    BING LandaC

## 2017-10-25 NOTE — MR AVS SNAPSHOT
After Visit Summary   10/25/2017    Mary Valdez    MRN: [id number]           Patient Information     Date Of Birth          1995        Visit Information        Provider Department      10/25/2017 10:40 AM Charisse Alexander APRN [facility]        Today's Diagnoses     PTSD (post-traumatic stress disorder)    -  1    Need for HPV vaccine        Need for prophylactic vaccination and inoculation against influenza        NENO (generalized anxiety disorder)        Major depression in complete remission (H)          Care Instructions    Citalopram (Celexa) 10 mg daily.    Follow up in the clinic in 3 months or sooner if needed.    Call if you want to look into counseling.    Charisse Alexander NP-C          Follow-ups after your visit        Who to contact     If you have questions or need follow up information about today's clinic visit or your schedule please contact [facility] directly at [phone].  Normal or non-critical lab and imaging results will be communicated to you by [employer], letter or phone within 4 business days after the clinic has received the results. If you do not hear from us within 7 days, please contact the clinic through [employer] or phone. If you have a critical or abnormal lab result, we will notify you by phone as soon as possible.  Submit refill requests through [employer] or call your pharmacy and they will forward the refill request to us. Please allow 3 business days for your refill to be completed.          Additional Information About Your Visit        MyChart Information     [employer] gives you secure access to your electronic health record. If you see a primary care provider, you can also send messages to your care team and make appointments. If you have questions, please call your primary care clinic.  If you do not have a primary care provider, please call [phone] and they will assist you.        Care EveryWhere ID   "   This is your Care EveryWhere ID. This could be used by other organizations to access your [facility] medical records  [id number]        Your Vitals Were     Pulse Temperature Respirations Height BMI (Body Mass Index)       80 97.8  F (36.6  C) (Temporal) 20 5' 9.02\" (1.753 m) 27.29 kg/m2        Blood Pressure from Last 3 Encounters:   10/25/17 112/60   10/20/17 124/70   10/17/17 126/79    Weight from Last 3 Encounters:   10/25/17 184 lb 14.4 oz (83.9 kg) (95 %)*   10/17/17 185 lb (83.9 kg) (95 %)*   09/26/17 182 lb 12.8 oz (82.9 kg) (95 %)*     * Growth percentiles are based on [facility] 2-20 Years data.              Today, you had the following     No orders found for display         Today's Medication Changes          These changes are accurate as of: 10/25/17 11:24 AM.  If you have any questions, ask your nurse or doctor.               Start taking these medicines.        Dose/Directions    citalopram 10 MG tablet   Commonly known as:  celeXA   Used for:  PTSD (post-traumatic stress disorder), NENO (generalized anxiety disorder), Major depression in complete remission (H)   Started by:  Charisse Alexander APRN CNP        Dose:  10 mg   Take 1 tablet (10 mg) by mouth daily   Quantity:  90 tablet   Refills:  0            Where to get your medicines      These medications were sent to [place] [address]  [address] [facility] [address]     Phone:  [phone]     citalopram 10 MG tablet                Primary Care Provider Office Phone # Fax #    Estela Mcfadden Mai, [phone] [address] [facility]   [facility] [address]        Equal Access to Services     KAYY SYLVESTER AH: Celena ramos Somarcelo, waaxda luqadaha, qaybta kaalmada jyothi, shari moncada. So [facility] [phone].    ATENCIÓN: Si habla español, tiene a garcia disposición servicios gratuitos de asistencia lingüística. Neva al [phone].    We comply with applicable " federal civil rights laws and [place] laws. We do not discriminate on the basis of race, color, national origin, age, disability, sex, sexual orientation, or gender identity.            Thank you!     Thank you for choosing [facility]  for your care. Our goal is always to provide you with excellent care. Hearing back from our patients is one way we can continue to improve our services. Please take a few minutes to complete the written survey that you may receive in the mail after your visit with us. Thank you!             Your Updated Medication List - Protect others around you: Learn how to safely use, store and throw away your medicines at www.disposemymeds.org.          This list is accurate as of: 10/25/17 11:24 AM.  Always use your most recent med list.                   Brand Name Dispense Instructions for use Diagnosis    B-12 1000 MCG Caps           citalopram 10 MG tablet    celeXA    90 tablet    Take 1 tablet (10 mg) by mouth daily    PTSD (post-traumatic stress disorder), NENO (generalized anxiety disorder), Major depression in complete remission (H)       EPINEPHrine 0.3 MG/0.3ML injection 2-pack    EPIPEN/ADRENACLICK/or ANY BX GENERIC EQUIV    0.6 mL    Inject 0.3 mLs (0.3 mg) into the muscle as needed for anaphylaxis    Allergic reaction to insect sting, accidental or unintentional, initial encounter, Cough, SOB (shortness of breath)       magnesium 200 MG Tabs           medroxyPROGESTERone 150 MG/ML injection    DEPO-PROVERA     Inject 150 mg into the muscle every 3 months        MULTI-VITAMIN DAILY PO      Take 1 tablet by mouth daily Reported on 3/8/2017        * triamcinolone 0.5 % cream    KENALOG    30 g    Apply sparingly to affected area two times daily as needed    Psoriasis       * triamcinolone 0.1 % ointment    KENALOG    454 g    Apply to the affected area of the skin twice a day with use of a moisturizer before. CAn use for 4 weeks straight then need 1 week break     Guttate psoriasis, Psoriatic arthritis (H)       * Notice:  This list has 2 medication(s) that are the same as other medications prescribed for you. Read the directions carefully, and ask your doctor or other care provider to review them with you.

## 2017-10-26 ENCOUNTER — TRANSFERRED RECORDS (OUTPATIENT)
Dept: HEALTH INFORMATION MANAGEMENT | Facility: CLINIC | Age: 22
End: 2017-10-26

## 2017-10-26 LAB — EJECTION FRACTION: 53

## 2017-10-26 ASSESSMENT — ANXIETY QUESTIONNAIRES: GAD7 TOTAL SCORE: 14

## 2017-10-26 ASSESSMENT — PATIENT HEALTH QUESTIONNAIRE - PHQ9: SUM OF ALL RESPONSES TO PHQ QUESTIONS 1-9: 4

## 2017-10-30 ENCOUNTER — TELEPHONE (OUTPATIENT)
Dept: FAMILY MEDICINE | Facility: OTHER | Age: 22
End: 2017-10-30

## 2017-10-30 NOTE — TELEPHONE ENCOUNTER
Reason for Call:  Form, our goal is to have forms completed with 72 hours, however, some forms may require a visit or additional information.    Type of letter, form or note:  for an emotional support animal    Who is the form from?: Patient    Where did the form come from: wants to email to clinic    What clinic location was the form placed at?: [facility] - [phone]    Where the form was placed: has not been sent in yet    What number is listed as a contact on the form?: na       Additional comments: is there an e mail address she could send this to Charisse to be filled out?       Call taken on 10/30/2017 at 3:50 PM by Lizeth Grace

## 2017-11-10 ENCOUNTER — TRANSFERRED RECORDS (OUTPATIENT)
Dept: HEALTH INFORMATION MANAGEMENT | Facility: CLINIC | Age: 22
End: 2017-11-10

## 2017-11-27 ENCOUNTER — TELEPHONE (OUTPATIENT)
Dept: DERMATOLOGY | Facility: CLINIC | Age: 22
End: 2017-11-27

## 2017-11-27 NOTE — TELEPHONE ENCOUNTER
[facility] Call Center    Phone Message    Name of Caller: Mary    Phone Number: Cell number on file:    Telephone Information:   Mobile [phone]   Mobile        Best time to return call: ANY    May a detailed message be left on voicemail: yes    Relation to patient: Self    Reason for Call: Other: Symptoms are worsening, please call to discuss medication. Please send medication to [facility] in [place]     Action Taken: Message routed to:  Adult Clinics: Dermatology p [id number]

## 2017-11-27 NOTE — TELEPHONE ENCOUNTER
Called patient.  She stated that her psoriasis is getting worse and she thinks it is time to discuss medications.  I informed her that she will need to be seen.  She agreed with the plan.  I scheduled her to see Dr. Luna.    Lindy Tolentino CMA

## 2017-12-12 ENCOUNTER — TELEPHONE (OUTPATIENT)
Dept: FAMILY MEDICINE | Facility: OTHER | Age: 22
End: 2017-12-12

## 2017-12-12 NOTE — TELEPHONE ENCOUNTER
Reason for Call:  Form, our goal is to have forms completed with 72 hours, however, some forms may require a visit or additional information.    Type of letter, form or note:  medical    Who is the form from?: Patient    Where did the form come from: Patient or family brought in       What clinic location was the form placed at?: [facility] - [phone]    Where the form was placed: [facility]    What number is listed as a contact on the form?: none        Additional comments: please mail to address listed on form- [address] [facility] [address]    Call taken on 12/12/2017 at 1:05 PM by Mary Sharp

## 2017-12-14 ENCOUNTER — OFFICE VISIT (OUTPATIENT)
Dept: DERMATOLOGY | Facility: CLINIC | Age: 22
End: 2017-12-14
Payer: COMMERCIAL

## 2017-12-14 DIAGNOSIS — M25.60 JOINT STIFFNESS: Primary | ICD-10-CM

## 2017-12-14 DIAGNOSIS — R21 RASH: ICD-10-CM

## 2017-12-14 PROCEDURE — 99213 OFFICE O/P EST LOW 20 MIN: CPT | Performed by: DERMATOLOGY

## 2017-12-14 RX ORDER — CLOBETASOL PROPIONATE 0.5 MG/G
CREAM TOPICAL
Qty: 120 G | Refills: 1 | Status: SHIPPED | OUTPATIENT
Start: 2017-12-14 | End: 2018-02-06

## 2017-12-14 RX ORDER — CALCIPOTRIENE 50 UG/G
CREAM TOPICAL
Qty: 100 G | Refills: 1 | Status: SHIPPED | OUTPATIENT
Start: 2017-12-14 | End: 2018-02-06

## 2017-12-14 NOTE — PROGRESS NOTES
[facility] Dermatology Note      Dermatology Problem List:  1.Guttate psoriasis with psoriatic arthritis  -Current tx: clobetasol and see rheumatology for joint pain  -Previous Tx: triamcinolone 0.1% ointment/cream(patient felt this cream caused discomfort)    Encounter Date: Dec 14, 2017    CC:  Chief Complaint   Patient presents with     RECHECK     4 month follow up - Triamcinolone is not helping       History of Present Illness:  Ms. Mary Valdez is a 22 year old female who presents for a follow up for guttate psoriasis with psoriatic arthritis. The patient was last seen 7/24/17 when the patient was treated with triamcinolone. The patient is only using triamcinolone today, but it has not helped. She notes that applying this medication burns in both ointment and cream form. She uses other emollients every day. The patient does not plan on becoming pregnant, but wants to avoid medications that could affect pregnancy. The patient has a history of depression as well. The patient fingers have stiffness. The patient is pruritic in the areas affected by her psoriasis on her back and abdomen.      Past Medical History:   Patient Active Problem List   Diagnosis     Psoriasis     Migratory pain     Anxiety state     Major depression in complete remission (H)     NENO (generalized anxiety disorder)     PTSD (post-traumatic stress disorder)     Maltracking of right patella     Segmental dysfunction of thoracic region     Segmental dysfunction of lumbar region     Segmental dysfunction of sacral region     Flank pain     Sprain of lateral collateral ligament of right knee, initial encounter     Hypoglycemia, unspecified     Allergic reaction to insect sting, accidental or unintentional, initial encounter     Past Medical History:   Diagnosis Date     Bell's palsy 7 y/o    Due to trauma, neg Lyme     Depression     Venice Meyer, psychologist, [employer] in [place]     Depressive disorder      Major  depression in complete remission (H) 7/7/2014     Psoriasis     scalp only     Past Surgical History:   Procedure Laterality Date     ADENOIDECTOMY       ARTHROSCOPY KNEE WITH RETINACULAR RELEASE Right 1/20/2017    Procedure: ARTHROSCOPY KNEE WITH RETINACULAR RELEASE MEDIAL OR LATERAL;  Surgeon: Yevgeniy Graf DO;  Location: [facility] OR     C NONSPECIFIC PROCEDURE  02/12/01    adenoidectomy     DENTAL SURGERY       MOUTH SURGERY         Social History:  The patient works as a nutrition aide. The patient denies use of tanning beds.    Family History:  There is no family history of skin cancer.    Medications:  Current Outpatient Prescriptions   Medication Sig Dispense Refill     EPINEPHrine (EPIPEN/ADRENACLICK/OR ANY BX GENERIC EQUIV) 0.3 MG/0.3ML injection 2-pack Inject 0.3 mLs (0.3 mg) into the muscle as needed for anaphylaxis 0.6 mL 1     triamcinolone (KENALOG) 0.1 % ointment Apply to the affected area of the skin twice a day with use of a moisturizer before. CAn use for 4 weeks straight then need 1 week break 454 g 3     triamcinolone (KENALOG) 0.5 % cream Apply sparingly to affected area two times daily as needed 30 g 0     magnesium 200 MG TABS        Cyanocobalamin (B-12) 1000 MCG CAPS        medroxyPROGESTERone (DEPO-PROVERA) 150 MG/ML injection Inject 150 mg into the muscle every 3 months       Multiple Vitamin (MULTI-VITAMIN DAILY PO) Take 1 tablet by mouth daily Reported on 3/8/2017         Allergies   Allergen Reactions     No Known Drug Allergies        Review of Systems:    -Constitutional: The patient denies fatigue, fevers, chills, unintended weight loss, and night sweats.  -Rheum- finger joint stiffness    Physical exam:  Vitals: There were no vitals taken for this visit.  GEN: This is a well developed, well-nourished female in no acute distress, in a pleasant mood.    NEURO: Alert and oriented  PSYCH: In pleasant mood, appropriate affect  SKIN: Total skin excluding the undergarment areas was  performed. The exam included the head/face, neck, both arms, chest, back, abdomen, both legs, digits and/or nails.      -Pink papules on abdomen and back with faint silvery scale,   -No other lesions of concern on areas examined.        Component      Latest Ref Rng & Units 7/24/2017   WBC      4.0 - 11.0 10e9/L 7.2   RBC Count      3.8 - 5.2 10e12/L 4.36   Hemoglobin      11.7 - 15.7 g/dL 12.7   Hematocrit      35.0 - 47.0 % 37.8   MCV      78 - 100 fl 87   MCH      26.5 - 33.0 pg 29.1   MCHC      31.5 - 36.5 g/dL 33.6   RDW      10.0 - 15.0 % 12.7   Platelet Count      150 - 450 10e9/L 226   Diff Method       Automated Method   % Neutrophils      % 63.0   % Lymphocytes      % 27.8   % Monocytes      % 6.9   % Eosinophils      % 1.9   % Basophils      % 0.4   Absolute Neutrophil      1.6 - 8.3 10e9/L 4.5   Absolute Lymphocytes      0.8 - 5.3 10e9/L 2.0   Absolute Monocytes      0.0 - 1.3 10e9/L 0.5   Absolute Eosinophils      0.0 - 0.7 10e9/L 0.1   Absolute Basophils      0.0 - 0.2 10e9/L 0.0   Sodium      133 - 144 mmol/L 141   Potassium      3.4 - 5.3 mmol/L 4.1   Chloride      96 - 110 mmol/L 111 (H)   Carbon Dioxide      20 - 32 mmol/L 25   Anion Gap      3 - 14 mmol/L 5   Glucose      70 - 99 mg/dL 92   Urea Nitrogen      7 - 30 mg/dL 15   Creatinine      0.50 - 1.00 mg/dL 0.78   GFR Estimate      >60 mL/min/1.7m2 >90 . . .   GFR Estimate If Black      >60 mL/min/1.7m2 >90 . . .   Calcium      8.5 - 10.1 mg/dL 8.8   Bilirubin Total      0.2 - 1.3 mg/dL 0.3   Albumin      3.4 - 5.0 g/dL 4.0   Protein Total      6.8 - 8.8 g/dL 7.9   Alkaline Phosphatase      40 - 150 U/L 71   ALT      0 - 50 U/L 26   AST      0 - 35 U/L 14   M Tuberculosis Result      NEG Negative   M Tuberculosis Antigen Value      IU/mL 0.01   HIV Antigen Antibody Combo      NR Nonreactive . . .   Hepatitis B Surface Antibody      <8.00 m[IU]/mL 0.09   Hep B Surface Agn      NR Nonreactive   Hepatitis C Antibody      NR Nonreactive . . .        Impression/Plan:  1. Rash consistent with guttate psoriasis. Skin involvement is 3% TBSA. However, joint is involved. Pregnancy category B. Patient has hx of depression, declines Otezela, Soriatane and Methotrexate not options as she would like to get pregnant soon    Stop triamcinolone    Start clobetasol 0.05% creamBID two weeks on, 2 weeks off     When not using clobetasol, use vitamin D, calcipotriene 0.005% cream     Consider biopsy     Discussion on Enbrel or Humira versus and phototherapy. Phototherapy difficult as patient lives 45 min away. She is still concerned about starting biologics. Joint  pain bothers her most so we will have her see rheumatology to see if they can manage her joint pain with desire for pregnancy soon    If she desires biologics she will need a CXR and MMR titter. I also encouraged her to get the flu shot.    2. Joint stiffness in fingers, possible psoriatic arthritis     Referral to rheumatology     Follow-up after appointment with rheumatology, earlier for new or changing lesions.       Staff Involved:  Scribe/Staff    Scribe Disclosure:   I, Nai Antunez, am serving as a scribe to document services personally performed by Dr. Anjana Luna, based on data collection and the provider's statements to me.         Provider Disclosure:   The documentation recorded by the scribe accurately reflects the services I personally performed and the decisions made by me.    Anjana Luna MD    Department of Dermatology  [facility]: Phone: [phone], Fax:[phone]  [facility]: Phone: [phone], Fax: [phone]

## 2017-12-14 NOTE — LETTER
12/14/2017       RE: Mary Valdez    [address]  [place] [address]     Dear Colleague,    Thank you for referring your patient, Mary Valdez, to the [facility] [place] [facility]. Please see a copy of my visit note below.    [facility] Dermatology Note      Dermatology Problem List:  1.Guttate psoriasis with psoriatic arthritis  -Current tx: clobetasol and see rheumatology for joint pain  -Previous Tx: triamcinolone 0.1% ointment/cream(patient felt this cream caused discomfort)    Encounter Date: Dec 14, 2017    CC:  Chief Complaint   Patient presents with     RECHECK     4 month follow up - Triamcinolone is not helping       History of Present Illness:  Ms. Mary Valdez is a 22 year old female who presents for a follow up for guttate psoriasis with psoriatic arthritis. The patient was last seen 7/24/17 when the patient was treated with triamcinolone. The patient is only using triamcinolone today, but it has not helped. She notes that applying this medication burns in both ointment and cream form. She uses other emollients every day. The patient does not plan on becoming pregnant, but wants to avoid medications that could affect pregnancy. The patient has a history of depression as well. The patient fingers have stiffness. The patient is pruritic in the areas affected by her psoriasis on her back and abdomen.    Past Medical History:   Patient Active Problem List   Diagnosis     Psoriasis     Migratory pain     Anxiety state     Major depression in complete remission (H)     NENO (generalized anxiety disorder)     PTSD (post-traumatic stress disorder)     Maltracking of right patella     Segmental dysfunction of thoracic region     Segmental dysfunction of lumbar region     Segmental dysfunction of sacral region     Flank pain     Sprain of lateral collateral ligament of right knee, initial encounter      Hypoglycemia, unspecified     Allergic reaction to insect sting, accidental or unintentional, initial encounter     Past Medical History:   Diagnosis Date     Bell's palsy 7 y/o    Due to trauma, neg Lyme     Depression     Venice Meyer, psychologist, [employer] in [place]     Depressive disorder      Major depression in complete remission (H) 7/7/2014     Psoriasis     scalp only     Past Surgical History:   Procedure Laterality Date     ADENOIDECTOMY       ARTHROSCOPY KNEE WITH RETINACULAR RELEASE Right 1/20/2017    Procedure: ARTHROSCOPY KNEE WITH RETINACULAR RELEASE MEDIAL OR LATERAL;  Surgeon: Yevgeniy Graf DO;  Location: [facility]     C NONSPECIFIC PROCEDURE  02/12/01    adenoidectomy     DENTAL SURGERY       MOUTH SURGERY       Social History:  The patient works as a nutrition aide. The patient denies use of tanning beds.    Family History:  There is no family history of skin cancer.    Medications:  Current Outpatient Prescriptions   Medication Sig Dispense Refill     EPINEPHrine (EPIPEN/ADRENACLICK/OR ANY BX GENERIC EQUIV) 0.3 MG/0.3ML injection 2-pack Inject 0.3 mLs (0.3 mg) into the muscle as needed for anaphylaxis 0.6 mL 1     triamcinolone (KENALOG) 0.1 % ointment Apply to the affected area of the skin twice a day with use of a moisturizer before. CAn use for 4 weeks straight then need 1 week break 454 g 3     triamcinolone (KENALOG) 0.5 % cream Apply sparingly to affected area two times daily as needed 30 g 0     magnesium 200 MG TABS        Cyanocobalamin (B-12) 1000 MCG CAPS        medroxyPROGESTERone (DEPO-PROVERA) 150 MG/ML injection Inject 150 mg into the muscle every 3 months       Multiple Vitamin (MULTI-VITAMIN DAILY PO) Take 1 tablet by mouth daily Reported on 3/8/2017       Allergies   Allergen Reactions     No Known Drug Allergies      Review of Systems:  -Constitutional: The patient denies fatigue, fevers, chills, unintended weight loss, and night sweats.  -Rheum- finger joint  stiffness    Physical exam:  Vitals: There were no vitals taken for this visit.  GEN: This is a well developed, well-nourished female in no acute distress, in a pleasant mood.    NEURO: Alert and oriented  PSYCH: In pleasant mood, appropriate affect  SKIN: Total skin excluding the undergarment areas was performed. The exam included the head/face, neck, both arms, chest, back, abdomen, both legs, digits and/or nails.      -Pink papules on abdomen and back with faint silvery scale,   -No other lesions of concern on areas examined.  Component      Latest Ref Rng & Units 7/24/2017   WBC      4.0 - 11.0 10e9/L 7.2   RBC Count      3.8 - 5.2 10e12/L 4.36   Hemoglobin      11.7 - 15.7 g/dL 12.7   Hematocrit      35.0 - 47.0 % 37.8   MCV      78 - 100 fl 87   MCH      26.5 - 33.0 pg 29.1   MCHC      31.5 - 36.5 g/dL 33.6   RDW      10.0 - 15.0 % 12.7   Platelet Count      150 - 450 10e9/L 226   Diff Method       Automated Method   % Neutrophils      % 63.0   % Lymphocytes      % 27.8   % Monocytes      % 6.9   % Eosinophils      % 1.9   % Basophils      % 0.4   Absolute Neutrophil      1.6 - 8.3 10e9/L 4.5   Absolute Lymphocytes      0.8 - 5.3 10e9/L 2.0   Absolute Monocytes      0.0 - 1.3 10e9/L 0.5   Absolute Eosinophils      0.0 - 0.7 10e9/L 0.1   Absolute Basophils      0.0 - 0.2 10e9/L 0.0   Sodium      133 - 144 mmol/L 141   Potassium      3.4 - 5.3 mmol/L 4.1   Chloride      96 - 110 mmol/L 111 (H)   Carbon Dioxide      20 - 32 mmol/L 25   Anion Gap      3 - 14 mmol/L 5   Glucose      70 - 99 mg/dL 92   Urea Nitrogen      7 - 30 mg/dL 15   Creatinine      0.50 - 1.00 mg/dL 0.78   GFR Estimate      >60 mL/min/1.7m2 >90 . . .   GFR Estimate If Black      >60 mL/min/1.7m2 >90 . . .   Calcium      8.5 - 10.1 mg/dL 8.8   Bilirubin Total      0.2 - 1.3 mg/dL 0.3   Albumin      3.4 - 5.0 g/dL 4.0   Protein Total      6.8 - 8.8 g/dL 7.9   Alkaline Phosphatase      40 - 150 U/L 71   ALT      0 - 50 U/L 26   AST      0 - 35  U/L 14   M Tuberculosis Result      NEG Negative   M Tuberculosis Antigen Value      IU/mL 0.01   HIV Antigen Antibody Combo      NR Nonreactive . . .   Hepatitis B Surface Antibody      <8.00 m[IU]/mL 0.09   Hep B Surface Agn      NR Nonreactive   Hepatitis C Antibody      NR Nonreactive . . .       Impression/Plan:  1. Rash consistent with guttate psoriasis. Skin involvement is 3% TBSA. However, joint is involved. Pregnancy category B. Patient has hx of depression, declines Otezela, Soriatane and Methotrexate not options as she would like to get pregnant soon    Stop triamcinolone    Start clobetasol 0.05% creamBID two weeks on, 2 weeks off     When not using clobetasol, use vitamin D, calcipotriene 0.005% cream     Consider biopsy     Discussion on Enbrel or Humira versus and phototherapy. Phototherapy difficult as patient lives 45 [place] away. She is still concerned about starting biologics. Joint  pain bothers her most so we will have her see rheumatology to see if they can manage her joint pain with desire for pregnancy soon    If she desires biologics she will need a CXR and MMR titter. I also encouraged her to get the flu shot.    2. Joint stiffness in fingers, possible psoriatic arthritis     Referral to rheumatology     Follow-up after appointment with rheumatology, earlier for new or changing lesions.     Staff Involved:  Scribe/Staff    Scribe Disclosure:   I, Nai Antunez, am serving as a scribe to document services personally performed by Dr. Anjana Luna, based on data collection and the provider's statements to me.     Provider Disclosure:   The documentation recorded by the scribe accurately reflects the services I personally performed and the decisions made by me.    Anjana Luna MD    Department of Dermatology  [facility]: Phone: [phone], Fax:[phone]  [facility]:  Phone: [phone], Fax: [phone]

## 2017-12-14 NOTE — PATIENT INSTRUCTIONS
For rheumatology---[facility]: [facility] [phone]   [email or web address]

## 2017-12-14 NOTE — MR AVS SNAPSHOT
After Visit Summary   12/14/2017    Mary Valdez    MRN: [id number]           Patient Information     Date Of Birth          1995        Visit Information        Provider Department      12/14/2017 7:45 AM Anjana Luna MD [facility]        Today's Diagnoses     Joint stiffness    -  1    Rash          Care Instructions    For rheumatology---[facility]: [facility] [phone]   [email or web address]/          Follow-ups after your visit        Additional Services     RHEUMATOLOGY REFERRAL       Your provider has referred you to: [facility]:   [facility]: [facility] [phone]   [email or web address]/    Please be aware that coverage of these services is subject to the terms and limitations of your health insurance plan.  Call member services at your health plan with any benefit or coverage questions.      Please bring the following with you to your appointment:    (1) Any X-Rays, CTs or MRIs which have been performed.  Contact the facility where they were done to arrange for  prior to your scheduled appointment.    (2) List of current medications   (3) This referral request   (4) Any documents/labs given to you for this referral                  Follow-up notes from your care team     Return in about 3 months (around 3/14/2018).      Your next 10 appointments already scheduled     May 24, 2018 12:15 PM CDT   Return Visit with Anjana Luna MD   [facility] ([facility])    [id number] [address] [place] [id number] [phone]              Who to contact     If you have questions or need follow up information about today's clinic visit or your schedule please contact [facility] directly at [phone].  Normal or non-critical lab and imaging results will be  communicated to you by Extricomhart, letter or phone within 4 business days after the clinic has received the results. If you do not hear from us within 7 days, please contact the clinic through Ac[employer] or phone. If you have a critical or abnormal lab result, we will notify you by phone as soon as possible.  Submit refill requests through [employer]here or call your pharmacy and they will forward the refill request to us. Please allow 3 business days for your refill to be completed.          Additional Information About Your Visit        Acusphere Information     Ac[employer]here gives you secure access to your electronic health record. If you see a primary care provider, you can also send messages to your care team and make appointments. If you have questions, please call your primary care clinic.  If you do not have a primary care provider, please call [phone] and they will assist you.      Ac[employer]here is an electronic gateway that provides easy, online access to your medical records. With [employer]here, you can request a clinic appointment, read your test results, renew a prescription or communicate with your care team.     To access your existing account, please contact your [facility] or call [phone] for assistance.        Care EveryWhere ID     This is your Care EveryWhere ID. This could be used by other organizations to access your [facility] medical records  [id number]         Blood Pressure from Last 3 Encounters:   10/25/17 112/60   10/20/17 124/70   10/17/17 126/79    Weight from Last 3 Encounters:   10/25/17 83.9 kg (184 lb 14.4 oz) (95 %)*   10/17/17 83.9 kg (185 lb) (95 %)*   09/26/17 82.9 kg (182 lb 12.8 oz) (95 %)*     * Growth percentiles are based on CDC 2-20 Years data.              We Performed the Following     RHEUMATOLOGY REFERRAL          Today's Medication Changes          These changes are accurate as of: 12/14/17  9:02 AM.  If you have any questions, ask your nurse or doctor.                Start taking these medicines.        Dose/Directions    calcipotriene 0.005 % cream   Commonly known as:  DOVONOX   Used for:  Rash        Apply twice daily when not using clobetasol   Quantity:  100 g   Refills:  1       clobetasol 0.05 % cream   Commonly known as:  TEMOVATE   Used for:  Rash        Apply twice daily for 2 weeks, take 2 week break, then repeat as needed on trunk   Quantity:  120 g   Refills:  1            Where to get your medicines      These medications were sent to [facility] [address] [place] [address] [address]  [address], [facility] [address]     Phone:  [phone]     calcipotriene 0.005 % cream    clobetasol 0.05 % cream                Primary Care Provider Office Phone # Fax #    Estela Mcfadden Mai, [phone] [address] [facility]   [place] [address]        Equal Access to Services     [facility]: Hadii bear wheat hadasho Somarcelo, waaxda luqadaha, qaybta kaalmada adeegyada, shari escamilla . So [facility] [phone].    ATENCIÓN: Si habla español, tiene a garcia disposición servicios gratuitos de asistencia lingüística. Llame al [phone].    We comply with applicable federal civil rights laws and [place] laws. We do not discriminate on the basis of race, color, national origin, age, disability, sex, sexual orientation, or gender identity.            Thank you!     Thank you for choosing [facility]  for your care. Our goal is always to provide you with excellent care. Hearing back from our patients is one way we can continue to improve our services. Please take a few minutes to complete the written survey that you may receive in the mail after your visit with us. Thank you!             Your Updated Medication List - Protect others around you: Learn how to safely use, store and throw away your medicines at www.disposemymeds.org.          This list is accurate as of: 12/14/17  9:02 AM.  Always use your most recent med  list.                   Brand Name Dispense Instructions for use Diagnosis    B-12 1000 MCG Caps           calcipotriene 0.005 % cream    DOVONOX    100 g    Apply twice daily when not using clobetasol    Rash       clobetasol 0.05 % cream    TEMOVATE    120 g    Apply twice daily for 2 weeks, take 2 week break, then repeat as needed on trunk    Rash       EPINEPHrine 0.3 MG/0.3ML injection 2-pack    EPIPEN/ADRENACLICK/or ANY BX GENERIC EQUIV    0.6 mL    Inject 0.3 mLs (0.3 mg) into the muscle as needed for anaphylaxis    Allergic reaction to insect sting, accidental or unintentional, initial encounter, Cough, SOB (shortness of breath)       magnesium 200 MG Tabs           medroxyPROGESTERone 150 MG/ML injection    DEPO-PROVERA     Inject 150 mg into the muscle every 3 months        MULTI-VITAMIN DAILY PO      Take 1 tablet by mouth daily Reported on 3/8/2017        * triamcinolone 0.5 % cream    KENALOG    30 g    Apply sparingly to affected area two times daily as needed    Psoriasis       * triamcinolone 0.1 % ointment    KENALOG    454 g    Apply to the affected area of the skin twice a day with use of a moisturizer before. CAn use for 4 weeks straight then need 1 week break    Guttate psoriasis, Psoriatic arthritis (H)       * Notice:  This list has 2 medication(s) that are the same as other medications prescribed for you. Read the directions carefully, and ask your doctor or other care provider to review them with you.

## 2017-12-14 NOTE — NURSING NOTE
Dermatology Rooming Note    Mary Valdez's goals for this visit include:   Chief Complaint   Patient presents with     RECHECK     4 month follow up - Triamcinolone is not helping       Is a scribe okay for this visit: YES    Are records needed for this visit(If yes, obtain release of information): NO     Vitals: There were no vitals taken for this visit.    Referring Provider:  No referring provider defined for this encounter.    Lindy Tolentino, CMA

## 2017-12-19 NOTE — TELEPHONE ENCOUNTER
Called patient and per C2C LM that form is completed and will be mailed out to address listed on form. To call with any questions. Sahara Tamayo MA\

## 2018-01-22 ENCOUNTER — PRE VISIT (OUTPATIENT)
Dept: RHEUMATOLOGY | Facility: CLINIC | Age: 23
End: 2018-01-22

## 2018-01-22 NOTE — TELEPHONE ENCOUNTER
PREVISIT INFORMATION                                                    Mary Gunjan Courtney scheduled for future visit at [facility] specialty clinics.    Patient is scheduled to see Dr Pereira on Thur 1/25/1  Reason for visit: Consult Joint pain  Referring provider Dr Lackey  Has patient seen previous specialist? No  Medical Records:  Available in chart.  Patient was previously seen at a [place] or [facility] facility.    REVIEW                                                      New patient packet mailed to patient: N/A  Medication reconciliation complete: Yes      Current Outpatient Prescriptions   Medication Sig Dispense Refill     clobetasol (TEMOVATE) 0.05 % cream Apply twice daily for 2 weeks, take 2 week break, then repeat as needed on trunk 120 g 1     calcipotriene (DOVONOX) 0.005 % cream Apply twice daily when not using clobetasol 100 g 1     EPINEPHrine (EPIPEN/ADRENACLICK/OR ANY BX GENERIC EQUIV) 0.3 MG/0.3ML injection 2-pack Inject 0.3 mLs (0.3 mg) into the muscle as needed for anaphylaxis 0.6 mL 1     triamcinolone (KENALOG) 0.1 % ointment Apply to the affected area of the skin twice a day with use of a moisturizer before. CAn use for 4 weeks straight then need 1 week break 454 g 3     triamcinolone (KENALOG) 0.5 % cream Apply sparingly to affected area two times daily as needed 30 g 0     magnesium 200 MG TABS        Cyanocobalamin (B-12) 1000 MCG CAPS        medroxyPROGESTERone (DEPO-PROVERA) 150 MG/ML injection Inject 150 mg into the muscle every 3 months       Multiple Vitamin (MULTI-VITAMIN DAILY PO) Take 1 tablet by mouth daily Reported on 3/8/2017         Allergies: No known drug allergies      Patient review:  Who is currently managing your care (update PCP if needed)? Dr Gray    Are you currently taking any medications to manage your rheumatology condition? No   If not, have you previously taken any rheumatology medications like DMARD or biologic  (type, dates, length of tx, effective or not)? No      Are you taking any medications over-the-counter? No     Update home medications and allergies info: Yes     Have you had any recent rheumatology labs? Yes   Last lab results in chart:    Hemoglobin   Date Value Ref Range Status   10/17/2017 12.3 11.7 - 15.7 g/dL Final   07/24/2017 12.7 11.7 - 15.7 g/dL Final   05/10/2017 11.5 (L) 11.7 - 15.7 g/dL Final     Urea Nitrogen   Date Value Ref Range Status   10/17/2017 11 7 - 30 mg/dL Final   07/24/2017 15 7 - 30 mg/dL Final   05/10/2017 11 7 - 30 mg/dL Final     Sed Rate   Date Value Ref Range Status   03/08/2017 7 0 - 20 mm/h Final   04/07/2016 7 0 - 20 mm/h Final   10/03/2013 7 0 - 15 mm/h Final     CRP Inflammation   Date Value Ref Range Status   03/08/2017 <2.9 0.0 - 8.0 mg/L Final   10/03/2013 8.5 (H) 0.0 - 8.0 mg/L Final   09/13/2013 <5.0 0.0 - 8.0 mg/L Final     AST   Date Value Ref Range Status   07/24/2017 14 0 - 35 U/L Final   05/10/2017 16 0 - 35 U/L Final   04/24/2017 33 0 - 35 U/L Final     Albumin   Date Value Ref Range Status   07/24/2017 4.0 3.4 - 5.0 g/dL Final   05/10/2017 3.7 3.4 - 5.0 g/dL Final   04/24/2017 4.1 3.4 - 5.0 g/dL Final     Alkaline Phosphatase   Date Value Ref Range Status   07/24/2017 71 40 - 150 U/L Final   05/10/2017 67 40 - 150 U/L Final   04/24/2017 75 40 - 150 U/L Final     ALT   Date Value Ref Range Status   07/24/2017 26 0 - 50 U/L Final   05/10/2017 27 0 - 50 U/L Final   04/24/2017 60 (H) 0 - 50 U/L Final     Rheumatoid Factor   Date Value Ref Range Status   09/09/2013 10 0 - 14 IU/mL Final     Recent Labs   Lab Test  10/17/17   2032  07/24/17   1021  05/10/17   2255  04/24/17   1458  03/08/17   1515  04/07/16   1528   WBC  8.8  7.2  8.2  8.9  8.8   --    HGB  12.3  12.7  11.5*  13.2  13.1   --    HCT  38.1  37.8  35.4  40.5  40.3   --    MCV  88  87  89  89  86   --    PLT  229  226  241  271  244   --    BUN  11  15  11  10  11  13   TSH  1.34   --    --    --   1.58  1.33    AST   --   14  16  33  16  14   ALT   --   26  27  60*  24  23   ALKPHOS   --   71  67  75  64  61       Color Urine (no units)   Date Value   07/13/2017 Colorless     Appearance Urine (no units)   Date Value   07/13/2017 Clear     Glucose Urine (mg/dL)   Date Value   07/13/2017 Negative     Bilirubin Urine (no units)   Date Value   07/13/2017 Negative     Ketones Urine (mg/dL)   Date Value   07/13/2017 Negative     Specific Gravity Urine (no units)   Date Value   07/13/2017 1.003     pH Urine (pH)   Date Value   07/13/2017 6.0     Protein Albumin Urine (mg/dL)   Date Value   07/13/2017 Negative     Urobilinogen Urine (EU/dL)   Date Value   03/13/2016 0.2     Nitrite Urine (no units)   Date Value   07/13/2017 Negative     Leukocyte Esterase Urine (no units)   Date Value   07/13/2017 Negative       Have you had any recent x-rays related to your visit? Yes there are prev films from prev encounters in [facility]    Are your immunizations up-to-date? Yes    Do you have copy of your immunizations? N/A   Immunization History   Administered Date(s) Administered     DTAP (<7y) 03/24/2003     HepB 1995, 01/13/1998, 09/30/1998     Hib (PRP-T) 01/13/1998, 03/19/1998, 09/30/1998, 11/17/1998     Historical DTP/aP 01/13/1998, 03/19/1998, 05/13/1998, 11/17/1998     Influenza (IIV3) PF 10/26/2012     MMR 11/17/1998, 03/24/2003     Mantoux Tuberculin Skin Test 11/26/2012, 03/04/2013     Meningococcal (Menactra ) 07/23/2010     OPV, trivalent, live 01/13/1998, 03/19/1998, 05/13/1998     Poliovirus, inactivated (IPV) 03/24/2003     TDAP Vaccine (Boostrix) 07/23/2010     Varicella 11/17/1998, 12/30/2011     Varicella Pt Report Hx of Varicella/Chicken Pox 01/01/1999       PLAN/FOLLOW-UP NEEDED                                                      Previsit review complete.  Patient will see provider at future scheduled appointment.     Patient Reminders Given:  Please, make sure you bring an updated list of your medications.   Please,  present 15 minutes early.  If you need to cancel or reschedule,please call [phone].

## 2018-01-25 ENCOUNTER — OFFICE VISIT (OUTPATIENT)
Dept: RHEUMATOLOGY | Facility: CLINIC | Age: 23
End: 2018-01-25
Attending: DERMATOLOGY
Payer: COMMERCIAL

## 2018-01-25 VITALS
RESPIRATION RATE: 18 BRPM | WEIGHT: 194.2 LBS | SYSTOLIC BLOOD PRESSURE: 114 MMHG | DIASTOLIC BLOOD PRESSURE: 74 MMHG | BODY MASS INDEX: 28.76 KG/M2 | OXYGEN SATURATION: 99 % | TEMPERATURE: 98.3 F | HEIGHT: 69 IN | HEART RATE: 51 BPM

## 2018-01-25 DIAGNOSIS — M19.90 INFLAMMATORY ARTHRITIS: Primary | ICD-10-CM

## 2018-01-25 DIAGNOSIS — L40.50 PSORIATIC ARTHRITIS (H): ICD-10-CM

## 2018-01-25 PROCEDURE — 36415 COLL VENOUS BLD VENIPUNCTURE: CPT | Performed by: STUDENT IN AN ORGANIZED HEALTH CARE EDUCATION/TRAINING PROGRAM

## 2018-01-25 PROCEDURE — 86431 RHEUMATOID FACTOR QUANT: CPT | Performed by: STUDENT IN AN ORGANIZED HEALTH CARE EDUCATION/TRAINING PROGRAM

## 2018-01-25 PROCEDURE — 86200 CCP ANTIBODY: CPT | Performed by: STUDENT IN AN ORGANIZED HEALTH CARE EDUCATION/TRAINING PROGRAM

## 2018-01-25 PROCEDURE — 99204 OFFICE O/P NEW MOD 45 MIN: CPT | Performed by: STUDENT IN AN ORGANIZED HEALTH CARE EDUCATION/TRAINING PROGRAM

## 2018-01-25 RX ORDER — PREDNISONE 5 MG/1
TABLET ORAL
Qty: 60 TABLET | Refills: 2 | Status: SHIPPED | OUTPATIENT
Start: 2018-01-25 | End: 2018-06-01

## 2018-01-25 ASSESSMENT — PAIN SCALES - GENERAL: PAINLEVEL: MODERATE PAIN (4)

## 2018-01-25 NOTE — NURSING NOTE
"Mary Valdez's goals for this visit include:   She requests these members of her care team be copied on today's visit information:     PCP: Estela Gray    Referring Provider:  Anjana Luna MD  [address] [place] [address] [address]  [place] [address]    Chief Complaint   Patient presents with     Consult     joint stiffness       Initial /74 (Patient Position: Sitting, Cuff Size: Adult Large)  Pulse 51  Temp 98.3  F (36.8  C)  Resp 18  Ht 1.74 m (5' 8.5\")  Wt 88.1 kg (194 lb 3.2 oz)  SpO2 99%  BMI 29.1 kg/m2 Estimated body mass index is 29.1 kg/(m^2) as calculated from the following:    Height as of this encounter: 1.74 m (5' 8.5\").    Weight as of this encounter: 88.1 kg (194 lb 3.2 oz).  Medication Reconciliation: complete    Do you need any medication refills at today's visit? No  Chief Complaint   Patient presents with     Consult     joint stiffness         "

## 2018-01-25 NOTE — PATIENT INSTRUCTIONS
-- Will do blood tests today     -- She is going to follow up with a dermatologist and will start Taltz SQ injections.     -- Will give prednisone 5 mg . Start with 15 mg x 1 week and decrease to 10 mg and then to 5 mg    -- RTC in 4 months

## 2018-01-25 NOTE — PROGRESS NOTES
Rheumatology Clinic Visit     Mary Valdez MRN# [id number]   YOB: 1995 Age: 22 year old     Date of Visit: January 25, 2018  Primary care provider: Estela Gray          Assessment and Plan:   Assessment     -- Psoriatic arthritis  -- Achilles tendinitis  -- Guttate psoriasis    Ms Valdez is 22-year-old female seen in clinic for evaluation of psoriatic arthritis. She has 5 year history of joint pains which is getting worse in the last 6 months. The joints involved are hands, wrists, ankles and toes.  She denies significant pain in her lower back.  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  She was seen by Dr. Cody and was recommended to try Humira or Otezla but it got denied by her insurance, moreover she also does not want to take Humira because of the side effects.  She also does not want to take methotrexate due to possible pregnancy plans.    She was seen by another Dermatologist at [facility] for second opinion and will be starting Ixekizumab (Anti-interleukin 17A Monoclonal Antibody ).  It needs to be approved by her insurance.  I recommended her to get her vaccines including flu vaccine and pneumonia vaccine (PCV -13, PPSV  -23) before starting Ixekizumab but she does not want to take vaccines.     She will follow-up with her Dermatologist.  As per the reports Ixekizumab can work for psoriatic arthritis as well but it is not FDA approved. I will follow her back in clinic in 4 months to see her response.  Today I will get rheumatoid serologies to make sure she does not have overlapping rheumatoid arthritis.    Plan    -- Will do blood tests today -rheumatoid factor, anti-CCP antibody    -- She is going to follow up with  dermatologist and will start Taltz SQ injections.     -- Will give prednisone 5 mg. Start with 15 mg x 1 week and decrease to 10 mg and then to 5 mg daily.     -- RTC in 4 months               Active Problem List:      Patient Active Problem List    Diagnosis Date Noted     Psoriatic arthritis (H) 01/25/2018     Priority: Medium     Allergic reaction to insect sting, accidental or unintentional, initial encounter 09/27/2017     Priority: Medium     Hypoglycemia, unspecified 07/10/2017     Priority: Medium     Sprain of lateral collateral ligament of right knee, initial encounter 06/21/2017     Priority: Medium     Segmental dysfunction of thoracic region 05/24/2017     Priority: Medium     Segmental dysfunction of lumbar region 05/24/2017     Priority: Medium     Segmental dysfunction of sacral region 05/24/2017     Priority: Medium     Flank pain 05/24/2017     Priority: Medium     Maltracking of right patella 01/05/2017     Priority: Medium     NENO (generalized anxiety disorder) 05/05/2016     Priority: Medium     PTSD (post-traumatic stress disorder) 05/05/2016     Priority: Medium     Anxiety state 07/07/2014     Priority: Medium     Problem list name updated by automated process. Provider to review       Major depression in complete remission (H) 07/07/2014     Priority: Medium     Migratory pain 10/13/2013     Priority: Medium     Psoriasis 07/23/2010     Priority: Medium            History of Present Illness:   Mary Valdez is a 22 year old female with PMH of psoriasis, psoriatic arthritis seen in the clinic in consultation at request of Dr Luna for management and evaluation of psoriatic arthritis.    For 5 years she has pain in her joints but getting worse in 6 months.  Joints involved are mainly hands, toes, right ankle and knee but has generalized fatigue and malaise.  She has difficulty getting out of chair and couch.  Has some morning stiffness for couple hours.  She takes ibuprofen and Tylenol which does not seem to help a lot.  She denies any history of dactylitis but does have Achilles tendinitis and plantar fasciitis.  Denies history of uveitis or significant low back pain.  She has extensive family  history of psoriatic arthritis in her 3 aunts and grand father.  No family history of rheumatoid arthritis or other autoimmune connective tissue disease.    She was seen by Dr. Cody and Dr Luna in dermatology and was recommended to try methotrexate, but due to pregnancy plans she refused. Later Humira and Otezla were recommended as they are safer in pregnancy but her insurance denied it.  Moreover she is worried about side effects of Humira. She did see another dermatologist in [place] for second opinion and will be starting Ixekizumab subcu injection.  It needs to be pre approved by her insurance and she is waiting for it.     No history of ulcerative colitis or chron's disease. No h/o iritis, enthesitis, finger or toe swelling like dactylitis. Denies any raynauds, malar rash, photosensitivity, recurrent mouth/genital ulcers, sicca symptoms, pleuritic chest pains, recurrent sinusitis/rhinitis, swallowing difficulty, hearing or visual changes recently. No h/o arterial/venous thrombosis in the past. Denies any tick bite, recent GI/ infection.          Review of Systems:   Review Of Systems  Constitutional: denies fever, chills, night sweats and weight loss.  Skin: She has psoriasis over her face, back, upper abdomen.  Eyes: No dryness or irritation in eyes. No episode of eye inflammation or redness.   Ears/Nose/Throat: no recurrent sinus infections.  Respiratory: No shortness of breath, dyspnea on exertion, cough, or hemoptysis  Cardiovascular: no chest pain or palpitations.  Gastrointestinal: no nausea, vomiting, abdominal pain.  Normal bowel movements.  Genitourinary: no dysuria, frequency  or hematuria.  Musculoskeletal: as in HPI  Neurologic: no numbness, tingling.  Psychiatric: no mood disorders.  Hematologic/Lymphatic/Immunologic: no history of easy bruising, petechia or purpura.  No abnormal bleeding.   Endocrine: no h/o thyroid disease or Diabetes.              Past Medical History:     Past Medical  History:   Diagnosis Date     Bell's palsy 7 y/o    Due to trauma, neg Lyme     Depression     Venice Meyer, psychologist, [facility] in [place]     Depressive disorder      Major depression in complete remission (H) 7/7/2014     Psoriasis     scalp only     Psoriatic arthritis (H)      Past Surgical History:   Procedure Laterality Date     ADENOIDECTOMY       ARTHROSCOPY KNEE WITH RETINACULAR RELEASE Right 1/20/2017    Procedure: ARTHROSCOPY KNEE WITH RETINACULAR RELEASE MEDIAL OR LATERAL;  Surgeon: Yevgeniy Graf DO;  Location: [facility]     C NONSPECIFIC PROCEDURE  02/12/01    adenoidectomy     DENTAL SURGERY       MOUTH SURGERY              Social History:     Social History     Occupational History     Not on file.     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            Family History:     Family History   Problem Relation Age of Onset     Other - See Comments Father      benign nerve spinal tumor      Hypertension Father      DIABETES Maternal Grandmother      Depression Maternal Grandmother      Seizure Disorder Brother             Allergies:     Allergies   Allergen Reactions     No Known Drug Allergies             Medications:     Current Outpatient Prescriptions   Medication Sig Dispense Refill     predniSONE (DELTASONE) 5 MG tablet 15 mg x 1 week then 10 mg x 1 week and stay on 5 mg daily. 60 tablet 2     EPINEPHrine (EPIPEN/ADRENACLICK/OR ANY BX GENERIC EQUIV) 0.3 MG/0.3ML injection 2-pack Inject 0.3 mLs (0.3 mg) into the muscle as needed for anaphylaxis 0.6 mL 1     magnesium 200 MG TABS        Cyanocobalamin (B-12) 1000 MCG CAPS        medroxyPROGESTERone (DEPO-PROVERA) 150 MG/ML injection Inject 150 mg into the muscle every 3 months       Multiple Vitamin  "(MULTI-VITAMIN DAILY PO) Take 1 tablet by mouth daily Reported on 3/8/2017       clobetasol (TEMOVATE) 0.05 % cream Apply twice daily for 2 weeks, take 2 week break, then repeat as needed on trunk (Patient not taking: Reported on 1/25/2018) 120 g 1     calcipotriene (DOVONOX) 0.005 % cream Apply twice daily when not using clobetasol (Patient not taking: Reported on 1/25/2018) 100 g 1     triamcinolone (KENALOG) 0.1 % ointment Apply to the affected area of the skin twice a day with use of a moisturizer before. CAn use for 4 weeks straight then need 1 week break (Patient not taking: Reported on 1/25/2018) 454 g 3     triamcinolone (KENALOG) 0.5 % cream Apply sparingly to affected area two times daily as needed (Patient not taking: Reported on 1/25/2018) 30 g 0            Physical Exam:   Blood pressure 114/74, pulse 51, temperature 98.3  F (36.8  C), resp. rate 18, height 1.74 m (5' 8.5\"), weight 88.1 kg (194 lb 3.2 oz), SpO2 99 %, not currently breastfeeding.  Wt Readings from Last 4 Encounters:   01/25/18 88.1 kg (194 lb 3.2 oz)   10/25/17 83.9 kg (184 lb 14.4 oz) (95 %)*   10/17/17 83.9 kg (185 lb) (95 %)*   09/26/17 82.9 kg (182 lb 12.8 oz) (95 %)*     * Growth percentiles are based on CDC 2-20 Years data.       Constitutional: well-developed, appearing stated age; cooperative  Eyes: nl EOM, PERRLA, vision, conjunctiva, sclera  ENT: nl external ears, nose, hearing, lips, teeth, gums, throat  No mucous membrane lesions, normal saliva pool  Neck: no mass or thyroid enlargement  Resp: lungs clear to auscultation, nl to palpation  CV: RRR, no murmurs, rubs or gallops, no edema  GI: no ABD mass or tenderness, no HSM  : not tested  Lymph: no cervical, supraclavicular, inguinal or epitrochlear nodes    MS: All TMJ, neck, shoulder, elbow, wrist, MCP/PIP/DIP, spine, hip, knee, ankle, and foot MTP/IP joints were examined.      -- She has mild pain on palpation over the MCP, PIP joints bilaterally.  Mild tenderness over " bilateral wrists, left worse than the right.  Tenderness present over the left elbow.  -- No tenderness present over the medial or lateral epicondyles.  -- she has tenderness over bilateral ankles, MTP joints 2-5.   -- No knee effusions or tenderness present  -- No active synovitis or deformity. Full ROM.  Normal  strength.   -- No dactylitis,  tenosynovitis, enthesopathy.    Skin: no nail pitting, alopecia, rash, nodules or lesions.-Pink papules on abdomen and back with faint silvery scale,   -No other lesions of concern on areas examined.  Neuro: nl cranial nerves, strength, sensation, DTRs.   Psych: nl judgement, orientation, memory, affect.         Data:     Results for orders placed or performed in visit on 10/26/17   Echo Cardiac - HIM Scan   Result Value Ref Range    Ejection Fraction 53     Narrative    TRANSTHORACIC ECHOCARDIOGRAM CENTRACARE       Recent Labs   Lab Test  10/17/17   2032  07/24/17   1021  05/10/17   2255  04/24/17   1458  03/08/17   1515   10/03/13   1014  09/13/13   1527   WBC  8.8  7.2  8.2  8.9  8.8   < >  4.9  6.0   RBC  4.32  4.36  3.98  4.56  4.67   < >  4.10  4.08   HGB  12.3  12.7  11.5*  13.2  13.1   < >  12.0  12.0   HCT  38.1  37.8  35.4  40.5  40.3   < >  35.9  36.4   MCV  88  87  89  89  86   < >  88  89   RDW  12.4  12.7  12.3  12.6  13.1   < >  12.0  12.2   PLT  229  226  241  271  244   < >  208  183   ALBUMIN   --   4.0  3.7  4.1  4.4   < >   --   4.5   CRP   --    --    --    --   <2.9   --   8.5*  <5.0   BUN  11  15  11  10  11   < >   --   13    < > = values in this interval not displayed.      Recent Labs   Lab Test  10/17/17   2032  03/08/17   1515  04/07/16   1528   TSH  1.34  1.58  1.33     Hemoglobin   Date Value Ref Range Status   10/17/2017 12.3 11.7 - 15.7 g/dL Final   07/24/2017 12.7 11.7 - 15.7 g/dL Final   05/10/2017 11.5 (L) 11.7 - 15.7 g/dL Final     Urea Nitrogen   Date Value Ref Range Status   10/17/2017 11 7 - 30 mg/dL Final   07/24/2017 15 7 - 30  mg/dL Final   05/10/2017 11 7 - 30 mg/dL Final     Sed Rate   Date Value Ref Range Status   03/08/2017 7 0 - 20 mm/h Final   04/07/2016 7 0 - 20 mm/h Final   10/03/2013 7 0 - 15 mm/h Final     CRP Inflammation   Date Value Ref Range Status   03/08/2017 <2.9 0.0 - 8.0 mg/L Final   10/03/2013 8.5 (H) 0.0 - 8.0 mg/L Final   09/13/2013 <5.0 0.0 - 8.0 mg/L Final     AST   Date Value Ref Range Status   07/24/2017 14 0 - 35 U/L Final   05/10/2017 16 0 - 35 U/L Final   04/24/2017 33 0 - 35 U/L Final     Albumin   Date Value Ref Range Status   07/24/2017 4.0 3.4 - 5.0 g/dL Final   05/10/2017 3.7 3.4 - 5.0 g/dL Final   04/24/2017 4.1 3.4 - 5.0 g/dL Final     Alkaline Phosphatase   Date Value Ref Range Status   07/24/2017 71 40 - 150 U/L Final   05/10/2017 67 40 - 150 U/L Final   04/24/2017 75 40 - 150 U/L Final     ALT   Date Value Ref Range Status   07/24/2017 26 0 - 50 U/L Final   05/10/2017 27 0 - 50 U/L Final   04/24/2017 60 (H) 0 - 50 U/L Final     Rheumatoid Factor   Date Value Ref Range Status   09/09/2013 10 0 - 14 IU/mL Final     Recent Labs   Lab Test  10/17/17   2032  07/24/17   1021  05/10/17   2255  04/24/17   1458  03/08/17   1515  04/07/16   1528   WBC  8.8  7.2  8.2  8.9  8.8   --    HGB  12.3  12.7  11.5*  13.2  13.1   --    HCT  38.1  37.8  35.4  40.5  40.3   --    MCV  88  87  89  89  86   --    PLT  229  226  241  271  244   --    BUN  11  15  11  10  11  13   TSH  1.34   --    --    --   1.58  1.33   AST   --   14  16  33  16  14   ALT   --   26  27  60*  24  23   ALKPHOS   --   71  67  75  64  61       Reviewed Rheumatology lab flowsheet    Maurilio Pereira MD  [facility]  Department of Rheumatology & Autoimmune Disorders  [facility]: [phone]   Pager - [phone]

## 2018-01-25 NOTE — LETTER
1/25/2018      RE: Mary Valdez  [address]  [place] [address]     Dear Colleague,    Thank you for referring your patient, Mary Valdez, to the [facility]. Please see a copy of my visit note below.    Rheumatology Clinic Visit     Mary Valdez MRN# [id number]   YOB: 1995 Age: 22 year old     Date of Visit: January 25, 2018  Primary care provider: Estela Gray          Assessment and Plan:   Assessment     -- Psoriatic arthritis  -- Achilles tendinitis  -- Guttate psoriasis    Ms Valdez is 22-year-old female seen in clinic for evaluation of psoriatic arthritis. She has 5 year history of joint pains which is getting worse in the last 6 months. The joints involved are hands, wrists, ankles and toes.  She denies significant pain in her lower back.  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  She was seen by Dr. Cody and was recommended to try Humira or Otezla but it got denied by her insurance, moreover she also does not want to take Humira because of the side effects.  She also does not want to take methotrexate due to possible pregnancy plans.    She was seen by another Dermatologist at [place] for second opinion and will be starting Ixekizumab (Anti-interleukin 17A Monoclonal Antibody ).  It needs to be approved by her insurance.  I recommended her to get her vaccines including flu vaccine and pneumonia vaccine (PCV -13, PPSV  -23) before starting Ixekizumab but she does not want to take vaccines.     She will follow-up with her Dermatologist.  As per the reports Ixekizumab can work for psoriatic arthritis as well but it is not FDA approved. I will follow her back in clinic in 4 months to see her response.  Today I will get rheumatoid serologies to make sure she does not have overlapping rheumatoid arthritis.    Plan    -- Will do blood tests today -rheumatoid factor, anti-CCP  antibody    -- She is going to follow up with  dermatologist and will start Taltz SQ injections.     -- Will give prednisone 5 mg. Start with 15 mg x 1 week and decrease to 10 mg and then to 5 mg daily.     -- RTC in 4 months               Active Problem List:     Patient Active Problem List    Diagnosis Date Noted     Psoriatic arthritis (H) 01/25/2018     Priority: Medium     Allergic reaction to insect sting, accidental or unintentional, initial encounter 09/27/2017     Priority: Medium     Hypoglycemia, unspecified 07/10/2017     Priority: Medium     Sprain of lateral collateral ligament of right knee, initial encounter 06/21/2017     Priority: Medium     Segmental dysfunction of thoracic region 05/24/2017     Priority: Medium     Segmental dysfunction of lumbar region 05/24/2017     Priority: Medium     Segmental dysfunction of sacral region 05/24/2017     Priority: Medium     Flank pain 05/24/2017     Priority: Medium     Maltracking of right patella 01/05/2017     Priority: Medium     NENO (generalized anxiety disorder) 05/05/2016     Priority: Medium     PTSD (post-traumatic stress disorder) 05/05/2016     Priority: Medium     Anxiety state 07/07/2014     Priority: Medium     Problem list name updated by automated process. Provider to review       Major depression in complete remission (H) 07/07/2014     Priority: Medium     Migratory pain 10/13/2013     Priority: Medium     Psoriasis 07/23/2010     Priority: Medium            History of Present Illness:   Mary Valdez is a 22 year old female with PMH of psoriasis, psoriatic arthritis seen in the clinic in consultation at request of Dr Luna for management and evaluation of psoriatic arthritis.    For 5 years she has pain in her joints but getting worse in 6 months.  Joints involved are mainly hands, toes, right ankle and knee but has generalized fatigue and malaise.  She has difficulty getting out of chair and couch.  Has some morning stiffness for  couple hours.  She takes ibuprofen and Tylenol which does not seem to help a lot.  She denies any history of dactylitis but does have Achilles tendinitis and plantar fasciitis.  Denies history of uveitis or significant low back pain.  She has extensive family history of psoriatic arthritis in her 3 aunts and grand father.  No family history of rheumatoid arthritis or other autoimmune connective tissue disease.    She was seen by Dr. Cody and Dr Luna in dermatology and was recommended to try methotrexate, but due to pregnancy plans she refused. Later Humira and Otezla were recommended as they are safer in pregnancy but her insurance denied it.  Moreover she is worried about side effects of Humira. She did see another dermatologist in [place] for second opinion and will be starting Ixekizumab subcu injection.  It needs to be pre approved by her insurance and she is waiting for it.     No history of ulcerative colitis or chron's disease. No h/o iritis, enthesitis, finger or toe swelling like dactylitis. Denies any raynauds, malar rash, photosensitivity, recurrent mouth/genital ulcers, sicca symptoms, pleuritic chest pains, recurrent sinusitis/rhinitis, swallowing difficulty, hearing or visual changes recently. No h/o arterial/venous thrombosis in the past. Denies any tick bite, recent GI/ infection.          Review of Systems:   Review Of Systems  Constitutional: denies fever, chills, night sweats and weight loss.  Skin: She has psoriasis over her face, back, upper abdomen.  Eyes: No dryness or irritation in eyes. No episode of eye inflammation or redness.   Ears/Nose/Throat: no recurrent sinus infections.  Respiratory: No shortness of breath, dyspnea on exertion, cough, or hemoptysis  Cardiovascular: no chest pain or palpitations.  Gastrointestinal: no nausea, vomiting, abdominal pain.  Normal bowel movements.  Genitourinary: no dysuria, frequency  or hematuria.  Musculoskeletal: as in HPI  Neurologic: no numbness,  tingling.  Psychiatric: no mood disorders.  Hematologic/Lymphatic/Immunologic: no history of easy bruising, petechia or purpura.  No abnormal bleeding.   Endocrine: no h/o thyroid disease or Diabetes.              Past Medical History:     Past Medical History:   Diagnosis Date     Bell's palsy 5 y/o    Due to trauma, neg Lyme     Depression     Venice Meyer, psychologist, [facility] in [place]     Depressive disorder      Major depression in complete remission (H) 7/7/2014     Psoriasis     scalp only     Psoriatic arthritis (H)      Past Surgical History:   Procedure Laterality Date     ADENOIDECTOMY       ARTHROSCOPY KNEE WITH RETINACULAR RELEASE Right 1/20/2017    Procedure: ARTHROSCOPY KNEE WITH RETINACULAR RELEASE MEDIAL OR LATERAL;  Surgeon: Yevgeniy Graf DO;  Location: [facility] OR     C NONSPECIFIC PROCEDURE  02/12/01    adenoidectomy     DENTAL SURGERY       MOUTH SURGERY              Social History:     Social History     Occupational History     Not on file.     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            Family History:     Family History   Problem Relation Age of Onset     Other - See Comments Father      benign nerve spinal tumor      Hypertension Father      DIABETES Maternal Grandmother      Depression Maternal Grandmother      Seizure Disorder Brother             Allergies:     Allergies   Allergen Reactions     No Known Drug Allergies             Medications:     Current Outpatient Prescriptions   Medication Sig Dispense Refill     predniSONE (DELTASONE) 5 MG tablet 15 mg x 1 week then 10 mg x 1 week and stay on 5 mg daily. 60 tablet 2     EPINEPHrine (EPIPEN/ADRENACLICK/OR ANY BX GENERIC EQUIV) 0.3 MG/0.3ML injection 2-pack Inject 0.3 mLs (0.3 mg) into  "the muscle as needed for anaphylaxis 0.6 mL 1     magnesium 200 MG TABS        Cyanocobalamin (B-12) 1000 MCG CAPS        medroxyPROGESTERone (DEPO-PROVERA) 150 MG/ML injection Inject 150 mg into the muscle every 3 months       Multiple Vitamin (MULTI-VITAMIN DAILY PO) Take 1 tablet by mouth daily Reported on 3/8/2017       clobetasol (TEMOVATE) 0.05 % cream Apply twice daily for 2 weeks, take 2 week break, then repeat as needed on trunk (Patient not taking: Reported on 1/25/2018) 120 g 1     calcipotriene (DOVONOX) 0.005 % cream Apply twice daily when not using clobetasol (Patient not taking: Reported on 1/25/2018) 100 g 1     triamcinolone (KENALOG) 0.1 % ointment Apply to the affected area of the skin twice a day with use of a moisturizer before. CAn use for 4 weeks straight then need 1 week break (Patient not taking: Reported on 1/25/2018) 454 g 3     triamcinolone (KENALOG) 0.5 % cream Apply sparingly to affected area two times daily as needed (Patient not taking: Reported on 1/25/2018) 30 g 0            Physical Exam:   Blood pressure 114/74, pulse 51, temperature 98.3  F (36.8  C), resp. rate 18, height 1.74 m (5' 8.5\"), weight 88.1 kg (194 lb 3.2 oz), SpO2 99 %, not currently breastfeeding.  Wt Readings from Last 4 Encounters:   01/25/18 88.1 kg (194 lb 3.2 oz)   10/25/17 83.9 kg (184 lb 14.4 oz) (95 %)*   10/17/17 83.9 kg (185 lb) (95 %)*   09/26/17 82.9 kg (182 lb 12.8 oz) (95 %)*     * Growth percentiles are based on CDC 2-20 Years data.       Constitutional: well-developed, appearing stated age; cooperative  Eyes: nl EOM, PERRLA, vision, conjunctiva, sclera  ENT: nl external ears, nose, hearing, lips, teeth, gums, throat  No mucous membrane lesions, normal saliva pool  Neck: no mass or thyroid enlargement  Resp: lungs clear to auscultation, nl to palpation  CV: RRR, no murmurs, rubs or gallops, no edema  GI: no ABD mass or tenderness, no HSM  : not tested  Lymph: no cervical, supraclavicular, inguinal " or epitrochlear nodes    MS: All TMJ, neck, shoulder, elbow, wrist, MCP/PIP/DIP, spine, hip, knee, ankle, and foot MTP/IP joints were examined.      -- She has mild pain on palpation over the MCP, PIP joints bilaterally.  Mild tenderness over bilateral wrists, left worse than the right.  Tenderness present over the left elbow.  -- No tenderness present over the medial or lateral epicondyles.  -- she has tenderness over bilateral ankles, MTP joints 2-5.   -- No knee effusions or tenderness present  -- No active synovitis or deformity. Full ROM.  Normal  strength.   -- No dactylitis,  tenosynovitis, enthesopathy.    Skin: no nail pitting, alopecia, rash, nodules or lesions.-Pink papules on abdomen and back with faint silvery scale,   -No other lesions of concern on areas examined.  Neuro: nl cranial nerves, strength, sensation, DTRs.   Psych: nl judgement, orientation, memory, affect.         Data:     Results for orders placed or performed in visit on 10/26/17   Echo Cardiac - HIM Scan   Result Value Ref Range    Ejection Fraction 53     Narrative    TRANSTHORACIC ECHOCARDIOGRAM CENTRACARE       Recent Labs   Lab Test  10/17/17   2032  07/24/17   1021  05/10/17   2255  04/24/17   1458  03/08/17   1515   10/03/13   1014  09/13/13   1527   WBC  8.8  7.2  8.2  8.9  8.8   < >  4.9  6.0   RBC  4.32  4.36  3.98  4.56  4.67   < >  4.10  4.08   HGB  12.3  12.7  11.5*  13.2  13.1   < >  12.0  12.0   HCT  38.1  37.8  35.4  40.5  40.3   < >  35.9  36.4   MCV  88  87  89  89  86   < >  88  89   RDW  12.4  12.7  12.3  12.6  13.1   < >  12.0  12.2   PLT  229  226  241  271  244   < >  208  183   ALBUMIN   --   4.0  3.7  4.1  4.4   < >   --   4.5   CRP   --    --    --    --   <2.9   --   8.5*  <5.0   BUN  11  15  11  10  11   < >   --   13    < > = values in this interval not displayed.      Recent Labs   Lab Test  10/17/17   2032 03/08/17   1515  04/07/16   1528   TSH  1.34  1.58  1.33     Hemoglobin   Date Value Ref Range  Status   10/17/2017 12.3 11.7 - 15.7 g/dL Final   07/24/2017 12.7 11.7 - 15.7 g/dL Final   05/10/2017 11.5 (L) 11.7 - 15.7 g/dL Final     Urea Nitrogen   Date Value Ref Range Status   10/17/2017 11 7 - 30 mg/dL Final   07/24/2017 15 7 - 30 mg/dL Final   05/10/2017 11 7 - 30 mg/dL Final     Sed Rate   Date Value Ref Range Status   03/08/2017 7 0 - 20 mm/h Final   04/07/2016 7 0 - 20 mm/h Final   10/03/2013 7 0 - 15 mm/h Final     CRP Inflammation   Date Value Ref Range Status   03/08/2017 <2.9 0.0 - 8.0 mg/L Final   10/03/2013 8.5 (H) 0.0 - 8.0 mg/L Final   09/13/2013 <5.0 0.0 - 8.0 mg/L Final     AST   Date Value Ref Range Status   07/24/2017 14 0 - 35 U/L Final   05/10/2017 16 0 - 35 U/L Final   04/24/2017 33 0 - 35 U/L Final     Albumin   Date Value Ref Range Status   07/24/2017 4.0 3.4 - 5.0 g/dL Final   05/10/2017 3.7 3.4 - 5.0 g/dL Final   04/24/2017 4.1 3.4 - 5.0 g/dL Final     Alkaline Phosphatase   Date Value Ref Range Status   07/24/2017 71 40 - 150 U/L Final   05/10/2017 67 40 - 150 U/L Final   04/24/2017 75 40 - 150 U/L Final     ALT   Date Value Ref Range Status   07/24/2017 26 0 - 50 U/L Final   05/10/2017 27 0 - 50 U/L Final   04/24/2017 60 (H) 0 - 50 U/L Final     Rheumatoid Factor   Date Value Ref Range Status   09/09/2013 10 0 - 14 IU/mL Final     Recent Labs   Lab Test  10/17/17   2032  07/24/17   1021  05/10/17   2255  04/24/17   1458  03/08/17   1515  04/07/16   1528   WBC  8.8  7.2  8.2  8.9  8.8   --    HGB  12.3  12.7  11.5*  13.2  13.1   --    HCT  38.1  37.8  35.4  40.5  40.3   --    MCV  88  87  89  89  86   --    PLT  229  226  241  271  244   --    BUN  11  15  11  10  11  13   TSH  1.34   --    --    --   1.58  1.33   AST   --   14  16  33  16  14   ALT   --   26  27  60*  24  23   ALKPHOS   --   71  67  75  64  61       Reviewed Rheumatology lab flowsheet    Maurilio Pereira MD  [facility]  Department of Rheumatology & Autoimmune Disorders  [facility] [place]:  [phone]   Pager - [phone]        Again, thank you for allowing me to participate in the care of your patient.      Sincerely,    Maurilio Pereira MD

## 2018-01-26 LAB
CCP AB SER IA-ACNC: 1 U/ML
RHEUMATOID FACT SER NEPH-ACNC: <20 IU/ML (ref 0–20)

## 2018-02-06 ENCOUNTER — OFFICE VISIT (OUTPATIENT)
Dept: FAMILY MEDICINE | Facility: OTHER | Age: 23
End: 2018-02-06
Payer: COMMERCIAL

## 2018-02-06 VITALS
SYSTOLIC BLOOD PRESSURE: 126 MMHG | HEIGHT: 69 IN | WEIGHT: 191.5 LBS | DIASTOLIC BLOOD PRESSURE: 62 MMHG | RESPIRATION RATE: 16 BRPM | HEART RATE: 76 BPM | TEMPERATURE: 98 F | BODY MASS INDEX: 28.36 KG/M2

## 2018-02-06 DIAGNOSIS — K62.89 RECTAL PAIN: Primary | ICD-10-CM

## 2018-02-06 DIAGNOSIS — R14.3 FLATULENCE, ERUCTATION AND GAS PAIN: ICD-10-CM

## 2018-02-06 DIAGNOSIS — K13.79 MOUTH SORES: ICD-10-CM

## 2018-02-06 DIAGNOSIS — R19.4 CHANGE IN BOWEL HABITS: ICD-10-CM

## 2018-02-06 DIAGNOSIS — R14.2 FLATULENCE, ERUCTATION AND GAS PAIN: ICD-10-CM

## 2018-02-06 DIAGNOSIS — R10.84 ABDOMINAL PAIN, GENERALIZED: ICD-10-CM

## 2018-02-06 DIAGNOSIS — R19.5 MUCUS IN STOOL: ICD-10-CM

## 2018-02-06 DIAGNOSIS — R14.1 FLATULENCE, ERUCTATION AND GAS PAIN: ICD-10-CM

## 2018-02-06 PROCEDURE — 36415 COLL VENOUS BLD VENIPUNCTURE: CPT | Performed by: STUDENT IN AN ORGANIZED HEALTH CARE EDUCATION/TRAINING PROGRAM

## 2018-02-06 PROCEDURE — 99214 OFFICE O/P EST MOD 30 MIN: CPT | Performed by: STUDENT IN AN ORGANIZED HEALTH CARE EDUCATION/TRAINING PROGRAM

## 2018-02-06 PROCEDURE — 83516 IMMUNOASSAY NONANTIBODY: CPT | Performed by: STUDENT IN AN ORGANIZED HEALTH CARE EDUCATION/TRAINING PROGRAM

## 2018-02-06 PROCEDURE — 83516 IMMUNOASSAY NONANTIBODY: CPT | Mod: 91 | Performed by: STUDENT IN AN ORGANIZED HEALTH CARE EDUCATION/TRAINING PROGRAM

## 2018-02-06 ASSESSMENT — PATIENT HEALTH QUESTIONNAIRE - PHQ9
SUM OF ALL RESPONSES TO PHQ QUESTIONS 1-9: 4
SUM OF ALL RESPONSES TO PHQ QUESTIONS 1-9: 4
10. IF YOU CHECKED OFF ANY PROBLEMS, HOW DIFFICULT HAVE THESE PROBLEMS MADE IT FOR YOU TO DO YOUR WORK, TAKE CARE OF THINGS AT HOME, OR GET ALONG WITH OTHER PEOPLE: SOMEWHAT DIFFICULT

## 2018-02-06 ASSESSMENT — PAIN SCALES - GENERAL: PAINLEVEL: MODERATE PAIN (5)

## 2018-02-06 NOTE — PROGRESS NOTES
"  SUBJECTIVE:   Mary Valdez is a 22 year old female who presents to clinic today for the following health issues:    HPI  Concern - Digestive problems  Onset: Years    Description:   Patient states she has problems with canker sores in mouth (always in gumlines and last a couple weeks) - look like white dot, hemorrhoids, stomach cramps - feels like menstrual cramps, heart burn, stomach feels like it is on fire, stools are colorful and mucousy. Soft, \"fluffy\" but comes and goes.     Intensity: varies on days.     Progression of Symptoms:  worsening and intermittent    Accompanying Signs & Symptoms:  Cramps, hemorrhoids, heart burn, increased gas    Previous history of similar problem:   No    Precipitating factors:   Worsened by: N/A    Alleviating factors:  Improved by: N/A    Therapies Tried and outcome: None.     Patient states she stopped using dairy milk and started using almond milk to see if there is a difference and did not make a difference.    Patient has not gotten depo shot, states she is getting ready for a family and has discussed with boyfriend.     Used to go a few times per week  Mom has had an iffy tummy    Diet is healthier if anything    Painful with stools, thinks it is hemorrhoids, no blood, fat in stool    History of psoriasis - saw dermatologist at the [facility], then went to [place] [facility] - October, as going to put on Taltz and     Rheumatologist at the [facility] - arthritis is aggressive for her age    Answers for HPI/ROS submitted by the patient on 2/6/2018   If you checked off any problems, how difficult have these problems made it for you to do your work, take care of things at home, or get along with other people?: Somewhat difficult  PHQ9 TOTAL SCORE: 4      Problem list and histories reviewed & adjusted, as indicated.  Additional history: as documented    Labs reviewed in EPIC    ROS:  Constitutional, HEENT, cardiovascular, pulmonary, gi and gu systems are negative, except as " "otherwise noted.    OBJECTIVE:     /62 (BP Location: Left arm, Patient Position: Sitting, Cuff Size: Adult Regular)  Pulse 76  Temp 98  F (36.7  C) (Temporal)  Resp 16  Ht 5' 8.5\" (1.74 m)  Wt 191 lb 8 oz (86.9 kg)  BMI 28.69 kg/m2  Body mass index is 28.69 kg/(m^2).  GENERAL: healthy, alert and no distress  RESP: lungs clear to auscultation - no rales, rhonchi or wheezes  CV: regular rate and rhythm, normal S1 S2, no S3 or S4, no murmur, click or rub  ABDOMEN: soft, nontender, no hepatosplenomegaly, no masses and bowel sounds normal  MS: no gross musculoskeletal defects noted, no edema  SKIN: no suspicious lesions or rashes  NEURO: Normal strength and tone, mentation intact and speech normal  PSYCH: mentation appears normal, affect normal/bright    Diagnostic Test Results:  Ttg, pending    ASSESSMENT/PLAN:     1. Rectal pain  Patient has history of psoriasis with change in stools over the past 6-8 months. I am concerned about colitis due to the frequent stools with mucus, mouth sores, abdominal and rectal pain so I have referred her to gastroenterology. We will also check for Celiac's disease with a blood test.   - GASTROENTEROLOGY ADULT REF CONSULT ONLY  - Tissue transglutaminase valerie IgA and IgG    2. Mucus in stool  - GASTROENTEROLOGY ADULT REF CONSULT ONLY  - Tissue transglutaminase valerie IgA and IgG    3. Mouth sores  - GASTROENTEROLOGY ADULT REF CONSULT ONLY    4. Change in bowel habits  - GASTROENTEROLOGY ADULT REF CONSULT ONLY    5. Abdominal pain, generalized  - GASTROENTEROLOGY ADULT REF CONSULT ONLY  - Tissue transglutaminase valerie IgA and IgG    6. Flatulence, eructation and gas pain  - GASTROENTEROLOGY ADULT REF CONSULT ONLY    Charisse Alexander, MERVIN [facility]    Greater than 50% of 30 minute visit were spent on counseling or coordination of care regarding change in bowel habits, mouth sores, abdominal pain, bloating/gas.     Answers for HPI/ROS submitted by the patient " on 2/6/2018   If you checked off any problems, how difficult have these problems made it for you to do your work, take care of things at home, or get along with other people?: Somewhat difficult  PHQ9 TOTAL SCORE: 4

## 2018-02-06 NOTE — MR AVS SNAPSHOT
After Visit Summary   2/6/2018    Mary Valdez    MRN: [id number]           Patient Information     Date Of Birth          1995        Visit Information        Provider Department      2/6/2018 10:00 AM Charisse Alexander APRN [facility]        Today's Diagnoses     Mucus in stool    -  1    Rectal pain        Change in bowel habits        Mouth sores        Abdominal pain, generalized        Flatulence, eructation and gas pain           Follow-ups after your visit        Additional Services     GASTROENTEROLOGY ADULT REF CONSULT ONLY       Preferred Location: [facility]: [phone] and [facility] [phone]      Please be aware that coverage of these services is subject to the terms and limitations of your health insurance plan.  Call member services at your health plan with any benefit or coverage questions.  Any procedures must be performed at a [facility] facility OR coordinated by your clinic's referral office.    Please bring the following with you to your appointment:    (1) Any X-Rays, CTs or MRIs which have been performed.  Contact the facility where they were done to arrange for  prior to your scheduled appointment.    (2) List of current medications   (3) This referral request   (4) Any documents/labs given to you for this referral                  Your next 10 appointments already scheduled     May 22, 2018 11:00 AM CDT   Return Visit with Maurilio Pereira MD   [facility])    [address] [place] [id number] [phone]            May 24, 2018 12:15 PM CDT   Return Visit with Anjana Luna MD   [facility])    [address] [place] [id number] [phone]              Who to contact     If you have questions or need follow up information about today's clinic visit or your schedule please contact  "[facility] directly at [phone].  Normal or non-critical lab and imaging results will be communicated to you by MyChart, letter or phone within 4 business days after the clinic has received the results. If you do not hear from us within 7 days, please contact the clinic through [facility] [employer] or phone. If you have a critical or abnormal lab result, we will notify you by phone as soon as possible.  Submit refill requests through [employer] or call your pharmacy and they will forward the refill request to us. Please allow 3 business days for your refill to be completed.          Additional Information About Your Visit        [facility] Information     [employer] gives you secure access to your electronic health record. If you see a primary care provider, you can also send messages to your care team and make appointments. If you have questions, please call your primary care clinic.  If you do not have a primary care provider, please call [phone] and they will assist you.        Care EveryWhere ID     This is your Care EveryWhere ID. This could be used by other organizations to access your [facility] medical records  [id number]        Your Vitals Were     Pulse Temperature Respirations Height BMI (Body Mass Index)       76 98  F (36.7  C) (Temporal) 16 5' 8.5\" (1.74 m) 28.69 kg/m2        Blood Pressure from Last 3 Encounters:   02/06/18 126/62   01/25/18 114/74   10/25/17 112/60    Weight from Last 3 Encounters:   02/06/18 191 lb 8 oz (86.9 kg)   01/25/18 194 lb 3.2 oz (88.1 kg)   10/25/17 184 lb 14.4 oz (83.9 kg) (95 %)*     * Growth percentiles are based on CDC 2-20 Years data.              We Performed the Following     GASTROENTEROLOGY ADULT REF CONSULT ONLY     Tissue transglutaminase valerie IgA and IgG        Primary Care Provider Office Phone # Fax #    Estela Mcfadden Mai, [phone] [address] [place] [address]        Equal Access to Services     KAYY SYLVESTER AH: Celena raoms " Rositamarcelo, rianda luqadaha, qaanjanata kapatience roe, shari abiin hayaan mandieming merissaalexis laKalpanasherry coral. So [facility] [phone].    ATENCIÓN: Si quique christy, tiene a garcia disposición servicios gratuitos de asistencia lingüística. Neva al [phone].    We comply with applicable federal civil rights laws and [place] laws. We do not discriminate on the basis of race, color, national origin, age, disability, sex, sexual orientation, or gender identity.            Thank you!     Thank you for choosing [facility]  for your care. Our goal is always to provide you with excellent care. Hearing back from our patients is one way we can continue to improve our services. Please take a few minutes to complete the written survey that you may receive in the mail after your visit with us. Thank you!             Your Updated Medication List - Protect others around you: Learn how to safely use, store and throw away your medicines at www.disposemymeds.org.          This list is accurate as of 2/6/18 11:10 AM.  Always use your most recent med list.                   Brand Name Dispense Instructions for use Diagnosis    B-12 1000 MCG Caps           calcipotriene 0.005 % cream    DOVONOX    100 g    Apply twice daily when not using clobetasol    Rash       clobetasol 0.05 % cream    TEMOVATE    120 g    Apply twice daily for 2 weeks, take 2 week break, then repeat as needed on trunk    Rash       EPINEPHrine 0.3 MG/0.3ML injection 2-pack    EPIPEN/ADRENACLICK/or ANY BX GENERIC EQUIV    0.6 mL    Inject 0.3 mLs (0.3 mg) into the muscle as needed for anaphylaxis    Allergic reaction to insect sting, accidental or unintentional, initial encounter, Cough, SOB (shortness of breath)       magnesium 200 MG Tabs           medroxyPROGESTERone 150 MG/ML injection    DEPO-PROVERA     Inject 150 mg into the muscle every 3 months        MULTI-VITAMIN DAILY PO      Take 1 tablet by mouth daily Reported on 3/8/2017        predniSONE 5 MG  tablet    DELTASONE    60 tablet    15 mg x 1 week then 10 mg x 1 week and stay on 5 mg daily.    Inflammatory arthritis       * triamcinolone 0.5 % cream    KENALOG    30 g    Apply sparingly to affected area two times daily as needed    Psoriasis       * triamcinolone 0.1 % ointment    KENALOG    454 g    Apply to the affected area of the skin twice a day with use of a moisturizer before. CAn use for 4 weeks straight then need 1 week break    Guttate psoriasis, Psoriatic arthritis (H)       * Notice:  This list has 2 medication(s) that are the same as other medications prescribed for you. Read the directions carefully, and ask your doctor or other care provider to review them with you.

## 2018-02-07 LAB
TTG IGA SER-ACNC: <1 U/ML
TTG IGG SER-ACNC: 1 U/ML

## 2018-02-07 ASSESSMENT — PATIENT HEALTH QUESTIONNAIRE - PHQ9: SUM OF ALL RESPONSES TO PHQ QUESTIONS 1-9: 4

## 2018-02-20 ENCOUNTER — TRANSFERRED RECORDS (OUTPATIENT)
Dept: HEALTH INFORMATION MANAGEMENT | Facility: CLINIC | Age: 23
End: 2018-02-20

## 2018-02-20 ENCOUNTER — MEDICAL CORRESPONDENCE (OUTPATIENT)
Dept: HEALTH INFORMATION MANAGEMENT | Facility: CLINIC | Age: 23
End: 2018-02-20

## 2018-02-28 ENCOUNTER — HOSPITAL ENCOUNTER (OUTPATIENT)
Dept: ULTRASOUND IMAGING | Facility: CLINIC | Age: 23
Discharge: HOME | End: 2018-02-28
Payer: COMMERCIAL

## 2018-02-28 DIAGNOSIS — R10.13 DYSPEPSIA: ICD-10-CM

## 2018-02-28 DIAGNOSIS — Z71.3 DIETARY COUNSELING AND SURVEILLANCE: ICD-10-CM

## 2018-02-28 DIAGNOSIS — K92.1 HEMATOCHEZIA: ICD-10-CM

## 2018-02-28 DIAGNOSIS — K62.89 ANAL PAIN: ICD-10-CM

## 2018-02-28 DIAGNOSIS — R19.5 LOOSE STOOLS: ICD-10-CM

## 2018-02-28 PROCEDURE — 76705 ECHO EXAM OF ABDOMEN: CPT

## 2018-03-01 ENCOUNTER — NURSE TRIAGE (OUTPATIENT)
Dept: NURSING | Facility: CLINIC | Age: 23
End: 2018-03-01

## 2018-03-02 NOTE — TELEPHONE ENCOUNTER
"Is being worked up for crohns. No diagnosis yet. Has been having right lower quadrant abdominal pain off and on for about 5 weeks.  The pain is getting progressively worse yet still subsides at times. Not sure if she has a fever.  Has felt hot and had chills too. I recommended ER. Because of her other ongoing GI issues Mary is unsure of what she'll do.  Reason for Disposition    Blood in bowel movements   (Exception: blood on surface of BM with constipation)    Additional Information    Negative: Shock suspected (e.g., cold/pale/clammy skin, too weak to stand, low BP, rapid pulse)    Negative: Difficult to awaken or acting confused  (e.g., disoriented, slurred speech)    Negative: Passed out (i.e., lost consciousness, collapsed and was not responding)    Negative: Sounds like a life-threatening emergency to the triager    Negative: Chest pain    Negative: Pain is mainly in upper abdomen  (if needed ask: \"is it mainly above the belly button?\")    Negative: Followed an abdomen (stomach) injury    Negative: [1] Abdominal pain AND [2] pregnant < 20 weeks    Negative: [1] Abdominal pain AND [2] pregnant > 20 weeks    Negative: [1] Abdominal pain AND [2] postpartum < 1 month since delivery    Negative: [1] SEVERE pain (e.g., excruciating) AND [2] present > 1 hour    Negative: [1] SEVERE pain AND [2] age > 60    Negative: [1] Vomiting AND [2] contains red blood or black (\"coffee ground\") material  (Exception: few red streaks in vomit that only happened once)    Protocols used: ABDOMINAL PAIN - FEMALE-ADULT-BINU GHOSH RN [facility]     "

## 2018-03-08 ENCOUNTER — TRANSFERRED RECORDS (OUTPATIENT)
Dept: HEALTH INFORMATION MANAGEMENT | Facility: CLINIC | Age: 23
End: 2018-03-08

## 2018-03-26 ENCOUNTER — RADIANT APPOINTMENT (OUTPATIENT)
Dept: GENERAL RADIOLOGY | Facility: CLINIC | Age: 23
End: 2018-03-26
Attending: PEDIATRICS
Payer: COMMERCIAL

## 2018-03-26 ENCOUNTER — OFFICE VISIT (OUTPATIENT)
Dept: ORTHOPEDICS | Facility: CLINIC | Age: 23
End: 2018-03-26
Payer: COMMERCIAL

## 2018-03-26 VITALS
BODY MASS INDEX: 28.44 KG/M2 | HEIGHT: 69 IN | DIASTOLIC BLOOD PRESSURE: 78 MMHG | SYSTOLIC BLOOD PRESSURE: 122 MMHG | WEIGHT: 192 LBS

## 2018-03-26 DIAGNOSIS — S49.91XA INJURY OF RIGHT SHOULDER, INITIAL ENCOUNTER: Primary | ICD-10-CM

## 2018-03-26 DIAGNOSIS — M25.311 SHOULDER INSTABILITY, RIGHT: ICD-10-CM

## 2018-03-26 DIAGNOSIS — S49.91XA INJURY OF RIGHT SHOULDER, INITIAL ENCOUNTER: ICD-10-CM

## 2018-03-26 PROCEDURE — 99203 OFFICE O/P NEW LOW 30 MIN: CPT | Performed by: PEDIATRICS

## 2018-03-26 NOTE — LETTER
"    3/26/2018         RE: Mary Valdez  [address]  [place] [address]        Dear Colleague,    Thank you for referring your patient, Mary Valdez, to the [facility]. Please see a copy of my visit note below.    Sports Medicine Clinic Visit    PCP: Charisse Alexander    Mary Valdez is a 22 year old female who is seen  as a self referral presenting with a right shoulder injury.  Patient does have a history of right shoulder subluxations.  Did fall on 3/21/18 and when she caught herself she felt her shoulder slide out, and did have to help it relocate.  She feels that it has continued to sublux since that time.  Pain is diffuse thru the shoulder.   No treatment in the past.  Former swimmer.  Patient is left hand dominant.  Has noticed tingling in her entire hand.       **  Patient fell with fell with her arm extended. She had immediate pain in the right shoulder. [place]/felt a pop. She did feel her shoulder \"move back in\". This episode is worse than her previous episodes. She reports that she has had subluxations in the past. No treatment other than Ice and Tylenol. Currently, pain in the shoulder is dull. Has noted pain with micro-movements as opposed to macro-movements. Patient's father has had two shoulder surgeries.     Injury: right shoulder subluxation, possible dislocation     Location of Pain: right shoulder, diffuse   Duration of Pain: 5 day(s)  Rating of Pain at worst: 7/10  Rating of Pain Currently: 5/10  Symptoms are better with: Ice  Symptoms are worse with: movement of arm/shoulder   Additional Features:   Positive: popping, grinding, instability, paraesthesia and weakness   Negative: swelling, bruising, popping, catching, locking, numbness, pain in other joints and systemic symptoms  Other evaluation and/or treatments so far consists of: Nothing  Prior History of related problems: HX of subluxations     Social History: "  at PT clinic     Review of Systems  Musculoskeletal: as above  Remainder of review of systems is negative including constitutional, CV, pulmonary, GI, Skin and Neurologic except as noted in HPI or medical history.    This document serves as a record of the services and decisions personally performed and made by Viktor Mac DO, CAQ. It was created on his behalf by Zeus Jang, a trained medical scribe. The creation of this document is based the provider's statements to the medical scribe.  Zeus Jang March 26, 2018 11:01 AM      Past Medical History:   Diagnosis Date     Bell's palsy 5 y/o    Due to trauma, neg Lyme     Depression     Venice Meyer, psychologist, [employer] in [place]     Depressive disorder      Major depression in complete remission (H) 7/7/2014     Psoriasis     scalp only     Psoriatic arthritis (H)      Past Surgical History:   Procedure Laterality Date     ADENOIDECTOMY       ARTHROSCOPY KNEE WITH RETINACULAR RELEASE Right 1/20/2017    Procedure: ARTHROSCOPY KNEE WITH RETINACULAR RELEASE MEDIAL OR LATERAL;  Surgeon: Yevgeniy Graf DO;  Location: [facility] OR     C NONSPECIFIC PROCEDURE  02/12/01    adenoidectomy     DENTAL SURGERY       MOUTH SURGERY       Family History   Problem Relation Age of Onset     Other - See Comments Father      benign nerve spinal tumor      Hypertension Father      DIABETES Maternal Grandmother      Depression Maternal Grandmother      Psoriasis Maternal Grandfather      Seizure Disorder Brother      Psoriasis Maternal Aunt      Psoriasis Maternal Aunt      Psoriasis Maternal Aunt      Social History     Social History     Marital status: Single     Spouse name: N/A     Number of children: N/A     Years of education: N/A     Occupational History     Not on file.     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     Other Topics Concern     Not on file     Social History Narrative       Objective  /78  Ht 5' 8.5\" (1.74 m)  Wt 192 lb (87.1 kg)  BMI 28.77 kg/m2      GENERAL APPEARANCE: healthy, alert and no distress   GAIT: NORMAL  SKIN: no suspicious lesions or rashes  NEURO: Normal strength and tone, mentation intact and speech normal  PSYCH:  mentation appears normal and affect normal/bright  HEENT: no scleral icterus  CV: no extremity edema   RESP: nonlabored breathing    Right Shoulder exam    ROM:        forward flexion ~120 degrees, stiff , mild pain end ROM       abduction full, symmetric, no pain, pt notes popping sensation        internal rotation full, symmetric, increased pain        external rotation full, symmetric, no pain     Tender:        posterior shoulder       right upper trap     Non Tender:       remainder of shoulder    Strength:        abduction 4+/5       internal rotation 5/5       external rotation 5-/5    Impingement testing:       positive (+) empty can, strength symmetric, increased pain with resistance          Stability testing:       Pain with apprehension, no true apprehension       positive (+) sulcus sign bilat       positive (+) posterior compression, pain posterior and deep     Skin:       no visible deformities       well perfused       capillary refill brisk    Sensation:        normal sensation over shoulder and upper extremity     Radiology  Visualized radiographs of right shoulder obtained today, and reviewed the images with the patient.  Impression: no acute osseous abnormality.      Assessment:  1. Injury of right shoulder, initial encounter    2. Shoulder instability, right        Plan:  Discussed the assessment with the patient.    Plain films of the area reviewed with the patient.    Discussed:  *Symptom Treatment - Ice, Over the Counter Medications   *Activity " Modification   *Rehab - Physical Therapy, discussed anticipating ongoing routine Home exercises   *Imaging - X-Ray reviewed; could consider MRI     Topical Treatments: Ice prn   Over the counter medication: Patient's preferred OTC medication as directed on packaging if needed.  Activity Modification: as discussed   Rehab: Physical Therapy for right (bilateral) shoulder; next step . She should do PT for both shoulders, as left is somewhat lax as well.  Follow up: in 4-6 weeks if not improving; otherwise, as needed   Questions answered. The patient indicates understanding of these issues and agrees with the plan.     Viktor Mac DO, CAQ      Disclaimer: This note consists of symbols derived from keyboarding, dictation and/or voice recognition software. As a result, there may be errors in the script that have gone undetected. Please consider this when interpreting information found in this chart.    The information in this document, created by the medical scribe for me, accurately reflects the services I personally performed and the decisions made by me. I have reviewed and approved this document for accuracy.   Viktor Mac DO, CAQ     Again, thank you for allowing me to participate in the care of your patient.        Sincerely,        Viktor Mac DO

## 2018-03-26 NOTE — PROGRESS NOTES
"Sports Medicine Clinic Visit    PCP: Charisse Alexander    Mary Gunjan Valdez is a 22 year old female who is seen  as a self referral presenting with a right shoulder injury.  Patient does have a history of right shoulder subluxations.  Did fall on 3/21/18 and when she caught herself she felt her shoulder slide out, and did have to help it relocate.  She feels that it has continued to sublux since that time.  Pain is diffuse thru the shoulder.   No treatment in the past.  Former swimmer.  Patient is left hand dominant.  Has noticed tingling in her entire hand.       **  Patient fell with fell with her arm extended. She had immediate pain in the right shoulder. Bent/felt a pop. She did feel her shoulder \"move back in\". This episode is worse than her previous episodes. She reports that she has had subluxations in the past. No treatment other than Ice and Tylenol. Currently, pain in the shoulder is dull. Has noted pain with micro-movements as opposed to macro-movements. Patient's father has had two shoulder surgeries.     Injury: right shoulder subluxation, possible dislocation     Location of Pain: right shoulder, diffuse   Duration of Pain: 5 day(s)  Rating of Pain at worst: 7/10  Rating of Pain Currently: 5/10  Symptoms are better with: Ice  Symptoms are worse with: movement of arm/shoulder   Additional Features:   Positive: popping, grinding, instability, paraesthesia and weakness   Negative: swelling, bruising, popping, catching, locking, numbness, pain in other joints and systemic symptoms  Other evaluation and/or treatments so far consists of: Nothing  Prior History of related problems: HX of subluxations     Social History:  at PT clinic     Review of Systems  Musculoskeletal: as above  Remainder of review of systems is negative including constitutional, CV, pulmonary, GI, Skin and Neurologic except as noted in HPI or medical history.    This document serves as a record of the services and " "decisions personally performed and made by Viktor Mac DO, CAQ. It was created on his behalf by Zeus Jang, a trained medical scribe. The creation of this document is based the provider's statements to the medical scribe.  Zeus Jang March 26, 2018 11:01 AM      Past Medical History:   Diagnosis Date     Bell's palsy 7 y/o    Due to trauma, neg Lyme     Depression     Venice Meyer, psychologist, [employer] in [place]     Depressive disorder      Major depression in complete remission (H) 7/7/2014     Psoriasis     scalp only     Psoriatic arthritis (H)      Past Surgical History:   Procedure Laterality Date     ADENOIDECTOMY       ARTHROSCOPY KNEE WITH RETINACULAR RELEASE Right 1/20/2017    Procedure: ARTHROSCOPY KNEE WITH RETINACULAR RELEASE MEDIAL OR LATERAL;  Surgeon: Yevgeniy Graf DO;  Location: [facility]     C NONSPECIFIC PROCEDURE  02/12/01    adenoidectomy     DENTAL SURGERY       MOUTH SURGERY       Family History   Problem Relation Age of Onset     Other - See Comments Father      benign nerve spinal tumor      Hypertension Father      DIABETES Maternal Grandmother      Depression Maternal Grandmother      Psoriasis Maternal Grandfather      Seizure Disorder Brother      Psoriasis Maternal Aunt      Psoriasis Maternal Aunt      Psoriasis Maternal Aunt      Social History     Social History     Marital status: Single     Spouse name: N/A     Number of children: N/A     Years of education: N/A     Occupational History     Not on file.     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     Other Topics Concern     Not on file     Social History Narrative       Objective  /78  Ht 5' 8.5\" (1.74 m)  Wt 192 lb (87.1 kg)  BMI 28.77 kg/m2      GENERAL " APPEARANCE: healthy, alert and no distress   GAIT: NORMAL  SKIN: no suspicious lesions or rashes  NEURO: Normal strength and tone, mentation intact and speech normal  PSYCH:  mentation appears normal and affect normal/bright  HEENT: no scleral icterus  CV: no extremity edema   RESP: nonlabored breathing    Right Shoulder exam    ROM:        forward flexion ~120 degrees, stiff , mild pain end ROM       abduction full, symmetric, no pain, pt notes popping sensation        internal rotation full, symmetric, increased pain        external rotation full, symmetric, no pain     Tender:        posterior shoulder       right upper trap     Non Tender:       remainder of shoulder    Strength:        abduction 4+/5       internal rotation 5/5       external rotation 5-/5    Impingement testing:       positive (+) empty can, strength symmetric, increased pain with resistance          Stability testing:       Pain with apprehension, no true apprehension       positive (+) sulcus sign bilat       positive (+) posterior compression, pain posterior and deep     Skin:       no visible deformities       well perfused       capillary refill brisk    Sensation:        normal sensation over shoulder and upper extremity     Radiology  Visualized radiographs of right shoulder obtained today, and reviewed the images with the patient.  Impression: no acute osseous abnormality.      Assessment:  1. Injury of right shoulder, initial encounter    2. Shoulder instability, right        Plan:  Discussed the assessment with the patient.    Plain films of the area reviewed with the patient.    Discussed:  *Symptom Treatment - Ice, Over the Counter Medications   *Activity Modification   *Rehab - Physical Therapy, discussed anticipating ongoing routine Home exercises   *Imaging - X-Ray reviewed; could consider MRI     Topical Treatments: Ice prn   Over the counter medication: Patient's preferred OTC medication as directed on packaging if  needed.  Activity Modification: as discussed   Rehab: Physical Therapy for right (bilateral) shoulder; next step . She should do PT for both shoulders, as left is somewhat lax as well.  Follow up: in 4-6 weeks if not improving; otherwise, as needed   Questions answered. The patient indicates understanding of these issues and agrees with the plan.     Viktor Mac DO, WANDA      Disclaimer: This note consists of symbols derived from keyboarding, dictation and/or voice recognition software. As a result, there may be errors in the script that have gone undetected. Please consider this when interpreting information found in this chart.    The information in this document, created by the medical scribe for me, accurately reflects the services I personally performed and the decisions made by me. I have reviewed and approved this document for accuracy.   Viktor Mac DO, WANDA

## 2018-03-26 NOTE — MR AVS SNAPSHOT
"              After Visit Summary   3/26/2018    Mary Valdez    MRN: [id number]           Patient Information     Date Of Birth          1995        Visit Information        Provider Department      3/26/2018 11:00 AM Viktor Mac, DO [facility]        Today's Diagnoses     Injury of right shoulder, initial encounter    -  1    Shoulder instability, right          Care Instructions    Begin Physical Therapy               Follow-ups after your visit        Additional Services     PHYSICAL THERAPY REFERRAL       *This therapy referral will be filtered to a centralized scheduling office at [facility] and the patient will receive a call to schedule an appointment at a [place] location most convenient for them. *     [facility] provides Physical Therapy evaluation and treatment and many specialty services across the [place] system.  If requesting a specialty program, please choose from the list below.    If you have not heard from the scheduling office within 2 business days, please call [phone] for all locations, with the exception of [place], please call [phone] and [facility], please call [phone]  Treatment: Evaluation & Treatment  Special Instructions/Modalities:   [facility]  [address] [place]  [address]  Ph # [phone]  Fax # [phone]    Special Programs: [address] [facility] [place]  [address]  Ph # [phone]  Fax # [phone]    Please be aware that coverage of these services is subject to the terms and limitations of your health insurance plan.  Call member services at your health plan with any benefit or coverage questions.      **Note to Provider:  If you are referring outside of [facility] for the therapy appointment, please list the name of the location in the \"special instructions\" above, print the referral and give to the patient " "to schedule the appointment.                  Your next 10 appointments already scheduled     May 22, 2018 11:00 AM CDT   [facility]    [address] [place] [address] [phone]            May 24, 2018 12:15 PM CDT   Return Visit with Anjana Luna MD   [facility])    [address] [place] [address] [phone]              Who to contact     If you have questions or need follow up information about today's clinic visit or your schedule please contact [facility] directly at [phone].  Normal or non-critical lab and imaging results will be communicated to you by MyChart, letter or phone within 4 business days after the clinic has received the results. If you do not hear from us within 7 days, please contact the clinic through MyChart or phone. If you have a critical or abnormal lab result, we will notify you by phone as soon as possible.  Submit refill requests through [employer] or call your pharmacy and they will forward the refill request to us. Please allow 3 business days for your refill to be completed.          Additional Information About Your Visit        [employer] Information     [employer] gives you secure access to your electronic health record. If you see a primary care provider, you can also send messages to your care team and make appointments. If you have questions, please call your primary care clinic.  If you do not have a primary care provider, please call [phone] and they will assist you.        Care EveryWhere ID     This is your Care EveryWhere ID. This could be used by other organizations to access your [facility] medical records  [id number]        Your Vitals Were     Height BMI (Body Mass Index)                5' 8.5\" (1.74 m) 28.77 kg/m2           Blood Pressure from Last 3 Encounters: "   03/26/18 122/78   02/06/18 126/62   01/25/18 114/74    Weight from Last 3 Encounters:   03/26/18 192 lb (87.1 kg)   02/06/18 191 lb 8 oz (86.9 kg)   01/25/18 194 lb 3.2 oz (88.1 kg)              We Performed the Following     PHYSICAL THERAPY REFERRAL        Primary Care Provider Office Phone # Fax MERVIN Benavidez [facility] [phone] [address] [facility] [address]        Equal Access to Services     [facility]: Hadii aad ku hadasho Soomaali, waaxda luqadaha, qaybta kaalmada adeegyada, shari whitehead ademing escamilla . So [facility] [phone].    ATENCIÓN: Si habla español, tiene a garcia disposición servicios gratuitos de asistencia lingüística. [facility] [phone].    We comply with applicable federal civil rights laws and [place] laws. We do not discriminate on the basis of race, color, national origin, age, disability, sex, sexual orientation, or gender identity.            Thank you!     Thank you for choosing [facility]  for your care. Our goal is always to provide you with excellent care. Hearing back from our patients is one way we can continue to improve our services. Please take a few minutes to complete the written survey that you may receive in the mail after your visit with us. Thank you!             Your Updated Medication List - Protect others around you: Learn how to safely use, store and throw away your medicines at www.disposemymeds.org.          This list is accurate as of 3/26/18 11:18 AM.  Always use your most recent med list.                   Brand Name Dispense Instructions for use Diagnosis    B-12 1000 MCG Caps           EPINEPHrine 0.3 MG/0.3ML injection 2-pack    EPIPEN/ADRENACLICK/or ANY BX GENERIC EQUIV    0.6 mL    Inject 0.3 mLs (0.3 mg) into the muscle as needed for anaphylaxis    Allergic reaction to insect sting, accidental or unintentional, initial encounter, Cough, SOB (shortness of breath)        magnesium 200 MG Tabs           MULTI-VITAMIN DAILY PO      Take 1 tablet by mouth daily Reported on 3/8/2017        predniSONE 5 MG tablet    DELTASONE    60 tablet    15 mg x 1 week then 10 mg x 1 week and stay on 5 mg daily.    Inflammatory arthritis

## 2018-05-29 ENCOUNTER — MYC MEDICAL ADVICE (OUTPATIENT)
Dept: ORTHOPEDICS | Facility: CLINIC | Age: 23
End: 2018-05-29

## 2018-05-29 DIAGNOSIS — M25.511 RIGHT SHOULDER PAIN: Primary | ICD-10-CM

## 2018-06-01 ENCOUNTER — OFFICE VISIT (OUTPATIENT)
Dept: ORTHOPEDICS | Facility: CLINIC | Age: 23
End: 2018-06-01
Payer: COMMERCIAL

## 2018-06-01 ENCOUNTER — RADIANT APPOINTMENT (OUTPATIENT)
Dept: GENERAL RADIOLOGY | Facility: CLINIC | Age: 23
End: 2018-06-01
Attending: ORTHOPAEDIC SURGERY
Payer: COMMERCIAL

## 2018-06-01 VITALS
TEMPERATURE: 97.2 F | WEIGHT: 186 LBS | HEART RATE: 79 BPM | HEIGHT: 69 IN | BODY MASS INDEX: 27.55 KG/M2 | DIASTOLIC BLOOD PRESSURE: 68 MMHG | SYSTOLIC BLOOD PRESSURE: 122 MMHG

## 2018-06-01 DIAGNOSIS — M25.649 STIFFNESS OF HAND JOINT, UNSPECIFIED LATERALITY: ICD-10-CM

## 2018-06-01 DIAGNOSIS — M25.311 SHOULDER INSTABILITY, RIGHT: Primary | ICD-10-CM

## 2018-06-01 DIAGNOSIS — M25.511 RIGHT SHOULDER PAIN: ICD-10-CM

## 2018-06-01 PROCEDURE — 99213 OFFICE O/P EST LOW 20 MIN: CPT | Performed by: ORTHOPAEDIC SURGERY

## 2018-06-01 PROCEDURE — 73020 X-RAY EXAM OF SHOULDER: CPT | Mod: TC

## 2018-06-01 ASSESSMENT — PAIN SCALES - GENERAL: PAINLEVEL: MODERATE PAIN (4)

## 2018-06-01 NOTE — MR AVS SNAPSHOT
"              After Visit Summary   6/1/2018    Mary Valdez    MRN: [id number]           Patient Information     Date Of Birth          1995        Visit Information        Provider Department      6/1/2018 1:40 PM Yevgeniy Graf DO [facility]        Today's Diagnoses     Shoulder instability, right    -  1    Stiffness of hand joint, unspecified laterality           Follow-ups after your visit        Additional Services     OCCUPATIONAL THERAPY REFERRAL       *This therapy referral will be filtered to a centralized scheduling office at [facility] and the patient will receive a call to schedule an appointment at a [place] location most convenient for them. *     [facility] provides Occupational Therapy evaluation and treatment and many specialty services across the [place] system.  If requesting a specialty program, please choose from the list below.    If you have not heard from the scheduling office within 2 business days, please call [phone] for all locations, with the exception of [place], please call [phone] and [facility], please call [phone]    Treatment: Evaluation & Treatment  Special Instructions/Modalities:   Special Programs: Hand Therapy ([facility], [facility] & [place] locations only)    Please be aware that coverage of these services is subject to the terms and limitations of your health insurance plan.  Call member services at your health plan with any benefit or coverage questions.      **Note to Provider:  If you are referring outside of [place] for the therapy appointment, please list the name of the location in the \"special instructions\" above, print the referral and give to the patient to schedule the appointment.                  Your next 10 appointments already scheduled     Jun 06, 2018  4:30 PM CDT   Return Visit with Maurilio Pereira MD   [facility] ([facility]" "[facility]) [address] [place] [address] [phone]              Future tests that were ordered for you today     Open Future Orders        Priority Expected Expires Ordered    MR Shoulder Right w Contrast Routine  6/1/2019 6/1/2018    XR Shoulder Contrast CT/MR Injection Routine 6/1/2018 6/1/2019 6/1/2018            Who to contact     If you have questions or need follow up information about today's clinic visit or your schedule please contact [facility] directly at [phone].  Normal or non-critical lab and imaging results will be communicated to you by [employer], letter or phone within 4 business days after the clinic has received the results. If you do not hear from us within 7 days, please contact the clinic through [employer] or phone. If you have a critical or abnormal lab result, we will notify you by phone as soon as possible.  Submit refill requests through BPT or call your pharmacy and they will forward the refill request to us. Please allow 3 business days for your refill to be completed.          Additional Information About Your Visit        [employer] Information     BPT gives you secure access to your electronic health record. If you see a primary care provider, you can also send messages to your care team and make appointments. If you have questions, please call your primary care clinic.  If you do not have a primary care provider, please call [phone] and they will assist you.        Care EveryWhere ID     This is your Care EveryWhere ID. This could be used by other organizations to access your [place] medical records  [id number]        Your Vitals Were     Pulse Temperature Height BMI (Body Mass Index)          79 97.2  F (36.2  C) (Temporal) 5' 8.5\" (1.74 m) 27.87 kg/m2         Blood Pressure from Last 3 Encounters:   06/01/18 122/68   03/26/18 122/78   02/06/18 126/62    Weight from Last 3 Encounters:   06/01/18 186 lb (84.4 kg)   03/26/18 192 lb " (87.1 kg)   02/06/18 191 lb 8 oz (86.9 kg)              We Performed the Following     OCCUPATIONAL THERAPY REFERRAL        Primary Care Provider Office Phone # Fax #    MERVIN Guevara [facility] [phone]       [address] [facility] [address]        Equal Access to Services     [facility] : Hadii aad ku hadasho Soomaali, waaxda luqadaha, qaybta kaalmada adeegyada, waxay idiin hayaan adeeg kharash la'aan . So [facility] [phone].    ATENCIÓN: Si habla español, tiene a garcia disposición servicios gratuitos de asistencia lingüística. [facility] [phone].    We comply with applicable federal civil rights laws and [place] laws. We do not discriminate on the basis of race, color, national origin, age, disability, sex, sexual orientation, or gender identity.            Thank you!     Thank you for choosing [facility]  for your care. Our goal is always to provide you with excellent care. Hearing back from our patients is one way we can continue to improve our services. Please take a few minutes to complete the written survey that you may receive in the mail after your visit with us. Thank you!             Your Updated Medication List - Protect others around you: Learn how to safely use, store and throw away your medicines at www.disposemymeds.org.          This list is accurate as of 6/1/18  3:03 PM.  Always use your most recent med list.                   Brand Name Dispense Instructions for use Diagnosis    B-12 1000 MCG Caps           EPINEPHrine 0.3 MG/0.3ML injection 2-pack    EPIPEN/ADRENACLICK/or ANY BX GENERIC EQUIV    0.6 mL    Inject 0.3 mLs (0.3 mg) into the muscle as needed for anaphylaxis    Allergic reaction to insect sting, accidental or unintentional, initial encounter, Cough, SOB (shortness of breath)       magnesium 200 MG Tabs           MULTI-VITAMIN DAILY PO      Take 1 tablet by mouth daily Reported on 3/8/2017

## 2018-06-01 NOTE — PROGRESS NOTES
"ORTHOPEDIC CONSULT      Chief Complaint: Mary Valdez is a 22 year old female who is being seen for   Chief Complaint   Patient presents with     Shoulder Pain     right shoulder pain     Consult       History of Present Illness:   Mary Valdez is a 22 year old female who is seen in consultation at the request of self for evaluation of right shoulder pain.  Mechanism of Injury: patient states has had shoulder issues for years and has had previous episodes of shoulder popping into and out of place, described as subluxing but not dislocating to point of needing reduction.  Patient states has had at least 6 episodes over the last few years of this, however fell on April 13th, caught herself with her arm had another episode now the pain is getting much worse and her shoulder is much more bothersome and \"loose\".  Describes instability for quite some time but much worse after fall on April 13th.  She does describe anterior dull ache with occasional throbbing and feelings of shoulder out of place, worse with overhand activities like swimming and throwing a ball.  Also now having progressive pain to the point of waking at night.  No previous MRI, surgeries on right shoulder.  Is in PT and receiving kinesio taping and dry needling along with working on stability and strengthening, and although helping still very bothersome. Has tried NSAIDs without any benefit along with activity modification and rest.      #2: patient also mentioned that she has been having issues with her hands: Complains of couple of years but over the last few months stiffness in her MCP and PIP joints bilateral hands.  Worse in the morning.  But stiff throughout the day.    Patient's past medical, surgical, social and family histories reviewed.     Past Medical History:   Diagnosis Date     Bell's palsy 5 y/o    Due to trauma, neg Lyme     Depression     Venice Meyer, psychologist, [employer] in [place]     Depressive disorder      Major " depression in complete remission (H) 7/7/2014     Psoriasis     scalp only     Psoriatic arthritis (H)        Past Surgical History:   Procedure Laterality Date     ADENOIDECTOMY       ARTHROSCOPY KNEE WITH RETINACULAR RELEASE Right 1/20/2017    Procedure: ARTHROSCOPY KNEE WITH RETINACULAR RELEASE MEDIAL OR LATERAL;  Surgeon: Yevgeniy Graf DO;  Location: [facility] OR     C NONSPECIFIC PROCEDURE  02/12/01    adenoidectomy     DENTAL SURGERY       MOUTH SURGERY         Medications:    Current Outpatient Prescriptions on File Prior to Visit:  Cyanocobalamin (B-12) 1000 MCG CAPS    magnesium 200 MG TABS    Multiple Vitamin (MULTI-VITAMIN DAILY PO) Take 1 tablet by mouth daily Reported on 3/8/2017   EPINEPHrine (EPIPEN/ADRENACLICK/OR ANY BX GENERIC EQUIV) 0.3 MG/0.3ML injection 2-pack Inject 0.3 mLs (0.3 mg) into the muscle as needed for anaphylaxis (Patient not taking: Reported on 6/1/2018)     No current facility-administered medications on file prior to visit.     Allergies   Allergen Reactions     No Known Drug Allergies        Social History     Occupational History     Not on file.     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       Family History   Problem Relation Age of Onset     Other - See Comments Father      benign nerve spinal tumor      Hypertension Father      DIABETES Maternal Grandmother      Depression Maternal Grandmother      Psoriasis Maternal Grandfather      Seizure Disorder Brother      Psoriasis Maternal Aunt      Psoriasis Maternal Aunt      Psoriasis Maternal Aunt        REVIEW OF SYSTEMS  10 point review systems performed otherwise negative as noted as per history of present illness.    Physical Exam:  Vitals: /68 (BP Location: Left arm, Patient  "Position: Chair, Cuff Size: Adult Regular)  Pulse 79  Temp 97.2  F (36.2  C) (Temporal)  Ht 1.74 m (5' 8.5\")  Wt 84.4 kg (186 lb)  BMI 27.87 kg/m2  BMI= Body mass index is 27.87 kg/(m^2).  Constitutional: healthy, alert and no acute distress   Psychiatric: mentation appears normal and affect normal/bright  NEURO: no focal deficits  RESP: Normal with easy respirations and no use of accessory muscles to breathe, no audible wheezing or retractions  CV: RUE:  no edema         Regular rate and rhythm by palpation  SKIN: No erythema, rashes, excoriation, or breakdown. No evidence of infection.   JOINT/EXTREMITIES:right shoulder: inspection: no deformity, no effusion no bruising.  No focal tenderness to palpation.  AROM: 175/180/60/back pocket  PROM 180/180.  Pain with maximum forward flexion. Pain with internal rotation. Strength exam limited by pain. Pain and mild weakness with infraspinatus testing. No weakness + pain with supraspinatus.  No pain or weakness with bicep testing.  + Lift-off test, mildly painful Morton.  Positive apprehension with relocation.  Negative sulcus sign . Distal neurovascular grossly intact.   GAIT: non-antalgic    Bilateral hands: Some terminal flexion stiffness.  No gross swelling or deformity.  Flexors and extensors are intact    Diagnostic Modalities:  right shoulder X-ray: Well preserved glenohumeral articular space. No fracture, dislocation and or lesion.   Independent visualization of the images was performed.      Impression: right shoulder instability   Bilateral hand stiffness     Plan:  All of the above pertinent physical exam and imaging modalities findings was reviewed with Mary.  With her age, history, and exam recommended MRI arthrogram to r/u out labrum tear, and to follow-up afterwards.     Regarding her hands: She has previous been told she has psoriatic arthritis in her hands.  She has never had complete radiograph workup.  Prior to this we talked about initiating some " therapy.   Recommended starting with OT therapy.     Today I provided or dispensed occupational therapy.      Return to clinic to discuss test results, or sooner as needed for changes.  Re-x-ray on return: No    Scribed by:  Janak Rich APRN, CNP, DNP  1:29 PM  6/1/2018    I attest I have seen and evaluated the patient.  I agree with above impression and plan.  Magdaleno Graf D.O.

## 2018-06-01 NOTE — LETTER
"    6/1/2018         RE: Mary Valdez  [address]  [facility] [address]        Dear Colleague,    Thank you for referring your patient, Mary Valdez, to the [facility]. Please see a copy of my visit note below.    ORTHOPEDIC CONSULT      Chief Complaint: Mary Valdez is a 22 year old female who is being seen for   Chief Complaint   Patient presents with     Shoulder Pain     right shoulder pain     Consult       History of Present Illness:   Mary Valdez is a 22 year old female who is seen in consultation at the request of self for evaluation of right shoulder pain.  Mechanism of Injury: patient states has had shoulder issues for years and has had previous episodes of shoulder popping into and out of place, described as subluxing but not dislocating to point of needing reduction.  Patient states has had at least 6 episodes over the last few years of this, however fell on April 13th, caught herself with her arm had another episode now the pain is getting much worse and her shoulder is much more bothersome and \"loose\".  Describes instability for quite some time but much worse after fall on April 13th.  She does describe anterior dull ache with occasional throbbing and feelings of shoulder out of place, worse with overhand activities like swimming and throwing a ball.  Also now having progressive pain to the point of waking at night.  No previous MRI, surgeries on right shoulder.  Is in PT and receiving kinesio taping and dry needling along with working on stability and strengthening, and although helping still very bothersome. Has tried NSAIDs without any benefit along with activity modification and rest.      #2: patient also mentioned that she has been having issues with her hands: Complains of couple of years but over the last few months stiffness in her MCP and PIP joints bilateral hands.  Worse in the morning.  But stiff throughout the day.    Patient's past " medical, surgical, social and family histories reviewed.     Past Medical History:   Diagnosis Date     Bell's palsy 5 y/o    Due to trauma, neg Lyme     Depression     Venice Meyer, psychologist, [facility] in [place]     Depressive disorder      Major depression in complete remission (H) 7/7/2014     Psoriasis     scalp only     Psoriatic arthritis (H)        Past Surgical History:   Procedure Laterality Date     ADENOIDECTOMY       ARTHROSCOPY KNEE WITH RETINACULAR RELEASE Right 1/20/2017    Procedure: ARTHROSCOPY KNEE WITH RETINACULAR RELEASE MEDIAL OR LATERAL;  Surgeon: Yevgeniy Graf DO;  Location: [facility] OR     C NONSPECIFIC PROCEDURE  02/12/01    adenoidectomy     DENTAL SURGERY       MOUTH SURGERY         Medications:    Current Outpatient Prescriptions on File Prior to Visit:  Cyanocobalamin (B-12) 1000 MCG CAPS    magnesium 200 MG TABS    Multiple Vitamin (MULTI-VITAMIN DAILY PO) Take 1 tablet by mouth daily Reported on 3/8/2017   EPINEPHrine (EPIPEN/ADRENACLICK/OR ANY BX GENERIC EQUIV) 0.3 MG/0.3ML injection 2-pack Inject 0.3 mLs (0.3 mg) into the muscle as needed for anaphylaxis (Patient not taking: Reported on 6/1/2018)     No current facility-administered medications on file prior to visit.     Allergies   Allergen Reactions     No Known Drug Allergies        Social History     Occupational History     Not on file.     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       Family History   Problem Relation Age of Onset     Other - See Comments Father      benign nerve spinal tumor      Hypertension Father      DIABETES Maternal Grandmother      Depression Maternal Grandmother      Psoriasis Maternal Grandfather      Seizure Disorder Brother      Psoriasis Maternal Aunt   "    Psoriasis Maternal Aunt      Psoriasis Maternal Aunt        REVIEW OF SYSTEMS  10 point review systems performed otherwise negative as noted as per history of present illness.    Physical Exam:  Vitals: /68 (BP Location: Left arm, Patient Position: Chair, Cuff Size: Adult Regular)  Pulse 79  Temp 97.2  F (36.2  C) (Temporal)  Ht 1.74 m (5' 8.5\")  Wt 84.4 kg (186 lb)  BMI 27.87 kg/m2  BMI= Body mass index is 27.87 kg/(m^2).  Constitutional: healthy, alert and no acute distress   Psychiatric: mentation appears normal and affect normal/bright  NEURO: no focal deficits  RESP: Normal with easy respirations and no use of accessory muscles to breathe, no audible wheezing or retractions  CV: RUE:  no edema         Regular rate and rhythm by palpation  SKIN: No erythema, rashes, excoriation, or breakdown. No evidence of infection.   JOINT/EXTREMITIES:right shoulder: inspection: no deformity, no effusion no bruising.  No focal tenderness to palpation.  AROM: 175/180/60/back pocket  PROM 180/180.  Pain with maximum forward flexion. Pain with internal rotation. Strength exam limited by pain. Pain and mild weakness with infraspinatus testing. No weakness + pain with supraspinatus.  No pain or weakness with bicep testing.  + Lift-off test, mildly painful Morton.  Positive apprehension with relocation.  Negative sulcus sign . Distal neurovascular grossly intact.   GAIT: non-antalgic    Bilateral hands: Some terminal flexion stiffness.  No gross swelling or deformity.  Flexors and extensors are intact    Diagnostic Modalities:  right shoulder X-ray: Well preserved glenohumeral articular space. No fracture, dislocation and or lesion.   Independent visualization of the images was performed.      Impression: right shoulder instability   Bilateral hand stiffness     Plan:  All of the above pertinent physical exam and imaging modalities findings was reviewed with Mary.  With her age, history, and exam recommended MRI " arthrogram to r/u out labrum tear, and to follow-up afterwards.     Regarding her hands: She has previous been told she has psoriatic arthritis in her hands.  She has never had complete radiograph workup.  Prior to this we talked about initiating some therapy.   Recommended starting with OT therapy.     Today I provided or dispensed occupational therapy.      Return to clinic to discuss test results, or sooner as needed for changes.  Re-x-ray on return: No    Scribed by:  Janak Rich, APRN, CNP, DNP  1:29 PM  6/1/2018    I attest I have seen and evaluated the patient.  I agree with above impression and plan.  Magdaleno Graf D.O.    Again, thank you for allowing me to participate in the care of your patient.        Sincerely,        Yevgeniy Graf, DO

## 2018-06-06 DIAGNOSIS — M25.311 SHOULDER INSTABILITY, RIGHT: Primary | ICD-10-CM

## 2018-06-06 NOTE — PROGRESS NOTES
Placed new orders for MRI arthrogram with intra-articular injection prior to the MRI.  Spoke with radiology.  Daniel Rich, APRN, CNP, DNP  Orthopedic Surgery

## 2018-06-07 ENCOUNTER — HOSPITAL ENCOUNTER (OUTPATIENT)
Dept: MRI IMAGING | Facility: CLINIC | Age: 23
End: 2018-06-07
Attending: NURSE PRACTITIONER
Payer: COMMERCIAL

## 2018-06-07 ENCOUNTER — HOSPITAL ENCOUNTER (OUTPATIENT)
Dept: GENERAL RADIOLOGY | Facility: CLINIC | Age: 23
Discharge: HOME | End: 2018-06-07
Attending: NURSE PRACTITIONER | Admitting: NURSE PRACTITIONER
Payer: COMMERCIAL

## 2018-06-07 DIAGNOSIS — M25.311 SHOULDER INSTABILITY, RIGHT: ICD-10-CM

## 2018-06-07 PROCEDURE — A9585 GADOBUTROL INJECTION: HCPCS | Performed by: RADIOLOGY

## 2018-06-07 PROCEDURE — 25000128 H RX IP 250 OP 636: Performed by: RADIOLOGY

## 2018-06-07 PROCEDURE — 25500064 ZZH RX 255 OP 636: Performed by: RADIOLOGY

## 2018-06-07 PROCEDURE — 73222 MRI JOINT UPR EXTREM W/DYE: CPT | Mod: RT

## 2018-06-07 PROCEDURE — 23350 INJECTION FOR SHOULDER X-RAY: CPT

## 2018-06-07 RX ORDER — LIDOCAINE HYDROCHLORIDE 10 MG/ML
5 INJECTION, SOLUTION EPIDURAL; INFILTRATION; INTRACAUDAL; PERINEURAL ONCE
Status: COMPLETED | OUTPATIENT
Start: 2018-06-07 | End: 2018-06-07

## 2018-06-07 RX ORDER — IOPAMIDOL 408 MG/ML
50 INJECTION, SOLUTION INTRAVASCULAR ONCE
Status: COMPLETED | OUTPATIENT
Start: 2018-06-07 | End: 2018-06-07

## 2018-06-07 RX ORDER — EPINEPHRINE 1 MG/ML
1 INJECTION, SOLUTION, CONCENTRATE INTRAVENOUS ONCE
Status: COMPLETED | OUTPATIENT
Start: 2018-06-07 | End: 2018-06-07

## 2018-06-07 RX ORDER — GADOBUTROL 604.72 MG/ML
0.1 INJECTION INTRAVENOUS ONCE
Status: COMPLETED | OUTPATIENT
Start: 2018-06-07 | End: 2018-06-07

## 2018-06-07 RX ADMIN — LIDOCAINE HYDROCHLORIDE 0.1 ML: 10 INJECTION, SOLUTION EPIDURAL; INFILTRATION; INTRACAUDAL; PERINEURAL at 14:45

## 2018-06-07 RX ADMIN — GADOBUTROL 0.1 ML: 604.72 INJECTION INTRAVENOUS at 14:45

## 2018-06-07 RX ADMIN — EPINEPHRINE 0.1 MG: 1 INJECTION INTRAMUSCULAR; INTRAVENOUS; SUBCUTANEOUS at 14:45

## 2018-06-07 RX ADMIN — IOPAMIDOL 1 ML: 408 INJECTION, SOLUTION INTRAVASCULAR at 14:51

## 2018-06-13 ENCOUNTER — OFFICE VISIT (OUTPATIENT)
Dept: ORTHOPEDICS | Facility: CLINIC | Age: 23
End: 2018-06-13
Payer: COMMERCIAL

## 2018-06-13 ENCOUNTER — HOSPITAL ENCOUNTER (OUTPATIENT)
Dept: OCCUPATIONAL THERAPY | Facility: CLINIC | Age: 23
Setting detail: THERAPIES SERIES
End: 2018-06-13
Attending: NURSE PRACTITIONER
Payer: COMMERCIAL

## 2018-06-13 VITALS
HEIGHT: 69 IN | BODY MASS INDEX: 27.55 KG/M2 | DIASTOLIC BLOOD PRESSURE: 82 MMHG | SYSTOLIC BLOOD PRESSURE: 122 MMHG | TEMPERATURE: 97.1 F | WEIGHT: 186 LBS

## 2018-06-13 DIAGNOSIS — M25.311 SHOULDER INSTABILITY, RIGHT: Primary | ICD-10-CM

## 2018-06-13 PROCEDURE — 40000839 ZZH STATISTIC HAND THERAPY VISIT: Performed by: OCCUPATIONAL THERAPIST

## 2018-06-13 PROCEDURE — 97167 OT EVAL HIGH COMPLEX 60 MIN: CPT | Mod: GO | Performed by: OCCUPATIONAL THERAPIST

## 2018-06-13 PROCEDURE — 97110 THERAPEUTIC EXERCISES: CPT | Mod: GO | Performed by: OCCUPATIONAL THERAPIST

## 2018-06-13 PROCEDURE — 99213 OFFICE O/P EST LOW 20 MIN: CPT | Performed by: ORTHOPAEDIC SURGERY

## 2018-06-13 ASSESSMENT — PAIN SCALES - GENERAL: PAINLEVEL: MILD PAIN (2)

## 2018-06-13 NOTE — PROGRESS NOTES
Occupational Therapy Evaluation     06/13/18 1005   General Information/History   Start Of Care Date 06/13/18   Referring Physician Janak JEFFRIES CNP   Orders Evaluate And Treat As Indicated   Orders Date 06/01/18   Medical Diagnosis Psoriatic arthritis; stiffness of hand joints, unspecified laterality M25.667   Additional Occupational Profile Info/Pertinent history of current problem The patient is a 21 y/o female who has been experieincing increased bilateral hand pain and catching/locking of the fingers especially the little fingers per her report.  Patient has had discomfort in the hands and multi joint areas since 9th grade per her report.  She was diagnosed last year with psoriatic arthritis.  She stated also has discomfort in knees and recently in the right shoulder due to instability.  MRI results for shoulder, will be reviewed this date by physician and determine a treatment plan for the shoulder.  She has been participating in PT services for the shoulder, but has not seen any hand therapist ro OT services til this date.  She reported she can play the piano, crotech, write/type and minimal ability for dexterity tasks due to pain and stiffness.  Decreased functional use of the hands for daily tasks/activities.   Previous treatment or current condition tiger balm/pain relief,  HP and wrist braces (no finger braces)   Past medical history psoriatc arthritis, depression   How/Where did it occur With a fall;Other  (arthritis)   Onset date of current episode/exacerbation 06/01/18   Chronicity Recurrent   Hand Dominance Left   Affected side Bilateral   Functional limitations perform activities of daily living;perform required work activities;perform desired leisure / sports activities   Reported Symptoms Pain;Loss of Motion/Stiffness;Catching;Locking;Loss of strength   QuickDASH [Functional Disability Questionnaire; 0-100 (0=no dysfunction; 100=dysfunction)] (UEFI: 29/80  "significant functional loss of the hands)   Important Activities sports/paddle board, painting, crotech, piano and work   Patient role/Employment history (part time)   Occupation clinical coordiantor assistance   Employment Status Working in normal job without restrictions   Primary Job Tasks Keyboarding;Gripping/pinching;Using a mouse   Patient/Family goals statement \"learning how to be gentle to my joints for daily tasks/activities.\"  \"decrease locking of joints \"  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 Yes   Have you fallen and had an injury in the past year? Yes   Timed Up and Go score (seconds) 11 seconds   Is patient a fall risk? No   Fall screen comments situational falls per pt. due to cat /pet underfeet. Fall 4/13/18, slipped in kitchen.   Pain   Pain Primary Pain Report   Primary Pain Report   Location hands   Radiation Hand;Wrist   Pain Quality Aching   Frequency Intermittent   Scale 3/10   Pain Is Same All Of The Time   Pain Is Exacerbated By Activity/movement;Gripping;Pinching;Carrying;Lifting   Pain Is Relieved By Splints;Heat   Progression Since Onset Gradually Worsening   Edema   Overall  - Left Mild   Overall  - Right Mild   ROM   ROM AROM   AROM   AROM Wrist   Comments all WNL all planes of movment   Special Tests   Special Tests (9 hole peg test: left 29 seconds and right 25 seconds (below)   Strength   Strength Strength    Avg - Left 37#  (below norm)    Avg - Right 40#  (below norm)   Lateral Pinch - Left 16#  (norm)   Lateral Pinch - Right 17#  (norm)   3 Point Pinch - Left 13#  (norm)   3 Point Pinch - Right 12#  (below norm)   Education Assessment   Preferred Learning Style Listening;Demonstration   Barriers to Learning No barriers   Therapy Interventions   Planned Therapy Interventions Ultrasound;Paraffin;Strengthening;Splinting;Education of splint wear, care, fit and " precautions;Manual Therapy;Stretching;Coordination;Joint Protection Instruction;Home Program;Self Care/Home Management   Therapy plan comments K taping and oval 8 finger splints, compression gloves   Clinical Impression   Criteria for Skilled Therapeutic Interventions Met yes;treatment indicated   OT Diagnosis Decreased functional use of the hands for BADL/IADLs   Influenced by the following impairments Pain;Decreased range of motion;Decreased strength;Impaired sensation;Edema   Assessment of Occupational Performance 5 or more Performance Deficits   Identified Performance Deficits dressing, hygiene,home mgmt, meal prep/clean up, leisure activities, driving and work tasks/activities   Clinical Decision Making (Complexity) High complexity   Therapy Frequency 2x week  (may decrease as needed for scheduling and personal reasons)   Predicted Duration of Therapy Intervention (days/wks) 8 weeks   Risks and Benefits of Treatment have been explained. Yes   Patient, Family & other staff in agreement with plan of care Yes   Hand Goals   Hand Goals Dressing;Household Chores;Work;Driving;Sports/Recreation   Dressing   Current Functional Task Dressing LB;Buttoning;Zipping;Tying   Previous Performance Level Independent   Current Performance Level Severe difficulty   Goal Target Task Don/Doff pants;Don/Doff bra;[place] pants;Zipping   Goal Target Performance Level Mild difficulty   Due Date 08/13/18   Household Chores   Current Functional Task Washing and drying dishes;Vacuuming;Cooking;Gripping;Carrying   Previous Performance Level Independent   Current Performance Level Severe difficulty   Goal Target Task Hold dish rag and wash dishes;Hold spoon for stirring;Hold and lift pan;Hold and use vacuum ;Open a tight or new jar; and turn to open a door   Goal Target Performance Level Mild difficulty   Due Date 08/13/18   Work   Current Functional Task Computer use;Keyboarding;Mousing;Repetitive tasks;Writing   Previous  Performance Level Independent   Current Performance Level Severe difficulty   Goal Target Task Type;Mouse;Complete repetitive tasks;Write legibly   Goal Target Performance Level Mild difficulty   Due Date 08/13/18   Driving   Current Functional Task Holding steering wheel;Turning car key   Previous Performance Level Independent   Current Performance Level Severe difficulty   Goal Target Task  steering wheel;Turn car key to start car;Pull car door open   Goal Target Performance Level Mild difficulty   Due Date 08/13/18   Sports/Recreation   Current Functional Task Gripping;Holding;Playing   Previous Performance Level Independent   Curent Performance Level Unable   Goal Target Task (play piano, crotech)   Total Evaluation Time   Total Evaluation Time (Minutes) 25       Thank you for referring Mary  To OT services to assist with decrease hand pain and improve functional use for daily tasks/activities.    If you have any questions or concerns, please contact me at [phone].    Jalyn Cary MA, OTR/L  [facility]

## 2018-06-13 NOTE — LETTER
"    6/13/2018         RE: Mary Valdez  [address]  [place] [address]        Dear Colleague,    Thank you for referring your patient, Mary Valdez, to the [facility]. Please see a copy of my visit note below.    Office Visit-Follow up    Chief Complaint: Mary Valdez is a 22 year old female who is being seen for   Chief Complaint   Patient presents with     RECHECK     mri results of right shoulder       History of Present Illness:   Today's visit:  Returns for right shoulder.  She is done 2 months worth of formal physical therapy twice a week.  She does report recently it still feels like it \"subluxes\" however it is not painful.  June 1, 2018 visit:  Mary Valdez is a 22 year old female who is seen in consultation at the request of self for evaluation of right shoulder pain.  Mechanism of Injury: patient states has had shoulder issues for years and has had previous episodes of shoulder popping into and out of place, described as subluxing but not dislocating to point of needing reduction.  Patient states has had at least 6 episodes over the last few years of this, however fell on April 13th, caught herself with her arm had another episode now the pain is getting much worse and her shoulder is much more bothersome and \"loose\".  Describes instability for quite some time but much worse after fall on April 13th.  She does describe anterior dull ache with occasional throbbing and feelings of shoulder out of place, worse with overhand activities like swimming and throwing a ball.  Also now having progressive pain to the point of waking at night.  No previous MRI, surgeries on right shoulder.  Is in PT and receiving kinesio taping and dry needling along with working on stability and strengthening, and although helping still very bothersome. Has tried NSAIDs without any benefit along with activity modification and rest.       #2: patient also mentioned that she has " "been having issues with her hands: Complains of couple of years but over the last few months stiffness in her MCP and PIP joints bilateral hands.  Worse in the morning.  But stiff throughout the day.         REVIEW OF SYSTEMS  General: negative for, night sweats, dizziness, fatigue  Resp: No shortness of breath and no cough  CV: negative for chest pain, syncope or near-syncope  GI: negative for nausea, vomiting and diarrhea  : negative for dysuria and hematuria  Musculoskeletal: as above  Neurologic: negative for syncope   Hematologic: negative for bleeding disorder    Physical Exam:  Vitals: /82 (BP Location: Right arm, Patient Position: Chair, Cuff Size: Adult Regular)  Temp 97.1  F (36.2  C) (Temporal)  Ht 5' 8.5\" (1.74 m)  Wt 186 lb (84.4 kg)  BMI 27.87 kg/m2  BMI= Body mass index is 27.87 kg/(m^2).  Constitutional: healthy, alert and no acute distress   Psychiatric: mentation appears normal and affect normal/bright  NEURO: no focal deficits  RESP: Normal with easy respirations and no use of accessory muscles to breathe, no audible wheezing or retractions  CV: RUE:  no edema         SKIN: No erythema, rashes, excoriation, or breakdown. No evidence of infection.   JOINT/EXTREMITIES:right shoulder: Full range of motion.  No focal areas of pain with palpation.  No pain on her exam today including with relocation  GAIT: not tested             Diagnostic Modalities:  right shoulder MRI-Arthrogram   no acute fractures or dislocations.  Rotator cuff is intact.  Possible subtle posterior labral tear however probably associated with a normal anatomic variant.  There is no para labral cyst.  Independent visualization of the images was performed.      Impression: right shoulder instability    Plan:  All of the above pertinent physical exam and imaging modalities findings was reviewed with Mary.    MRI shows no definitive surgical pathology for shoulder instability.  She does report pain is significantly " "improved.  She has \"subluxation\" events a couple times a day.  However they do not cause pain.  I recommended with her to exhaust all conservative care including some more physical therapy.  If all she notices is the feeling of subluxation with no pain continue on.  If she starts to have more pain associated with these events return for evaluation.        Return to clinic PRN, or sooner as needed for changes.  Re-x-ray on return: No    Magdaleno Graf D.O.          Again, thank you for allowing me to participate in the care of your patient.        Sincerely,        Yevgeniy Graf, DO    "

## 2018-06-13 NOTE — MR AVS SNAPSHOT
After Visit Summary   6/13/2018    Mary Valdez    MRN: [id number]           Patient Information     Date Of Birth          1995        Visit Information        Provider Department      6/13/2018 11:00 AM Yevgeniy Graf DO [facility]        Today's Diagnoses     Shoulder instability, right    -  1       Follow-ups after your visit        Your next 10 appointments already scheduled     Jun 14, 2018 10:15 AM CDT   Hand Treatment with Jalyn Cary, OT   [facility] ([facility])    [address] [place] [address] Dr [place] [address]   [phone]            Jun 20, 2018 11:00 AM CDT   Hand Treatment with Jalyn Cary, OT   [facility] ([facility])    [address] [facility] [address] Shawn [place] [address]   [phone]            Jun 22, 2018  8:45 AM CDT   Hand Treatment with Jalyn Cary, OT   [facility] ([facility])    [address] [place] [address] Dr Shawn [place] [address]   [phone]            Jun 26, 2018 10:30 AM CDT   Hand Treatment with Jalyn Cary, OT   [facility] ([facility])    [address] [facility] [address] [place] [address]   [phone]            Jun 28, 2018 10:45 AM CDT   Hand Treatment with Korina Duke, OT   [facility] ([facility])    [address] [facility] [address] [place] [address]   [phone]            Jul 02, 2018 11:30 AM CDT   Hand Treatment with Jalyn Cary, OT   [facility] ([facility])    [address] [facility] [address] [place] [address]   [phone]            Jul 05, 2018 10:30 AM CDT   Hand Treatment with Jalyn Cary, OT   [facility] ([facility])    [address] [facility] [address] [place] [address]   [phone]            Jul  "09, 2018 10:30 AM CDT   Hand Treatment with Jalyn Cary, OT   [facility] ([facility])    [address] [facility] [address] [place] [id number] [phone]            Jul 12, 2018  9:45 AM CDT   Hand Treatment with Jalyn Cary, OT   [facility] ([facility])    [address] [facility] [address] [place] [id number] [phone]              Who to contact     If you have questions or need follow up information about today's clinic visit or your schedule please contact [facility] directly at [phone].  Normal or non-critical lab and imaging results will be communicated to you by Muse[employer], letter or phone within 4 business days after the clinic has received the results. If you do not hear from us within 7 days, please contact the clinic through [employer] or phone. If you have a critical or abnormal lab result, we will notify you by phone as soon as possible.  Submit refill requests through [employer] or call your pharmacy and they will forward the refill request to us. Please allow 3 business days for your refill to be completed.          Additional Information About Your Visit        Z[employer]i Information     [employer] gives you secure access to your electronic health record. If you see a primary care provider, you can also send messages to your care team and make appointments. If you have questions, please call your primary care clinic.  If you do not have a primary care provider, please call [phone] and they will assist you.        Care EveryWhere ID     This is your Care EveryWhere ID. This could be used by other organizations to access your [facility] medical records  [id number]        Your Vitals Were     Temperature Height BMI (Body Mass Index)             97.1  F (36.2  C) (Temporal) 5' 8.5\" (1.74 m) 27.87 kg/m2          Blood Pressure from Last 3 Encounters:   06/13/18 122/82   06/01/18 122/68   03/26/18 " 122/78    Weight from Last 3 Encounters:   06/13/18 186 lb (84.4 kg)   06/01/18 186 lb (84.4 kg)   03/26/18 192 lb (87.1 kg)              Today, you had the following     No orders found for display       Primary Care Provider Office Phone # Fax MERVIN Benavidez [phone] [address] [facility] [address]        Equal Access to Services     RUI SYLVESTER : Hadii aad ku hadasho Soomaali, waaxda luqadaha, qaybta kaalmada adeegyada, waxay idiin hayaan adeeg kharash la'sherry . So [facility] [phone].    ATENCIÓN: Si habla español, tiene a garcia disposición servicios gratuitos de asistencia lingüística. [facility] [phone].    We comply with applicable federal civil rights laws and [place] laws. We do not discriminate on the basis of race, color, national origin, age, disability, sex, sexual orientation, or gender identity.            Thank you!     Thank you for choosing [facility]  for your care. Our goal is always to provide you with excellent care. Hearing back from our patients is one way we can continue to improve our services. Please take a few minutes to complete the written survey that you may receive in the mail after your visit with us. Thank you!             Your Updated Medication List - Protect others around you: Learn how to safely use, store and throw away your medicines at www.disposemymeds.org.          This list is accurate as of 6/13/18 12:02 PM.  Always use your most recent med list.                   Brand Name Dispense Instructions for use Diagnosis    B-12 1000 MCG Caps           EPINEPHrine 0.3 MG/0.3ML injection 2-pack    EPIPEN/ADRENACLICK/or ANY BX GENERIC EQUIV    0.6 mL    Inject 0.3 mLs (0.3 mg) into the muscle as needed for anaphylaxis    Allergic reaction to insect sting, accidental or unintentional, initial encounter, Cough, SOB (shortness of breath)       magnesium 200 MG Tabs           MULTI-VITAMIN DAILY PO      Take 1  tablet by mouth daily Reported on 3/8/2017

## 2018-06-13 NOTE — PROGRESS NOTES
"Office Visit-Follow up    Chief Complaint: Mary Valdez is a 22 year old female who is being seen for   Chief Complaint   Patient presents with     RECHECK     mri results of right shoulder       History of Present Illness:   Today's visit:  Returns for right shoulder.  She is done 2 months worth of formal physical therapy twice a week.  She does report recently it still feels like it \"subluxes\" however it is not painful.  June 1, 2018 visit:  Mary Valdez is a 22 year old female who is seen in consultation at the request of self for evaluation of right shoulder pain.  Mechanism of Injury: patient states has had shoulder issues for years and has had previous episodes of shoulder popping into and out of place, described as subluxing but not dislocating to point of needing reduction.  Patient states has had at least 6 episodes over the last few years of this, however fell on April 13th, caught herself with her arm had another episode now the pain is getting much worse and her shoulder is much more bothersome and \"loose\".  Describes instability for quite some time but much worse after fall on April 13th.  She does describe anterior dull ache with occasional throbbing and feelings of shoulder out of place, worse with overhand activities like swimming and throwing a ball.  Also now having progressive pain to the point of waking at night.  No previous MRI, surgeries on right shoulder.  Is in PT and receiving kinesio taping and dry needling along with working on stability and strengthening, and although helping still very bothersome. Has tried NSAIDs without any benefit along with activity modification and rest.       #2: patient also mentioned that she has been having issues with her hands: Complains of couple of years but over the last few months stiffness in her MCP and PIP joints bilateral hands.  Worse in the morning.  But stiff throughout the day.         REVIEW OF SYSTEMS  General: negative for, night " "sweats, dizziness, fatigue  Resp: No shortness of breath and no cough  CV: negative for chest pain, syncope or near-syncope  GI: negative for nausea, vomiting and diarrhea  : negative for dysuria and hematuria  Musculoskeletal: as above  Neurologic: negative for syncope   Hematologic: negative for bleeding disorder    Physical Exam:  Vitals: /82 (BP Location: Right arm, Patient Position: Chair, Cuff Size: Adult Regular)  Temp 97.1  F (36.2  C) (Temporal)  Ht 5' 8.5\" (1.74 m)  Wt 186 lb (84.4 kg)  BMI 27.87 kg/m2  BMI= Body mass index is 27.87 kg/(m^2).  Constitutional: healthy, alert and no acute distress   Psychiatric: mentation appears normal and affect normal/bright  NEURO: no focal deficits  RESP: Normal with easy respirations and no use of accessory muscles to breathe, no audible wheezing or retractions  CV: RUE:  no edema         SKIN: No erythema, rashes, excoriation, or breakdown. No evidence of infection.   JOINT/EXTREMITIES:right shoulder: Full range of motion.  No focal areas of pain with palpation.  No pain on her exam today including with relocation  GAIT: not tested             Diagnostic Modalities:  right shoulder MRI-Arthrogram   no acute fractures or dislocations.  Rotator cuff is intact.  Possible subtle posterior labral tear however probably associated with a normal anatomic variant.  There is no para labral cyst.  Independent visualization of the images was performed.      Impression: right shoulder instability    Plan:  All of the above pertinent physical exam and imaging modalities findings was reviewed with Mary.    MRI shows no definitive surgical pathology for shoulder instability.  She does report pain is significantly improved.  She has \"subluxation\" events a couple times a day.  However they do not cause pain.  I recommended with her to exhaust all conservative care including some more physical therapy.  If all she notices is the feeling of subluxation with no pain continue " on.  If she starts to have more pain associated with these events return for evaluation.        Return to clinic PRN, or sooner as needed for changes.  Re-x-ray on return: Pinky Graf D.O.

## 2018-06-14 ENCOUNTER — HOSPITAL ENCOUNTER (OUTPATIENT)
Dept: OCCUPATIONAL THERAPY | Facility: CLINIC | Age: 23
Setting detail: THERAPIES SERIES
End: 2018-06-14
Attending: ORTHOPAEDIC SURGERY
Payer: COMMERCIAL

## 2018-06-14 PROCEDURE — 97535 SELF CARE MNGMENT TRAINING: CPT | Mod: GO | Performed by: OCCUPATIONAL THERAPIST

## 2018-06-14 PROCEDURE — 40000839 ZZH STATISTIC HAND THERAPY VISIT: Performed by: OCCUPATIONAL THERAPIST

## 2018-06-14 PROCEDURE — 97018 PARAFFIN BATH THERAPY: CPT | Mod: GO | Performed by: OCCUPATIONAL THERAPIST

## 2018-06-20 ENCOUNTER — HOSPITAL ENCOUNTER (OUTPATIENT)
Dept: OCCUPATIONAL THERAPY | Facility: CLINIC | Age: 23
Setting detail: THERAPIES SERIES
End: 2018-06-20
Attending: ORTHOPAEDIC SURGERY
Payer: COMMERCIAL

## 2018-06-20 PROCEDURE — 97035 APP MDLTY 1+ULTRASOUND EA 15: CPT | Mod: GO | Performed by: OCCUPATIONAL THERAPIST

## 2018-06-20 PROCEDURE — 97140 MANUAL THERAPY 1/> REGIONS: CPT | Mod: GO | Performed by: OCCUPATIONAL THERAPIST

## 2018-06-20 PROCEDURE — 40000839 ZZH STATISTIC HAND THERAPY VISIT: Performed by: OCCUPATIONAL THERAPIST

## 2018-06-26 ENCOUNTER — HOSPITAL ENCOUNTER (OUTPATIENT)
Dept: OCCUPATIONAL THERAPY | Facility: CLINIC | Age: 23
Setting detail: THERAPIES SERIES
End: 2018-06-26
Attending: ORTHOPAEDIC SURGERY
Payer: COMMERCIAL

## 2018-06-26 PROCEDURE — 97140 MANUAL THERAPY 1/> REGIONS: CPT | Mod: GO | Performed by: OCCUPATIONAL THERAPIST

## 2018-06-26 PROCEDURE — 40000839 ZZH STATISTIC HAND THERAPY VISIT: Performed by: OCCUPATIONAL THERAPIST

## 2018-06-26 PROCEDURE — 97110 THERAPEUTIC EXERCISES: CPT | Mod: GO | Performed by: OCCUPATIONAL THERAPIST

## 2018-06-26 PROCEDURE — 97018 PARAFFIN BATH THERAPY: CPT | Mod: GO | Performed by: OCCUPATIONAL THERAPIST

## 2018-06-28 ENCOUNTER — TELEPHONE (OUTPATIENT)
Dept: ORTHOPEDICS | Facility: CLINIC | Age: 23
End: 2018-06-28

## 2018-06-28 ENCOUNTER — OFFICE VISIT (OUTPATIENT)
Dept: ORTHOPEDICS | Facility: CLINIC | Age: 23
End: 2018-06-28
Payer: COMMERCIAL

## 2018-06-28 ENCOUNTER — HOSPITAL ENCOUNTER (OUTPATIENT)
Dept: OCCUPATIONAL THERAPY | Facility: CLINIC | Age: 23
Setting detail: THERAPIES SERIES
End: 2018-06-28
Attending: ORTHOPAEDIC SURGERY
Payer: COMMERCIAL

## 2018-06-28 VITALS
SYSTOLIC BLOOD PRESSURE: 109 MMHG | BODY MASS INDEX: 27.55 KG/M2 | HEIGHT: 69 IN | TEMPERATURE: 98 F | DIASTOLIC BLOOD PRESSURE: 74 MMHG | WEIGHT: 186 LBS

## 2018-06-28 DIAGNOSIS — M25.311 SHOULDER INSTABILITY, RIGHT: Primary | ICD-10-CM

## 2018-06-28 PROCEDURE — 40000839 ZZH STATISTIC HAND THERAPY VISIT

## 2018-06-28 PROCEDURE — 97535 SELF CARE MNGMENT TRAINING: CPT | Mod: GO

## 2018-06-28 PROCEDURE — 97140 MANUAL THERAPY 1/> REGIONS: CPT | Mod: GO

## 2018-06-28 PROCEDURE — 99213 OFFICE O/P EST LOW 20 MIN: CPT | Performed by: ORTHOPAEDIC SURGERY

## 2018-06-28 PROCEDURE — 97018 PARAFFIN BATH THERAPY: CPT | Mod: GO

## 2018-06-28 ASSESSMENT — PAIN SCALES - GENERAL: PAINLEVEL: MODERATE PAIN (5)

## 2018-06-28 NOTE — TELEPHONE ENCOUNTER
Type of surgery: Right shoulder arthroscopy with arthroscopic labral repair and possible capsulorrhaphy  Location of surgery: [facility]   Date of surgery: 7/6/18  Surgeon: Dr. Graf  Pre-Op Appt Date: 7/2/18  Post-Op Appt Date: 7/12/18   Packet sent out: Surgery packet was given to patient in clinic.   Pre-cert/Authorization completed: NA  Date: 6/28/2018    Yvonne Brian  Surgery Scheduler

## 2018-06-28 NOTE — PROGRESS NOTES
[facility]  [address] [facility] [address]  [phone]  Dept: [phone]    PRE-OP EVALUATION:  Today's date: 2018    Mary Valdez (: 1995) presents for pre-operative evaluation assessment as requested by Dr. Graf.  She requires evaluation and anesthesia risk assessment prior to undergoing surgery/procedure for treatment of right shoulder instability. Planned procedure right shoulder arthroscopy with arthroscopic labral repair possible capsulorrhaphy .    Fax number for surgical facility:   Primary Physician: Charisse Alexander  Type of Anesthesia Anticipated: General    Patient has a Health Care Directive or Living Will:  NO    Preop Questions 2018   Who is doing your surgery?    What are you having done? Shoulder scope   Date of Surgery/Procedure:    Facility or Hospital where procedure/surgery will be performed: [facility]   1.  Do you have a history of Heart attack, stroke, stent, coronary bypass surgery, or other heart surgery? No   2.  Do you ever have any pain or discomfort in your chest? No   3.  Do you have a history of  Heart Failure? No   4.   Are you troubled by shortness of breath when:  walking on a level surface, or up a slight hill, or at night? No   5.  Do you currently have a cold, bronchitis or other respiratory infection? No   6.  Do you have a cough, shortness of breath, or wheezing? No   7.  Do you sometimes get pains in the calves of your legs when you walk? No   8. Do you or anyone in your family have previous history of blood clots? No   9.  Do you or does anyone in your family have a serious bleeding problem such as prolonged bleeding following surgeries or cuts? No   10. Have you ever had problems with anemia or been told to take iron pills? No   11. Have you had any abnormal blood loss such as black, tarry or bloody stools, or abnormal vaginal bleeding? No   12. Have you ever had a blood  transfusion? No   13. Have you or any of your relatives ever had problems with anesthesia? YES - is very emotional when waking up from anesthesia and last time she became irritated and tried to take her IV out and leave. She just needs extra reassurance and help while coming out of anesthesia   14. Do you have sleep apnea, excessive snoring or daytime drowsiness? No   15. Do you have any prosthetic heart valves? No   16. Do you have prosthetic joints? No   17. Is there any chance that you may be pregnant? No     Answers for HPI/ROS submitted by the patient on 7/2/2018   If you checked off any problems, how difficult have these problems made it for you to do your work, take care of things at home, or get along with other people?: Not difficult at all  PHQ9 TOTAL SCORE: 3  NENO 7 TOTAL SCORE: 3      HPI:     HPI related to upcoming procedure: chronic shoulder pain and instability with planned surgical repair.       See problem list for active medical problems.  Problems all longstanding and stable, except as noted/documented.  See ROS for pertinent symptoms related to these conditions.                                                                                                                                                          .    MEDICAL HISTORY:     Patient Active Problem List    Diagnosis Date Noted     Shoulder instability, right 06/01/2018     Priority: Medium     Stiffness of hand joint, unspecified laterality 06/01/2018     Priority: Medium     Psoriatic arthritis (H) 01/25/2018     Priority: Medium     Allergic reaction to insect sting, accidental or unintentional, initial encounter 09/27/2017     Priority: Medium     Hypoglycemia, unspecified 07/10/2017     Priority: Medium     Sprain of lateral collateral ligament of right knee, initial encounter 06/21/2017     Priority: Medium     Segmental dysfunction of thoracic region 05/24/2017     Priority: Medium     Segmental dysfunction of lumbar region  05/24/2017     Priority: Medium     Segmental dysfunction of sacral region 05/24/2017     Priority: Medium     Flank pain 05/24/2017     Priority: Medium     Maltracking of right patella 01/05/2017     Priority: Medium     NENO (generalized anxiety disorder) 05/05/2016     Priority: Medium     PTSD (post-traumatic stress disorder) 05/05/2016     Priority: Medium     Anxiety state 07/07/2014     Priority: Medium     Problem list name updated by automated process. Provider to review       Major depression in complete remission (H) 07/07/2014     Priority: Medium     Migratory pain 10/13/2013     Priority: Medium     Psoriasis 07/23/2010     Priority: Medium      Past Medical History:   Diagnosis Date     Bell's palsy 5 y/o    Due to trauma, neg Lyme     Depression     Venice Meyer, psychologist, [employer] in [place]     Depressive disorder      Major depression in complete remission (H) 7/7/2014     Psoriasis     scalp only     Psoriatic arthritis (H)      Past Surgical History:   Procedure Laterality Date     ADENOIDECTOMY       ARTHROSCOPY KNEE WITH RETINACULAR RELEASE Right 1/20/2017    Procedure: ARTHROSCOPY KNEE WITH RETINACULAR RELEASE MEDIAL OR LATERAL;  Surgeon: Yevgeniy Graf DO;  Location: [facility] OR     C NONSPECIFIC PROCEDURE  02/12/01    adenoidectomy     DENTAL SURGERY       MOUTH SURGERY       Current Outpatient Prescriptions   Medication Sig Dispense Refill     Cyanocobalamin (B-12) 1000 MCG CAPS        EPINEPHrine (EPIPEN/ADRENACLICK/OR ANY BX GENERIC EQUIV) 0.3 MG/0.3ML injection 2-pack Inject 0.3 mLs (0.3 mg) into the muscle as needed for anaphylaxis 0.6 mL 1     Evening Primrose Oil 500 MG CAPS        magnesium 200 MG TABS        Multiple Vitamin (MULTI-VITAMIN DAILY PO) Take 1 tablet by mouth daily Reported on 3/8/2017       OTC products: none    Allergies   Allergen Reactions     Bee Venom Anaphylaxis     No Known Drug Allergies       Latex Allergy: NO    Social History   Substance Use  "Topics     Smoking status: Never Smoker     Smokeless tobacco: Never Used     Alcohol use No     History   Drug Use No       REVIEW OF SYSTEMS:   CONSTITUTIONAL: NEGATIVE for fever, chills, change in weight  INTEGUMENTARY/SKIN: NEGATIVE for worrisome rashes, moles or lesions  EYES: NEGATIVE for vision changes or irritation  ENT/MOUTH: NEGATIVE for ear, mouth and throat problems  RESP: NEGATIVE for significant cough or SOB  CV: NEGATIVE for chest pain, palpitations or peripheral edema  GI: NEGATIVE for nausea, abdominal pain, heartburn, or change in bowel habits  : NEGATIVE for frequency, dysuria, or hematuria  MUSCULOSKELETAL: POSITIVE for right shoulder discomfort NEGATIVE for significant arthralgias or myalgia  NEURO: NEGATIVE for weakness, dizziness or paresthesias  ENDOCRINE: NEGATIVE for temperature intolerance, skin/hair changes  HEME: NEGATIVE for bleeding problems  PSYCHIATRIC: POSITIVE for ongoing anxiety - is managing by positive self-talk and staying calm NEGATIVE for changes in mood or affect    EXAM:   /72  Pulse 76  Temp 98.1  F (36.7  C) (Temporal)  Resp 16  Ht 5' 8.5\" (1.74 m)  Wt 182 lb 1.6 oz (82.6 kg)  BMI 27.28 kg/m2    GENERAL APPEARANCE: healthy, alert and no distress     EYES: EOMI, PERRL     HENT: ear canals and TM's normal and nose and mouth without ulcers or lesions     NECK: no adenopathy, no asymmetry, masses, or scars and thyroid normal to palpation     RESP: lungs clear to auscultation - no rales, rhonchi or wheezes     CV: regular rates and rhythm, normal S1 S2, no S3 or S4 and no murmur, click or rub     ABDOMEN:  soft, nontender, no HSM or masses and bowel sounds normal     MS: extremities normal- no gross deformities noted, no evidence of inflammation in joints, FROM in all extremities.     SKIN: no suspicious lesions or rashes     NEURO: Normal strength and tone, sensory exam grossly normal, mentation intact and speech normal     PSYCH: mentation appears normal. and " affect normal/bright     LYMPHATICS: No cervical adenopathy    DIAGNOSTICS:     EKG: Not indicated due to non-vascular surgery and low risk of event (age <65 and without cardiac risk factors)  Labs Resulted Today:   Results for orders placed or performed during the hospital encounter of 06/07/18   MR Shoulder Right w Contrast    Narrative    MR ARTHROGRAM RIGHT SHOULDER  6/7/2018 4:16 PM    HISTORY:  Rule out labral tear.  Shoulder instability, right.    TECHNIQUE:  Following intra-articular glenohumeral joint injection of  diluted gadolinium (reported separately), coronal oblique, sagittal  oblique, and transverse fat suppressed T1, coronal oblique T2 and  inversion recovery, transverse gradient echo, and sagittal oblique fat  suppressed T2 weighted images were obtained.    FINDINGS:   Osseous acromial outlet: There is a Type I subacromial configuration.  No apparent subacromial spur. The acromioclavicular joint appears  within normal limits with no indentation upon the supraspinatus  outlet.    Rotator cuff:  No rotator cuff tendinosis or tear. No rotator cuff  muscle atrophy.    Biceps Tendon: No long head biceps tendon tear, subluxation, or  tendinosis.    Labral Structures: Subtle increased signal intensity at the tip of the  posterior labrum could represent a small broad-based tear but is  thought more likely to represent normal variation. There is no  adjacent paralabral cyst. The anterior labrum appears within normal  limits.    Osseous structures:  Articular cartilage surfaces along the humeral  head and glenoid fossa appear within normal limits. Mild cystic  resorptive changes are noted along the anatomical neck of the humerus  beneath the infraspinatus tendon. No evidence of Hill-Sachs impaction  fracture. No marrow edema.    Additional findings:  The anterior band of the inferior glenohumeral  ligament and middle glenohumeral ligament appear within normal limits.  Contrast outline of the glenohumeral  joint appears within normal  limits. Mild amount of contrast extravasation is noted along the  subscapularis muscle, presumably related to overdistention. No  abnormal bursal signal.       Impression    IMPRESSION:  1. Possible subtle posterior labral tear. However, this is thought to  be more likely related to normal variation. No paralabral cyst.  2. No rotator cuff tear.    KEV CRAIG MD       Recent Labs   Lab Test  10/17/17   2032  07/24/17   1021   HGB  12.3  12.7   PLT  229  226   NA  144  141   POTASSIUM  3.8  4.1   CR  0.80  0.78        IMPRESSION:   Reason for surgery/procedure: right shoulder arthroscopic surgery  Diagnosis/reason for consult: preop    The proposed surgical procedure is considered INTERMEDIATE risk.    REVISED CARDIAC RISK INDEX  The patient has the following serious cardiovascular risks for perioperative complications such as (MI, PE, VFib and 3  AV Block):  No serious cardiac risks  INTERPRETATION: 0 risks: Class I (very low risk - 0.4% complication rate)    The patient has the following additional risks for perioperative complications:  No identified additional risks      ICD-10-CM    1. Preop general physical exam Z01.818        RECOMMENDATIONS:     --Consult hospital rounder / IM to assist post-op medical management    --Patient is on no chronic medications    APPROVAL GIVEN to proceed with proposed procedure, without further diagnostic evaluation       Signed Electronically by: MERVIN Solorzano CNP    Copy of this evaluation report is provided to requesting physician.    [facility] Preop Guidelines    Revised Cardiac Risk Index

## 2018-06-28 NOTE — PROGRESS NOTES
"Office Visit-Follow up    Chief Complaint: Mary Valdez is a 22 year old female who is being seen for   Chief Complaint   Patient presents with     RECHECK     right shoulder instability       History of Present Illness:   Today's visit:  Returns for right shoulder.  She reports since her last visit she \"quit babying it\" and started using it.  Carrying grocery bags doing normal activity causes pain with subluxation episodes.  She always feels the shoulder coming out to the front.  She never felt it coming towards the back.  Rates the pain as moderate.  Worse with use.  She presents today requesting surgery  June 13, 2018 visit:  Returns for right shoulder.  She is done 2 months worth of formal physical therapy twice a week.  She does report recently it still feels like it \"subluxes\" however it is not painful.  June 1, 2018 visit:  Mary Valdez is a 22 year old female who is seen in consultation at the request of self for evaluation of right shoulder pain.  Mechanism of Injury: patient states has had shoulder issues for years and has had previous episodes of shoulder popping into and out of place, described as subluxing but not dislocating to point of needing reduction.  Patient states has had at least 6 episodes over the last few years of this, however fell on April 13th, caught herself with her arm had another episode now the pain is getting much worse and her shoulder is much more bothersome and \"loose\".  Describes instability for quite some time but much worse after fall on April 13th.  She does describe anterior dull ache with occasional throbbing and feelings of shoulder out of place, worse with overhand activities like swimming and throwing a ball.  Also now having progressive pain to the point of waking at night.  No previous MRI, surgeries on right shoulder.  Is in PT and receiving kinesio taping and dry needling along with working on stability and strengthening, and although helping still very " "bothersome. Has tried NSAIDs without any benefit along with activity modification and rest.        #2: patient also mentioned that she has been having issues with her hands: Complains of couple of years but over the last few months stiffness in her MCP and PIP joints bilateral hands.  Worse in the morning.  But stiff throughout the day.      REVIEW OF SYSTEMS  General: negative for, night sweats, dizziness, fatigue  Resp: No shortness of breath and no cough  CV: negative for chest pain, syncope or near-syncope  GI: negative for nausea, vomiting and diarrhea  : negative for dysuria and hematuria  Musculoskeletal: as above  Neurologic: negative for syncope   Hematologic: negative for bleeding disorder    Physical Exam:  Vitals: /74 (BP Location: Left arm, Patient Position: Chair, Cuff Size: Adult Regular)  Temp 98  F (36.7  C) (Temporal)  Ht 5' 8.5\" (1.74 m)  Wt 186 lb (84.4 kg)  BMI 27.87 kg/m2  BMI= Body mass index is 27.87 kg/(m^2).  Constitutional: healthy, alert and no acute distress   Psychiatric: mentation appears normal and affect normal/bright  NEURO: no focal deficits  RESP: Normal with easy respirations and no use of accessory muscles to breathe, no audible wheezing or retractions  CV: RUE:  no edema         Regular rate and rhythm by palpation  SKIN: No erythema, rashes, excoriation, or breakdown. No evidence of infection.   JOINT/EXTREMITIES:right older: Full active range of motion.  No focal areas of tenderness.  No gross deformity.  She has a positive [place]'s.  She does have positive anterior apprehension with relocation.  She has no weakness throughout.  Although she does have some pain with supraspinatus testing.  There is no appreciable biceps subluxation.  Negative posterior shift and load.  GAIT: not tested             Diagnostic Modalities:  None today.  Independent visualization of the images was performed.      Impression: right shoulder instability    Plan:  All of the above " "pertinent physical exam and imaging modalities findings was reviewed with Mary.    Options discussed.  She is now had symptoms in her shoulder for many years.  On April 13 she fell catching herself causing increased pain and a \"loose feeling\".  Since then she has had multiple instability episodes.  Recently with return back to normal activity which inconsideration is minimal she has had increased pain with instability episodes.  So given her failure of conservative care with 3 months worth of progressive symptoms refractory to rest, activity modification, formal physical therapy, home physical therapy, anti-inflammatories we discussed proceeding with right shoulder arthroscopy with arthroscopic labral repair possible capsulorrhaphy     The risks, benefits, alternatives, complication, limitations, and expectations were reviewed at length. Complication such as infection, bleeding, fracture, blood clots, cartilage injury, hardware failure, tendon re-tears, and nerve damage was reviewed. Surgery to be done under general anesthesia.    With any repair of a torn  labrum/SLAP, limitation would be in place post-operatively. This generally includes no active moving/lifting arm for 4-6 weeks, no overhead lifting for 8 weeks, no lifting weights for 12-16 weeks after surgery. Return to full unrestricted activity would be about approximately 16-20 weeks after surgery. These are a general time reference and may be changed depending on the pathology and fixation.     All questions were answered.Together through a combined decision making approach we have decided on a shoulder arthroscopy and the above listed procedures.    The patient's past medical and surgical history was reviewed; The patient will need a preoperative medical evaluation and clearance.    Return to clinic 10 days post-operatively. , or sooner as needed for changes.  Re-x-ray on return: No    Magdaleno Graf D.O.        "

## 2018-06-28 NOTE — LETTER
"    6/28/2018         RE: Mary Valdez  [address]  [place] [address]        Dear Colleague,    Thank you for referring your patient, Mary Valdez, to the [facility]. Please see a copy of my visit note below.    Office Visit-Follow up    Chief Complaint: Mary Valdez is a 22 year old female who is being seen for   Chief Complaint   Patient presents with     RECHECK     right shoulder instability       History of Present Illness:   Today's visit:  Returns for right shoulder.  She reports since her last visit she \"quit babying it\" and started using it.  Carrying grocery bags doing normal activity causes pain with subluxation episodes.  She always feels the shoulder coming out to the front.  She never felt it coming towards the back.  Rates the pain as moderate.  Worse with use.  She presents today requesting surgery  June 13, 2018 visit:  Returns for right shoulder.  She is done 2 months worth of formal physical therapy twice a week.  She does report recently it still feels like it \"subluxes\" however it is not painful.  June 1, 2018 visit:  Mary Valdez is a 22 year old female who is seen in consultation at the request of self for evaluation of right shoulder pain.  Mechanism of Injury: patient states has had shoulder issues for years and has had previous episodes of shoulder popping into and out of place, described as subluxing but not dislocating to point of needing reduction.  Patient states has had at least 6 episodes over the last few years of this, however fell on April 13th, caught herself with her arm had another episode now the pain is getting much worse and her shoulder is much more bothersome and \"loose\".  Describes instability for quite some time but much worse after fall on April 13th.  She does describe anterior dull ache with occasional throbbing and feelings of shoulder out of place, worse with overhand activities like swimming and throwing a " "ball.  Also now having progressive pain to the point of waking at night.  No previous MRI, surgeries on right shoulder.  Is in PT and receiving kinesio taping and dry needling along with working on stability and strengthening, and although helping still very bothersome. Has tried NSAIDs without any benefit along with activity modification and rest.        #2: patient also mentioned that she has been having issues with her hands: Complains of couple of years but over the last few months stiffness in her MCP and PIP joints bilateral hands.  Worse in the morning.  But stiff throughout the day.      REVIEW OF SYSTEMS  General: negative for, night sweats, dizziness, fatigue  Resp: No shortness of breath and no cough  CV: negative for chest pain, syncope or near-syncope  GI: negative for nausea, vomiting and diarrhea  : negative for dysuria and hematuria  Musculoskeletal: as above  Neurologic: negative for syncope   Hematologic: negative for bleeding disorder    Physical Exam:  Vitals: /74 (BP Location: Left arm, Patient Position: Chair, Cuff Size: Adult Regular)  Temp 98  F (36.7  C) (Temporal)  Ht 5' 8.5\" (1.74 m)  Wt 186 lb (84.4 kg)  BMI 27.87 kg/m2  BMI= Body mass index is 27.87 kg/(m^2).  Constitutional: healthy, alert and no acute distress   Psychiatric: mentation appears normal and affect normal/bright  NEURO: no focal deficits  RESP: Normal with easy respirations and no use of accessory muscles to breathe, no audible wheezing or retractions  CV: RUE:  no edema         Regular rate and rhythm by palpation  SKIN: No erythema, rashes, excoriation, or breakdown. No evidence of infection.   JOINT/EXTREMITIES:right older: Full active range of motion.  No focal areas of tenderness.  No gross deformity.  She has a positive [place]'s.  She does have positive anterior apprehension with relocation.  She has no weakness throughout.  Although she does have some pain with supraspinatus testing.  There is no " "appreciable biceps subluxation.  Negative posterior shift and load.  GAIT: not tested             Diagnostic Modalities:  None today.  Independent visualization of the images was performed.      Impression: right shoulder instability    Plan:  All of the above pertinent physical exam and imaging modalities findings was reviewed with Mary.    Options discussed.  She is now had symptoms in her shoulder for many years.  On April 13 she fell catching herself causing increased pain and a \"loose feeling\".  Since then she has had multiple instability episodes.  Recently with return back to normal activity which inconsideration is minimal she has had increased pain with instability episodes.  So given her failure of conservative care with 3 months worth of progressive symptoms refractory to rest, activity modification, formal physical therapy, home physical therapy, anti-inflammatories we discussed proceeding with right shoulder arthroscopy with arthroscopic labral repair possible capsulorrhaphy     The risks, benefits, alternatives, complication, limitations, and expectations were reviewed at length. Complication such as infection, bleeding, fracture, blood clots, cartilage injury, hardware failure, tendon re-tears, and nerve damage was reviewed. Surgery to be done under general anesthesia.    With any repair of a torn  labrum/SLAP, limitation would be in place post-operatively. This generally includes no active moving/lifting arm for 4-6 weeks, no overhead lifting for 8 weeks, no lifting weights for 12-16 weeks after surgery. Return to full unrestricted activity would be about approximately 16-20 weeks after surgery. These are a general time reference and may be changed depending on the pathology and fixation.     All questions were answered.Together through a combined decision making approach we have decided on a shoulder arthroscopy and the above listed procedures.    The patient's past medical and surgical history was " reviewed; The patient will need a preoperative medical evaluation and clearance.    Return to clinic 10 days post-operatively. , or sooner as needed for changes.  Re-x-ray on return: No    Magdaleno Graf D.O.          Again, thank you for allowing me to participate in the care of your patient.        Sincerely,        Yevgeniy Graf, DO

## 2018-06-28 NOTE — MR AVS SNAPSHOT
After Visit Summary   6/28/2018    Mary Valdez    MRN: [id number]           Patient Information     Date Of Birth          1995        Visit Information        Provider Department      6/28/2018 11:20 AM Yevgeniy Graf DO [facility]        Today's Diagnoses     Shoulder instability, right    -  1       Follow-ups after your visit        Your next 10 appointments already scheduled     Jul 02, 2018 10:00 AM CDT   Pre-Op physical with MERVIN Guevara [facility] ([facility])    [address] [address]   [phone]            Jul 02, 2018 11:30 AM CDT   Hand Treatment with Jalyn Cary, OT   [facility] ([facility])    [address] [place] [address] [address] [place] [address]   [phone]            Jul 05, 2018 10:30 AM CDT   Hand Treatment with Jalyn Cary OT   [facility] ([facility])    [address] [place] [address] [address] [place] [address]   [phone]            Jul 09, 2018 10:30 AM CDT   Hand Treatment with Jalyn Cary OT   [facility] ([facility])    [address] [place] [address] [address] [place] [address]   [phone]            Jul 12, 2018  9:45 AM CDT   Hand Treatment with Jalyn Cary, OT   [facility] ([facility])    [address] [place] [address] [address] [place] [address]   [phone]            Jul 12, 2018 10:30 AM CDT   Return Visit with Yevgeniy Graf DO   [facility]    [address] [address]   [phone]              Who to contact     If you have questions or need follow up information about today's clinic visit or your schedule please contact [facility] directly at [phone].  Normal or non-critical lab and  "imaging results will be communicated to you by MyChart, letter or phone within 4 business days after the clinic has received the results. If you do not hear from us within 7 days, please contact the clinic through [employer] or phone. If you have a critical or abnormal lab result, we will notify you by phone as soon as possible.  Submit refill requests through [employer] or call your pharmacy and they will forward the refill request to us. Please allow 3 business days for your refill to be completed.          Additional Information About Your Visit        [email or web address] Information     [employer] gives you secure access to your electronic health record. If you see a primary care provider, you can also send messages to your care team and make appointments. If you have questions, please call your primary care clinic.  If you do not have a primary care provider, please call [phone] and they will assist you.        Care EveryWhere ID     This is your Care EveryWhere ID. This could be used by other organizations to access your [facility] medical records  [id number]        Your Vitals Were     Temperature Height BMI (Body Mass Index)             98  F (36.7  C) (Temporal) 5' 8.5\" (1.74 m) 27.87 kg/m2          Blood Pressure from Last 3 Encounters:   06/28/18 109/74   06/13/18 122/82   06/01/18 122/68    Weight from Last 3 Encounters:   06/28/18 186 lb (84.4 kg)   06/13/18 186 lb (84.4 kg)   06/01/18 186 lb (84.4 kg)              We Performed the Following     Estella-Operative Worksheet        Primary Care Provider Office Phone # Fax #    Charisse MERVIN Eduardo [facility] [phone]       [address] [facility] [address]        Equal Access to Services     [facility] Center[facility] : Hadii bear ramos Somarcelo, waaxda luqadaha, qaybta kaalmada adeegyada, shari moncada. So [facility] [phone].    ATENCIÓN: Si habla español, tiene a garcia disposición servicios gratuitos de asistencia lingüística. Llame " al [phone].    We comply with applicable federal civil rights laws and [place] laws. We do not discriminate on the basis of race, color, national origin, age, disability, sex, sexual orientation, or gender identity.            Thank you!     Thank you for choosing [facility]  for your care. Our goal is always to provide you with excellent care. Hearing back from our patients is one way we can continue to improve our services. Please take a few minutes to complete the written survey that you may receive in the mail after your visit with us. Thank you!             Your Updated Medication List - Protect others around you: Learn how to safely use, store and throw away your medicines at www.disposemymeds.org.          This list is accurate as of 6/28/18 11:55 AM.  Always use your most recent med list.                   Brand Name Dispense Instructions for use Diagnosis    B-12 1000 MCG Caps           EPINEPHrine 0.3 MG/0.3ML injection 2-pack    EPIPEN/ADRENACLICK/or ANY BX GENERIC EQUIV    0.6 mL    Inject 0.3 mLs (0.3 mg) into the muscle as needed for anaphylaxis    Allergic reaction to insect sting, accidental or unintentional, initial encounter, Cough, SOB (shortness of breath)       magnesium 200 MG Tabs           MULTI-VITAMIN DAILY PO      Take 1 tablet by mouth daily Reported on 3/8/2017

## 2018-07-02 ENCOUNTER — OFFICE VISIT (OUTPATIENT)
Dept: FAMILY MEDICINE | Facility: OTHER | Age: 23
End: 2018-07-02
Payer: COMMERCIAL

## 2018-07-02 ENCOUNTER — HOSPITAL ENCOUNTER (OUTPATIENT)
Dept: OCCUPATIONAL THERAPY | Facility: CLINIC | Age: 23
Setting detail: THERAPIES SERIES
End: 2018-07-02
Attending: ORTHOPAEDIC SURGERY
Payer: COMMERCIAL

## 2018-07-02 VITALS
TEMPERATURE: 98.1 F | WEIGHT: 182.1 LBS | HEIGHT: 69 IN | HEART RATE: 76 BPM | RESPIRATION RATE: 16 BRPM | DIASTOLIC BLOOD PRESSURE: 72 MMHG | BODY MASS INDEX: 26.97 KG/M2 | SYSTOLIC BLOOD PRESSURE: 112 MMHG

## 2018-07-02 DIAGNOSIS — Z01.818 PREOP GENERAL PHYSICAL EXAM: Primary | ICD-10-CM

## 2018-07-02 PROCEDURE — 99214 OFFICE O/P EST MOD 30 MIN: CPT | Performed by: STUDENT IN AN ORGANIZED HEALTH CARE EDUCATION/TRAINING PROGRAM

## 2018-07-02 PROCEDURE — 97140 MANUAL THERAPY 1/> REGIONS: CPT | Mod: GO | Performed by: OCCUPATIONAL THERAPIST

## 2018-07-02 PROCEDURE — 97110 THERAPEUTIC EXERCISES: CPT | Mod: GO | Performed by: OCCUPATIONAL THERAPIST

## 2018-07-02 PROCEDURE — 97018 PARAFFIN BATH THERAPY: CPT | Mod: GO | Performed by: OCCUPATIONAL THERAPIST

## 2018-07-02 PROCEDURE — 40000839 ZZH STATISTIC HAND THERAPY VISIT: Performed by: OCCUPATIONAL THERAPIST

## 2018-07-02 RX ORDER — EVENING PRIMROSE OIL 500 MG
CAPSULE ORAL
COMMUNITY
End: 2019-05-24

## 2018-07-02 ASSESSMENT — ANXIETY QUESTIONNAIRES
GAD7 TOTAL SCORE: 3
4. TROUBLE RELAXING: NOT AT ALL
GAD7 TOTAL SCORE: 3
7. FEELING AFRAID AS IF SOMETHING AWFUL MIGHT HAPPEN: NOT AT ALL
3. WORRYING TOO MUCH ABOUT DIFFERENT THINGS: NOT AT ALL
2. NOT BEING ABLE TO STOP OR CONTROL WORRYING: SEVERAL DAYS
GAD7 TOTAL SCORE: 3
6. BECOMING EASILY ANNOYED OR IRRITABLE: SEVERAL DAYS
5. BEING SO RESTLESS THAT IT IS HARD TO SIT STILL: NOT AT ALL
7. FEELING AFRAID AS IF SOMETHING AWFUL MIGHT HAPPEN: NOT AT ALL
1. FEELING NERVOUS, ANXIOUS, OR ON EDGE: SEVERAL DAYS

## 2018-07-02 ASSESSMENT — PATIENT HEALTH QUESTIONNAIRE - PHQ9
SUM OF ALL RESPONSES TO PHQ QUESTIONS 1-9: 3
10. IF YOU CHECKED OFF ANY PROBLEMS, HOW DIFFICULT HAVE THESE PROBLEMS MADE IT FOR YOU TO DO YOUR WORK, TAKE CARE OF THINGS AT HOME, OR GET ALONG WITH OTHER PEOPLE: NOT DIFFICULT AT ALL
SUM OF ALL RESPONSES TO PHQ QUESTIONS 1-9: 3

## 2018-07-02 ASSESSMENT — PAIN SCALES - GENERAL: PAINLEVEL: MODERATE PAIN (4)

## 2018-07-02 NOTE — LETTER
My Depression Action Plan  Name: Mary Valdez   Date of Birth 1995  Date: 6/28/2018    My doctor: Charisse Alexander   My clinic: [facility]  [address] [facility] [address] [phone]          GREEN    ZONE   Good Control    What it looks like:     Things are going generally well. You have normal up s and down s. You may even feel depressed from time to time, but bad moods usually last less than a day.   What you need to do:  1. Continue to care for yourself (see self care plan)  2. Check your depression survival kit and update it as needed  3. Follow your physician s recommendations including any medication.  4. Do not stop taking medication unless you consult with your physician first.           YELLOW         ZONE Getting Worse    What it looks like:     Depression is starting to interfere with your life.     It may be hard to get out of bed; you may be starting to isolate yourself from others.    Symptoms of depression are starting to last most all day and this has happened for several days.     You may have suicidal thoughts but they are not constant.   What you need to do:     1. Call your care team, your response to treatment will improve if you keep your care team informed of your progress. Yellow periods are signs an adjustment may need to be made.     2. Continue your self-care, even if you have to fake it!    3. Talk to someone in your support network    4. Open up your depression survival kit           RED    ZONE Medical Alert - Get Help    What it looks like:     Depression is seriously interfering with your life.     You may experience these or other symptoms: You can t get out of bed most days, can t work or engage in other necessary activities, you have trouble taking care of basic hygiene, or basic responsibilities, thoughts of suicide or death that will not go away, self-injurious behavior.     What you need to do:  1. Call your  care team and request a same-day appointment. If they are not available (weekends or after hours) call your local crisis line, emergency room or 911.            Depression Self Care Plan / Survival Kit    Self-Care for Depression  Here s the deal. Your body and mind are really not as separate as most people think.  What you do and think affects how you feel and how you feel influences what you do and think. This means if you do things that people who feel good do, it will help you feel better.  Sometimes this is all it takes.  There is also a place for medication and therapy depending on how severe your depression is, so be sure to consult with your medical provider and/ or Behavioral Health Consultant if your symptoms are worsening or not improving.     In order to better manage my stress, I will:    Exercise  Get some form of exercise, every day. This will help reduce pain and release endorphins, the  feel good  chemicals in your brain. This is almost as good as taking antidepressants!  This is not the same as joining a gym and then never going! (they count on that by the way ) It can be as simple as just going for a walk or doing some gardening, anything that will get you moving.      Hygiene   Maintain good hygiene (Get out of bed in the morning, Make your bed, Brush your teeth, Take a shower, and Get dressed like you were going to work, even if you are unemployed).  If your clothes don't fit try to get ones that do.    Diet  I will strive to eat foods that are good for me, drink plenty of water, and avoid excessive sugar, caffeine, alcohol, and other mood-altering substances.  Some foods that are helpful in depression are: complex carbohydrates, B vitamins, flaxseed, fish or fish oil, fresh fruits and vegetables.    Psychotherapy  I agree to participate in Individual Therapy (if recommended).    Medication  If prescribed medications, I agree to take them.  Missing doses can result in serious side effects.  I  understand that drinking alcohol, or other illicit drug use, may cause potential side effects.  I will not stop my medication abruptly without first discussing it with my provider.    Staying Connected With Others  I will stay in touch with my friends, family members, and my primary care provider/team.    Use your imagination  Be creative.  We all have a creative side; it doesn t matter if it s oil painting, sand castles, or mud pies! This will also kick up the endorphins.    Witness Beauty  (AKA stop and smell the roses) Take a look outside, even in mid-winter. Notice colors, textures. Watch the squirrels and birds.     Service to others  Be of service to others.  There is always someone else in need.  By helping others we can  get out of ourselves  and remember the really important things.  This also provides opportunities for practicing all the other parts of the program.    Humor  Laugh and be silly!  Adjust your TV habits for less news and crime-drama and more comedy.    Control your stress  Try breathing deep, massage therapy, biofeedback, and meditation. Find time to relax each day.     My support system    Clinic Contact:  Phone number:    Contact 1:  Phone number:    Contact 2:  Phone number:    Faith/:  Phone number:    Therapist:  Phone number:    Local crisis center:    Phone number:    Other community support:  Phone number:

## 2018-07-02 NOTE — MR AVS SNAPSHOT
After Visit Summary   7/2/2018    Mary Valdez    MRN: [id number]           Patient Information     Date Of Birth          1995        Visit Information        Provider Department      7/2/2018 10:00 AM Charisse Alexander APRN [facility]        Today's Diagnoses     Preop general physical exam    -  1      Care Instructions      Before Your Surgery      Call your surgeon if there is any change in your health. This includes signs of a cold or flu (such as a sore throat, runny nose, cough, rash or fever).    Do not smoke, drink alcohol or take over the counter medicine (unless your surgeon or primary care doctor tells you to) for the 24 hours before and after surgery.    If you take prescribed drugs: Follow your doctor s orders about which medicines to take and which to stop until after surgery.    Eating and drinking prior to surgery: follow the instructions from your surgeon    Take a shower or bath the night before surgery. Use the soap your surgeon gave you to gently clean your skin. If you do not have soap from your surgeon, use your regular soap. Do not shave or scrub the surgery site.  Wear clean pajamas and have clean sheets on your bed.           Follow-ups after your visit        Your next 10 appointments already scheduled     Jul 02, 2018 11:30 AM CDT   Hand Treatment with Jalyn Cary OT   [facility] ([facility])    [address] [place] [address]   [phone]            Jul 05, 2018 10:30 AM CDT   Hand Treatment with Jalyn Cary OT   [facility] ([facility])    [address] [place] [address]   [phone]            Jul 06, 2018   Procedure with Yevgeniy Graf DO   [facility] ([facility])    [address] [place] [address]   [phone]           From [address]: Exit at [address] on south side of [place]. Turn right on [address]. Turn left at stoplight on [address]. [facility] will be in view two blocks ahead            Jul 09, 2018 10:30 AM CDT   Hand Treatment with Jalyn Cary OT   [facility] ([facility])    [address]   [place] [address]   [phone]            Jul 12, 2018  9:45 AM CDT   Hand Treatment with Jalyn Cary OT   [facility] ([facility])    [address] [place] [address]   [phone]            Jul 12, 2018 10:30 AM CDT   Return Visit with Yevgeniy Graf,    [facility]    [address] [address]   [phone]              Who to contact     If you have questions or need follow up information about today's clinic visit or your schedule please contact [facility] directly at [phone].  Normal or non-critical lab and imaging results will be communicated to you by [employer], letter or phone within 4 business days after the clinic has received the results. If you do not hear from us within 7 days, please contact the clinic through [employer] or phone. If you have a critical or abnormal lab result, we will notify you by phone as soon as possible.  Submit refill requests through [employer] or call your pharmacy and they will forward the refill request to us. Please allow 3 business days for your refill to be completed.          Additional Information About Your Visit        [employer] Information     [employer] gives you secure access to your electronic health record. If you see a primary care provider, you can also send messages to your care team and make appointments. If you have questions, please call your primary care clinic.  If you do not have a primary care provider, please call [phone] and they will  "assist you.        Care EveryWhere ID     This is your Care EveryWhere ID. This could be used by other organizations to access your [facility] medical records  [id number]        Your Vitals Were     Pulse Temperature Respirations Height BMI (Body Mass Index)       76 98.1  F (36.7  C) (Temporal) 16 5' 8.5\" (1.74 m) 27.28 kg/m2        Blood Pressure from Last 3 Encounters:   07/02/18 112/72   06/28/18 109/74   06/13/18 122/82    Weight from Last 3 Encounters:   07/02/18 182 lb 1.6 oz (82.6 kg)   06/28/18 186 lb (84.4 kg)   06/13/18 186 lb (84.4 kg)              Today, you had the following     No orders found for display       Primary Care Provider Office Phone # Fax #    Charisse Winchester MERVIN Alexander [facility] [phone]       [address] [place] [address]        Equal Access to Services     RUI SYLVESTER : Hadii aad ku hadasho Soomaali, waaxda luqadaha, qaybta kaalmada adeegyada, waxay idiin hayaan adeeg kharash la'angeliquen . So [facility] [phone].    ATENCIÓN: Si habla español, tiene a garcia disposición servicios gratuitos de asistencia lingüística. Llame al [phone].    We comply with applicable federal civil rights laws and [place] laws. We do not discriminate on the basis of race, color, national origin, age, disability, sex, sexual orientation, or gender identity.            Thank you!     Thank you for choosing [facility]  for your care. Our goal is always to provide you with excellent care. Hearing back from our patients is one way we can continue to improve our services. Please take a few minutes to complete the written survey that you may receive in the mail after your visit with us. Thank you!             Your Updated Medication List - Protect others around you: Learn how to safely use, store and throw away your medicines at www.disposemymeds.org.          This list is accurate as of 7/2/18 10:31 AM.  Always use your most recent med list.                   Brand Name " Dispense Instructions for use Diagnosis    B-12 1000 MCG Caps           EPINEPHrine 0.3 MG/0.3ML injection 2-pack    EPIPEN/ADRENACLICK/or ANY BX GENERIC EQUIV    0.6 mL    Inject 0.3 mLs (0.3 mg) into the muscle as needed for anaphylaxis    Allergic reaction to insect sting, accidental or unintentional, initial encounter, Cough, SOB (shortness of breath)       Evening Primrose Oil 500 MG Caps           magnesium 200 MG Tabs           MULTI-VITAMIN DAILY PO      Take 1 tablet by mouth daily Reported on 3/8/2017

## 2018-07-03 ASSESSMENT — PATIENT HEALTH QUESTIONNAIRE - PHQ9: SUM OF ALL RESPONSES TO PHQ QUESTIONS 1-9: 3

## 2018-07-03 ASSESSMENT — ANXIETY QUESTIONNAIRES: GAD7 TOTAL SCORE: 3

## 2018-07-05 NOTE — H&P (VIEW-ONLY)
[facility]  [address] [address]  [phone]  Dept: [phone]    PRE-OP EVALUATION:  Today's date: 2018    Mary Valdez (: 1995) presents for pre-operative evaluation assessment as requested by Dr. Graf.  She requires evaluation and anesthesia risk assessment prior to undergoing surgery/procedure for treatment of right shoulder instability. Planned procedure right shoulder arthroscopy with arthroscopic labral repair possible capsulorrhaphy .    Fax number for surgical facility:   Primary Physician: Charisse Alexander  Type of Anesthesia Anticipated: General    Patient has a Health Care Directive or Living Will:  NO    Preop Questions 2018   Who is doing your surgery?    What are you having done? Shoulder scope   Date of Surgery/Procedure:    Facility or Hospital where procedure/surgery will be performed: [facility]   1.  Do you have a history of Heart attack, stroke, stent, coronary bypass surgery, or other heart surgery? No   2.  Do you ever have any pain or discomfort in your chest? No   3.  Do you have a history of  Heart Failure? No   4.   Are you troubled by shortness of breath when:  walking on a level surface, or up a slight hill, or at night? No   5.  Do you currently have a cold, bronchitis or other respiratory infection? No   6.  Do you have a cough, shortness of breath, or wheezing? No   7.  Do you sometimes get pains in the calves of your legs when you walk? No   8. Do you or anyone in your family have previous history of blood clots? No   9.  Do you or does anyone in your family have a serious bleeding problem such as prolonged bleeding following surgeries or cuts? No   10. Have you ever had problems with anemia or been told to take iron pills? No   11. Have you had any abnormal blood loss such as black, tarry or bloody stools, or abnormal vaginal bleeding? No   12. Have you ever had a blood  transfusion? No   13. Have you or any of your relatives ever had problems with anesthesia? YES - is very emotional when waking up from anesthesia and last time she became irritated and tried to take her IV out and leave. She just needs extra reassurance and help while coming out of anesthesia   14. Do you have sleep apnea, excessive snoring or daytime drowsiness? No   15. Do you have any prosthetic heart valves? No   16. Do you have prosthetic joints? No   17. Is there any chance that you may be pregnant? No     Answers for HPI/ROS submitted by the patient on 7/2/2018   If you checked off any problems, how difficult have these problems made it for you to do your work, take care of things at home, or get along with other people?: Not difficult at all  PHQ9 TOTAL SCORE: 3  NENO 7 TOTAL SCORE: 3      HPI:     HPI related to upcoming procedure: chronic shoulder pain and instability with planned surgical repair.       See problem list for active medical problems.  Problems all longstanding and stable, except as noted/documented.  See ROS for pertinent symptoms related to these conditions.                                                                                                                                                          .    MEDICAL HISTORY:     Patient Active Problem List    Diagnosis Date Noted     Shoulder instability, right 06/01/2018     Priority: Medium     Stiffness of hand joint, unspecified laterality 06/01/2018     Priority: Medium     Psoriatic arthritis (H) 01/25/2018     Priority: Medium     Allergic reaction to insect sting, accidental or unintentional, initial encounter 09/27/2017     Priority: Medium     Hypoglycemia, unspecified 07/10/2017     Priority: Medium     Sprain of lateral collateral ligament of right knee, initial encounter 06/21/2017     Priority: Medium     Segmental dysfunction of thoracic region 05/24/2017     Priority: Medium     Segmental dysfunction of lumbar region  05/24/2017     Priority: Medium     Segmental dysfunction of sacral region 05/24/2017     Priority: Medium     Flank pain 05/24/2017     Priority: Medium     Maltracking of right patella 01/05/2017     Priority: Medium     NENO (generalized anxiety disorder) 05/05/2016     Priority: Medium     PTSD (post-traumatic stress disorder) 05/05/2016     Priority: Medium     Anxiety state 07/07/2014     Priority: Medium     Problem list name updated by automated process. Provider to review       Major depression in complete remission (H) 07/07/2014     Priority: Medium     Migratory pain 10/13/2013     Priority: Medium     Psoriasis 07/23/2010     Priority: Medium      Past Medical History:   Diagnosis Date     Bell's palsy 7 y/o    Due to trauma, neg Lyme     Depression     Venice Meyer, psychologist, [employer] in [place]     Depressive disorder      Major depression in complete remission (H) 7/7/2014     Psoriasis     scalp only     Psoriatic arthritis (H)      Past Surgical History:   Procedure Laterality Date     ADENOIDECTOMY       ARTHROSCOPY KNEE WITH RETINACULAR RELEASE Right 1/20/2017    Procedure: ARTHROSCOPY KNEE WITH RETINACULAR RELEASE MEDIAL OR LATERAL;  Surgeon: Yevgeniy Graf DO;  Location: [facility] OR     C NONSPECIFIC PROCEDURE  02/12/01    adenoidectomy     DENTAL SURGERY       MOUTH SURGERY       Current Outpatient Prescriptions   Medication Sig Dispense Refill     Cyanocobalamin (B-12) 1000 MCG CAPS        EPINEPHrine (EPIPEN/ADRENACLICK/OR ANY BX GENERIC EQUIV) 0.3 MG/0.3ML injection 2-pack Inject 0.3 mLs (0.3 mg) into the muscle as needed for anaphylaxis 0.6 mL 1     Evening Primrose Oil 500 MG CAPS        magnesium 200 MG TABS        Multiple Vitamin (MULTI-VITAMIN DAILY PO) Take 1 tablet by mouth daily Reported on 3/8/2017       OTC products: none    Allergies   Allergen Reactions     Bee Venom Anaphylaxis     No Known Drug Allergies       Latex Allergy: NO    Social History   Substance Use  "Topics     Smoking status: Never Smoker     Smokeless tobacco: Never Used     Alcohol use No     History   Drug Use No       REVIEW OF SYSTEMS:   CONSTITUTIONAL: NEGATIVE for fever, chills, change in weight  INTEGUMENTARY/SKIN: NEGATIVE for worrisome rashes, moles or lesions  EYES: NEGATIVE for vision changes or irritation  ENT/MOUTH: NEGATIVE for ear, mouth and throat problems  RESP: NEGATIVE for significant cough or SOB  CV: NEGATIVE for chest pain, palpitations or peripheral edema  GI: NEGATIVE for nausea, abdominal pain, heartburn, or change in bowel habits  : NEGATIVE for frequency, dysuria, or hematuria  MUSCULOSKELETAL: POSITIVE for right shoulder discomfort NEGATIVE for significant arthralgias or myalgia  NEURO: NEGATIVE for weakness, dizziness or paresthesias  ENDOCRINE: NEGATIVE for temperature intolerance, skin/hair changes  HEME: NEGATIVE for bleeding problems  PSYCHIATRIC: POSITIVE for ongoing anxiety - is managing by positive self-talk and staying calm NEGATIVE for changes in mood or affect    EXAM:   /72  Pulse 76  Temp 98.1  F (36.7  C) (Temporal)  Resp 16  Ht 5' 8.5\" (1.74 m)  Wt 182 lb 1.6 oz (82.6 kg)  BMI 27.28 kg/m2    GENERAL APPEARANCE: healthy, alert and no distress     EYES: EOMI, PERRL     HENT: ear canals and TM's normal and nose and mouth without ulcers or lesions     NECK: no adenopathy, no asymmetry, masses, or scars and thyroid normal to palpation     RESP: lungs clear to auscultation - no rales, rhonchi or wheezes     CV: regular rates and rhythm, normal S1 S2, no S3 or S4 and no murmur, click or rub     ABDOMEN:  soft, nontender, no HSM or masses and bowel sounds normal     MS: extremities normal- no gross deformities noted, no evidence of inflammation in joints, FROM in all extremities.     SKIN: no suspicious lesions or rashes     NEURO: Normal strength and tone, sensory exam grossly normal, mentation intact and speech normal     PSYCH: mentation appears normal. and " affect normal/bright     LYMPHATICS: No cervical adenopathy    DIAGNOSTICS:     EKG: Not indicated due to non-vascular surgery and low risk of event (age <65 and without cardiac risk factors)  Labs Resulted Today:   Results for orders placed or performed during the hospital encounter of 06/07/18   MR Shoulder Right w Contrast    Narrative    MR ARTHROGRAM RIGHT SHOULDER  6/7/2018 4:16 PM    HISTORY:  Rule out labral tear.  Shoulder instability, right.    TECHNIQUE:  Following intra-articular glenohumeral joint injection of  diluted gadolinium (reported separately), coronal oblique, sagittal  oblique, and transverse fat suppressed T1, coronal oblique T2 and  inversion recovery, transverse gradient echo, and sagittal oblique fat  suppressed T2 weighted images were obtained.    FINDINGS:   Osseous acromial outlet: There is a Type I subacromial configuration.  No apparent subacromial spur. The acromioclavicular joint appears  within normal limits with no indentation upon the supraspinatus  outlet.    Rotator cuff:  No rotator cuff tendinosis or tear. No rotator cuff  muscle atrophy.    Biceps Tendon: No long head biceps tendon tear, subluxation, or  tendinosis.    Labral Structures: Subtle increased signal intensity at the tip of the  posterior labrum could represent a small broad-based tear but is  thought more likely to represent normal variation. There is no  adjacent paralabral cyst. The anterior labrum appears within normal  limits.    Osseous structures:  Articular cartilage surfaces along the humeral  head and glenoid fossa appear within normal limits. Mild cystic  resorptive changes are noted along the anatomical neck of the humerus  beneath the infraspinatus tendon. No evidence of Hill-Sachs impaction  fracture. No marrow edema.    Additional findings:  The anterior band of the inferior glenohumeral  ligament and middle glenohumeral ligament appear within normal limits.  Contrast outline of the glenohumeral  joint appears within normal  limits. Mild amount of contrast extravasation is noted along the  subscapularis muscle, presumably related to overdistention. No  abnormal bursal signal.       Impression    IMPRESSION:  1. Possible subtle posterior labral tear. However, this is thought to  be more likely related to normal variation. No paralabral cyst.  2. No rotator cuff tear.    KEV CRAIG MD       Recent Labs   Lab Test  10/17/17   2032  07/24/17   1021   HGB  12.3  12.7   PLT  229  226   NA  144  141   POTASSIUM  3.8  4.1   CR  0.80  0.78        IMPRESSION:   Reason for surgery/procedure: right shoulder arthroscopic surgery  Diagnosis/reason for consult: preop    The proposed surgical procedure is considered INTERMEDIATE risk.    REVISED CARDIAC RISK INDEX  The patient has the following serious cardiovascular risks for perioperative complications such as (MI, PE, VFib and 3  AV Block):  No serious cardiac risks  INTERPRETATION: 0 risks: Class I (very low risk - 0.4% complication rate)    The patient has the following additional risks for perioperative complications:  No identified additional risks      ICD-10-CM    1. Preop general physical exam Z01.818        RECOMMENDATIONS:     --Consult hospital rounder / IM to assist post-op medical management    --Patient is on no chronic medications    APPROVAL GIVEN to proceed with proposed procedure, without further diagnostic evaluation       Signed Electronically by: MERVIN Solorzano CNP    Copy of this evaluation report is provided to requesting physician.    [facility] Preop Guidelines    Revised Cardiac Risk Index

## 2018-07-06 ENCOUNTER — ANESTHESIA EVENT (OUTPATIENT)
Dept: SURGERY | Facility: CLINIC | Age: 23
End: 2018-07-06
Payer: COMMERCIAL

## 2018-07-06 ENCOUNTER — ANESTHESIA (OUTPATIENT)
Dept: SURGERY | Facility: CLINIC | Age: 23
End: 2018-07-06
Payer: COMMERCIAL

## 2018-07-06 ENCOUNTER — SURGERY (OUTPATIENT)
Age: 23
End: 2018-07-06

## 2018-07-06 ENCOUNTER — HOSPITAL ENCOUNTER (OUTPATIENT)
Facility: CLINIC | Age: 23
Discharge: HOME | End: 2018-07-06
Attending: ORTHOPAEDIC SURGERY | Admitting: ORTHOPAEDIC SURGERY
Payer: COMMERCIAL

## 2018-07-06 VITALS
RESPIRATION RATE: 13 BRPM | OXYGEN SATURATION: 97 % | DIASTOLIC BLOOD PRESSURE: 62 MMHG | SYSTOLIC BLOOD PRESSURE: 116 MMHG | TEMPERATURE: 97.8 F

## 2018-07-06 DIAGNOSIS — M25.311 SHOULDER INSTABILITY, RIGHT: Primary | ICD-10-CM

## 2018-07-06 LAB — HCG UR QL: NEGATIVE

## 2018-07-06 PROCEDURE — C1713 ANCHOR/SCREW BN/BN,TIS/BN: HCPCS | Performed by: ORTHOPAEDIC SURGERY

## 2018-07-06 PROCEDURE — 27110028 ZZH OR GENERAL SUPPLY NON-STERILE: Performed by: ORTHOPAEDIC SURGERY

## 2018-07-06 PROCEDURE — 25000125 ZZHC RX 250: Performed by: NURSE ANESTHETIST, CERTIFIED REGISTERED

## 2018-07-06 PROCEDURE — 25000566 ZZH SEVOFLURANE, EA 15 MIN: Performed by: ORTHOPAEDIC SURGERY

## 2018-07-06 PROCEDURE — 37000008 ZZH ANESTHESIA TECHNICAL FEE, 1ST 30 MIN: Performed by: ORTHOPAEDIC SURGERY

## 2018-07-06 PROCEDURE — 25000125 ZZHC RX 250: Performed by: ORTHOPAEDIC SURGERY

## 2018-07-06 PROCEDURE — 36000056 ZZH SURGERY LEVEL 3 1ST 30 MIN: Performed by: ORTHOPAEDIC SURGERY

## 2018-07-06 PROCEDURE — 25000128 H RX IP 250 OP 636: Performed by: NURSE ANESTHETIST, CERTIFIED REGISTERED

## 2018-07-06 PROCEDURE — 27210794 ZZH OR GENERAL SUPPLY STERILE: Performed by: ORTHOPAEDIC SURGERY

## 2018-07-06 PROCEDURE — 36000058 ZZH SURGERY LEVEL 3 EA 15 ADDTL MIN: Performed by: ORTHOPAEDIC SURGERY

## 2018-07-06 PROCEDURE — C9399 UNCLASSIFIED DRUGS OR BIOLOG: HCPCS | Performed by: NURSE ANESTHETIST, CERTIFIED REGISTERED

## 2018-07-06 PROCEDURE — 81025 URINE PREGNANCY TEST: CPT | Performed by: NURSE ANESTHETIST, CERTIFIED REGISTERED

## 2018-07-06 PROCEDURE — 29806 SHO ARTHRS SRG CAPSULORRAPHY: CPT | Mod: RT | Performed by: ORTHOPAEDIC SURGERY

## 2018-07-06 PROCEDURE — 71000027 ZZH RECOVERY PHASE 2 EACH 15 MINS: Performed by: ORTHOPAEDIC SURGERY

## 2018-07-06 PROCEDURE — 37000009 ZZH ANESTHESIA TECHNICAL FEE, EACH ADDTL 15 MIN: Performed by: ORTHOPAEDIC SURGERY

## 2018-07-06 PROCEDURE — 71000014 ZZH RECOVERY PHASE 1 LEVEL 2 FIRST HR: Performed by: ORTHOPAEDIC SURGERY

## 2018-07-06 PROCEDURE — 40000306 ZZH STATISTIC PRE PROC ASSESS II: Performed by: ORTHOPAEDIC SURGERY

## 2018-07-06 PROCEDURE — 27211024 ZZHC OR SUPPLY OTHER OPNP: Performed by: ORTHOPAEDIC SURGERY

## 2018-07-06 DEVICE — IMPLANTABLE DEVICE: Type: IMPLANTABLE DEVICE | Site: SHOULDER | Status: FUNCTIONAL

## 2018-07-06 RX ORDER — ACETAMINOPHEN 325 MG/1
975 TABLET ORAL EVERY 8 HOURS PRN
Qty: 100 TABLET | Refills: 1 | Status: SHIPPED | OUTPATIENT
Start: 2018-07-06 | End: 2019-05-25

## 2018-07-06 RX ORDER — OXYCODONE HYDROCHLORIDE 5 MG/1
5 TABLET ORAL
Status: DISCONTINUED | OUTPATIENT
Start: 2018-07-06 | End: 2018-07-06 | Stop reason: HOSPADM

## 2018-07-06 RX ORDER — BUPIVACAINE HYDROCHLORIDE AND EPINEPHRINE 5; 5 MG/ML; UG/ML
INJECTION, SOLUTION PERINEURAL PRN
Status: DISCONTINUED | OUTPATIENT
Start: 2018-07-06 | End: 2018-07-06

## 2018-07-06 RX ORDER — ONDANSETRON 2 MG/ML
INJECTION INTRAMUSCULAR; INTRAVENOUS PRN
Status: DISCONTINUED | OUTPATIENT
Start: 2018-07-06 | End: 2018-07-06

## 2018-07-06 RX ORDER — OXYCODONE HYDROCHLORIDE 5 MG/1
5-10 TABLET ORAL EVERY 4 HOURS PRN
Qty: 50 TABLET | Refills: 0 | Status: SHIPPED | OUTPATIENT
Start: 2018-07-06 | End: 2018-08-30

## 2018-07-06 RX ORDER — FENTANYL CITRATE 50 UG/ML
25-50 INJECTION, SOLUTION INTRAMUSCULAR; INTRAVENOUS
Status: DISCONTINUED | OUTPATIENT
Start: 2018-07-06 | End: 2018-07-06 | Stop reason: HOSPADM

## 2018-07-06 RX ORDER — CLINDAMYCIN PHOSPHATE 900 MG/50ML
900 INJECTION, SOLUTION INTRAVENOUS ONCE
Status: COMPLETED | OUTPATIENT
Start: 2018-07-06 | End: 2018-07-06

## 2018-07-06 RX ORDER — FENTANYL CITRATE 50 UG/ML
INJECTION, SOLUTION INTRAMUSCULAR; INTRAVENOUS PRN
Status: DISCONTINUED | OUTPATIENT
Start: 2018-07-06 | End: 2018-07-06

## 2018-07-06 RX ORDER — DEXAMETHASONE SODIUM PHOSPHATE 10 MG/ML
INJECTION, SOLUTION INTRAMUSCULAR; INTRAVENOUS PRN
Status: DISCONTINUED | OUTPATIENT
Start: 2018-07-06 | End: 2018-07-06

## 2018-07-06 RX ORDER — MEPERIDINE HYDROCHLORIDE 25 MG/ML
12.5 INJECTION INTRAMUSCULAR; INTRAVENOUS; SUBCUTANEOUS
Status: DISCONTINUED | OUTPATIENT
Start: 2018-07-06 | End: 2018-07-06 | Stop reason: HOSPADM

## 2018-07-06 RX ORDER — IBUPROFEN 600 MG/1
600 TABLET, FILM COATED ORAL EVERY 8 HOURS PRN
Qty: 30 TABLET | Refills: 1 | Status: SHIPPED | OUTPATIENT
Start: 2018-07-06 | End: 2019-05-25

## 2018-07-06 RX ORDER — HYDROMORPHONE HYDROCHLORIDE 1 MG/ML
.3-.5 INJECTION, SOLUTION INTRAMUSCULAR; INTRAVENOUS; SUBCUTANEOUS EVERY 10 MIN PRN
Status: DISCONTINUED | OUTPATIENT
Start: 2018-07-06 | End: 2018-07-06 | Stop reason: HOSPADM

## 2018-07-06 RX ORDER — METHOCARBAMOL 750 MG/1
750 TABLET, FILM COATED ORAL EVERY 6 HOURS PRN
Qty: 60 TABLET | Refills: 1 | Status: SHIPPED | OUTPATIENT
Start: 2018-07-06 | End: 2018-08-30

## 2018-07-06 RX ORDER — CEFAZOLIN SODIUM 1 G/3ML
1 INJECTION, POWDER, FOR SOLUTION INTRAMUSCULAR; INTRAVENOUS SEE ADMIN INSTRUCTIONS
Status: DISCONTINUED | OUTPATIENT
Start: 2018-07-06 | End: 2018-07-06 | Stop reason: ALTCHOICE

## 2018-07-06 RX ORDER — ALBUTEROL SULFATE 0.83 MG/ML
2.5 SOLUTION RESPIRATORY (INHALATION) EVERY 4 HOURS PRN
Status: DISCONTINUED | OUTPATIENT
Start: 2018-07-06 | End: 2018-07-06 | Stop reason: HOSPADM

## 2018-07-06 RX ORDER — NALOXONE HYDROCHLORIDE 0.4 MG/ML
.1-.4 INJECTION, SOLUTION INTRAMUSCULAR; INTRAVENOUS; SUBCUTANEOUS
Status: DISCONTINUED | OUTPATIENT
Start: 2018-07-06 | End: 2018-07-06 | Stop reason: HOSPADM

## 2018-07-06 RX ORDER — LIDOCAINE 40 MG/G
CREAM TOPICAL
Status: DISCONTINUED | OUTPATIENT
Start: 2018-07-06 | End: 2018-07-06 | Stop reason: HOSPADM

## 2018-07-06 RX ORDER — ONDANSETRON 4 MG/1
4-8 TABLET, ORALLY DISINTEGRATING ORAL EVERY 8 HOURS PRN
Qty: 10 TABLET | Refills: 1 | Status: SHIPPED | OUTPATIENT
Start: 2018-07-06 | End: 2018-08-30

## 2018-07-06 RX ORDER — LIDOCAINE HYDROCHLORIDE 20 MG/ML
INJECTION, SOLUTION INFILTRATION; PERINEURAL PRN
Status: DISCONTINUED | OUTPATIENT
Start: 2018-07-06 | End: 2018-07-06

## 2018-07-06 RX ORDER — SODIUM CHLORIDE, SODIUM LACTATE, POTASSIUM CHLORIDE, CALCIUM CHLORIDE 600; 310; 30; 20 MG/100ML; MG/100ML; MG/100ML; MG/100ML
INJECTION, SOLUTION INTRAVENOUS CONTINUOUS
Status: DISCONTINUED | OUTPATIENT
Start: 2018-07-06 | End: 2018-07-06 | Stop reason: HOSPADM

## 2018-07-06 RX ORDER — ONDANSETRON 4 MG/1
4 TABLET, ORALLY DISINTEGRATING ORAL EVERY 30 MIN PRN
Status: DISCONTINUED | OUTPATIENT
Start: 2018-07-06 | End: 2018-07-06 | Stop reason: HOSPADM

## 2018-07-06 RX ORDER — PROPOFOL 10 MG/ML
INJECTION, EMULSION INTRAVENOUS PRN
Status: DISCONTINUED | OUTPATIENT
Start: 2018-07-06 | End: 2018-07-06

## 2018-07-06 RX ORDER — KETOROLAC TROMETHAMINE 30 MG/ML
30 INJECTION, SOLUTION INTRAMUSCULAR; INTRAVENOUS EVERY 6 HOURS PRN
Status: DISCONTINUED | OUTPATIENT
Start: 2018-07-06 | End: 2018-07-06 | Stop reason: HOSPADM

## 2018-07-06 RX ORDER — CEFAZOLIN SODIUM 2 G/100ML
2 INJECTION, SOLUTION INTRAVENOUS
Status: DISCONTINUED | OUTPATIENT
Start: 2018-07-06 | End: 2018-07-06 | Stop reason: ALTCHOICE

## 2018-07-06 RX ORDER — ONDANSETRON 2 MG/ML
4 INJECTION INTRAMUSCULAR; INTRAVENOUS EVERY 30 MIN PRN
Status: DISCONTINUED | OUTPATIENT
Start: 2018-07-06 | End: 2018-07-06 | Stop reason: HOSPADM

## 2018-07-06 RX ORDER — EPHEDRINE SULFATE 50 MG/ML
INJECTION, SOLUTION INTRAMUSCULAR; INTRAVENOUS; SUBCUTANEOUS PRN
Status: DISCONTINUED | OUTPATIENT
Start: 2018-07-06 | End: 2018-07-06

## 2018-07-06 RX ORDER — OXYCODONE HYDROCHLORIDE 5 MG/1
10 TABLET ORAL EVERY 4 HOURS PRN
Status: DISCONTINUED | OUTPATIENT
Start: 2018-07-06 | End: 2018-07-06 | Stop reason: HOSPADM

## 2018-07-06 RX ADMIN — DEXAMETHASONE SODIUM PHOSPHATE 10 MG: 10 INJECTION, SOLUTION INTRAMUSCULAR; INTRAVENOUS at 09:45

## 2018-07-06 RX ADMIN — FENTANYL CITRATE 25 MCG: 50 INJECTION, SOLUTION INTRAMUSCULAR; INTRAVENOUS at 11:54

## 2018-07-06 RX ADMIN — PHENYLEPHRINE HYDROCHLORIDE 100 MCG: 10 INJECTION, SOLUTION INTRAMUSCULAR; INTRAVENOUS; SUBCUTANEOUS at 10:11

## 2018-07-06 RX ADMIN — Medication 5 ML: at 12:59

## 2018-07-06 RX ADMIN — SUGAMMADEX 200 MG: 100 INJECTION, SOLUTION INTRAVENOUS at 11:11

## 2018-07-06 RX ADMIN — PHENYLEPHRINE HYDROCHLORIDE 200 MCG: 10 INJECTION, SOLUTION INTRAMUSCULAR; INTRAVENOUS; SUBCUTANEOUS at 10:20

## 2018-07-06 RX ADMIN — PROPOFOL 200 MG: 10 INJECTION, EMULSION INTRAVENOUS at 09:32

## 2018-07-06 RX ADMIN — SODIUM CHLORIDE, POTASSIUM CHLORIDE, SODIUM LACTATE AND CALCIUM CHLORIDE: 600; 310; 30; 20 INJECTION, SOLUTION INTRAVENOUS at 10:16

## 2018-07-06 RX ADMIN — DEXAMETHASONE SODIUM PHOSPHATE 2.5 MG: 10 INJECTION, SOLUTION INTRAMUSCULAR; INTRAVENOUS at 11:07

## 2018-07-06 RX ADMIN — DEXAMETHASONE SODIUM PHOSPHATE 2.5 MG: 10 INJECTION, SOLUTION INTRAMUSCULAR; INTRAVENOUS at 11:08

## 2018-07-06 RX ADMIN — Medication 5 ML: at 11:09

## 2018-07-06 RX ADMIN — SODIUM CHLORIDE, POTASSIUM CHLORIDE, SODIUM LACTATE AND CALCIUM CHLORIDE: 600; 310; 30; 20 INJECTION, SOLUTION INTRAVENOUS at 09:06

## 2018-07-06 RX ADMIN — FENTANYL CITRATE 50 MCG: 50 INJECTION, SOLUTION INTRAMUSCULAR; INTRAVENOUS at 09:25

## 2018-07-06 RX ADMIN — FENTANYL CITRATE 50 MCG: 50 INJECTION, SOLUTION INTRAMUSCULAR; INTRAVENOUS at 12:00

## 2018-07-06 RX ADMIN — Medication 5 ML: at 11:08

## 2018-07-06 RX ADMIN — Medication 5 ML: at 11:10

## 2018-07-06 RX ADMIN — ONDANSETRON 4 MG: 2 INJECTION INTRAMUSCULAR; INTRAVENOUS at 09:45

## 2018-07-06 RX ADMIN — LIDOCAINE HYDROCHLORIDE 100 MG: 20 INJECTION, SOLUTION INFILTRATION; PERINEURAL at 09:31

## 2018-07-06 RX ADMIN — PHENYLEPHRINE HYDROCHLORIDE 100 MCG: 10 INJECTION, SOLUTION INTRAMUSCULAR; INTRAVENOUS; SUBCUTANEOUS at 10:05

## 2018-07-06 RX ADMIN — CLINDAMYCIN PHOSPHATE 900 MG: 18 INJECTION, SOLUTION INTRAVENOUS at 09:37

## 2018-07-06 RX ADMIN — PHENYLEPHRINE HYDROCHLORIDE 100 MCG: 10 INJECTION, SOLUTION INTRAMUSCULAR; INTRAVENOUS; SUBCUTANEOUS at 09:54

## 2018-07-06 RX ADMIN — MIDAZOLAM 2 MG: 1 INJECTION INTRAMUSCULAR; INTRAVENOUS at 09:20

## 2018-07-06 RX ADMIN — DEXAMETHASONE SODIUM PHOSPHATE 2.5 MG: 10 INJECTION, SOLUTION INTRAMUSCULAR; INTRAVENOUS at 11:06

## 2018-07-06 RX ADMIN — Medication 5 MG: at 10:32

## 2018-07-06 RX ADMIN — LIDOCAINE HYDROCHLORIDE 1 ML: 10 INJECTION, SOLUTION EPIDURAL; INFILTRATION; INTRACAUDAL; PERINEURAL at 09:06

## 2018-07-06 RX ADMIN — FENTANYL CITRATE 50 MCG: 50 INJECTION, SOLUTION INTRAMUSCULAR; INTRAVENOUS at 09:31

## 2018-07-06 RX ADMIN — ROCURONIUM BROMIDE 50 MG: 10 INJECTION INTRAVENOUS at 09:33

## 2018-07-06 RX ADMIN — DEXAMETHASONE SODIUM PHOSPHATE 2.5 MG: 10 INJECTION, SOLUTION INTRAMUSCULAR; INTRAVENOUS at 11:09

## 2018-07-06 RX ADMIN — Medication 5 ML: at 11:07

## 2018-07-06 RX ADMIN — Medication 5 ML: at 12:58

## 2018-07-06 RX ADMIN — ONDANSETRON 4 MG: 4 TABLET, ORALLY DISINTEGRATING ORAL at 13:48

## 2018-07-06 RX ADMIN — DEXMEDETOMIDINE HYDROCHLORIDE 10 MCG: 100 INJECTION, SOLUTION INTRAVENOUS at 10:45

## 2018-07-06 RX ADMIN — PHENYLEPHRINE HYDROCHLORIDE 100 MCG: 10 INJECTION, SOLUTION INTRAMUSCULAR; INTRAVENOUS; SUBCUTANEOUS at 09:47

## 2018-07-06 RX ADMIN — PHENYLEPHRINE HYDROCHLORIDE 0.3 MCG/KG/MIN: 10 INJECTION, SOLUTION INTRAMUSCULAR; INTRAVENOUS; SUBCUTANEOUS at 10:25

## 2018-07-06 NOTE — BRIEF OP NOTE
[facility] Brief Operative Note    Pre-operative diagnosis: right shoulder instability   Post-operative diagnosis: right shoulder anterior inferior labral tear   Procedure: Procedure(s):  ARTHROSCOPY SHOULDER with anterior inferior labral repair   Surgeon: Yevgeniy Graf DO   Assistant(s): Janak Rich APRN, CNP, DNP (A advanced practice provider was necessary for his expertise, exposure and surgical assistance throughout the case.)   Estimated blood loss:  Fluids: Less than 10 ml  1600 ml Crystalloids   Specimens: None   Findings: See dictated operative report for full details [id number]     Magdaleno Graf D.O.

## 2018-07-06 NOTE — ANESTHESIA POSTPROCEDURE EVALUATION
Patient: Mary Valdez    Procedure(s):  Right shoulder arthroscopy with arthroscopic labral repair - Wound Class: I-Clean    Diagnosis:right shoulder instability  Diagnosis Additional Information: No value filed.    Anesthesia Type:  General, ETT, Periph. Nerve Block for postop pain    Note:  Anesthesia Post Evaluation    Patient location during evaluation: Phase 2 and Bedside  Patient participation: Able to fully participate in evaluation  Level of consciousness: awake and alert  Pain management: satisfactory to patient  Airway patency: patent  Cardiovascular status: stable  Respiratory status: room air and spontaneous ventilation  Hydration status: stable  PONV: none     Anesthetic complications: None    Comments: Appear to tolerate Gen with US guided Right ISB pre-emergence well without anesthesia related problems / complications noted.  Pain level adequate per patient.  Had slight upper shoulder pain earlier in PACU but has resolved as block appears to have become more solid and now rates her Pain as a 0 on 0-10 pain scale.   No N  /  V .  No complaints per patient.  Will follow as needed.        Last vitals:  Vitals:    07/06/18 1150 07/06/18 1155 07/06/18 1200   BP: 117/58 115/55 114/56   Resp: 27 28 13   Temp:   97.8  F (36.6  C)   SpO2: 99% 99% 99%         Electronically Signed By: MERVIN Dejesus CRNA  July 6, 2018  12:15 PM

## 2018-07-06 NOTE — IP AVS SNAPSHOT
[facility] Phase II    [address]     [place] [address]    Phone:  [phone]                                       After Visit Summary   7/6/2018    Mary Valdez    MRN: [id number]           After Visit Summary Signature Page     I have received my discharge instructions, and my questions have been answered. I have discussed any challenges I see with this plan with the nurse or doctor.    ..........................................................................................................................................  Patient/Patient Representative Signature      ..........................................................................................................................................  Patient Representative Print Name and Relationship to Patient    ..................................................               ................................................  Date                                            Time    ..........................................................................................................................................  Reviewed by Signature/Title    ...................................................              ..............................................  Date                                                            Time

## 2018-07-06 NOTE — ANESTHESIA PROCEDURE NOTES
Peripheral nerve/Neuraxial procedure note : interscalene  Pre-Procedure  Performed by  COLLETTE FULLER   Location: post-op      Pre-Anesthestic Checklist: patient identified, IV checked, site marked, risks and benefits discussed, informed consent, monitors and equipment checked, pre-op evaluation, at physician/surgeon's request and post-op pain management    Timeout  Correct Patient: Yes   Correct Procedure: Yes   Correct Site: Yes   Correct Laterality: Yes   Correct Position: Yes   Site Marked: Yes   .   Procedure Documentation    Diagnosis:SP RIGHT SHOULDER SURGERY-NEED FOR PAIN MANAGEMENT.    Procedure:  right  Interscalene.     Ultrasound used to identify targeted nerve, plexus, or vascular marker and placed a needle adjacent to it., Ultrasound was used to visualize the spread of the anesthetic in close proximity to the above stated nerve. A permanent image is entered into the patient's record.  Patient Prep;mask, sterile gloves, chlorhexidine gluconate and isopropyl alcohol.  Nerve Stim: Initial Level 0.05 mA.  Lowest motor response 0.03 mA..  Needle: insulated Needle Gauge: 22.  Insertion Method: Single Shot.       Assessment/Narrative  Paresthesias: No.  Injection made incrementally with aspirations every 5 mL..  The placement was negative for: blood aspirated, painful injection and site bleeding.  Bolus given via needle..   Secured via.   Complications: none. Test dose of mL at. Test dose negative for signs of intravascular, subdural or intrathecal injection. Comments:  US Guided right interscalene block performed per LIANA GOMEZ under the direct supervision of VINCE Fuller CRNA.  Pt tolerated the procedure well.  Pain 0 /10 upon arrival to PACU.  No complications noted.  Will follow if needed.

## 2018-07-06 NOTE — INTERVAL H&P NOTE
This H&P has been reviewed and there are no clinically significant changes in the patient s condition.  The patient was evaluated by myself as well as Charisse Alexander prior to surgery. The Patient is approved for the surgery and the stated surgical procedure is still clinically indicated. If admitted and clinically appropriate I will involve the hospital rounder.

## 2018-07-06 NOTE — OP NOTE
Procedure Date: 07/06/2018      PREOPERATIVE DIAGNOSIS:  Right shoulder anterior instability.      POSTOPERATIVE DIAGNOSIS:  Right shoulder anteroinferior labral tear.      PROCEDURE:  Right shoulder arthroscopy with arthroscopic repair of anteroinferior labral repair.      SURGEON:  Yevgeniy Graf DO.      ASSISTANT:  Janak Rich, nurse practitioner ( He was utilized throughout the procedure assisting with instrument passage into and out of the shoulder and provided skin closure).      ANESTHESIA:  General with postoperative scalene block.        ESTIMATED BLOOD LOSS:  Less than 10 mL.      FLUIDS:  600 mL crystalloids.      COUNTS:  Correct.      DISPOSITION:  To PACU in stable condition.      SPECIFICATIONS:  Include a total of 2 Biomet JuggerKnotless anchors and one 1.4 JuggerKnot anchor.      GROSS FINDINGS:  The articular cartilage was pristine with no defects.  The posterior labrum extending up to the biceps anchor was intact with careful probing.  The biceps anchor was intact with no subluxation or no peel back.  Anterior to the biceps was intact as well.  Carefully probing the labrum as it rounded the anterior and anteroinferior aspect revealed the labrum to be in place; however, it was torn and easily displaced.  This extended down to the level approximately 6 o'clock.  The rotator cuff was intact with probing.  There was no evidence of defects.  The biceps had no instability.      NOTE:  This is a pleasant 22-year-old female complaining of shoulder instability and pain.  She has had symptoms on and off in her shoulder over many years.  However, in April she fell catching herself and felt increased pain and a loose feeling.  Since then she had multiple loose or instability episodes.  She had attempted exhaustive conservative care including rest, activity modifications, formal physical therapy, at home therapy, anti-inflammatories with no improvement.  She continued to have instability episodes.  Exam-wise  showed just some anterior instability.  MRI was performed showing a small, potentially posterior, labral tear.  Anterior labrum appeared to be intact.  I discussed her options.  Unfortunately, given her continued symptoms refractory to conservative care we discussed shoulder arthroscopy with possible labral repair, possible capsulorrhaphy.  The risks, benefits reviewed.  Timeframe for recovery discussed.  Once reviewed, she opted to proceed.      DETAILS OF PROCEDURE:  She was met preoperatively.  Again informed consent was verified.  Appropriate site was marked and she was wheeled to operative suite #1.  Transferred to the table with no issues.  When deemed appropriate by Anesthesia, she was positioned in the beach chair position, head remained in neutral through entirety of the case, all bony prominences were padded, she was secured to the table.  Right shoulder was then sterilely prepped and draped in normal manner.  Prior to incision, a timeout was performed.  Again, the appropriate patient, surgery and extremity were verified and antibiotics administered.        A posterior portal was established, trocar was gradually easily introduced intra-articular with no significant resistance.  The scope was introduced.  With an outside-in technique using a spinal needle, anterior portal was established.  The articular structures were probed and as noted below.  The labrum at approximately the 4 o'clock  position to about 6 o'clock position was in place; however, it was torn and could be easily displaced.  With this and her preoperative symptoms in mind, I freed up the tissue at that area.  I utilized a shaver and roughened up the bone along the glenoid to bleeding bed.  I then placed 1 Biomet JuggerKnot anchor along the far anteroinferior aspect.  Using a bird beak, I was able to pass it through the labrum passing the stitch and then tying.  This brought it up nicely in this position.  I then repeated this process using  JuggerKnotless anchors slightly more working my way more superior.  Again, this was able to bring the labrum up nice.  The tissue was of good quality.        Final arthroscopy pictures were taken.  Shoulder was copiously irrigated, suctioned dry, instruments were removed.  Portal sites were closed with nylon.  A sterile bandage applied.      She was subsequently transferred to the PACU in stable condition.        She will be discharged home with oxycodone, Tylenol, ibuprofen and Robaxin for pain.  Followup has been scheduled.  Discharge instructions provided.         JERRELL YOUNGBLOOD DO             D: 2018   T: 2018   MT: MELISSA      Name:     OLLIE TALAMANTES   MRN:      [id number]        Account:        [id number]   :      1995           Procedure Date: 2018      Document: [id number]

## 2018-07-06 NOTE — ANESTHESIA CARE TRANSFER NOTE
Patient: Mary Valdez    Procedure(s):  Right shoulder arthroscopy with arthroscopic labral repair - Wound Class: I-Clean    Diagnosis: right shoulder instability  Diagnosis Additional Information: No value filed.    Anesthesia Type:   General, ETT, Periph. Nerve Block for postop pain     Note:  Airway :Nasal Cannula  Patient transferred to:PACU  Handoff Report: Identified the Reponsible Provider, Discussed the surgical course, Reviewed Intra-OP anesthesia mangement and issues during anesthesia, Set expectations for post-procedure period, Allowed opportunity for questions and acknowledgement of understanding, Reviewed the pertinent medical history and Identifed the Patient      Vitals: (Last set prior to Anesthesia Care Transfer)    CRNA VITALS  7/6/2018 1049 - 7/6/2018 1131      7/6/2018             Pulse: 107    SpO2: 100 %                Electronically Signed By: MERVIN Dejesus CRNA  July 6, 2018  11:31 AM

## 2018-07-06 NOTE — ADDENDUM NOTE
Addendum  created 07/06/18 1319 by Pascual Fuller APRN CRNA    Anesthesia Event edited, Anesthesia Intra Blocks edited, Anesthesia Intra Meds edited, Child order released for a procedure order, Procedure Event Log accessed, Sign clinical note

## 2018-07-06 NOTE — ANESTHESIA CARE TRANSFER NOTE
Patient: Mary Valdez    Procedure(s):  Right shoulder arthroscopy with arthroscopic labral repair - Wound Class: I-Clean    Diagnosis: right shoulder instability  Diagnosis Additional Information: No value filed.    Anesthesia Type:   General, ETT, Periph. Nerve Block for postop pain     Note:  Airway :Nasal Cannula  Patient transferred to:PACU  Handoff Report: Identifed the Patient, Identified the Reponsible Provider, Reviewed the pertinent medical history, Discussed the surgical course, Reviewed Intra-OP anesthesia mangement and issues during anesthesia, Set expectations for post-procedure period and Allowed opportunity for questions and acknowledgement of understanding      Vitals: (Last set prior to Anesthesia Care Transfer)    CRNA VITALS  7/6/2018 1049 - 7/6/2018 1131      7/6/2018             Pulse: 107    SpO2: 100 %                Electronically Signed By: MERVIN Dejesus CRNA  July 6, 2018  11:31 AM

## 2018-07-06 NOTE — ANESTHESIA PREPROCEDURE EVALUATION
Anesthesia Evaluation     .             ROS/MED HX    ENT/Pulmonary:  - neg pulmonary ROS     Neurologic: Comment: Bell's Palsy      Cardiovascular:  - neg cardiovascular ROS       METS/Exercise Tolerance:     Hematologic:  - neg hematologic  ROS       Musculoskeletal:   (+) , , other musculoskeletal- Knee problems; right shoulder pain      GI/Hepatic:  - neg GI/hepatic ROS       Renal/Genitourinary:  - ROS Renal section negative       Endo:  - neg endo ROS       Psychiatric: Comment: PTSD    (+) psychiatric history anxiety and depression      Infectious Disease:  - neg infectious disease ROS       Malignancy:      - no malignancy   Other:    (+) No chance of pregnancy   - neg other ROS                 Physical Exam  Normal systems: cardiovascular, pulmonary and dental    Airway   Mallampati: I  TM distance: >3 FB  Neck ROM: full    Dental     Cardiovascular   Rhythm and rate: regular and normal      Pulmonary    breath sounds clear to auscultation                    Anesthesia Plan      History & Physical Review  History and physical reviewed and following examination; no interval change.    ASA Status:  2 .    NPO Status:  > 8 hours    Plan for General, ETT and Periph. Nerve Block for postop pain with Intravenous and Propofol induction. Maintenance will be Inhalation.    PONV prophylaxis:  Ondansetron (or other 5HT-3) and Dexamethasone or Solumedrol       Postoperative Care  Postoperative pain management:  IV analgesics, Oral pain medications and Peripheral nerve block (Single Shot).      Consents  Anesthetic plan, risks, benefits and alternatives discussed with:  Patient..                          .

## 2018-07-06 NOTE — IP AVS SNAPSHOT
MRN:[id number]                      After Visit Summary   7/6/2018    Mary Valdez    MRN: [id number]           Thank you!     Thank you for choosing [facility] for your care. Our goal is always to provide you with excellent care. Hearing back from our patients is one way we can continue to improve our services. Please take a few minutes to complete the written survey that you may receive in the mail after you visit with us. Thank you!        Patient Information     Date Of Birth          1995        About your hospital stay     You were admitted on:  July 6, 2018 You last received care in the:  [facility]    You were discharged on:  July 6, 2018       Who to Call     For medical emergencies, please call 911.  For non-urgent questions about your medical care, please call your primary care provider or clinic, [phone]  For questions related to your surgery, please call your surgery clinic        Attending Provider     Provider Yvegeniy Palacios,  Orthopedics       Primary Care Provider Office Phone # Fax #    Charisse MERVIN [facility]ardo [facility] [phone]      After Care Instructions      Diet as Tolerated       Return to diet before surgery, unless instructed otherwise.            Discharge Instructions       Review outpatient procedure discharge instructions with patient as directed by Provider            Discharge Instructions - Lifting Limit (specify)       Lifting limit  0 pounds until seen at Post-op follow up appointment.            Dressing Change       Change dressing on third day after surgery.            Ice to affected area       Ice pack to surgical site every 15 minutes per hour for 24 hours            No Alcohol       For 24 hours post procedure or if taking narcotics            No driving or operating machinery        until further notice            Notify Provider       For signs and symptoms of infection: Fever  greater than 101, redness, swelling, heat at site, drainage, pus.            Return to clinic       Return to clinic in 10 days            Wound care       Do not immerse wound in water until sutures removed                  Your next 10 appointments already scheduled     Jul 12, 2018 10:30 AM CDT   Return [facility] with [facility]evgeniy Graf DO   [facility]    [address] [facility] [place] [facility] [id number] [phone]              Further instructions from your care team       General Shoulder Arthroscopy Discharge Instructions                                      [phone]  Bone and Joint Service Line for issues or concerns        General Care:  After surgery you may feel tired/sleepy. This is normal. Please have someone stay with you for 24 hours after surgery. You should avoid driving for 1-2 days after surgery, as your reaction time may be slow. You should not drive at all if you have had surgery on your arms, right leg and/or are taking narcotic pain medications until released by your doctor. If you have any question along the way please contact the office. If you feel it is an issue cannot wait for normal office hours, contact the on-call physician.    Bandages:    Change your bandage after the first 48-72 hours. You may use Band-Aids or sterile gauze with a small amount of tape. It s normal to have some blood-tinged fluid on your bandages, this will usually continue for the first day or two. Keep the area clean and dry. Do not apply any ointments.     Bathing:  Do not submerge your incision in water such as a bath or pool. It is ok to shower after removing your initial bandage after the first 2 days from surgery. Avoid any excessively hot showers, baths, or hot tubes after surgery.     Follow up:  Your follow up appointment should already be scheduled. If its not, please call the office to verify an appointment 10 days after surgery.     Diet:  Start with  non-alcoholic liquids at first, particularly water or sports drinks after surgery. Progress to bland foods such as crackers and bread and finally to your normal diet if you have no problems.     Pain control:  Take your pain medications as prescribed. These medications may make you sleepy. Do not drive, operate equipment, or drink alcohol when taking these.  You may take Tylenol (Generic name is acetaminophen) as directed on the bottle for additional relief or in place of the prescribed pain medications as your pain gets better. If the medications cause a reaction such as nausea or skin rash, stop taking them and contact your doctor. Please plan accordingly, pain medications will not be re-filled on the weekends or at night. Call the office during the day if you need more medications. Narcotic pain medication can cause constipation, take the stool softer as prescribed.             Sleeping Position:  Sometimes this can be a challenge after shoulder surgery. Some find it comfortable sleeping propped up on several pillows with pillows also behind their elbow. Others, if available, sleep in an easy chair. You may also lay on your back, it may be more comfortable to have a pillow behind your elbow to keep it from falling back.     Sling/Brace:  Keep the sling or brace on after surgery until your follow up. You may remove to bathe, but keep your elbow to your side and do not reach with your arm. You should also slip your arm out of your sling and allow your elbow to straighten all the way out and then bend all the way up. You may need to use your other arm to assist in this. Also make sure to move your wrist back and forth and practice making a fist. Do this 10-15 times about 3-4 times a day.       Physical Therapy:  Depending on your surgery, physical therapy may start within a few days or be delayed 4-6 weeks. At your first post-operative visit, your doctor will direct you on your personal therapy program. You may  start the Codman exercises as listed below if pain is controlled.     Codman Shoulder Exercises:  Bend over at the waist and let your arm completely relax. Support your weight by leaning on a table or counter.  Swing your arm slowly from side to side.  Repeat this 20 times, four times each day:    Normal findings after surgery:  It is sometimes normal to have pain in the back of the shoulder even before the block wears off in the hand or elbow.  Warmth and swelling about the hand and shoulder is normal for up to 3-4 weeks after surgery.  Numbness around the incisions is normal.  You may have bruising around the incisions and into the bicep area. You may notice this bruising settle into the elbow and forearm.   Low grade fevers less than 100.5 degrees Fahrenheit are normal.     When to call the Office:  Temperature greater than 101.5 degrees Fahreheit.  Fever, chills, and increasing pain in the shoulder.  Excessive drainage from the incisions that include bright red blood.  Drainage from the incisions sites that appear yellow, pus-like, or foul smelling.  Increasing pain the shoulder not relieved by the prescribed pain medications or ice.  Persistent nausea or vomiting not helped by the Zofran.  Any other effects you feel are significant.    Block  The Nurse Anesthetist has injected a local block for your surgery.  This block can improve you pain management over the next 12 to 20 hours.  Your extremity will be numb.  You need to be very careful with the extremity until the block wears off.  You should also take your pain medication as prescribed by your surgeon.  Start the pain medication when you get home.  You will want pain medication in your blood stream before the block wears off.    Same-Day Surgery   Adult Discharge Orders & Instructions     For 24 hours after surgery    1. Get plenty of rest.  A responsible adult must stay with you for at least 24 hours after you leave the hospital.   2. Do not drive or use  heavy equipment.  If you have weakness or tingling, don't drive or use heavy equipment until this feeling goes away.  3. Do not drink alcohol.  4. Avoid strenuous or risky activities.  Ask for help when climbing stairs.   5. You may feel lightheaded.  IF so, sit for a few minutes before standing.  Have someone help you get up.   6. You may have a slight fever. Call the doctor if your fever is over 100 F (37.7 C) (taken under the tongue) or lasts longer than 24 hours.  7. You may have a dry mouth, a sore throat, muscle aches or trouble sleeping.  These should go away after 24 hours.  8. Do not make important or legal decisions.  Based on the surgery/procedure that you had today, we do not anticipate that you will have any problems.  However, given the various responses that patients can have to the surgical experience, we want to ensure that you have information available to manage pain or nausea and what to do if you observe bleeding or you develop any signs and symptoms of infection:  Methods to control pain include:  Prescription pain medication or over the counter medications as prescribed or suggested by your physician.  In addition, ice packs and periods of rest are often helpful.  If your pain is not managed with the above methods, contact your physician.  Methods to control nausea include:  Anti-nausea medication approved by your physician.  Drink clear liquids such as apple juice, ginger ale, broth or 7-Up. Be sure to drink enough fluids.  Move to a regular diet as you feel able.  Rest may also help.  Bleeding:  It's not uncommon to see a little blood staining on the dressing, about the size of a quarter in the first 24 hours; if you see this, there is no reason to be alarmed.  However, should this continue to increase in size, apply pressure if able, ant notify your physician.  Infection:  We do not anticipate that you will acquire an infection, but if you should experience any of the following symptoms:   redness, swelling, heat, increasing pain or abnormal drainage at your surgery site, fever or chills, please notify your physician.    Call your doctor for any of the followin.  Signs of infection (fever, growing tenderness at the surgery site, a large amount of drainage or bleeding, severe pain, foul-smelling drainage, redness, swelling).    2. It has been over 8 to 10 hours since surgery and you are still not able to urinate (pass water).    3.  Headache for over 24 hours.    Nurse advice line: [phone]        Additional Information     If you use hormonal birth control (such as the pill, patch, ring or implants): You'll need a second form of birth control for 7 days (condoms, a diaphragm or contraceptive foam). While in the hospital, you received a medicine called Bridion. Your normal birth control will not work as well for a week after taking this medicine.          Pending Results     No orders found from 2018 to 2018.            Admission Information     Date & Time Provider Department Dept. Phone    2018 Yevgeniy Graf DO [facility] Phase [phone]      Your Vitals Were     Blood Pressure Temperature Respirations Pulse Oximetry          114/56 97.8  F (36.6  C) (Oral) 13 99%        MyChart Information     [facility] gives you secure access to your electronic health record. If you see a primary care provider, you can also send messages to your care team and make appointments. If you have questions, please call your primary care clinic.  If you do not have a primary care provider, please call [phone] and they will assist you.        Care EveryWhere ID     This is your Care EveryWhere ID. This could be used by other organizations to access your [facility] medical records  [id number]        Equal Access to Services     RUI SYLVESTER : Celena Sharp, matthew carr, shari ramírez. So [facility]  [phone].    ATENCIÓN: Si quique christy, tiene a garcia disposición servicios gratuitos de asistencia lingüística. Neva ren [phone].    We comply with applicable federal civil rights laws and [place] laws. We do not discriminate on the basis of race, color, national origin, age, disability, sex, sexual orientation, or gender identity.               Review of your medicines      START taking        Dose / Directions    acetaminophen 325 MG tablet   Commonly known as:  TYLENOL   Used for:  Shoulder instability, right        Dose:  975 mg   Take 3 tablets (975 mg) by mouth every 8 hours as needed for other (mild pain)   Quantity:  100 tablet   Refills:  1       ibuprofen 600 MG tablet   Commonly known as:  ADVIL/MOTRIN   Used for:  Shoulder instability, right        Dose:  600 mg   Take 1 tablet (600 mg) by mouth every 8 hours as needed for pain (mild)   Quantity:  30 tablet   Refills:  1       methocarbamol 750 MG tablet   Commonly known as:  ROBAXIN   Used for:  Shoulder instability, right        Dose:  750 mg   Take 1 tablet (750 mg) by mouth every 6 hours as needed for muscle spasms (muscle spasm)   Quantity:  60 tablet   Refills:  1       ondansetron 4 MG ODT tab   Commonly known as:  ZOFRAN-ODT   Used for:  Shoulder instability, right        Dose:  4-8 mg   Take 1-2 tablets (4-8 mg) by mouth every 8 hours as needed for nausea Dissolve ON the tongue.   Quantity:  10 tablet   Refills:  1       oxyCODONE IR 5 MG tablet   Commonly known as:  ROXICODONE   Used for:  Shoulder instability, right        Dose:  5-10 mg   Take 1-2 tablets (5-10 mg) by mouth every 4 hours as needed for pain or other (Moderate to Severe)   Quantity:  50 tablet   Refills:  0         CONTINUE these medicines which have NOT CHANGED        Dose / Directions    B-12 1000 MCG Caps   Indication:  B spectrum        Refills:  0       EPINEPHrine 0.3 MG/0.3ML injection 2-pack   Commonly known as:  EPIPEN/ADRENACLICK/or ANY BX GENERIC EQUIV    Used for:  Allergic reaction to insect sting, accidental or unintentional, initial encounter, Cough, SOB (shortness of breath)        Dose:  0.3 mg   Inject 0.3 mLs (0.3 mg) into the muscle as needed for anaphylaxis   Quantity:  0.6 mL   Refills:  1       Evening Primrose Oil 500 MG Caps        Refills:  0       magnesium 200 MG Tabs        Refills:  0       MULTI-VITAMIN DAILY PO        Dose:  1 tablet   Take 1 tablet by mouth daily Reported on 3/8/2017   Refills:  0            Where to get your medicines      Some of these will need a paper prescription and others can be bought over the counter. Ask your nurse if you have questions.     Bring a paper prescription for each of these medications     acetaminophen 325 MG tablet    ibuprofen 600 MG tablet    methocarbamol 750 MG tablet    ondansetron 4 MG ODT tab    oxyCODONE IR 5 MG tablet                Protect others around you: Learn how to safely use, store and throw away your medicines at www.disposemymeds.org.        Information about OPIOIDS     PRESCRIPTION OPIOIDS: WHAT YOU NEED TO KNOW   We gave you an opioid (narcotic) pain medicine. It is important to manage your pain, but opioids are not always the best choice. You should first try all the other options your care team gave you. Take this medicine for as short a time (and as few doses) as possible.     These medicines have risks:    DO NOT drive when on new or higher doses of pain medicine. These medicines can affect your alertness and reaction times, and you could be arrested for driving under the influence (DUI). If you need to use opioids long-term, talk to your care team about driving.    DO NOT operate heave machinery    DO NOT do any other dangerous activities while taking these medicines.     DO NOT drink any alcohol while taking these medicines.      If the opioid prescribed includes acetaminophen, DO NOT take with any other medicines that contain acetaminophen. Read all labels carefully. Look  for the word  acetaminophen  or  Tylenol.  Ask your pharmacist if you have questions or are unsure.    You can get addicted to pain medicines, especially if you have a history of addiction (chemical, alcohol or substance dependence). Talk to your care team about ways to reduce this risk.    Store your pills in a secure place, locked if possible. We will not replace any lost or stolen medicine. If you don t finish your medicine, please throw away (dispose) as directed by your pharmacist. The [place] [employer]ution [employer] has more information about safe disposal: https://www.pca.[facility].[place].us/living-green/managing-unwanted-medications.     All opioids tend to cause constipation. Drink plenty of water and eat foods that have a lot of fiber, such as fruits, vegetables, prune juice, apple juice and high-fiber cereal. Take a laxative (Miralax, milk of magnesia, Colace, Senna) if you don t move your bowels at least every other day.              Medication List: This is a list of all your medications and when to take them. Check marks below indicate your daily home schedule. Keep this list as a reference.      Medications           Morning Afternoon Evening Bedtime As Needed    acetaminophen 325 MG tablet   Commonly known as:  TYLENOL   Take 3 tablets (975 mg) by mouth every 8 hours as needed for other (mild pain)                                B-12 1000 MCG Caps                                EPINEPHrine 0.3 MG/0.3ML injection 2-pack   Commonly known as:  EPIPEN/ADRENACLICK/or ANY BX GENERIC EQUIV   Inject 0.3 mLs (0.3 mg) into the muscle as needed for anaphylaxis                                Evening Primrose Oil 500 MG Caps                                ibuprofen 600 MG tablet   Commonly known as:  ADVIL/MOTRIN   Take 1 tablet (600 mg) by mouth every 8 hours as needed for pain (mild)                                magnesium 200 MG Tabs                                methocarbamol 750 MG tablet   Commonly known as:   ROBAXIN   Take 1 tablet (750 mg) by mouth every 6 hours as needed for muscle spasms (muscle spasm)                                MULTI-VITAMIN DAILY PO   Take 1 tablet by mouth daily Reported on 3/8/2017                                ondansetron 4 MG ODT tab   Commonly known as:  ZOFRAN-ODT   Take 1-2 tablets (4-8 mg) by mouth every 8 hours as needed for nausea Dissolve ON the tongue.                                oxyCODONE IR 5 MG tablet   Commonly known as:  ROXICODONE   Take 1-2 tablets (5-10 mg) by mouth every 4 hours as needed for pain or other (Moderate to Severe)

## 2018-07-06 NOTE — DISCHARGE INSTRUCTIONS
General Shoulder Arthroscopy Discharge Instructions                                      [phone]  Bone and Joint Service Line for issues or concerns        General Care:  After surgery you may feel tired/sleepy. This is normal. Please have someone stay with you for 24 hours after surgery. You should avoid driving for 1-2 days after surgery, as your reaction time may be slow. You should not drive at all if you have had surgery on your arms, right leg and/or are taking narcotic pain medications until released by your doctor. If you have any question along the way please contact the office. If you feel it is an issue cannot wait for normal office hours, contact the on-call physician.    Bandages:    Change your bandage after the first 48-72 hours. You may use Band-Aids or sterile gauze with a small amount of tape. It s normal to have some blood-tinged fluid on your bandages, this will usually continue for the first day or two. Keep the area clean and dry. Do not apply any ointments.     Bathing:  Do not submerge your incision in water such as a bath or pool. It is ok to shower after removing your initial bandage after the first 2 days from surgery. Avoid any excessively hot showers, baths, or hot tubes after surgery.     Follow up:  Your follow up appointment should already be scheduled. If its not, please call the office to verify an appointment 10 days after surgery.     Diet:  Start with non-alcoholic liquids at first, particularly water or sports drinks after surgery. Progress to bland foods such as crackers and bread and finally to your normal diet if you have no problems.     Pain control:  Take your pain medications as prescribed. These medications may make you sleepy. Do not drive, operate equipment, or drink alcohol when taking these.  You may take Tylenol (Generic name is acetaminophen) as directed on the bottle for additional relief or in place of the prescribed pain medications as your pain gets  better. If the medications cause a reaction such as nausea or skin rash, stop taking them and contact your doctor. Please plan accordingly, pain medications will not be re-filled on the weekends or at night. Call the office during the day if you need more medications. Narcotic pain medication can cause constipation, take the stool softer as prescribed.             Sleeping Position:  Sometimes this can be a challenge after shoulder surgery. Some find it comfortable sleeping propped up on several pillows with pillows also behind their elbow. Others, if available, sleep in an easy chair. You may also lay on your back, it may be more comfortable to have a pillow behind your elbow to keep it from falling back.     Sling/Brace:  Keep the sling or brace on after surgery until your follow up. You may remove to bathe, but keep your elbow to your side and do not reach with your arm. You should also slip your arm out of your sling and allow your elbow to straighten all the way out and then bend all the way up. You may need to use your other arm to assist in this. Also make sure to move your wrist back and forth and practice making a fist. Do this 10-15 times about 3-4 times a day.       Physical Therapy:  Depending on your surgery, physical therapy may start within a few days or be delayed 4-6 weeks. At your first post-operative visit, your doctor will direct you on your personal therapy program. You may start the Codman exercises as listed below if pain is controlled.     Codman Shoulder Exercises:  Bend over at the waist and let your arm completely relax. Support your weight by leaning on a table or counter.  Swing your arm slowly from side to side.  Repeat this 20 times, four times each day:    Normal findings after surgery:  It is sometimes normal to have pain in the back of the shoulder even before the block wears off in the hand or elbow.  Warmth and swelling about the hand and shoulder is normal for up to 3-4 weeks  after surgery.  Numbness around the incisions is normal.  You may have bruising around the incisions and into the bicep area. You may notice this bruising settle into the elbow and forearm.   Low grade fevers less than 100.5 degrees Fahrenheit are normal.     When to call the Office:  Temperature greater than 101.5 degrees Fahreheit.  Fever, chills, and increasing pain in the shoulder.  Excessive drainage from the incisions that include bright red blood.  Drainage from the incisions sites that appear yellow, pus-like, or foul smelling.  Increasing pain the shoulder not relieved by the prescribed pain medications or ice.  Persistent nausea or vomiting not helped by the Zofran.  Any other effects you feel are significant.    Block  The Nurse Anesthetist has injected a local block for your surgery.  This block can improve you pain management over the next 12 to 20 hours.  Your extremity will be numb.  You need to be very careful with the extremity until the block wears off.  You should also take your pain medication as prescribed by your surgeon.  Start the pain medication when you get home.  You will want pain medication in your blood stream before the block wears off.    Same-Day Surgery   Adult Discharge Orders & Instructions     For 24 hours after surgery    1. Get plenty of rest.  A responsible adult must stay with you for at least 24 hours after you leave the hospital.   2. Do not drive or use heavy equipment.  If you have weakness or tingling, don't drive or use heavy equipment until this feeling goes away.  3. Do not drink alcohol.  4. Avoid strenuous or risky activities.  Ask for help when climbing stairs.   5. You may feel lightheaded.  IF so, sit for a few minutes before standing.  Have someone help you get up.   6. You may have a slight fever. Call the doctor if your fever is over 100 F (37.7 C) (taken under the tongue) or lasts longer than 24 hours.  7. You may have a dry mouth, a sore throat, muscle aches  or trouble sleeping.  These should go away after 24 hours.  8. Do not make important or legal decisions.  Based on the surgery/procedure that you had today, we do not anticipate that you will have any problems.  However, given the various responses that patients can have to the surgical experience, we want to ensure that you have information available to manage pain or nausea and what to do if you observe bleeding or you develop any signs and symptoms of infection:  Methods to control pain include:  Prescription pain medication or over the counter medications as prescribed or suggested by your physician.  In addition, ice packs and periods of rest are often helpful.  If your pain is not managed with the above methods, contact your physician.  Methods to control nausea include:  Anti-nausea medication approved by your physician.  Drink clear liquids such as apple juice, ginger ale, broth or 7-Up. Be sure to drink enough fluids.  Move to a regular diet as you feel able.  Rest may also help.  Bleeding:  It's not uncommon to see a little blood staining on the dressing, about the size of a quarter in the first 24 hours; if you see this, there is no reason to be alarmed.  However, should this continue to increase in size, apply pressure if able, ant notify your physician.  Infection:  We do not anticipate that you will acquire an infection, but if you should experience any of the following symptoms:  redness, swelling, heat, increasing pain or abnormal drainage at your surgery site, fever or chills, please notify your physician.    Call your doctor for any of the followin.  Signs of infection (fever, growing tenderness at the surgery site, a large amount of drainage or bleeding, severe pain, foul-smelling drainage, redness, swelling).    2. It has been over 8 to 10 hours since surgery and you are still not able to urinate (pass water).    3.  Headache for over 24 hours.    Nurse advice line: [phone]

## 2018-07-12 ENCOUNTER — OFFICE VISIT (OUTPATIENT)
Dept: ORTHOPEDICS | Facility: CLINIC | Age: 23
End: 2018-07-12
Payer: COMMERCIAL

## 2018-07-12 VITALS
HEART RATE: 111 BPM | HEIGHT: 69 IN | DIASTOLIC BLOOD PRESSURE: 83 MMHG | WEIGHT: 186 LBS | SYSTOLIC BLOOD PRESSURE: 120 MMHG | BODY MASS INDEX: 27.55 KG/M2

## 2018-07-12 DIAGNOSIS — M25.311 SHOULDER INSTABILITY, RIGHT: Primary | ICD-10-CM

## 2018-07-12 PROCEDURE — 99024 POSTOP FOLLOW-UP VISIT: CPT | Performed by: ORTHOPAEDIC SURGERY

## 2018-07-12 ASSESSMENT — PAIN SCALES - GENERAL: PAINLEVEL: MODERATE PAIN (4)

## 2018-07-12 NOTE — MR AVS SNAPSHOT
"              After Visit Summary   7/12/2018    Mary Valdez    MRN: [id number]           Patient Information     Date Of Birth          1995        Visit Information        Provider Department      7/12/2018 10:30 AM Yevgeniy Graf, DO [facility]        Today's Diagnoses     Shoulder instability, right    -  1       Follow-ups after your visit        Additional Services     PHYSICAL THERAPY REFERRAL       *This therapy referral will be filtered to a centralized scheduling office at [facility] and the patient will receive a call to schedule an appointment at a [facility] location most convenient for them. *     [facility] provides Physical Therapy evaluation and treatment and many specialty services across the [facility] system.  If requesting a specialty program, please choose from the list below.    If you have not heard from the scheduling office within 2 business days, please call [phone] for all locations, with the exception of [place], please call [phone] and [facility], please call [phone]  Treatment: Evaluation & Treatment  Special Instructions/Modalities:   Special Programs: None    Please be aware that coverage of these services is subject to the terms and limitations of your health insurance plan.  Call member services at your health plan with any benefit or coverage questions.      **Note to Provider:  If you are referring outside of [facility] for the therapy appointment, please list the name of the location in the \"special instructions\" above, print the referral and give to the patient to schedule the appointment.                  Who to contact     If you have questions or need follow up information about today's clinic visit or your schedule please contact [facility] directly at [phone].  Normal or non-critical lab and imaging results will be communicated to you by MyChart, letter or phone " "within 4 business days after the clinic has received the results. If you do not hear from us within 7 days, please contact the clinic through [employer] or phone. If you have a critical or abnormal lab result, we will notify you by phone as soon as possible.  Submit refill requests through [employer] or call your pharmacy and they will forward the refill request to us. Please allow 3 business days for your refill to be completed.          Additional Information About Your Visit        [email or web address] Information     [employer] gives you secure access to your electronic health record. If you see a primary care provider, you can also send messages to your care team and make appointments. If you have questions, please call your primary care clinic.  If you do not have a primary care provider, please call [phone] and they will assist you.        Care EveryWhere ID     This is your Care EveryWhere ID. This could be used by other organizations to access your [facility] medical records  [id number]        Your Vitals Were     Pulse Height BMI (Body Mass Index)             111 5' 8.5\" (1.74 m) 27.87 kg/m2          Blood Pressure from Last 3 Encounters:   07/12/18 120/83   07/06/18 116/62   07/02/18 112/72    Weight from Last 3 Encounters:   07/12/18 186 lb (84.4 kg)   07/02/18 182 lb 1.6 oz (82.6 kg)   06/28/18 186 lb (84.4 kg)              We Performed the Following     PHYSICAL THERAPY REFERRAL        Primary Care Provider Office Phone # Fax #    Charisse Annabella Alexander, APRN [facility] [phone] [address] [address] [place] [id number]        Equal Access to Services     [facility]: Hadii aad ku hadasho Soomaali, waaxda luqadaha, qaybta kaalmada adeegyada, shari moncada. So [facility] [phone].    ATENCIÓN: Si habla español, tiene a garcia disposición servicios gratuitos de asistencia lingüística. Llame al [phone].    We comply with applicable federal civil rights laws and [place] laws. We " do not discriminate on the basis of race, color, national origin, age, disability, sex, sexual orientation, or gender identity.            Thank you!     Thank you for choosing [facility]  for your care. Our goal is always to provide you with excellent care. Hearing back from our patients is one way we can continue to improve our services. Please take a few minutes to complete the written survey that you may receive in the mail after your visit with us. Thank you!             Your Updated Medication List - Protect others around you: Learn how to safely use, store and throw away your medicines at www.disposemymeds.org.          This list is accurate as of 7/12/18 10:55 AM.  Always use your most recent med list.                   Brand Name Dispense Instructions for use Diagnosis    acetaminophen 325 MG tablet    TYLENOL    100 tablet    Take 3 tablets (975 mg) by mouth every 8 hours as needed for other (mild pain)    Shoulder instability, right       B-12 1000 MCG Caps           EPINEPHrine 0.3 MG/0.3ML injection 2-pack    EPIPEN/ADRENACLICK/or ANY BX GENERIC EQUIV    0.6 mL    Inject 0.3 mLs (0.3 mg) into the muscle as needed for anaphylaxis    Allergic reaction to insect sting, accidental or unintentional, initial encounter, Cough, SOB (shortness of breath)       Evening Primrose Oil 500 MG Caps           ibuprofen 600 MG tablet    ADVIL/MOTRIN    30 tablet    Take 1 tablet (600 mg) by mouth every 8 hours as needed for pain (mild)    Shoulder instability, right       magnesium 200 MG Tabs           methocarbamol 750 MG tablet    ROBAXIN    60 tablet    Take 1 tablet (750 mg) by mouth every 6 hours as needed for muscle spasms (muscle spasm)    Shoulder instability, right       MULTI-VITAMIN DAILY PO      Take 1 tablet by mouth daily Reported on 3/8/2017        ondansetron 4 MG ODT tab    ZOFRAN-ODT    10 tablet    Take 1-2 tablets (4-8 mg) by mouth every 8 hours as needed for nausea Dissolve ON the  tongue.    Shoulder instability, right       oxyCODONE IR 5 MG tablet    ROXICODONE    50 tablet    Take 1-2 tablets (5-10 mg) by mouth every 4 hours as needed for pain or other (Moderate to Severe)    Shoulder instability, right

## 2018-07-12 NOTE — PROGRESS NOTES
Orthopedic Clinic Post-Operative Note    CHIEF COMPLAINT:   Chief Complaint   Patient presents with     Surgical Followup     DOS: 7/6/2018       HISTORY OF PRESENT ILLNESS  Doing well. she only required a day or 2 of pain medications.  No other issues    Patient's past medical, surgical, social and family histories reviewed.     Past Medical History:   Diagnosis Date     Bell's palsy 7 y/o    Due to trauma, neg Lyme     Depression     Venice Meyer, psychologist, [employer] in [place]     Depressive disorder      Major depression in complete remission (H) 7/7/2014     Psoriasis     scalp only     Psoriatic arthritis (H)        Past Surgical History:   Procedure Laterality Date     ADENOIDECTOMY       ARTHROSCOPY KNEE WITH RETINACULAR RELEASE Right 1/20/2017    Procedure: ARTHROSCOPY KNEE WITH RETINACULAR RELEASE MEDIAL OR LATERAL;  Surgeon: Yevgeniy Graf DO;  Location: [facility] OR     ARTHROSCOPY SHOULDER SUPERIOR LABRUM ANTERIOR TO POSTERIOR REPAIR Right 7/6/2018    Procedure: ARTHROSCOPY SHOULDER SUPERIOR LABRUM ANTERIOR TO POSTERIOR REPAIR;  Right shoulder arthroscopy with arthroscopic labral repair;  Surgeon: Yevgeniy Graf DO;  Location: [facility] OR     C NONSPECIFIC PROCEDURE  02/12/01    adenoidectomy     DENTAL SURGERY       MOUTH SURGERY         Medications:    Current Outpatient Prescriptions on File Prior to Visit:  acetaminophen (TYLENOL) 325 MG tablet Take 3 tablets (975 mg) by mouth every 8 hours as needed for other (mild pain)   Cyanocobalamin (B-12) 1000 MCG CAPS    EPINEPHrine (EPIPEN/ADRENACLICK/OR ANY BX GENERIC EQUIV) 0.3 MG/0.3ML injection 2-pack Inject 0.3 mLs (0.3 mg) into the muscle as needed for anaphylaxis   Evening Primrose Oil 500 MG CAPS    ibuprofen (ADVIL/MOTRIN) 600 MG tablet Take 1 tablet (600 mg) by mouth every 8 hours as needed for pain (mild)   magnesium 200 MG TABS    methocarbamol (ROBAXIN) 750 MG tablet Take 1 tablet (750 mg) by mouth every 6 hours as needed for  "muscle spasms (muscle spasm)   Multiple Vitamin (MULTI-VITAMIN DAILY PO) Take 1 tablet by mouth daily Reported on 3/8/2017   ondansetron (ZOFRAN-ODT) 4 MG ODT tab Take 1-2 tablets (4-8 mg) by mouth every 8 hours as needed for nausea Dissolve ON the tongue.   oxyCODONE IR (ROXICODONE) 5 MG tablet Take 1-2 tablets (5-10 mg) by mouth every 4 hours as needed for pain or other (Moderate to Severe) (Patient not taking: Reported on 7/12/2018)     No current facility-administered medications on file prior to visit.     Allergies   Allergen Reactions     Bee Venom Anaphylaxis     No Known Drug Allergies        Social History     Occupational History     Not on file.     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       Family History   Problem Relation Age of Onset     Other - See Comments Father      benign nerve spinal tumor      Hypertension Father      Diabetes Maternal Grandmother      Depression Maternal Grandmother      Psoriasis Maternal Grandfather      Seizure Disorder Brother      Psoriasis Maternal Aunt      Psoriasis Maternal Aunt      Psoriasis Maternal Aunt        REVIEW OF SYSTEMS  General: negative for, night sweats, dizziness, fatigue  Resp: No shortness of breath and no cough  CV: negative for chest pain, syncope or near-syncope  GI: negative for nausea, vomiting and diarrhea  : negative for dysuria and hematuria  Musculoskeletal: as above  Neurologic: negative for syncope   Hematologic: negative for bleeding disorder    Physical Exam:  Vitals: /83 (BP Location: Left arm, Patient Position: Chair, Cuff Size: Adult Large)  Pulse 111  Ht 5' 8.5\" (1.74 m)  Wt 186 lb (84.4 kg)  BMI 27.87 kg/m2  BMI= Body mass index is 27.87 kg/(m^2).  Constitutional: healthy, alert and no " acute distress   Psychiatric: mentation appears normal and affect normal/bright  NEURO: no focal deficits  SKIN: .healing well, well approximated skin edges, without signs of infection including no erythema, incision breakdown or purlent drainage  JOINT/EXTREMITIES: Some bruising along the anterior shoulder and upper arm.  Fingers are warm.  Slight diminished sensation in the fingertips.  Good capillary refill.  Normal color.  Palpable radial pulse  GAIT: not tested     Diagnostic Modalities:  None today.  Independent visualization of the images was performed.      Impression:   Chief Complaint   Patient presents with     Surgical Followup     DOS: 7/6/2018   Doing well from the [employer] repair some decreased sensation of the fingers however that has been improving.    Plan:   Recommend continued observation for the finger numbness has been improving.  Continue sling when up and about.  However when sitting she certainly may remove her sling to let her arm fully rest.  No pushing pulling lifting or raising the arm.  Sutures-6 days from surgery recommend returning next week for nurse visit for suture removal.    Start physical therapy in 2 weeks with [employer] protocol.  Return to clinic 4, week(s), or sooner as needed for changes.    Re-x-ray on return: No    Magdaleno Graf D.O.

## 2018-07-12 NOTE — LETTER
7/12/2018         RE: Mary Valdez  [address]  [place] [address]        Dear Colleague,    Thank you for referring your patient, Mary Valdez, to the [facility]. Please see a copy of my visit note below.    Orthopedic Clinic Post-Operative Note    CHIEF COMPLAINT:   Chief Complaint   Patient presents with     Surgical Followup     DOS: 7/6/2018       HISTORY OF PRESENT ILLNESS  Doing well. she only required a day or 2 of pain medications.  No other issues    Patient's past medical, surgical, social and family histories reviewed.     Past Medical History:   Diagnosis Date     Bell's palsy 7 y/o    Due to trauma, neg Lyme     Depression     Venice Meyer, psychologist, [employer] in [place]     Depressive disorder      Major depression in complete remission (H) 7/7/2014     Psoriasis     scalp only     Psoriatic arthritis (H)        Past Surgical History:   Procedure Laterality Date     ADENOIDECTOMY       ARTHROSCOPY KNEE WITH RETINACULAR RELEASE Right 1/20/2017    Procedure: ARTHROSCOPY KNEE WITH RETINACULAR RELEASE MEDIAL OR LATERAL;  Surgeon: Yevgeniy Graf DO;  Location: [facility]     ARTHROSCOPY SHOULDER SUPERIOR LABRUM ANTERIOR TO POSTERIOR REPAIR Right 7/6/2018    Procedure: ARTHROSCOPY SHOULDER SUPERIOR LABRUM ANTERIOR TO POSTERIOR REPAIR;  Right shoulder arthroscopy with arthroscopic labral repair;  Surgeon: Yevgeniy Graf DO;  Location: [facility] OR     C NONSPECIFIC PROCEDURE  02/12/01    adenoidectomy     DENTAL SURGERY       MOUTH SURGERY         Medications:    Current Outpatient Prescriptions on File Prior to Visit:  acetaminophen (TYLENOL) 325 MG tablet Take 3 tablets (975 mg) by mouth every 8 hours as needed for other (mild pain)   Cyanocobalamin (B-12) 1000 MCG CAPS    EPINEPHrine (EPIPEN/ADRENACLICK/OR ANY BX GENERIC EQUIV) 0.3 MG/0.3ML injection 2-pack Inject 0.3 mLs (0.3 mg) into the muscle as needed for anaphylaxis   Evening Primrose  Oil 500 MG CAPS    ibuprofen (ADVIL/MOTRIN) 600 MG tablet Take 1 tablet (600 mg) by mouth every 8 hours as needed for pain (mild)   magnesium 200 MG TABS    methocarbamol (ROBAXIN) 750 MG tablet Take 1 tablet (750 mg) by mouth every 6 hours as needed for muscle spasms (muscle spasm)   Multiple Vitamin (MULTI-VITAMIN DAILY PO) Take 1 tablet by mouth daily Reported on 3/8/2017   ondansetron (ZOFRAN-ODT) 4 MG ODT tab Take 1-2 tablets (4-8 mg) by mouth every 8 hours as needed for nausea Dissolve ON the tongue.   oxyCODONE IR (ROXICODONE) 5 MG tablet Take 1-2 tablets (5-10 mg) by mouth every 4 hours as needed for pain or other (Moderate to Severe) (Patient not taking: Reported on 7/12/2018)     No current facility-administered medications on file prior to visit.     Allergies   Allergen Reactions     Bee Venom Anaphylaxis     No Known Drug Allergies        Social History     Occupational History     Not on file.     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       Family History   Problem Relation Age of Onset     Other - See Comments Father      benign nerve spinal tumor      Hypertension Father      Diabetes Maternal Grandmother      Depression Maternal Grandmother      Psoriasis Maternal Grandfather      Seizure Disorder Brother      Psoriasis Maternal Aunt      Psoriasis Maternal Aunt      Psoriasis Maternal Aunt        REVIEW OF SYSTEMS  General: negative for, night sweats, dizziness, fatigue  Resp: No shortness of breath and no cough  CV: negative for chest pain, syncope or near-syncope  GI: negative for nausea, vomiting and diarrhea  : negative for dysuria and hematuria  Musculoskeletal: as above  Neurologic: negative for syncope   Hematologic: negative for bleeding  "disorder    Physical Exam:  Vitals: /83 (BP Location: Left arm, Patient Position: Chair, Cuff Size: Adult Large)  Pulse 111  Ht 5' 8.5\" (1.74 m)  Wt 186 lb (84.4 kg)  BMI 27.87 kg/m2  BMI= Body mass index is 27.87 kg/(m^2).  Constitutional: healthy, alert and no acute distress   Psychiatric: mentation appears normal and affect normal/bright  NEURO: no focal deficits  SKIN: .healing well, well approximated skin edges, without signs of infection including no erythema, incision breakdown or purlent drainage  JOINT/EXTREMITIES: Some bruising along the anterior shoulder and upper arm.  Fingers are warm.  Slight diminished sensation in the fingertips.  Good capillary refill.  Normal color.  Palpable radial pulse  GAIT: not tested     Diagnostic Modalities:  None today.  Independent visualization of the images was performed.      Impression:   Chief Complaint   Patient presents with     Surgical Followup     DOS: 7/6/2018   Doing well from the Bankart repair some decreased sensation of the fingers however that has been improving.    Plan:   Recommend continued observation for the finger numbness has been improving.  Continue sling when up and about.  However when sitting she certainly may remove her sling to let her arm fully rest.  No pushing pulling lifting or raising the arm.  Sutures-6 days from surgery recommend returning next week for nurse visit for suture removal.    Start physical therapy in 2 weeks with Bankart protocol.  Return to clinic 4, week(s), or sooner as needed for changes.    Re-x-ray on return: No    Magdaleno Graf D.O.    Again, thank you for allowing me to participate in the care of your patient.        Sincerely,        Yevgeniy Graf, DO    "

## 2018-08-23 NOTE — ADDENDUM NOTE
Encounter addended by: Jalyn Cary, OT on: 8/23/2018  2:08 PM<BR>     Actions taken: Episode resolved, Sign clinical note, Flowsheet accepted

## 2018-08-23 NOTE — PROGRESS NOTES
Occupational Therapy Discharge Progress Note     07/02/18 1126   Notes   Note Type Discharge Summary   Signing Clinician's Name / Credentials   Signing clinician's name / credentials Jalyn EDMONDSON   Session Number   Session Number 5/16   Providers   Referring Physician Janak JEFFRIES, RODNEY   General Information   Rxs Authorized 40   Rxs Used 5   Medical Diagnosis Psoriatic arthritis; stiffness of hand joints, unspecified laterality M25.649   Orders Evaluate And Treat As Indicated   Start Of Care Date 06/13/18   Onset date of current episode/exacerbation 06/01/18   Subjective Measures   Subjective On last OT appt on 7/2/18: Patient reported will be having right shoulder scope and surgery on 7/6/18  The patient was able to crotech using new joint protection technques and use of warm pack ,and able to complete leisure activity.  Pt. requested hold OT for 30 days.   Initial Pain level 2/10  (bilateral)   Modalities   Modalities Paraffin   Paraffin - Duration 15 min   Skilled Interventions To Increase Blood Flow For Improved Healing;To Decrease Pain;To Decrease Inflammation   Location bilateral hands; 5 dips, plastic and towel wrap and pt. seated safely at table.   Therapeutic Exercise   Skilled Interventions To Increase Blood Flow For Improved Healing   Minutes of Treatment 17   Other Exer/Activities/Educ   Exercise 1 Reviewed/reassessed goals and Home programming   Exercise 2 Educated, demonstrated and provided handout for light strengthening   Description 2 grasp/pinch with use of foam blocks (yellor 1st, then pink as tolerated)   Manual   Manual STM   Skilled Interventions To Increase Blood Flow For Improved Healing;To Increase Tissue Excursion;To Enhance Joint Rom;To Decrease Pain;To Decrease Inflammation   Minutes of Treatment 10   STM Finger;Hypothenars;Thenars;1st dc   Position Sitting   Description/ Technique Mild to moderate pressure applied to each finger/thumb  dorsum/volar surface area.  Education and instruction for home self massage to decreased pain and stiffness and improve functional use of the hands.   Hand Goals   Hand Goals Dressing;Household Chores;Work;Driving;Sports/Recreation   Dressing   Current Functional Task Dressing LB;Buttoning;Zipping;Tying   Previous Performance Level Independent   Current Performance Level Moderate difficulty   Goal Target Task Don/Doff pants;Don/Doff bra;[place] pants;Zipping   Goal Target Performance Level Mild difficulty   Due Date 08/13/18   If goal not met, Why? pt did not return as of 8/24/18   Household Chores   Current Functional Task Washing and drying dishes;Vacuuming;Cooking;Gripping;Carrying   Previous Performance Level Independent   Current Performance Level Mild difficulty   Goal Target Task Hold dish rag and wash dishes;Hold spoon for stirring;Hold and lift pan;Hold and use vacuum ;Open a tight or new jar; and turn to open a door   Goal Target Performance Level Mild difficulty   Due Date 08/13/18   Date Goal Met 07/02/18   If goal not met, Why? pt did not return as of 8/24/18   Work   Current Functional Task Computer use;Keyboarding;Mousing;Repetitive tasks;Writing   Previous Performance Level Independent   Current Performance Level Moderate difficulty  (improved)   Goal Target Task Type;Mouse;Complete repetitive tasks;Write legibly   Goal Target Performance Level Mild difficulty   Due Date 08/13/18   If goal not met, Why? pt did not return as of 8/24/18   Driving   Current Functional Task Holding steering wheel;Turning car key   Previous Performance Level Independent   Current Performance Level Severe difficulty   Goal Target Task  steering wheel;Turn car key to start car;Pull car door open   Goal Target Performance Level Mild difficulty   Due Date 08/13/18   If goal not met, Why? pt did not return as of 8/24/18   Sports/Recreation   Current Functional Task Gripping;Holding;Playing   Previous Performance  Level Independent   Curent Performance Level Unable   Goal Target Task (play piano, crotech)   Goal Target Performance Level Mild difficulty   Due Date 08/13/18   If goal not met, Why? pt did not return as of 8/24/18   Assessment   Clinical Impression(s) Comments Improved pain free use of the hands for functional tasks.   Response to Therapy: Improvements ROM;Flexibility;Pain;Self Care Skills;Sensitivity   Equipment   Equipment yellow and pink foam block   Education   Learner Patient   Readiness Acceptance   Method Explanation;Booklet/handout;Demonstration   Response Verbalizes understanding   Education Notes hand/grasp strengthening   Plan   Home program as instructed   Updates to plan of care Held chart 30 days until 8/2/18, as of 8/24/18 patient has not returned.  OT discharge at this time   Total Session Time   Timed Code Treatment Minutes 42   Total Treatment Time (sum of timed and untimed services) 42       Thank you for referring Mary  To OT services to assist with decrease stiffness of the hand and improve functional use for daily tasks/activities.    If you have any questions or concerns, please contact me at [phone].    Jalyn Cary MA, OTR/L  [facility]

## 2018-08-30 ENCOUNTER — OFFICE VISIT (OUTPATIENT)
Dept: ORTHOPEDICS | Facility: CLINIC | Age: 23
End: 2018-08-30
Payer: COMMERCIAL

## 2018-08-30 VITALS
SYSTOLIC BLOOD PRESSURE: 113 MMHG | DIASTOLIC BLOOD PRESSURE: 76 MMHG | HEIGHT: 69 IN | WEIGHT: 185 LBS | BODY MASS INDEX: 27.4 KG/M2 | TEMPERATURE: 97.9 F

## 2018-08-30 DIAGNOSIS — M25.311 SHOULDER INSTABILITY, RIGHT: Primary | ICD-10-CM

## 2018-08-30 PROCEDURE — 99024 POSTOP FOLLOW-UP VISIT: CPT | Performed by: ORTHOPAEDIC SURGERY

## 2018-08-30 RX ORDER — GABAPENTIN 100 MG/1
100 CAPSULE ORAL 3 TIMES DAILY
Qty: 30 CAPSULE | Refills: 0 | Status: SHIPPED | OUTPATIENT
Start: 2018-08-30 | End: 2019-05-25

## 2018-08-30 ASSESSMENT — PAIN SCALES - GENERAL: PAINLEVEL: NO PAIN (0)

## 2018-08-30 NOTE — PROGRESS NOTES
"Orthopedic Clinic Post-Operative Note    CHIEF COMPLAINT:   Chief Complaint   Patient presents with     RECHECK     right shoulder follow up     Surgical Followup     DOS; 7/6/2018~Right shoulder arthroscopy with arthroscopic repair of anteroinferior labral repair. ~8 weeks       HISTORY OF PRESENT ILLNESS  Attending physical therapy.  Feels like she is making good progress for her shoulder.  She reports a numb feeling into her fingers has resolved however now she has hot \"pain into her thumb.  Describes a sharp pain occasionally.  Hypersensitive at times.  Patient's past medical, surgical, social and family histories reviewed.     Past Medical History:   Diagnosis Date     Bell's palsy 5 y/o    Due to trauma, neg Lyme     Depression     Venice Meyer, psychologist, [employer] in [place]     Depressive disorder      Major depression in complete remission (H) 7/7/2014     Psoriasis     scalp only     Psoriatic arthritis (H)        Past Surgical History:   Procedure Laterality Date     ADENOIDECTOMY       ARTHROSCOPY KNEE WITH RETINACULAR RELEASE Right 1/20/2017    Procedure: ARTHROSCOPY KNEE WITH RETINACULAR RELEASE MEDIAL OR LATERAL;  Surgeon: Yevgeniy Graf DO;  Location: [facility]     ARTHROSCOPY SHOULDER SUPERIOR LABRUM ANTERIOR TO POSTERIOR REPAIR Right 7/6/2018    Procedure: ARTHROSCOPY SHOULDER SUPERIOR LABRUM ANTERIOR TO POSTERIOR REPAIR;  Right shoulder arthroscopy with arthroscopic labral repair;  Surgeon: Yevgeniy Graf DO;  Location: [facility] OR     C NONSPECIFIC PROCEDURE  02/12/01    adenoidectomy     DENTAL SURGERY       MOUTH SURGERY         Medications:    Current Outpatient Prescriptions on File Prior to Visit:  Cyanocobalamin (B-12) 1000 MCG CAPS    Evening Primrose Oil 500 MG CAPS    magnesium 200 MG TABS    Multiple Vitamin (MULTI-VITAMIN DAILY PO) Take 1 tablet by mouth daily Reported on 3/8/2017   acetaminophen (TYLENOL) 325 MG tablet Take 3 tablets (975 mg) by mouth every 8 hours " "as needed for other (mild pain)   EPINEPHrine (EPIPEN/ADRENACLICK/OR ANY BX GENERIC EQUIV) 0.3 MG/0.3ML injection 2-pack Inject 0.3 mLs (0.3 mg) into the muscle as needed for anaphylaxis   ibuprofen (ADVIL/MOTRIN) 600 MG tablet Take 1 tablet (600 mg) by mouth every 8 hours as needed for pain (mild)     No current facility-administered medications on file prior to visit.     Allergies   Allergen Reactions     Bee Venom Anaphylaxis     No Known Drug Allergies        Social History     Occupational History     Not on file.     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       Family History   Problem Relation Age of Onset     Other - See Comments Father      benign nerve spinal tumor      Hypertension Father      Diabetes Maternal Grandmother      Depression Maternal Grandmother      Psoriasis Maternal Grandfather      Seizure Disorder Brother      Psoriasis Maternal Aunt      Psoriasis Maternal Aunt      Psoriasis Maternal Aunt        REVIEW OF SYSTEMS  General: negative for, night sweats, dizziness, fatigue  Resp: No shortness of breath and no cough  CV: negative for chest pain, syncope or near-syncope  GI: negative for nausea, vomiting and diarrhea  : negative for dysuria and hematuria  Musculoskeletal: as above  Neurologic: negative for syncope   Hematologic: negative for bleeding disorder    Physical Exam:  Vitals: /76  Temp 97.9  F (36.6  C) (Temporal)  Ht 5' 8.5\" (1.74 m)  Wt 185 lb (83.9 kg)  BMI 27.72 kg/m2  BMI= Body mass index is 27.72 kg/(m^2).  Constitutional: healthy, alert and no acute distress   Psychiatric: mentation appears normal and affect normal/bright  NEURO: no focal deficits  SKIN: .well healed, no erythema, no incision breakdown and no " drainage.  JOINT/EXTREMITIES: Active motion to approximately 100  passively 110/90.  Soft endpoint.  Limited to secondary to pain.  No focal areas of tenderness.  Right thumb and hand have full motion.  There is some hypersensitivity light touch along entire thumb.  No deformity.  No bony tenderness  GAIT: not tested     Diagnostic Modalities:  None today.  Independent visualization of the images was performed.      Impression:   Chief Complaint   Patient presents with     RECHECK     right shoulder follow up     Surgical Followup     DOS; 7/6/2018~Right shoulder arthroscopy with arthroscopic repair of anteroinferior labral repair. ~8 weeks   Right thumb hypersensitivity    Plan:   She feels she is making good progress physical therapy.  Recommend she continue to do so.  We discussed her stiffness today.  She does have some issues laying on that shoulder which causes pain.  Recommend she continue working at range of motion.  As long as she is making progress that is key.    For her thumb initially it was numb now she is having some shooting pains.  Recommend trying some Neurontin.  Risks reviewed.  Return to clinic 4, week(s), or sooner as needed for changes.    Re-x-ray on return: No    Magdaleno Graf D.O.

## 2018-08-30 NOTE — MR AVS SNAPSHOT
"              After Visit Summary   8/30/2018    Mary Valdez    MRN: [id number]           Patient Information     Date Of Birth          1995        Visit Information        Provider Department      8/30/2018 9:50 AM Yevgeniy Graf, DO [facility]        Today's Diagnoses     Shoulder instability, right    -  1       Follow-ups after your visit        Who to contact     If you have questions or need follow up information about today's clinic visit or your schedule please contact [facility] directly at [phone].  Normal or non-critical lab and imaging results will be communicated to you by [employer], letter or phone within 4 business days after the clinic has received the results. If you do not hear from us within 7 days, please contact the clinic through [employer] or phone. If you have a critical or abnormal lab result, we will notify you by phone as soon as possible.  Submit refill requests through [employer] or call your pharmacy and they will forward the refill request to us. Please allow 3 business days for your refill to be completed.          Additional Information About Your Visit        MyChart Information     [employer] gives you secure access to your electronic health record. If you see a primary care provider, you can also send messages to your care team and make appointments. If you have questions, please call your primary care clinic.  If you do not have a primary care provider, please call [phone] and they will assist you.        Care EveryWhere ID     This is your Care EveryWhere ID. This could be used by other organizations to access your [facility] medical records  [id number]        Your Vitals Were     Temperature Height BMI (Body Mass Index)             97.9  F (36.6  C) (Temporal) 5' 8.5\" (1.74 m) 27.72 kg/m2          Blood Pressure from Last 3 Encounters:   08/30/18 113/76   07/12/18 120/83   07/06/18 116/62    Weight from Last 3 Encounters: "   08/30/18 185 lb (83.9 kg)   07/12/18 186 lb (84.4 kg)   07/02/18 182 lb 1.6 oz (82.6 kg)              Today, you had the following     No orders found for display         Today's Medication Changes          These changes are accurate as of 8/30/18 10:38 AM.  If you have any questions, ask your nurse or doctor.               Start taking these medicines.        Dose/Directions    gabapentin 100 MG capsule   Commonly known as:  NEURONTIN   Used for:  Shoulder instability, right   Started by:  Yevgeniy Graf,         Dose:  100 mg   Take 1 capsule (100 mg) by mouth 3 times daily   Quantity:  30 capsule   Refills:  0            Where to get your medicines      These medications were sent to [facility] [address] [place] [address] [address]  [address] [facility] [address]     Phone:  [phone]     gabapentin 100 MG capsule                Primary Care Provider Office Phone # Fax #    Charisse Annabella Alexander, APRN [facility] [phone] [address] [address] [place] [address]        Equal Access to Services     [facility]: Hadii aad ku hadasho Soomaali, waaxda luqadaha, qaybta kaalmada adeegyada, shari escamilla . So [facility] [phone].    ATENCIÓN: Si habla español, tiene a garcia disposición servicios gratuitos de asistencia lingüística. Neva al [phone].    We comply with applicable federal civil rights laws and [place] laws. We do not discriminate on the basis of race, color, national origin, age, disability, sex, sexual orientation, or gender identity.            Thank you!     Thank you for choosing [facility]  for your care. Our goal is always to provide you with excellent care. Hearing back from our patients is one way we can continue to improve our services. Please take a few minutes to complete the written survey that you may receive in the mail after your visit with us. Thank you!             Your Updated Medication List -  Protect others around you: Learn how to safely use, store and throw away your medicines at www.disposemymeds.org.          This list is accurate as of 8/30/18 10:38 AM.  Always use your most recent med list.                   Brand Name Dispense Instructions for use Diagnosis    acetaminophen 325 MG tablet    TYLENOL    100 tablet    Take 3 tablets (975 mg) by mouth every 8 hours as needed for other (mild pain)    Shoulder instability, right       B-12 1000 MCG Caps           EPINEPHrine 0.3 MG/0.3ML injection 2-pack    EPIPEN/ADRENACLICK/or ANY BX GENERIC EQUIV    0.6 mL    Inject 0.3 mLs (0.3 mg) into the muscle as needed for anaphylaxis    Allergic reaction to insect sting, accidental or unintentional, initial encounter, Cough, SOB (shortness of breath)       Evening Primrose Oil 500 MG Caps           gabapentin 100 MG capsule    NEURONTIN    30 capsule    Take 1 capsule (100 mg) by mouth 3 times daily    Shoulder instability, right       ibuprofen 600 MG tablet    ADVIL/MOTRIN    30 tablet    Take 1 tablet (600 mg) by mouth every 8 hours as needed for pain (mild)    Shoulder instability, right       magnesium 200 MG Tabs           MULTI-VITAMIN DAILY PO      Take 1 tablet by mouth daily Reported on 3/8/2017

## 2018-08-30 NOTE — LETTER
"    8/30/2018         RE: Mary Valdez  [address]  [facility] [address]        Dear Colleague,    Thank you for referring your patient, Mary Valdez, to the [facility]. Please see a copy of my visit note below.    Orthopedic Clinic Post-Operative Note    CHIEF COMPLAINT:   Chief Complaint   Patient presents with     RECHECK     right shoulder follow up     Surgical Followup     DOS; 7/6/2018~Right shoulder arthroscopy with arthroscopic repair of anteroinferior labral repair. ~8 weeks       HISTORY OF PRESENT ILLNESS  Attending physical therapy.  Feels like she is making good progress for her shoulder.  She reports a numb feeling into her fingers has resolved however now she has hot \"pain into her thumb.  Describes a sharp pain occasionally.  Hypersensitive at times.  Patient's past medical, surgical, social and family histories reviewed.     Past Medical History:   Diagnosis Date     Bell's palsy 7 y/o    Due to trauma, neg Lyme     Depression     Venice Meyer, psychologist, [employer] in [place]     Depressive disorder      Major depression in complete remission (H) 7/7/2014     Psoriasis     scalp only     Psoriatic arthritis (H)        Past Surgical History:   Procedure Laterality Date     ADENOIDECTOMY       ARTHROSCOPY KNEE WITH RETINACULAR RELEASE Right 1/20/2017    Procedure: ARTHROSCOPY KNEE WITH RETINACULAR RELEASE MEDIAL OR LATERAL;  Surgeon: Yevgeniy Graf DO;  Location: [facility]     ARTHROSCOPY SHOULDER SUPERIOR LABRUM ANTERIOR TO POSTERIOR REPAIR Right 7/6/2018    Procedure: ARTHROSCOPY SHOULDER SUPERIOR LABRUM ANTERIOR TO POSTERIOR REPAIR;  Right shoulder arthroscopy with arthroscopic labral repair;  Surgeon: Yevgeniy Graf DO;  Location: [facility] OR     C NONSPECIFIC PROCEDURE  02/12/01    adenoidectomy     DENTAL SURGERY       MOUTH SURGERY         Medications:    Current Outpatient Prescriptions on File Prior to Visit:  Cyanocobalamin (B-12) " "1000 MCG CAPS    Evening Primrose Oil 500 MG CAPS    magnesium 200 MG TABS    Multiple Vitamin (MULTI-VITAMIN DAILY PO) Take 1 tablet by mouth daily Reported on 3/8/2017   acetaminophen (TYLENOL) 325 MG tablet Take 3 tablets (975 mg) by mouth every 8 hours as needed for other (mild pain)   EPINEPHrine (EPIPEN/ADRENACLICK/OR ANY BX GENERIC EQUIV) 0.3 MG/0.3ML injection 2-pack Inject 0.3 mLs (0.3 mg) into the muscle as needed for anaphylaxis   ibuprofen (ADVIL/MOTRIN) 600 MG tablet Take 1 tablet (600 mg) by mouth every 8 hours as needed for pain (mild)     No current facility-administered medications on file prior to visit.     Allergies   Allergen Reactions     Bee Venom Anaphylaxis     No Known Drug Allergies        Social History     Occupational History     Not on file.     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       Family History   Problem Relation Age of Onset     Other - See Comments Father      benign nerve spinal tumor      Hypertension Father      Diabetes Maternal Grandmother      Depression Maternal Grandmother      Psoriasis Maternal Grandfather      Seizure Disorder Brother      Psoriasis Maternal Aunt      Psoriasis Maternal Aunt      Psoriasis Maternal Aunt        REVIEW OF SYSTEMS  General: negative for, night sweats, dizziness, fatigue  Resp: No shortness of breath and no cough  CV: negative for chest pain, syncope or near-syncope  GI: negative for nausea, vomiting and diarrhea  : negative for dysuria and hematuria  Musculoskeletal: as above  Neurologic: negative for syncope   Hematologic: negative for bleeding disorder    Physical Exam:  Vitals: /76  Temp 97.9  F (36.6  C) (Temporal)  Ht 5' 8.5\" (1.74 m)  Wt 185 lb (83.9 kg)  BMI 27.72 kg/m2  BMI= Body " mass index is 27.72 kg/(m^2).  Constitutional: healthy, alert and no acute distress   Psychiatric: mentation appears normal and affect normal/bright  NEURO: no focal deficits  SKIN: .well healed, no erythema, no incision breakdown and no drainage.  JOINT/EXTREMITIES: Active motion to approximately 100  passively 110/90.  Soft endpoint.  Limited to secondary to pain.  No focal areas of tenderness.  Right thumb and hand have full motion.  There is some hypersensitivity light touch along entire thumb.  No deformity.  No bony tenderness  GAIT: not tested     Diagnostic Modalities:  None today.  Independent visualization of the images was performed.      Impression:   Chief Complaint   Patient presents with     RECHECK     right shoulder follow up     Surgical Followup     DOS; 7/6/2018~Right shoulder arthroscopy with arthroscopic repair of anteroinferior labral repair. ~8 weeks   Right thumb hypersensitivity    Plan:   She feels she is making good progress physical therapy.  Recommend she continue to do so.  We discussed her stiffness today.  She does have some issues laying on that shoulder which causes pain.  Recommend she continue working at range of motion.  As long as she is making progress that is key.    For her thumb initially it was numb now she is having some shooting pains.  Recommend trying some Neurontin.  Risks reviewed.  Return to clinic 4, week(s), or sooner as needed for changes.    Re-x-ray on return: No    Magdaleno Graf D.O.    Again, thank you for allowing me to participate in the care of your patient.        Sincerely,        Yevgeniy Graf, DO

## 2018-10-12 ENCOUNTER — MYC MEDICAL ADVICE (OUTPATIENT)
Dept: ORTHOPEDICS | Facility: CLINIC | Age: 23
End: 2018-10-12

## 2018-10-12 DIAGNOSIS — M25.552 HIP PAIN, LEFT: Primary | ICD-10-CM

## 2018-10-17 ENCOUNTER — HOSPITAL ENCOUNTER (OUTPATIENT)
Dept: GENERAL RADIOLOGY | Facility: CLINIC | Age: 23
Discharge: HOME | End: 2018-10-17
Attending: ORTHOPAEDIC SURGERY | Admitting: ORTHOPAEDIC SURGERY
Payer: COMMERCIAL

## 2018-10-17 ENCOUNTER — OFFICE VISIT (OUTPATIENT)
Dept: ORTHOPEDICS | Facility: CLINIC | Age: 23
End: 2018-10-17
Payer: COMMERCIAL

## 2018-10-17 VITALS
BODY MASS INDEX: 26.5 KG/M2 | DIASTOLIC BLOOD PRESSURE: 72 MMHG | HEIGHT: 69 IN | SYSTOLIC BLOOD PRESSURE: 108 MMHG | WEIGHT: 178.9 LBS | TEMPERATURE: 98.7 F

## 2018-10-17 DIAGNOSIS — M25.552 HIP PAIN, LEFT: Primary | ICD-10-CM

## 2018-10-17 DIAGNOSIS — M62.89 HAMSTRING TIGHTNESS OF LEFT LOWER EXTREMITY: ICD-10-CM

## 2018-10-17 DIAGNOSIS — M25.552 HIP PAIN, LEFT: ICD-10-CM

## 2018-10-17 PROCEDURE — 73502 X-RAY EXAM HIP UNI 2-3 VIEWS: CPT | Mod: TC

## 2018-10-17 PROCEDURE — 99213 OFFICE O/P EST LOW 20 MIN: CPT | Performed by: ORTHOPAEDIC SURGERY

## 2018-10-17 ASSESSMENT — PAIN SCALES - GENERAL: PAINLEVEL: NO PAIN (0)

## 2018-10-17 NOTE — PROGRESS NOTES
"ORTHOPEDIC CONSULT      Chief Complaint: Mary Valdez is a 22 year old female who is being seen for Chief Complaint   Patient presents with     Musculoskeletal Problem     LEFT HIP PAIN     Consult       History of Present Illness:   Seen previously for other complaints. Has not been previously for left hip.  States first time with this type of pain was in the 7th grade.  Was told at that time that due to her growing quite a bit \"her cartilage pulled away from her hip\".  Patient unsure what this meant. Was told to be a lifetime running restriction. States when she stopped running, the pain resolved. Has not had pain in her since until about 1 year ago.  Now states having progressive deep stiffness type hip into the groin pain.  Only has the pain when in one position for a prolonged time.  No injury or event associated with this.  Take ibuprofen/Aleve without benefit.  Activity modification and changing positions does help the pain.  Staying in a prolonged position worsens the pain.  Denies radiating into the back, down to the foot, or lateral hip pain.  Denies any other recent health changes.  No previous physical therapy, injections or surgeries to the hip.  States this pain she is getting is exactly the same type of pain she had in 7th grade. Denies rashes or other skin issues to the hip. She notes one leg is longer than the other.        Patient's past medical, surgical, social and family histories reviewed.     Past Medical History:   Diagnosis Date     Bell's palsy 7 y/o    Due to trauma, neg Lyme     Depression     Venice Meyer, psychologist, [employer] in [place]     Depressive disorder      Major depression in complete remission (H) 7/7/2014     Psoriasis     scalp only     Psoriatic arthritis (H)        Past Surgical History:   Procedure Laterality Date     ADENOIDECTOMY       ARTHROSCOPY KNEE WITH RETINACULAR RELEASE Right 1/20/2017    Procedure: ARTHROSCOPY KNEE WITH RETINACULAR RELEASE MEDIAL OR " LATERAL;  Surgeon: Yevgeniy Graf DO;  [facility]     ARTHROSCOPY SHOULDER SUPERIOR LABRUM ANTERIOR TO POSTERIOR REPAIR Right 7/6/2018    Procedure: ARTHROSCOPY SHOULDER SUPERIOR LABRUM ANTERIOR TO POSTERIOR REPAIR;  Right shoulder arthroscopy with arthroscopic labral repair;  Surgeon: Yevgeniy Graf DO;  Location: [facility]     C NONSPECIFIC PROCEDURE  02/12/01    adenoidectomy     DENTAL SURGERY       MOUTH SURGERY         Medications:    Current Outpatient Prescriptions on File Prior to Visit:  acetaminophen (TYLENOL) 325 MG tablet Take 3 tablets (975 mg) by mouth every 8 hours as needed for other (mild pain)   Cyanocobalamin (B-12) 1000 MCG CAPS    EPINEPHrine (EPIPEN/ADRENACLICK/OR ANY BX GENERIC EQUIV) 0.3 MG/0.3ML injection 2-pack Inject 0.3 mLs (0.3 mg) into the muscle as needed for anaphylaxis   Evening Primrose Oil 500 MG CAPS    gabapentin (NEURONTIN) 100 MG capsule Take 1 capsule (100 mg) by mouth 3 times daily   ibuprofen (ADVIL/MOTRIN) 600 MG tablet Take 1 tablet (600 mg) by mouth every 8 hours as needed for pain (mild)   magnesium 200 MG TABS    Multiple Vitamin (MULTI-VITAMIN DAILY PO) Take 1 tablet by mouth daily Reported on 3/8/2017     No current facility-administered medications on file prior to visit.     Allergies   Allergen Reactions     Bee Venom Anaphylaxis     No Known Drug Allergies        Social History     Occupational History     Not on file.     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    Physical Exam:  Vitals: /72  Temp 98.7  F (37.1  C) (Oral)  Ht 1.74 m (5' 8.5\")  Wt 81.1 kg (178 lb 14.4 oz)  BMI 26.81 kg/m2  BMI= Body mass index is 26.81 kg/(m^2).  Constitutional: healthy, alert and no acute distress   Psychiatric: mentation appears normal and affect normal/bright  NEURO: no focal deficits  RESP: Normal with easy respirations and no use of accessory muscles to breathe, no audible wheezing or retractions  CV: LLE:  no edema         Regular rate and rhythm by palpation  JOINT/EXTREMITIES: Left hip: hip flexion is equal to the right. No focal tenderness to SI.  Deep palpation of greater trochanteric recreates groin pain. Mild pain at maximum flexion to groin.  Internal/external rotation is equal left to right. Does recreate the pain especially with internal rotation..  Tightness with hamstring.  Good quad tone.  Strength is 5/5 to hip flexion, abduction, adduction.  Normal gait. Able to perform one legged squat equal right and left side. Negative obers.  Negative JOHN, negative straight leg raise other than hamstring tightness.    Lymph: no appreciated lymphedema  GAIT: non-antalgic    Diagnostic Modalities:  left hip X-ray: No fracture, dislocation and or lesion. Normal alignment.  Joint space maintained no significant arthritis. No appreciable soft tissue abnormality.   Independent visualization of the images was performed.      Impression: left hip hamstring tightness and left hip pain    Plan:  All of the above pertinent physical exam and imaging modalities findings was reviewed with Mary.  On exam was very tight in the hamstrings especially. Although her complaints are deep into the hip and groin pain the imaging was negative and no noted " history of trauma.   There is not a clear etiology of her pain however the tightness could certainly be contributing.  Recommended to start with physical therapy then return in 4-6 weeks if not getting better.     I recommend conservative care for the patient to include formal physical therapy. Today I provided or dispensed physical therapy.  She lives in [place] and so a print out was given for the physical therapy       GIO Douglas was present throughout entire history and exam.       Return to clinic 4-6 weeks as needed if not better, or sooner as needed for changes.  Re-x-ray on return: No    Scribed by:  MERVIN Ambrosio, CNP  4:35 PM  10/17/2018    I attest I have seen and evaluated the patient.  I agree with above impression and plan.  Magdaleno Graf D.O.

## 2018-10-17 NOTE — LETTER
"    10/17/2018         RE: Mary Valdez  [address] [address] [facility] [address] [facility] [place] [facility] [place] [address]        Dear Colleague,    Thank you for referring your patient, Mary Valdez, to the [facility]. Please see a copy of my visit note below.    ORTHOPEDIC CONSULT      Chief Complaint: Mary Valdez is a 22 year old female who is being seen for Chief Complaint   Patient presents with     Musculoskeletal Problem     LEFT HIP PAIN     Consult       History of Present Illness:   Seen previously for other complaints. Has not been previously for left hip.  States first time with this type of pain was in the 7th grade.  Was told at that time that due to her growing quite a bit \"her cartilage pulled away from her hip\".  Patient unsure what this meant. Was told to be a lifetime running restriction. States when she stopped running, the pain resolved. Has not had pain in her since until about 1 year ago.  Now states having progressive deep stiffness type hip into the groin pain.  Only has the pain when in one position for a prolonged time.  No injury or event associated with this.  Take ibuprofen/Aleve without benefit.  Activity modification and changing positions does help the pain.  Staying in a prolonged position worsens the pain.  Denies radiating into the back, down to the foot, or lateral hip pain.  Denies any other recent health changes.  No previous physical therapy, injections or surgeries to the hip.  States this pain she is getting is exactly the same type of pain she had in 7th grade. Denies rashes or other skin issues to the hip. She notes one leg is longer than the other.        Patient's past medical, surgical, social and family histories reviewed.     Past Medical History:   Diagnosis Date     Bell's palsy 7 y/o    Due to trauma, neg Lyme     Depression     Venice Meyer, psychologist, [employer] in [place]     Depressive disorder      Major depression in complete remission (H) 7/7/2014     Psoriasis     " scalp only     Psoriatic arthritis (H)        Past Surgical History:   Procedure Laterality Date     ADENOIDECTOMY       ARTHROSCOPY KNEE WITH RETINACULAR RELEASE Right 1/20/2017    Procedure: ARTHROSCOPY KNEE WITH RETINACULAR RELEASE MEDIAL OR LATERAL;  Surgeon: Yevgeniy Graf DO;  [facility]     ARTHROSCOPY SHOULDER SUPERIOR LABRUM ANTERIOR TO POSTERIOR REPAIR Right 7/6/2018    Procedure: ARTHROSCOPY SHOULDER SUPERIOR LABRUM ANTERIOR TO POSTERIOR REPAIR;  Right shoulder arthroscopy with arthroscopic labral repair;  Surgeon: Yevgeniy Graf DO;  [facility]     C NONSPECIFIC PROCEDURE  02/12/01    adenoidectomy     DENTAL SURGERY       MOUTH SURGERY         Medications:    Current Outpatient Prescriptions on File Prior to Visit:  acetaminophen (TYLENOL) 325 MG tablet Take 3 tablets (975 mg) by mouth every 8 hours as needed for other (mild pain)   Cyanocobalamin (B-12) 1000 MCG CAPS    EPINEPHrine (EPIPEN/ADRENACLICK/OR ANY BX GENERIC EQUIV) 0.3 MG/0.3ML injection 2-pack Inject 0.3 mLs (0.3 mg) into the muscle as needed for anaphylaxis   Evening Primrose Oil 500 MG CAPS    gabapentin (NEURONTIN) 100 MG capsule Take 1 capsule (100 mg) by mouth 3 times daily   ibuprofen (ADVIL/MOTRIN) 600 MG tablet Take 1 tablet (600 mg) by mouth every 8 hours as needed for pain (mild)   magnesium 200 MG TABS    Multiple Vitamin (MULTI-VITAMIN DAILY PO) Take 1 tablet by mouth daily Reported on 3/8/2017     No current facility-administered medications on file prior to visit.     Allergies   Allergen Reactions     Bee Venom Anaphylaxis     No Known Drug Allergies        Social History     Occupational History     Not on file.     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    Physical Exam:  Vitals: /72  Temp 98.7  F (37.1  C) (Oral)  Ht 1.74 m (5' 8.5\")  Wt 81.1 kg (178 lb 14.4 oz)  BMI 26.81 kg/m2  BMI= Body mass index is 26.81 kg/(m^2).  Constitutional: healthy, alert and no acute distress   Psychiatric: mentation appears normal and affect normal/bright  NEURO: no focal deficits  RESP: Normal with easy respirations and no use of accessory muscles to breathe, no audible wheezing or retractions  CV: LLE:  no edema         Regular rate and rhythm by palpation  JOINT/EXTREMITIES: Left hip: hip flexion is equal to the right. No focal tenderness to SI.  Deep palpation of greater trochanteric recreates groin pain. Mild pain at maximum flexion to groin.  Internal/external rotation is equal left to right. Does recreate the pain especially with internal rotation..  Tightness with hamstring.  Good quad tone.  Strength is 5/5 to hip flexion, abduction, adduction.  Normal gait. Able to perform one legged squat equal right and left side. Negative obers.  Negative JOHN, negative straight leg raise other than hamstring tightness.    Lymph: no appreciated lymphedema  GAIT: non-antalgic    Diagnostic Modalities:  left hip X-ray: No fracture, dislocation and or lesion. Normal alignment.  Joint space maintained no significant arthritis. No appreciable soft tissue abnormality.   Independent visualization of the images was performed.      Impression: left hip hamstring tightness and left hip pain    Plan:  All of " the above pertinent physical exam and imaging modalities findings was reviewed with Mary.  On exam was very tight in the hamstrings especially. Although her complaints are deep into the hip and groin pain the imaging was negative and no noted history of trauma.   There is not a clear etiology of her pain however the tightness could certainly be contributing.  Recommended to start with physical therapy then return in 4-6 weeks if not getting better.     I recommend conservative care for the patient to include formal physical therapy. Today I provided or dispensed physical therapy.  She lives in [place] and so a print out was given for the physical therapy       GIO Douglas was present throughout entire history and exam.       Return to clinic 4-6 weeks as needed if not better, or sooner as needed for changes.  Re-x-ray on return: No    Scribed by:  MERVIN Ambrosio, CNP  4:35 PM  10/17/2018    I attest I have seen and evaluated the patient.  I agree with above impression and plan.  Magdaleno Graf D.O.    Again, thank you for allowing me to participate in the care of your patient.        Sincerely,        Yevgeniy Graf, DO

## 2018-10-17 NOTE — MR AVS SNAPSHOT
After Visit Summary   10/17/2018    Mary Valdez    MRN: [id number]           Patient Information     Date Of Birth          1995        Visit Information        Provider Department      10/17/2018 11:10 AM Yevgeniy Graf,  [facility]        Today's Diagnoses     Hip pain, left    -  1    Hamstring tightness of left lower extremity           Follow-ups after your visit        Additional Services     PHYSICAL THERAPY REFERRAL       External physical therapy order.    Left hip: flexibility, quad core, strength gait work.                  Who to contact     If you have questions or need follow up information about today's clinic visit or your schedule please contact [facility] directly at [phone].  Normal or non-critical lab and imaging results will be communicated to you by TMhart, letter or phone within 4 business days after the clinic has received the results. If you do not hear from us within 7 days, please contact the clinic through TMhart or phone. If you have a critical or abnormal lab result, we will notify you by phone as soon as possible.  Submit refill requests through Voltari or call your pharmacy and they will forward the refill request to us. Please allow 3 business days for your refill to be completed.          Additional Information About Your Visit        MyChart Information     Voltari gives you secure access to your electronic health record. If you see a primary care provider, you can also send messages to your care team and make appointments. If you have questions, please call your primary care clinic.  If you do not have a primary care provider, please call [phone] and they will assist you.        Care EveryWhere ID     This is your Care EveryWhere ID. This could be used by other organizations to access your [place] medical records  [id number]        Your Vitals Were     Temperature Height BMI (Body Mass Index)     "         98.7  F (37.1  C) (Oral) 5' 8.5\" (1.74 m) 26.81 kg/m2          Blood Pressure from Last 3 Encounters:   10/17/18 108/72   08/30/18 113/76   07/12/18 120/83    Weight from Last 3 Encounters:   10/17/18 178 lb 14.4 oz (81.1 kg)   08/30/18 185 lb (83.9 kg)   07/12/18 186 lb (84.4 kg)               Primary Care Provider Office Phone # Fax #    Charisse Alexander, APRN [phone] [address] [place] [address]        Equal Access to Services     [facility]: Hadii bear sheao Somarcelo, waaxda luqadaha, qaybta kaalmada adeegyada, shari escamilla . So [facility] [phone].    ATENCIÓN: Si habla español, tiene a garcia disposición servicios gratuitos de asistencia lingüística. Llame al [phone].    We comply with applicable federal civil rights laws and [place] laws. We do not discriminate on the basis of race, color, national origin, age, disability, sex, sexual orientation, or gender identity.            Thank you!     Thank you for choosing [facility]  for your care. Our goal is always to provide you with excellent care. Hearing back from our patients is one way we can continue to improve our services. Please take a few minutes to complete the written survey that you may receive in the mail after your visit with us. Thank you!             Your Updated Medication List - Protect others around you: Learn how to safely use, store and throw away your medicines at www.disposemymeds.org.          This list is accurate as of 10/17/18 11:59 PM.  Always use your most recent med list.                   Brand Name Dispense Instructions for use Diagnosis    acetaminophen 325 MG tablet    TYLENOL    100 tablet    Take 3 tablets (975 mg) by mouth every 8 hours as needed for other (mild pain)    Shoulder instability, right       B-12 1000 MCG Caps           EPINEPHrine 0.3 MG/0.3ML injection 2-pack    EPIPEN/ADRENACLICK/or ANY BX GENERIC EQUIV    " 0.6 mL    Inject 0.3 mLs (0.3 mg) into the muscle as needed for anaphylaxis    Allergic reaction to insect sting, accidental or unintentional, initial encounter, Cough, SOB (shortness of breath)       Evening Primrose Oil 500 MG Caps           gabapentin 100 MG capsule    NEURONTIN    30 capsule    Take 1 capsule (100 mg) by mouth 3 times daily    Shoulder instability, right       ibuprofen 600 MG tablet    ADVIL/MOTRIN    30 tablet    Take 1 tablet (600 mg) by mouth every 8 hours as needed for pain (mild)    Shoulder instability, right       magnesium 200 MG Tabs           MULTI-VITAMIN DAILY PO      Take 1 tablet by mouth daily Reported on 3/8/2017

## 2019-02-08 ENCOUNTER — TELEPHONE (OUTPATIENT)
Dept: FAMILY MEDICINE | Facility: OTHER | Age: 24
End: 2019-02-08

## 2019-02-08 NOTE — TELEPHONE ENCOUNTER
Reason for Call:  Other appointment    Detailed comments: pt was scheduled with Jazmine Alexander today at 11 but she is out today. Jazmine did recommend Mary see OBGYN. She is coming from [place] and would need an 11:00 today. Wondering if JITENDRA would be willing to work her in? Please advise.     Phone Number Patient can be reached at: Home number on file [phone] (home)    Best Time: any     Can we leave a detailed message on this number? YES    Call taken on 2/8/2019 at 7:30 AM by Whitney Doan

## 2019-02-08 NOTE — TELEPHONE ENCOUNTER
LVM for patient to return call. JKD has openings today at [place].      Melody Rivera CMA  February 8, 2019  8:17 AM

## 2019-02-11 NOTE — TELEPHONE ENCOUNTER
Left message for return call. Will see if patient still is looking at coming in and if so will she see Dr. Miranda in [place] this afternoon.    Elmira Peña  Women's Health

## 2019-02-15 ENCOUNTER — HEALTH MAINTENANCE LETTER (OUTPATIENT)
Age: 24
End: 2019-02-15
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
## 2019-05-24 ENCOUNTER — TELEPHONE (OUTPATIENT)
Dept: FAMILY MEDICINE | Facility: OTHER | Age: 24
End: 2019-05-24

## 2019-05-24 ENCOUNTER — OFFICE VISIT (OUTPATIENT)
Dept: FAMILY MEDICINE | Facility: OTHER | Age: 24
End: 2019-05-24

## 2019-05-24 VITALS
BODY MASS INDEX: 24.4 KG/M2 | HEART RATE: 64 BPM | WEIGHT: 164.7 LBS | TEMPERATURE: 97.1 F | HEIGHT: 69 IN | DIASTOLIC BLOOD PRESSURE: 56 MMHG | SYSTOLIC BLOOD PRESSURE: 96 MMHG | RESPIRATION RATE: 16 BRPM

## 2019-05-24 DIAGNOSIS — R44.3 HALLUCINATIONS: ICD-10-CM

## 2019-05-24 DIAGNOSIS — F22 DELUSION (H): ICD-10-CM

## 2019-05-24 DIAGNOSIS — F43.10 PTSD (POST-TRAUMATIC STRESS DISORDER): ICD-10-CM

## 2019-05-24 DIAGNOSIS — F41.0 PANIC ATTACKS: Primary | ICD-10-CM

## 2019-05-24 PROCEDURE — 99215 OFFICE O/P EST HI 40 MIN: CPT | Performed by: STUDENT IN AN ORGANIZED HEALTH CARE EDUCATION/TRAINING PROGRAM

## 2019-05-24 ASSESSMENT — ANXIETY QUESTIONNAIRES
3. WORRYING TOO MUCH ABOUT DIFFERENT THINGS: MORE THAN HALF THE DAYS
2. NOT BEING ABLE TO STOP OR CONTROL WORRYING: MORE THAN HALF THE DAYS
7. FEELING AFRAID AS IF SOMETHING AWFUL MIGHT HAPPEN: MORE THAN HALF THE DAYS
GAD7 TOTAL SCORE: 15
GAD7 TOTAL SCORE: 15
5. BEING SO RESTLESS THAT IT IS HARD TO SIT STILL: MORE THAN HALF THE DAYS
1. FEELING NERVOUS, ANXIOUS, OR ON EDGE: MORE THAN HALF THE DAYS
GAD7 TOTAL SCORE: 15
4. TROUBLE RELAXING: MORE THAN HALF THE DAYS
6. BECOMING EASILY ANNOYED OR IRRITABLE: NEARLY EVERY DAY
7. FEELING AFRAID AS IF SOMETHING AWFUL MIGHT HAPPEN: MORE THAN HALF THE DAYS

## 2019-05-24 ASSESSMENT — PATIENT HEALTH QUESTIONNAIRE - PHQ9
SUM OF ALL RESPONSES TO PHQ QUESTIONS 1-9: 11
SUM OF ALL RESPONSES TO PHQ QUESTIONS 1-9: 11

## 2019-05-24 ASSESSMENT — MIFFLIN-ST. JEOR: SCORE: 1569.19

## 2019-05-24 NOTE — TELEPHONE ENCOUNTER
Talked to patient. I gave her info for [facility] which has a telemed option for seeing psychiatry. Other option is [facility] who has psychiatry in [place]. She would like to try the telemed option. She will be working on getting insurance and will set up an appointment after she has that figured out.  Charisse Alexander, DARLEEN-C

## 2019-05-24 NOTE — TELEPHONE ENCOUNTER
Called and left message for patient to return my call.  I would like to talk to her when she calls back.  Charisse Alexander, DARLEEN-C

## 2019-05-25 PROBLEM — F41.0 PANIC ATTACKS: Status: ACTIVE | Noted: 2019-05-25

## 2019-05-25 PROBLEM — R44.3 HALLUCINATIONS: Status: ACTIVE | Noted: 2019-05-25

## 2019-05-25 PROBLEM — F22 DELUSION (H): Status: ACTIVE | Noted: 2019-05-25

## 2019-05-25 ASSESSMENT — ANXIETY QUESTIONNAIRES: GAD7 TOTAL SCORE: 15

## 2019-05-25 ASSESSMENT — PATIENT HEALTH QUESTIONNAIRE - PHQ9: SUM OF ALL RESPONSES TO PHQ QUESTIONS 1-9: 11

## 2019-08-19 NOTE — PROGRESS NOTES
Subjective     Mary Valdez is a 23 year old female who presents to clinic today for the following health issues:    Patient is here to discuss paresthesias particularly on the left side of her body and this has been going on since she can remember. She has been seeing Elias Ragland at [facility] in [place]. He believes it may be due to her traumatic brain injury. She fell backwards off a swing when she was 6 and hit her head on compacted dirt and had Kersey Palsy afterwards. Her psychiatrist does not believe she has schizophrenia. Her symptoms have been present since childhood or as long as she can remember but she figured up until now that these were normal things that all people experience. She is currently doing weekly group and individual DBT.  She had an MRI of her brain in 2016 which was normal and her symptoms have not changed since then. She does have tension headaches that seem to build in intensity in the days leading up to episodes of panic attacks that where she will have impulsive thoughts and behaviors including self-harming behaviors like hitting her head against a wall. After she has the episodes her symptoms will gradually improve for a period of time until the headaches start and ramp up again following the same pattern. She also notes that her symptoms worsen when she has a lot of external stimulus/input of any kind. She denies plan or intent to end her life. She is  and her  is extremely supportive and understanding. She tells him whenever she is having symptoms of a panic attack or feeling impulsive and he will stay with her until she feels safe. She recently had a miscarriage and reports she is handling this well. She believes that the baby did not have the right stuff to be able to survive and is coping with it day by day.     History of Present Illness        Mental Health Follow-up:  Patient presents to follow-up on Depression & Anxiety.Patient's depression  "since last visit has been:  Better  The patient is not having other symptoms associated with depression.  Patient's anxiety since last visit has been:  Good  The patient is having other symptoms associated with anxiety.  Any significant life events: job concerns  Patient is feeling anxious or having panic attacks.  Patient has no concerns about alcohol or drug use.     Social History  Tobacco Use    Smoking status: Never Smoker    Smokeless tobacco: Never Used  Alcohol use: No    Alcohol/week: 0.0 oz  Drug use: No      Today's PHQ-9         PHQ-9 Total Score:     (P) 5   PHQ-9 Q9 Thoughts of better off dead/self-harm past 2 weeks :   (P) Not at all   Thoughts of suicide or self harm:      Self-harm Plan:        Self-harm Action:          Safety concerns for self or others:           Migraines:   Since the patient's last clinic visit, headaches are: worsened  The patient is getting headaches:  Every other day  She is not able to do normal daily activities when she has a migraine.  The patient is taking the following rescue/relief medications:  No rescue/relief medications   Patient states \"I get no relief\" from the rescue/relief medications.   The patient is taking the following medications to prevent migraines:  No medications to prevent migraines  In the past 4 weeks, the patient has gone to an Urgent Care or Emergency Room 0 times times due to headaches.    She eats 4 or more servings of fruits and vegetables daily.She consumes 0 sweetened beverage(s) daily.  She is taking medications regularly.     Answers for HPI/ROS submitted by the patient on 8/21/2019   Chronic problems general questions HPI Form  PHQ9 TOTAL SCORE: 5  NENO 7 TOTAL SCORE: 6      Patient Active Problem List   Diagnosis     Psoriasis     Migratory pain     Anxiety state     Major depression in complete remission (H)     NENO (generalized anxiety disorder)     PTSD (post-traumatic stress disorder)     Maltracking of right patella     Segmental " dysfunction of thoracic region     Segmental dysfunction of lumbar region     Segmental dysfunction of sacral region     Flank pain     Sprain of lateral collateral ligament of right knee, initial encounter     Hypoglycemia, unspecified     Allergic reaction to insect sting, accidental or unintentional, initial encounter     Psoriatic arthritis (H)     Shoulder instability, right     Stiffness of hand joint, unspecified laterality     Hip pain, left     Hamstring tightness of left lower extremity     Panic attacks     Delusion (H)     Hallucinations     Past Surgical History:   Procedure Laterality Date     ADENOIDECTOMY       ARTHROSCOPY KNEE WITH RETINACULAR RELEASE Right 1/20/2017    Procedure: ARTHROSCOPY KNEE WITH RETINACULAR RELEASE MEDIAL OR LATERAL;  Surgeon: Yevgeniy Graf DO;  Location: [facility] OR     ARTHROSCOPY SHOULDER SUPERIOR LABRUM ANTERIOR TO POSTERIOR REPAIR Right 7/6/2018    Procedure: ARTHROSCOPY SHOULDER SUPERIOR LABRUM ANTERIOR TO POSTERIOR REPAIR;  Right shoulder arthroscopy with arthroscopic labral repair;  Surgeon: Yevgeniy Graf DO;  Location: [facility] OR     C NONSPECIFIC PROCEDURE  02/12/01    adenoidectomy     DENTAL SURGERY       MOUTH SURGERY         Social History     Tobacco Use     Smoking status: Never Smoker     Smokeless tobacco: Never Used   Substance Use Topics     Alcohol use: No     Alcohol/week: 0.0 oz     Family History   Problem Relation Age of Onset     Other - See Comments Father         benign nerve spinal tumor      Hypertension Father      Diabetes Maternal Grandmother      Depression Maternal Grandmother      Psoriasis Maternal Grandfather      Seizure Disorder Brother         epilepsy     Psoriasis Maternal Aunt      Psoriasis Maternal Aunt      Psoriasis Maternal Aunt          Current Outpatient Medications   Medication Sig Dispense Refill     EPINEPHrine (EPIPEN/ADRENACLICK/OR ANY BX GENERIC EQUIV) 0.3 MG/0.3ML injection 2-pack Inject 0.3 mLs (0.3 mg)  "into the muscle as needed for anaphylaxis 0.6 mL 1     Prenatal Vit-Fe Fumarate-FA (PRENATAL PO)        Probiotic Product (PROBIOTIC PO)        Allergies   Allergen Reactions     Bee Venom Anaphylaxis     No Known Drug Allergies      BP Readings from Last 3 Encounters:   08/21/19 112/64   05/24/19 96/56   10/17/18 108/72    Wt Readings from Last 3 Encounters:   08/21/19 69.4 kg (153 lb 1.6 oz)   05/24/19 74.7 kg (164 lb 11.2 oz)   10/17/18 81.1 kg (178 lb 14.4 oz)                    Reviewed and updated as needed this visit by Provider         Review of Systems   ROS COMP: Constitutional, HEENT, cardiovascular, pulmonary, GI, , musculoskeletal, neuro, skin, endocrine and psych systems are negative, except as otherwise noted.      Objective    /64   Pulse 80   Temp 98.1  F (36.7  C) (Temporal)   Ht 1.741 m (5' 8.54\")   Wt 69.4 kg (153 lb 1.6 oz)   Breastfeeding? Unknown   BMI 22.91 kg/m    Body mass index is 22.91 kg/m .  Physical Exam   GENERAL: healthy, alert and no distress  EYES: Eyes grossly normal to inspection, PERRL and conjunctivae and sclerae normal  HENT: ear canals and TM's normal, nose and mouth without ulcers or lesions  NECK: no adenopathy, no asymmetry, masses, or scars and thyroid normal to palpation  RESP: lungs clear to auscultation - no rales, rhonchi or wheezes  CV: regular rate and rhythm, normal S1 S2, no S3 or S4, no murmur, click or rub, no peripheral edema and peripheral pulses strong  MS: no gross musculoskeletal defects noted, no edema  SKIN: no suspicious lesions or rashes  NEURO: Normal strength and tone, mentation intact and speech normal  PSYCH: mentation appears normal, affect normal/bright, judgement and insight intact and appearance well groomed    Diagnostic Test Results:  Labs reviewed in Epic        Assessment & Plan     1. Executive function deficit  I had a long discussion with patient today about her symptoms. Her psychiatrist does not believe she has " "schizophrenia and rather believes her symptoms are related to a TBI when she was 6 years old. Her symptoms have been present since childhood or as long as she can remember but she figured up until now that these were normal things that all people experience. She is currently doing weekly group and individual DBT and will continue this until she graduates the program. I have recommended a neuropsych evaluation and she will schedule this closer to home in [place]. I have also recommended a neurology evaluation since we are thinking her symptoms are related to TBI. She had an MRI of her brain in 2016 which was normal and her symptoms have not changed since then so I do not think repeating the MRI is warranted. She does have tension headaches that seem to build in intensity in the days leading up to episodes of panic attacks that  impulsive thoughts and behaviors  - OCCUPATIONAL THERAPY REFERRAL; Future  - NEUROLOGY ADULT REFERRAL  - NEUROPSYCHOLOGY REFERRAL    2. Traumatic brain injury with loss of consciousness, sequela (H)  See above note.  - OCCUPATIONAL THERAPY REFERRAL; Future  - NEUROLOGY ADULT REFERRAL  - NEUROPSYCHOLOGY REFERRAL    3. Allergic reaction to insect sting, accidental or unintentional, initial encounter  - EPINEPHrine (EPIPEN/ADRENACLICK/OR ANY BX GENERIC EQUIV) 0.3 MG/0.3ML injection 2-pack; Inject 0.3 mLs (0.3 mg) into the muscle as needed for anaphylaxis  Dispense: 0.6 mL; Refill: 1    4. Impulsive  I talked to her about her history of TBI and worsening symptoms with a lot of external stimuli. I recommended working with occupational therapy on a \"sensory diet\" so that she knows which stimuli may worsen her symptoms and which things may help her feel calm. She is agreeable to trying this.  - OCCUPATIONAL THERAPY REFERRAL; Future  - NEUROLOGY ADULT REFERRAL  - NEUROPSYCHOLOGY REFERRAL    5. Insomnia, unspecified type  She has chronic difficulty with sleeping and seems to relate to ramping up of " symptoms. I recommended establishing care with the neurologist and getting recommendations for treatment of insomnia from the neurologist.   - NEUROLOGY ADULT REFERRAL  - NEUROPSYCHOLOGY REFERRAL    6. Paresthesias  Recommend evaluation and treatment by neurology.  - OCCUPATIONAL THERAPY REFERRAL; Future  - NEUROLOGY ADULT REFERRAL  - NEUROPSYCHOLOGY REFERRAL    7. Tension-type headache, not intractable, unspecified chronicity pattern  *  - NEUROLOGY ADULT REFERRAL    8. Visual aura  - OCCUPATIONAL THERAPY REFERRAL; Future  - NEUROLOGY ADULT REFERRAL    9. Psoriatic arthritis (H)  Not currently treating with medication. Back in 2017 discussed starting methotrexate or Humira with dermatologist Dr. Cody but she made decision to hold off on treatment.      CONSULTATION/REFERRAL to neuropsych evaluation, establish care with neurology, occupational therapy     No follow-ups on file.    MERVIN Solorzano [facility]

## 2019-08-21 ENCOUNTER — OFFICE VISIT (OUTPATIENT)
Dept: FAMILY MEDICINE | Facility: OTHER | Age: 24
End: 2019-08-21
Payer: COMMERCIAL

## 2019-08-21 VITALS
HEART RATE: 80 BPM | HEIGHT: 69 IN | BODY MASS INDEX: 22.67 KG/M2 | TEMPERATURE: 98.1 F | SYSTOLIC BLOOD PRESSURE: 112 MMHG | WEIGHT: 153.1 LBS | DIASTOLIC BLOOD PRESSURE: 64 MMHG

## 2019-08-21 DIAGNOSIS — G47.00 INSOMNIA, UNSPECIFIED TYPE: ICD-10-CM

## 2019-08-21 DIAGNOSIS — G44.209 TENSION-TYPE HEADACHE, NOT INTRACTABLE, UNSPECIFIED CHRONICITY PATTERN: ICD-10-CM

## 2019-08-21 DIAGNOSIS — R20.2 PARESTHESIAS: ICD-10-CM

## 2019-08-21 DIAGNOSIS — R45.87 IMPULSIVE: ICD-10-CM

## 2019-08-21 DIAGNOSIS — S06.9X9S TRAUMATIC BRAIN INJURY WITH LOSS OF CONSCIOUSNESS, SEQUELA (H): ICD-10-CM

## 2019-08-21 DIAGNOSIS — R41.844 EXECUTIVE FUNCTION DEFICIT: Primary | ICD-10-CM

## 2019-08-21 DIAGNOSIS — L40.50 PSORIATIC ARTHRITIS (H): ICD-10-CM

## 2019-08-21 DIAGNOSIS — T63.481A ALLERGIC REACTION TO INSECT STING, ACCIDENTAL OR UNINTENTIONAL, INITIAL ENCOUNTER: ICD-10-CM

## 2019-08-21 DIAGNOSIS — H53.9 VISUAL AURA: ICD-10-CM

## 2019-08-21 PROCEDURE — 99214 OFFICE O/P EST MOD 30 MIN: CPT | Performed by: STUDENT IN AN ORGANIZED HEALTH CARE EDUCATION/TRAINING PROGRAM

## 2019-08-21 RX ORDER — EPINEPHRINE 0.3 MG/.3ML
0.3 INJECTION SUBCUTANEOUS PRN
Qty: 0.6 ML | Refills: 1 | Status: SHIPPED | OUTPATIENT
Start: 2019-08-21

## 2019-08-21 ASSESSMENT — PATIENT HEALTH QUESTIONNAIRE - PHQ9
SUM OF ALL RESPONSES TO PHQ QUESTIONS 1-9: 5
SUM OF ALL RESPONSES TO PHQ QUESTIONS 1-9: 5

## 2019-08-21 ASSESSMENT — ANXIETY QUESTIONNAIRES
GAD7 TOTAL SCORE: 6
GAD7 TOTAL SCORE: 6
4. TROUBLE RELAXING: SEVERAL DAYS
GAD7 TOTAL SCORE: 6
7. FEELING AFRAID AS IF SOMETHING AWFUL MIGHT HAPPEN: NOT AT ALL
1. FEELING NERVOUS, ANXIOUS, OR ON EDGE: SEVERAL DAYS
5. BEING SO RESTLESS THAT IT IS HARD TO SIT STILL: NOT AT ALL
7. FEELING AFRAID AS IF SOMETHING AWFUL MIGHT HAPPEN: NOT AT ALL
6. BECOMING EASILY ANNOYED OR IRRITABLE: MORE THAN HALF THE DAYS
2. NOT BEING ABLE TO STOP OR CONTROL WORRYING: SEVERAL DAYS
3. WORRYING TOO MUCH ABOUT DIFFERENT THINGS: SEVERAL DAYS

## 2019-08-21 ASSESSMENT — MIFFLIN-ST. JEOR: SCORE: 1516.58

## 2019-08-21 NOTE — PATIENT INSTRUCTIONS
1. Schedule neuropsych evaluation     2. Schedule with a neurologist    3. Schedule with occupational therapy     4. I will call Elias to talk about a sleep medication     BING LandaC

## 2019-08-22 ASSESSMENT — ANXIETY QUESTIONNAIRES: GAD7 TOTAL SCORE: 6

## 2019-08-22 ASSESSMENT — PATIENT HEALTH QUESTIONNAIRE - PHQ9: SUM OF ALL RESPONSES TO PHQ QUESTIONS 1-9: 5

## 2019-08-27 ENCOUNTER — TELEPHONE (OUTPATIENT)
Dept: FAMILY MEDICINE | Facility: OTHER | Age: 24
End: 2019-08-27

## 2019-08-27 NOTE — TELEPHONE ENCOUNTER
LMTC, please inform patient that I tried to get her scheduled in [place] with a neuropsychologist but they do not have any one in [place] they suggested the closest [facility] be [facility].    Please ask/suggest patient contact her insurance to see where would be the closest for her that would be covered.    Thanks  Ashia Salas RT (R)

## 2019-08-27 NOTE — TELEPHONE ENCOUNTER
"Reason for Call:  Other Referral    Detailed comments: Patient said she was given a referral to a neuropsycholigst and is having a hard time scheduling. She would like us to help schedule for her. She said \"it was a pain in the butt\"    [facility] in [place] is the closest to her.     Phone Number Patient can be reached at: Home number on file [phone] (home)    Best Time: any    Can we leave a detailed message on this number? YES    Call taken on 8/27/2019 at 1:50 PM by Mary Chapa    "

## 2019-09-03 ENCOUNTER — TELEPHONE (OUTPATIENT)
Dept: FAMILY MEDICINE | Facility: OTHER | Age: 24
End: 2019-09-03

## 2019-09-03 NOTE — TELEPHONE ENCOUNTER
Reason for Call: Request for an order or referral:    Order or referral being requested: neuro psychology    Date needed: as soon as possible    Has the patient been seen by the PCP for this problem? YES    Additional comments: patient states she decided not to schedule with them because its too difficult.     Phone number Patient can be reached at:  Home number on file [phone] (home)    Best Time:  any    Can we leave a detailed message on this number?  YES    Call taken on 9/3/2019 at 2:15 PM by Lizeth Grace

## 2019-09-03 NOTE — TELEPHONE ENCOUNTER
Patient returned call and was given message. She will call her insurance company to see what is covered.     Leon Connolly,

## 2019-09-28 ENCOUNTER — HEALTH MAINTENANCE LETTER (OUTPATIENT)
Age: 24
End: 2019-09-28

## 2019-12-19 ENCOUNTER — TELEPHONE (OUTPATIENT)
Dept: FAMILY MEDICINE | Facility: OTHER | Age: 24
End: 2019-12-19

## 2019-12-19 NOTE — TELEPHONE ENCOUNTER
Summary:    Patient is due/failing the following:   PAP and PHYSICAL    Action needed:   Patient needs office visit for Physical and PAP.    Type of outreach:    Sent [facility] message.    Questions for provider review:    None                                                                                                                                    Oanh Fontana CMA       Chart routed to Care Team .          Panel Management Review      Patient has the following on her problem list:     Depression / Dysthymia review    Measure:  Needs PHQ-9 score of 4 or less during index window.  Administer PHQ-9 and if score is 5 or more, send encounter to provider for next steps.        PHQ-9 SCORE 7/2/2018 5/24/2019 8/21/2019   PHQ-9 Total Score - - -   PHQ-9 Total Score MyChart 3 (Minimal depression) 11 (Moderate depression) 5 (Mild depression)   PHQ-9 Total Score 3 11 5       If PHQ-9 recheck is 5 or more, route to provider for next steps.    Patient is due for:  PHQ9      Composite cancer screening  Chart review shows that this patient is due/due soon for the following Pap Smear

## 2019-12-26 ENCOUNTER — MYC MEDICAL ADVICE (OUTPATIENT)
Dept: FAMILY MEDICINE | Facility: OTHER | Age: 24
End: 2019-12-26

## 2020-05-14 ENCOUNTER — MYC MEDICAL ADVICE (OUTPATIENT)
Dept: FAMILY MEDICINE | Facility: OTHER | Age: 25
End: 2020-05-14

## 2020-05-14 DIAGNOSIS — F51.4 NIGHT TERRORS: ICD-10-CM

## 2020-05-14 DIAGNOSIS — G47.00 INSOMNIA, UNSPECIFIED TYPE: Primary | ICD-10-CM

## 2020-05-14 NOTE — TELEPHONE ENCOUNTER
Responded via [employer]   Referral has been faxed   Closing encounter  Ashia Salas RT (R)

## 2020-05-19 ENCOUNTER — TELEPHONE (OUTPATIENT)
Dept: FAMILY MEDICINE | Facility: OTHER | Age: 25
End: 2020-05-19

## 2020-05-19 NOTE — TELEPHONE ENCOUNTER
Questions answered. They will schedule the pt with the sleep specialist and then that person will order the sleep study if needed.    Tina Whiting, MSN, RN

## 2020-05-19 NOTE — TELEPHONE ENCOUNTER
Fidelia from the [facility] calling.   She needs to clarify if the order is for a sleep study or for a consult with the provider?  Please call to clarify [phone]  Thank you.

## 2020-07-11 ENCOUNTER — HOSPITAL ENCOUNTER (EMERGENCY)
Facility: CLINIC | Age: 25
End: 2020-07-11
Payer: COMMERCIAL

## 2020-07-11 ENCOUNTER — HOSPITAL ENCOUNTER (OUTPATIENT)
Facility: CLINIC | Age: 25
Discharge: HOME | End: 2020-07-11
Attending: OBSTETRICS & GYNECOLOGY | Admitting: OBSTETRICS & GYNECOLOGY
Payer: COMMERCIAL

## 2020-07-11 VITALS — TEMPERATURE: 98.7 F | RESPIRATION RATE: 16 BRPM | SYSTOLIC BLOOD PRESSURE: 124 MMHG | DIASTOLIC BLOOD PRESSURE: 60 MMHG

## 2020-07-11 PROBLEM — Z36.89 ENCOUNTER FOR TRIAGE IN PREGNANT PATIENT: Status: ACTIVE | Noted: 2020-07-11

## 2020-07-11 LAB
ALBUMIN UR-MCNC: NEGATIVE MG/DL
APPEARANCE UR: CLEAR
BILIRUB UR QL STRIP: NEGATIVE
COLOR UR AUTO: COLORLESS
GLUCOSE UR STRIP-MCNC: NEGATIVE MG/DL
HGB UR QL STRIP: NEGATIVE
KETONES UR STRIP-MCNC: NEGATIVE MG/DL
LEUKOCYTE ESTERASE UR QL STRIP: NEGATIVE
NITRATE UR QL: NEGATIVE
PH UR STRIP: 7 PH (ref 5–7)
SOURCE: NORMAL
SP GR UR STRIP: 1 (ref 1–1.03)
UROBILINOGEN UR STRIP-MCNC: 0 MG/DL (ref 0–2)

## 2020-07-11 PROCEDURE — G0463 HOSPITAL OUTPT CLINIC VISIT: HCPCS

## 2020-07-11 PROCEDURE — 81003 URINALYSIS AUTO W/O SCOPE: CPT | Performed by: OBSTETRICS & GYNECOLOGY

## 2020-07-11 NOTE — DISCHARGE INSTRUCTIONS
"         OB Triage Discharge Instructions    Diet:  Regular diet as tolerated    Activity:  As tolerated    Other Special Instructions: follow up with provider as scheduled    Reason for being seen in L&D: weakness and \" I just don't feel right.\" requesting BP check.   Treatment/procedures performed in L&D: UA, blood pressure check x 2    Call [facility] at [phone] if you have:  ? 5 or more contractions in one hour  ? Leaking of fluid from your vaginal area  ? Decreased fetal movement (follow kick count instructions)  ? Bleeding from your vaginal area  ? Swelling in your face, or increased swelling in your hands or legs  ? Headaches or vision problems such as blurring or seeing spots or starts  ? Nausea or vomiting lasting for more than 24 hours  ? An increase in weight (5lbs/week)  ? Epigastric pain (sometimes confused with heartburn that does not go away or a very bad stomach ache)    If you have any questions, please follow up with your doctor.        Patient Signature: ______________________________________________________________  By signing the above I acknowledge that a nurse or my care provider has explained the instruction to me and I have had any questions regarding my care explained to me.        Discharge Nurse Signature: _______________________________ 7/11/2020  4:56 PM    Method of discharge: ambulation    Accompanied by: self  Time of discharge: 1650      "

## 2020-07-11 NOTE — PROGRESS NOTES
[facility] to the [facility] with I think my blood pressure is high. She has not taken her blood pressure, but says she feels icky. She was placed in room 335-2 triage for evaluation urine sent.

## 2020-07-11 NOTE — PROGRESS NOTES
S:  Discharge from triage.   A:  FHT's not assessed per clients requests. Reports baby active. Declines feeling any abdominal tightening's. Cervical exam not done. UA results WNL's, BP checks x 2 WNL's. No other concerns.   R:  Written and verbal D/C instruction provided. Pt. Verbalized understanding of D/C instructions and will follow up with midwife provider in [place] as previously scheduled. Continue to keep hydrated.   Verbalizes understanding that she will call or return to the [facility] with any further questions or concerns.   Pt. D/C'd via ambulation with self    Nursing Discharge Checklist  Discharge order entered: Yes  Patient care order entered: Yes  Charges entered: Yes  IV start and stop times have been documented in Epic? No  NST start and stop times have been documented in Epic Doc F/S? No

## 2020-07-11 NOTE — PROGRESS NOTES
"S: Triage Arrival  B: Mary is a 24 y.o.  @ 25w 2d per client, who presents with complaint of \"just not feeling right. I was in [employer] and was just so tired. Thought maybe it was my blood pressure.\"reflexes WNL's; no clonus. Denies headache and blurred vision, bur does see spots occasionally  And reports,\"I have throughout my life. They think it could be migraine symptoms without the headache pain.\"  A: In town today visiting friends. Sees a midwife, Alicia Pederson, in [place] for her prenatal care. Declines any uterine or fetal heart tone monitoring. Denies feeling any abdominal tightenings. UA collected. Initial / 77, pulse 69, afebrile. Will reassess BP in 15 min.  R: Will notify MD to obtain further orders.    "

## 2021-01-09 ENCOUNTER — HEALTH MAINTENANCE LETTER (OUTPATIENT)
Age: 26
End: 2021-01-09

## 2021-02-21 ENCOUNTER — TRANSFERRED RECORDS (OUTPATIENT)
Dept: HEALTH INFORMATION MANAGEMENT | Facility: CLINIC | Age: 26
End: 2021-02-21

## 2021-02-25 LAB
CHOLEST SERPL-MCNC: 129 MG/DL (ref 100–199)
GLUCOSE SERPL-MCNC: 78 MG/DL (ref 65–99)
HDLC SERPL-MCNC: 43 MG/DL
LDLC SERPL CALC-MCNC: 73 MG/DL (ref 0–99)
TRIGL SERPL-MCNC: 64 MG/DL (ref 0–149)

## 2021-03-30 ENCOUNTER — TELEPHONE (OUTPATIENT)
Dept: FAMILY MEDICINE | Facility: OTHER | Age: 26
End: 2021-03-30

## 2021-03-30 RX ORDER — LURASIDONE HYDROCHLORIDE 20 MG/1
20 TABLET, FILM COATED ORAL DAILY
Qty: 30 TABLET | Refills: 0 | Status: SHIPPED | OUTPATIENT
Start: 2021-03-30

## 2021-03-30 NOTE — TELEPHONE ENCOUNTER
latuda 20mg once a day [facility] in [place]    Reason for Call:  Medication or medication refill:    Do you use a [facility]?  Name of the pharmacy and phone number for the current request:  [facility]    Name of the medication requested: Latuda 20 mg, one per day.     Other request: this was prescribed will she a mental health inpt. She has a ppointment scheduled 4/02/21.    Can we leave a detailed message on this number? YES    Phone number patient can be reached at: Cell number on file:    Telephone Information:   Mobile [phone]   Mobile [phone]       Best Time:     Call taken on 3/30/2021 at 11:03 AM by Christy Bello

## 2021-10-23 ENCOUNTER — HEALTH MAINTENANCE LETTER (OUTPATIENT)
Age: 26
End: 2021-10-23

## 2022-03-24 NOTE — MR AVS SNAPSHOT
After Visit Summary   1/25/2018    Mary Valdez    MRN: [id number]           Patient Information     Date Of Birth          1995        Visit Information        Provider Department      1/25/2018 11:00 AM Maurilio Pereira MD [facility]        Today's Diagnoses     Inflammatory arthritis    -  1      Care Instructions    -- Will do blood tests today     -- She is going to follow up with a rheumatologist and will start Taltz SQ injections.     -- Will give prednisone 5 mg . Start with 15 mg x 1 week and decrease to 10 mg and then to 5 mg    -- RTC in 4 months           Follow-ups after your visit        Follow-up notes from your care team     Return in about 4 months (around 5/25/2018).      Your next 10 appointments already scheduled     Jan 31, 2018  9:20 AM CST   MyChart Short with MERVIN Guevara [facility] ([address] [address] [phone]            May 22, 2018 11:00 AM CDT   Return Visit with Maurilio Pereira MD   [facility] ([facility])    [id number] [place] [address] [phone]            May 24, 2018 12:15 PM CDT   Return Visit with Anjana Luna MD   [facility] ([facility])    [address] [place] [address] [phone]              Who to contact     If you have questions or need follow up information about today's clinic visit or your schedule please contact [facility] directly at [phone].  Normal or non-critical lab and imaging results will be communicated to you by MyChart, letter or phone within 4 business days after the clinic has received the results. If you do not hear from us within 7 days, please contact the clinic through MyChart or phone. If you have a critical or abnormal lab result, we will notify you by phone as soon as  "possible.  Submit refill requests through [employer] or call your pharmacy and they will forward the refill request to us. Please allow 3 business days for your refill to be completed.          Additional Information About Your Visit        [employer] Information     [employer] gives you secure access to your electronic health record. If you see a primary care provider, you can also send messages to your care team and make appointments. If you have questions, please call your primary care clinic.  If you do not have a primary care provider, please call [phone] and they will assist you.      [employer] is an electronic gateway that provides easy, online access to your medical records. With [employer], you can request a clinic appointment, read your test results, renew a prescription or communicate with your care team.     To access your existing account, please contact your [facility] or call [phone] for assistance.        Care EveryWhere ID     This is your Care EveryWhere ID. This could be used by other organizations to access your [facility] medical records  [id number]        Your Vitals Were     Pulse Temperature Respirations Height Pulse Oximetry BMI (Body Mass Index)    51 98.3  F (36.8  C) 18 1.74 m (5' 8.5\") 99% 29.1 kg/m2       Blood Pressure from Last 3 Encounters:   01/25/18 114/74   10/25/17 112/60   10/20/17 124/70    Weight from Last 3 Encounters:   01/25/18 88.1 kg (194 lb 3.2 oz)   10/25/17 83.9 kg (184 lb 14.4 oz) (95 %)*   10/17/17 83.9 kg (185 lb) (95 %)*     * Growth percentiles are based on CDC 2-20 Years data.              We Performed the Following     Cyclic Citrullinated Peptide Antibody IgG     Rheumatoid factor          Today's Medication Changes          These changes are accurate as of 1/25/18 11:39 AM.  If you have any questions, ask your nurse or doctor.               Start taking these medicines.        Dose/Directions    predniSONE 5 MG tablet   Commonly " known as:  DELTASONE   Used for:  Inflammatory arthritis   Started by:  Maurilio Pereira MD        15 mg x 1 week then 10 mg x 1 week and stay on 5 mg daily.   Quantity:  60 tablet   Refills:  2            Where to get your medicines      These medications were sent to [facility] [address] [place] [address] - [address]  [address], [facility] [address]     Phone:  [phone]     predniSONE 5 MG tablet                Primary Care Provider Office Phone # Fax #    Leonbrionna Mcfadden Mai, [phone]        [facility] [address] [place] [address]        Equal Access to Services     [facility]: Hadii aad ku hadasho Soomaali, waaxda luqadaha, qaybta kaalmada adeegyada, shari escamilla . So [facility] [phone].    ATENCIÓN: Si habla español, tiene a garcia disposición servicios gratuitos de asistencia lingüística. Ll[facility] [phone].    We comply with applicable federal civil rights laws and [place] laws. We do not discriminate on the basis of race, color, national origin, age, disability, sex, sexual orientation, or gender identity.            Thank you!     Thank you for choosing [facility]  for your care. Our goal is always to provide you with excellent care. Hearing back from our patients is one way we can continue to improve our services. Please take a few minutes to complete the written survey that you may receive in the mail after your visit with us. Thank you!             Your Updated Medication List - Protect others around you: Learn how to safely use, store and throw away your medicines at www.disposemymeds.org.          This list is accurate as of 1/25/18 11:39 AM.  Always use your most recent med list.                   Brand Name Dispense Instructions for use Diagnosis    B-12 1000 MCG Caps           calcipotriene 0.005 % cream    DOVONOX    100 g    Apply twice daily when not using clobetasol    Rash       clobetasol 0.05 % cream    TEMOVATE    120 g     Apply twice daily for 2 weeks, take 2 week break, then repeat as needed on trunk    Rash       EPINEPHrine 0.3 MG/0.3ML injection 2-pack    EPIPEN/ADRENACLICK/or ANY BX GENERIC EQUIV    0.6 mL    Inject 0.3 mLs (0.3 mg) into the muscle as needed for anaphylaxis    Allergic reaction to insect sting, accidental or unintentional, initial encounter, Cough, SOB (shortness of breath)       magnesium 200 MG Tabs           medroxyPROGESTERone 150 MG/ML injection    DEPO-PROVERA     Inject 150 mg into the muscle every 3 months        MULTI-VITAMIN DAILY PO      Take 1 tablet by mouth daily Reported on 3/8/2017        predniSONE 5 MG tablet    DELTASONE    60 tablet    15 mg x 1 week then 10 mg x 1 week and stay on 5 mg daily.    Inflammatory arthritis       * triamcinolone 0.5 % cream    KENALOG    30 g    Apply sparingly to affected area two times daily as needed    Psoriasis       * triamcinolone 0.1 % ointment    KENALOG    454 g    Apply to the affected area of the skin twice a day with use of a moisturizer before. CAn use for 4 weeks straight then need 1 week break    Guttate psoriasis, Psoriatic arthritis (H)       * Notice:  This list has 2 medication(s) that are the same as other medications prescribed for you. Read the directions carefully, and ask your doctor or other care provider to review them with you.       Yes...

## 2024-10-25 ENCOUNTER — NURSE TRIAGE (OUTPATIENT)
Dept: URGENT CARE | Facility: URGENT CARE | Age: 29
End: 2024-10-25
Payer: COMMERCIAL

## 2024-10-25 NOTE — TELEPHONE ENCOUNTER
Patient calling feeling generally unwell. She has been struggling to keep food down for a while and she is losing weight. When she eats she feels nauseous and throws up. She feels fatigued and lethargic. She has lost almost 5 lbs in the past couple of days.     Per protocol patient should go to ED/UC for further treatment. Patient agreed to plan of care.     Carson Montano RN on 10/25/2024 at 3:18 PM        Reason for Disposition   Drinking very little and dehydration suspected (e.g., no urine > 12 hours, very dry mouth, very lightheaded)    Protocols used: Nausea-A-OH

## 2024-10-26 ENCOUNTER — HOSPITAL ENCOUNTER (EMERGENCY)
Facility: CLINIC | Age: 29
Discharge: HOME | End: 2024-10-26
Attending: PHYSICIAN ASSISTANT | Admitting: PHYSICIAN ASSISTANT
Payer: COMMERCIAL

## 2024-10-26 VITALS
DIASTOLIC BLOOD PRESSURE: 69 MMHG | HEART RATE: 63 BPM | SYSTOLIC BLOOD PRESSURE: 112 MMHG | WEIGHT: 155 LBS | RESPIRATION RATE: 16 BRPM | OXYGEN SATURATION: 100 % | TEMPERATURE: 99 F | BODY MASS INDEX: 23.2 KG/M2

## 2024-10-26 DIAGNOSIS — R19.7 NAUSEA VOMITING AND DIARRHEA: ICD-10-CM

## 2024-10-26 DIAGNOSIS — R11.2 NAUSEA VOMITING AND DIARRHEA: ICD-10-CM

## 2024-10-26 DIAGNOSIS — E86.0 DEHYDRATION: ICD-10-CM

## 2024-10-26 LAB
ALBUMIN SERPL BCG-MCNC: 4.8 G/DL (ref 3.5–5.2)
ALP SERPL-CCNC: 42 U/L (ref 40–150)
ALT SERPL W P-5'-P-CCNC: 17 U/L (ref 0–50)
ANION GAP SERPL CALCULATED.3IONS-SCNC: 13 MMOL/L (ref 7–15)
AST SERPL W P-5'-P-CCNC: 17 U/L (ref 0–45)
BASOPHILS # BLD AUTO: 0 10E3/UL (ref 0–0.2)
BASOPHILS NFR BLD AUTO: 0 %
BILIRUB SERPL-MCNC: 0.4 MG/DL
BUN SERPL-MCNC: 8.1 MG/DL (ref 6–20)
CALCIUM SERPL-MCNC: 9.4 MG/DL (ref 8.8–10.4)
CHLORIDE SERPL-SCNC: 104 MMOL/L (ref 98–107)
CREAT SERPL-MCNC: 0.79 MG/DL (ref 0.51–0.95)
CRP SERPL-MCNC: <3 MG/L
EGFRCR SERPLBLD CKD-EPI 2021: >90 ML/MIN/1.73M2
EOSINOPHIL # BLD AUTO: 0 10E3/UL (ref 0–0.7)
EOSINOPHIL NFR BLD AUTO: 0 %
ERYTHROCYTE [DISTWIDTH] IN BLOOD BY AUTOMATED COUNT: 12.5 % (ref 10–15)
GLUCOSE SERPL-MCNC: 81 MG/DL (ref 70–99)
HCO3 SERPL-SCNC: 22 MMOL/L (ref 22–29)
HCT VFR BLD AUTO: 38.8 % (ref 35–47)
HGB BLD-MCNC: 13.1 G/DL (ref 11.7–15.7)
IMM GRANULOCYTES # BLD: 0 10E3/UL
IMM GRANULOCYTES NFR BLD: 0 %
LIPASE SERPL-CCNC: 27 U/L (ref 13–60)
LYMPHOCYTES # BLD AUTO: 1.6 10E3/UL (ref 0.8–5.3)
LYMPHOCYTES NFR BLD AUTO: 23 %
MAGNESIUM SERPL-MCNC: 1.9 MG/DL (ref 1.7–2.3)
MCH RBC QN AUTO: 30.1 PG (ref 26.5–33)
MCHC RBC AUTO-ENTMCNC: 33.8 G/DL (ref 31.5–36.5)
MCV RBC AUTO: 89 FL (ref 78–100)
MONOCYTES # BLD AUTO: 0.4 10E3/UL (ref 0–1.3)
MONOCYTES NFR BLD AUTO: 6 %
NEUTROPHILS # BLD AUTO: 4.9 10E3/UL (ref 1.6–8.3)
NEUTROPHILS NFR BLD AUTO: 70 %
NRBC # BLD AUTO: 0 10E3/UL
NRBC BLD AUTO-RTO: 0 /100
PLATELET # BLD AUTO: 219 10E3/UL (ref 150–450)
POTASSIUM SERPL-SCNC: 4 MMOL/L (ref 3.4–5.3)
PROT SERPL-MCNC: 7.6 G/DL (ref 6.4–8.3)
RBC # BLD AUTO: 4.35 10E6/UL (ref 3.8–5.2)
SODIUM SERPL-SCNC: 139 MMOL/L (ref 135–145)
TSH SERPL DL<=0.005 MIU/L-ACNC: 0.6 UIU/ML (ref 0.3–4.2)
WBC # BLD AUTO: 6.9 10E3/UL (ref 4–11)

## 2024-10-26 PROCEDURE — 96361 HYDRATE IV INFUSION ADD-ON: CPT | Performed by: PHYSICIAN ASSISTANT

## 2024-10-26 PROCEDURE — 83735 ASSAY OF MAGNESIUM: CPT | Performed by: PHYSICIAN ASSISTANT

## 2024-10-26 PROCEDURE — 96375 TX/PRO/DX INJ NEW DRUG ADDON: CPT | Performed by: PHYSICIAN ASSISTANT

## 2024-10-26 PROCEDURE — 84443 ASSAY THYROID STIM HORMONE: CPT | Performed by: PHYSICIAN ASSISTANT

## 2024-10-26 PROCEDURE — 96374 THER/PROPH/DIAG INJ IV PUSH: CPT | Performed by: PHYSICIAN ASSISTANT

## 2024-10-26 PROCEDURE — 82040 ASSAY OF SERUM ALBUMIN: CPT | Performed by: PHYSICIAN ASSISTANT

## 2024-10-26 PROCEDURE — 85004 AUTOMATED DIFF WBC COUNT: CPT | Performed by: PHYSICIAN ASSISTANT

## 2024-10-26 PROCEDURE — 250N000011 HC RX IP 250 OP 636: Performed by: PHYSICIAN ASSISTANT

## 2024-10-26 PROCEDURE — 86140 C-REACTIVE PROTEIN: CPT | Performed by: PHYSICIAN ASSISTANT

## 2024-10-26 PROCEDURE — 93005 ELECTROCARDIOGRAM TRACING: CPT | Performed by: PHYSICIAN ASSISTANT

## 2024-10-26 PROCEDURE — 93010 ELECTROCARDIOGRAM REPORT: CPT | Performed by: PHYSICIAN ASSISTANT

## 2024-10-26 PROCEDURE — 36415 COLL VENOUS BLD VENIPUNCTURE: CPT | Performed by: PHYSICIAN ASSISTANT

## 2024-10-26 PROCEDURE — 99284 EMERGENCY DEPT VISIT MOD MDM: CPT | Mod: 25 | Performed by: PHYSICIAN ASSISTANT

## 2024-10-26 PROCEDURE — 83690 ASSAY OF LIPASE: CPT | Performed by: PHYSICIAN ASSISTANT

## 2024-10-26 PROCEDURE — 258N000003 HC RX IP 258 OP 636: Performed by: PHYSICIAN ASSISTANT

## 2024-10-26 PROCEDURE — 99284 EMERGENCY DEPT VISIT MOD MDM: CPT | Performed by: PHYSICIAN ASSISTANT

## 2024-10-26 RX ORDER — ONDANSETRON 2 MG/ML
4 INJECTION INTRAMUSCULAR; INTRAVENOUS EVERY 30 MIN PRN
Status: DISCONTINUED | OUTPATIENT
Start: 2024-10-26 | End: 2024-10-26 | Stop reason: HOSPADM

## 2024-10-26 RX ORDER — LORAZEPAM 2 MG/ML
0.5 INJECTION INTRAMUSCULAR ONCE
Status: COMPLETED | OUTPATIENT
Start: 2024-10-26 | End: 2024-10-26

## 2024-10-26 RX ORDER — LORAZEPAM 1 MG/1
0.5 TABLET ORAL EVERY 6 HOURS PRN
Qty: 8 TABLET | Refills: 0 | Status: SHIPPED | OUTPATIENT
Start: 2024-10-26

## 2024-10-26 RX ORDER — ONDANSETRON 4 MG/1
4 TABLET, ORALLY DISINTEGRATING ORAL EVERY 8 HOURS PRN
Qty: 10 TABLET | Refills: 0 | Status: SHIPPED | OUTPATIENT
Start: 2024-10-26

## 2024-10-26 RX ADMIN — SODIUM CHLORIDE, POTASSIUM CHLORIDE, SODIUM LACTATE AND CALCIUM CHLORIDE 1000 ML: 600; 310; 30; 20 INJECTION, SOLUTION INTRAVENOUS at 14:10

## 2024-10-26 RX ADMIN — SODIUM CHLORIDE 1000 ML: 9 INJECTION, SOLUTION INTRAVENOUS at 12:42

## 2024-10-26 RX ADMIN — ONDANSETRON 4 MG: 2 INJECTION INTRAMUSCULAR; INTRAVENOUS at 12:46

## 2024-10-26 RX ADMIN — LORAZEPAM 0.5 MG: 2 INJECTION INTRAMUSCULAR; INTRAVENOUS at 14:04

## 2024-10-26 ASSESSMENT — ACTIVITIES OF DAILY LIVING (ADL)
ADLS_ACUITY_SCORE: 0

## 2024-10-26 ASSESSMENT — COLUMBIA-SUICIDE SEVERITY RATING SCALE - C-SSRS
6. HAVE YOU EVER DONE ANYTHING, STARTED TO DO ANYTHING, OR PREPARED TO DO ANYTHING TO END YOUR LIFE?: NO
1. IN THE PAST MONTH, HAVE YOU WISHED YOU WERE DEAD OR WISHED YOU COULD GO TO SLEEP AND NOT WAKE UP?: NO
2. HAVE YOU ACTUALLY HAD ANY THOUGHTS OF KILLING YOURSELF IN THE PAST MONTH?: NO

## 2024-10-26 NOTE — ED TRIAGE NOTES
Patient with vomiting/diarrhea x 1 week, states vomiting/nausea is a chronic issue but has been getting worse the past week. Near syncopal episode yesterday.      Triage Assessment (Adult)       Row Name 10/26/24 1158          Triage Assessment    Airway WDL WDL        Respiratory WDL    Respiratory WDL WDL        Skin Circulation/Temperature WDL    Skin Circulation/Temperature WDL WDL        Cardiac WDL    Cardiac WDL WDL

## 2024-10-26 NOTE — ED PROVIDER NOTES
History     Chief Complaint   Patient presents with    Nausea, Vomiting, & Diarrhea     HPI  Mary Valdez is a 28 year old female who presents for evaluation of an acute exacerbation of her chronic issue.  She states for over 2 years she has had troubles eating and drinking.  She is losing weight.  She has been going to her primary care provider at the [facility].  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  States that symptoms have been significantly worse over the last week.  Has not been able to take anything p.o. this morning.  Nausea and vomiting.  Multiple episodes of diarrhea.  She was very lightheaded going up and down the steps and almost experienced syncope.  No fall or head injury.  She states that her stools are oily at times.  No blood or pus in them.  She apparently had EGD and colonoscopy in 2016 which she reports was negative.  She has not seen GI regarding her symptoms yet.  She does not have any medications at home to help with her symptoms.        Allergies:  Allergies   Allergen Reactions    Bee Venom Anaphylaxis    No Known Drug Allergy        Problem List:    Patient Active Problem List    Diagnosis Date Noted    Encounter for triage in pregnant patient 07/11/2020     Priority: Medium    Panic attacks 05/25/2019     Priority: Medium    Delusion (H) 05/25/2019     Priority: Medium    Hallucinations 05/25/2019     Priority: Medium    Hip pain, left 10/17/2018     Priority: Medium    Hamstring tightness of left lower extremity 10/17/2018     Priority: Medium    Shoulder instability, right 06/01/2018     Priority: Medium    Stiffness of hand joint, unspecified laterality 06/01/2018     Priority: Medium    Psoriatic arthritis (H) 01/25/2018     Priority: Medium    Allergic reaction to insect sting, accidental or unintentional, initial encounter 09/27/2017     Priority: Medium    Hypoglycemia, unspecified 07/10/2017     Priority: Medium     Sprain of lateral collateral ligament of right knee, initial encounter 06/21/2017     Priority: Medium    Segmental dysfunction of thoracic region 05/24/2017     Priority: Medium    Segmental dysfunction of lumbar region 05/24/2017     Priority: Medium    Segmental dysfunction of sacral region 05/24/2017     Priority: Medium    Flank pain 05/24/2017     Priority: Medium    Maltracking of right patella 01/05/2017     Priority: Medium    NENO (generalized anxiety disorder) 05/05/2016     Priority: Medium    PTSD (post-traumatic stress disorder) 05/05/2016     Priority: Medium    Anxiety state 07/07/2014     Priority: Medium     Problem list name updated by automated process. Provider to review      Major depression in complete remission (H) 07/07/2014     Priority: Medium    Migratory pain 10/13/2013     Priority: Medium    Psoriasis 07/23/2010     Priority: Medium        Past Medical History:    Past Medical History:   Diagnosis Date    Bell's palsy 5 y/o    Depression     Depressive disorder     Major depression in complete remission (H) 7/7/2014    Psoriasis     Psoriatic arthritis (H)        Past Surgical History:    Past Surgical History:   Procedure Laterality Date    ADENOIDECTOMY      ARTHROSCOPY KNEE WITH RETINACULAR RELEASE Right 1/20/2017    Procedure: ARTHROSCOPY KNEE WITH RETINACULAR RELEASE MEDIAL OR LATERAL;  Surgeon: Yevgeniy Graf DO;  Location: [facility] OR    ARTHROSCOPY SHOULDER SUPERIOR LABRUM ANTERIOR TO POSTERIOR REPAIR Right 7/6/2018    Procedure: ARTHROSCOPY SHOULDER SUPERIOR LABRUM ANTERIOR TO POSTERIOR REPAIR;  Right shoulder arthroscopy with arthroscopic labral repair;  Surgeon: Yevgeniy Graf DO;  Location: [facility] OR    DENTAL SURGERY      MOUTH SURGERY      ZZC NONSPECIFIC PROCEDURE  02/12/01    adenoidectomy       Family History:    Family History   Problem Relation Age of Onset    Other - See Comments Father         benign nerve spinal tumor     Hypertension Father      Diabetes Maternal Grandmother     Depression Maternal Grandmother     Psoriasis Maternal Grandfather     Seizure Disorder Brother         epilepsy    Psoriasis Maternal Aunt     Psoriasis Maternal Aunt     Psoriasis Maternal Aunt        Social History:  Marital Status:  Single [1]  Social History     Tobacco Use    Smoking status: Never    Smokeless tobacco: Never   Substance Use Topics    Alcohol use: No     Alcohol/week: 0.0 standard drinks of alcohol    Drug use: No        Medications:    EPINEPHrine (EPIPEN/ADRENACLICK/OR ANY BX GENERIC EQUIV) 0.3 MG/0.3ML injection 2-pack  LORazepam (ATIVAN) 1 MG tablet  lurasidone (LATUDA) 20 MG TABS tablet  ondansetron (ZOFRAN ODT) 4 MG ODT tab  Prenatal Vit-Fe Fumarate-FA (PRENATAL PO)  Probiotic Product (PROBIOTIC PO)          Review of Systems   All other systems reviewed and are negative.      Physical Exam   BP: 136/53  Pulse: 63  Temp: 99  F (37.2  C)  Resp: 16  Weight: 70.3 kg (155 lb)  SpO2: 100 %      Physical Exam  Vitals and nursing note reviewed.   Constitutional:       General: She is not in acute distress.     Appearance: She is not diaphoretic.   HENT:      Head: Normocephalic and atraumatic.      Right Ear: Tympanic membrane and external ear normal.      Left Ear: Tympanic membrane and external ear normal.      Nose: Nose normal.      Mouth/Throat:      Pharynx: No oropharyngeal exudate.   Eyes:      General: No scleral icterus.        Right eye: No discharge.         Left eye: No discharge.      Conjunctiva/sclera: Conjunctivae normal.      Pupils: Pupils are equal, round, and reactive to light.   Neck:      Thyroid: No thyromegaly.   Cardiovascular:      Rate and Rhythm: Normal rate and regular rhythm.      Heart sounds: Normal heart sounds. No murmur heard.  Pulmonary:      Effort: Pulmonary effort is normal. No respiratory distress.      Breath sounds: Normal breath sounds. No wheezing or rales.   Chest:      Chest wall: No tenderness.   Abdominal:       General: Bowel sounds are normal. There is no distension.      Palpations: Abdomen is soft. There is no mass.      Tenderness: There is no abdominal tenderness. There is no right CVA tenderness, left CVA tenderness, guarding or rebound.   Musculoskeletal:         General: No tenderness or deformity. Normal range of motion.      Cervical back: Normal range of motion and neck supple.   Lymphadenopathy:      Cervical: No cervical adenopathy.   Skin:     General: Skin is warm and dry.      Capillary Refill: Capillary refill takes less than 2 seconds.      Findings: No erythema or rash.   Neurological:      Mental Status: She is alert and oriented to person, place, and time.      Cranial Nerves: No cranial nerve deficit.   Psychiatric:         Behavior: Behavior normal.         Thought Content: Thought content normal.         ED Course     1:49 PM -I reevaluated the patient.  She finished her first liter of IV fluids.  She states that she still feels horrible.  She feels lightheaded and almost like she is going to pass out.  Nausea continues.  Has not had any vomiting.  Based on her responses, I am not convinced this is medication side effect from Zofran.  She seems very anxious.  Will run an EKG to ensure no arrhythmia.  Labs are all stable.  Will hang another liter of LR.  Try a low-dose of Ativan and see how she responds.  Patient in agreement.     Procedures                 EKG Interpretation:      EKG Number: 1  Interpreted by Ruiz Negrete PA-C  Symptoms at time of EKG: lightheadedness   Rhythm: Normal sinus   Rate: Normal  Axis: Normal  Ectopy: None  Conduction: Normal  ST Segments/ T Waves: No ST-T wave changes and No acute ischemic changes  Q Waves: None  Comparison to prior: No old EKG available    Clinical Impression: normal EKG         Critical Care time:  none              Results for orders placed or performed during the hospital encounter of 10/26/24 (from the past 24 hours)   CBC with platelets  differential    Narrative    The following orders were created for panel order CBC with platelets differential.  Procedure                               Abnormality         Status                     ---------                               -----------         ------                     CBC with platelets and d...[[id number]]                      Final result                 Please view results for these tests on the individual orders.   Comprehensive metabolic panel   Result Value Ref Range    Sodium 139 135 - 145 mmol/L    Potassium 4.0 3.4 - 5.3 mmol/L    Carbon Dioxide (CO2) 22 22 - 29 mmol/L    Anion Gap 13 7 - 15 mmol/L    Urea Nitrogen 8.1 6.0 - 20.0 mg/dL    Creatinine 0.79 0.51 - 0.95 mg/dL    GFR Estimate >90 >60 mL/min/1.73m2    Calcium 9.4 8.8 - 10.4 mg/dL    Chloride 104 98 - 107 mmol/L    Glucose 81 70 - 99 mg/dL    Alkaline Phosphatase 42 40 - 150 U/L    AST 17 0 - 45 U/L    ALT 17 0 - 50 U/L    Protein Total 7.6 6.4 - 8.3 g/dL    Albumin 4.8 3.5 - 5.2 g/dL    Bilirubin Total 0.4 <=1.2 mg/dL   Lipase   Result Value Ref Range    Lipase 27 13 - 60 U/L   Magnesium   Result Value Ref Range    Magnesium 1.9 1.7 - 2.3 mg/dL   TSH with free T4 reflex   Result Value Ref Range    TSH 0.60 0.30 - 4.20 uIU/mL   CRP inflammation   Result Value Ref Range    CRP Inflammation <3.00 <5.00 mg/L   CBC with platelets and differential   Result Value Ref Range    WBC Count 6.9 4.0 - 11.0 10e3/uL    RBC Count 4.35 3.80 - 5.20 10e6/uL    Hemoglobin 13.1 11.7 - 15.7 g/dL    Hematocrit 38.8 35.0 - 47.0 %    MCV 89 78 - 100 fL    MCH 30.1 26.5 - 33.0 pg    MCHC 33.8 31.5 - 36.5 g/dL    RDW 12.5 10.0 - 15.0 %    Platelet Count 219 150 - 450 10e3/uL    % Neutrophils 70 %    % Lymphocytes 23 %    % Monocytes 6 %    % Eosinophils 0 %    % Basophils 0 %    % Immature Granulocytes 0 %    NRBCs per 100 WBC 0 <1 /100    Absolute Neutrophils 4.9 1.6 - 8.3 10e3/uL    Absolute Lymphocytes 1.6 0.8 - 5.3 10e3/uL    Absolute Monocytes 0.4 0.0  - 1.3 10e3/uL    Absolute Eosinophils 0.0 0.0 - 0.7 10e3/uL    Absolute Basophils 0.0 0.0 - 0.2 10e3/uL    Absolute Immature Granulocytes 0.0 <=0.4 10e3/uL    Absolute NRBCs 0.0 10e3/uL       Medications   ondansetron (ZOFRAN) injection 4 mg (4 mg Intravenous $Given 10/26/24 1246)   sodium chloride 0.9% BOLUS 1,000 mL (0 mLs Intravenous Stopped 10/26/24 1512)   lactated ringers BOLUS 1,000 mL (0 mLs Intravenous Stopped 10/26/24 1513)   LORazepam (ATIVAN) injection 0.5 mg (0.5 mg Intravenous $Given 10/26/24 1404)       Assessments & Plan (with Medical Decision Making)     Nausea vomiting and diarrhea  Dehydration     28 year old female presents for evaluation of an acute exacerbation of her ongoing issues over the past 2+ years with troubles eating and drinking.  Frequent episodes of nausea and vomiting.  Has not been able to keep anything p.o. down this morning.  Lightheadedness to the point of near syncope.  Frequent loose stools that are oily at times.  No blood or pus in the stool.  No fevers or chills.  No abdominal pain.  Has not seen GI yet.  Exam with blood pressure 136/53, temperature 99.0, pulse 63, respiration 16, oxygen saturation 100% on room air.  Patient is in no acute distress.  ENT exam negative.  Cardiopulmonary exam normal.  No adventitious lung sounds.  Abdomen with good bowel sounds.  Nontender to palpation.  No hepatosplenomegaly.  No lower extremity edema.  IV was established.  Laboratory levels all normal with comprehensive metabolic panel, lipase, magnesium, TSH, CRP, and CBC.  Patient received 1 L IV normal saline and 1 L LR.  Still did not urinate.  Given the IV fluid shortage currently, we decided not to proceed with 1/3 L especially given that she felt like she could sip p.o. fluids now.  She did receive Zofran and Ativan.  Seemed anxious during the evaluation, and I think the Ativan helped calm things down.  EKG performed given her lightheadedness here in the ED.  No sign of arrhythmia.   No acute ST or T wave change.  Interpreted by myself.  Discussed that we did not find any acute emergent condition today.  No indication for imaging as she does not have any abdominal pain and has a nonacute exam.  Patient does have a chronic issue that requires further investigation.  I placed a gastroenterology referral for her.  She likely needs some antibody testing, EGD with potential biopsies, versus other testing.  She may have a food sensitivity and we will investigate this issue as well.  Diet diary recommended.  I prescribed Zofran and Ativan for breakthrough symptoms.  Possible side effects discussed.  Indications for ED return reviewed.  She is in agreement.     I have reviewed the nursing notes.    I have reviewed the findings, diagnosis, plan and need for follow up with the patient.           Medical Decision Making  The patient's presentation was of high complexity (a chronic illness severe exacerbation, progression, or side effect of treatment).    The patient's evaluation involved:  ordering and/or review of 3+ test(s) in this encounter (see separate area of note for details)    The patient's management necessitated moderate risk (prescription drug management including medications given in the ED).        Discharge Medication List as of 10/26/2024  3:47 PM        START taking these medications    Details   LORazepam (ATIVAN) 1 MG tablet Take 0.5 tablets (0.5 mg) by mouth every 6 hours as needed for anxiety or vomiting., Disp-8 tablet, R-0, E-Prescribe      ondansetron (ZOFRAN ODT) 4 MG ODT tab Take 1 tablet (4 mg) by mouth every 8 hours as needed for nausea or vomiting., Disp-10 tablet, R-0, E-Prescribe             Final diagnoses:   Nausea vomiting and diarrhea   Dehydration     Disclaimer: This note consists of symbols derived from keyboarding, dictation and/or voice recognition software. As a result, there may be errors in the script that have gone undetected. Please consider this when  interpreting information found in this chart.      10/26/2024   [facility] EMERGENCY DEPT       Ruiz Negrete PA-C  10/26/24 1650

## 2024-10-26 NOTE — DISCHARGE INSTRUCTIONS
It was a pleasure working with you today!  I hope your condition improves rapidly!     As we discussed, please consider reaching out to an alternative medicine provider for food sensitivity testing.  You could also see if your primary offers this.  Ultimately, you need to rule out foods that you are body is potentially reacting to.  Also, you need to see a gastroenterologist.  I placed a referral for you.  Please call and schedule that consult.  You need further testing for your concern which involves combination of laboratory testing, different imaging, and potentially even a repeat upper endoscopy with potential biopsies to look in to potential causes for your ongoing symptoms.    In the interim, please use the Zofran for nausea.  Sip small amounts of Gatorade every 5 minutes to continue the hydration process today.  For any breakthrough nausea and vomiting, you can use the lorazepam.  Do not drive for 8 hours after taking lorazepam, as this medication can impair your judgment.  Zofran should be your first option.

## 2024-10-29 ENCOUNTER — APPOINTMENT (OUTPATIENT)
Dept: CT IMAGING | Facility: CLINIC | Age: 29
End: 2024-10-29
Attending: NURSE PRACTITIONER
Payer: COMMERCIAL

## 2024-10-29 ENCOUNTER — HOSPITAL ENCOUNTER (EMERGENCY)
Facility: CLINIC | Age: 29
Discharge: HOME | End: 2024-10-29
Attending: NURSE PRACTITIONER | Admitting: NURSE PRACTITIONER
Payer: COMMERCIAL

## 2024-10-29 VITALS
TEMPERATURE: 98.6 F | RESPIRATION RATE: 20 BRPM | HEIGHT: 69 IN | OXYGEN SATURATION: 100 % | HEART RATE: 61 BPM | BODY MASS INDEX: 22.88 KG/M2 | DIASTOLIC BLOOD PRESSURE: 81 MMHG | SYSTOLIC BLOOD PRESSURE: 124 MMHG | WEIGHT: 154.5 LBS

## 2024-10-29 DIAGNOSIS — R11.2 NAUSEA VOMITING AND DIARRHEA: ICD-10-CM

## 2024-10-29 DIAGNOSIS — R19.7 NAUSEA VOMITING AND DIARRHEA: ICD-10-CM

## 2024-10-29 DIAGNOSIS — R10.84 GENERALIZED ABDOMINAL PAIN: ICD-10-CM

## 2024-10-29 LAB
ANION GAP SERPL CALCULATED.3IONS-SCNC: 11 MMOL/L (ref 7–15)
BASOPHILS # BLD AUTO: 0 10E3/UL (ref 0–0.2)
BASOPHILS NFR BLD AUTO: 1 %
BUN SERPL-MCNC: 6.4 MG/DL (ref 6–20)
CALCIUM SERPL-MCNC: 9 MG/DL (ref 8.8–10.4)
CHLORIDE SERPL-SCNC: 106 MMOL/L (ref 98–107)
CREAT SERPL-MCNC: 0.75 MG/DL (ref 0.51–0.95)
EGFRCR SERPLBLD CKD-EPI 2021: >90 ML/MIN/1.73M2
EOSINOPHIL # BLD AUTO: 0.1 10E3/UL (ref 0–0.7)
EOSINOPHIL NFR BLD AUTO: 1 %
ERYTHROCYTE [DISTWIDTH] IN BLOOD BY AUTOMATED COUNT: 12.5 % (ref 10–15)
GLUCOSE SERPL-MCNC: 80 MG/DL (ref 70–99)
HCO3 SERPL-SCNC: 23 MMOL/L (ref 22–29)
HCT VFR BLD AUTO: 34.5 % (ref 35–47)
HGB BLD-MCNC: 11.6 G/DL (ref 11.7–15.7)
IMM GRANULOCYTES # BLD: 0 10E3/UL
IMM GRANULOCYTES NFR BLD: 0 %
LYMPHOCYTES # BLD AUTO: 1.8 10E3/UL (ref 0.8–5.3)
LYMPHOCYTES NFR BLD AUTO: 32 %
MCH RBC QN AUTO: 29.9 PG (ref 26.5–33)
MCHC RBC AUTO-ENTMCNC: 33.6 G/DL (ref 31.5–36.5)
MCV RBC AUTO: 89 FL (ref 78–100)
MONOCYTES # BLD AUTO: 0.4 10E3/UL (ref 0–1.3)
MONOCYTES NFR BLD AUTO: 8 %
NEUTROPHILS # BLD AUTO: 3.2 10E3/UL (ref 1.6–8.3)
NEUTROPHILS NFR BLD AUTO: 58 %
NRBC # BLD AUTO: 0 10E3/UL
NRBC BLD AUTO-RTO: 0 /100
PLATELET # BLD AUTO: 193 10E3/UL (ref 150–450)
POTASSIUM SERPL-SCNC: 4 MMOL/L (ref 3.4–5.3)
RBC # BLD AUTO: 3.88 10E6/UL (ref 3.8–5.2)
SODIUM SERPL-SCNC: 140 MMOL/L (ref 135–145)
WBC # BLD AUTO: 5.5 10E3/UL (ref 4–11)

## 2024-10-29 PROCEDURE — 250N000009 HC RX 250: Performed by: NURSE PRACTITIONER

## 2024-10-29 PROCEDURE — 85025 COMPLETE CBC W/AUTO DIFF WBC: CPT | Performed by: NURSE PRACTITIONER

## 2024-10-29 PROCEDURE — 80048 BASIC METABOLIC PNL TOTAL CA: CPT | Performed by: NURSE PRACTITIONER

## 2024-10-29 PROCEDURE — 258N000003 HC RX IP 258 OP 636: Performed by: NURSE PRACTITIONER

## 2024-10-29 PROCEDURE — 99284 EMERGENCY DEPT VISIT MOD MDM: CPT | Performed by: NURSE PRACTITIONER

## 2024-10-29 PROCEDURE — 96374 THER/PROPH/DIAG INJ IV PUSH: CPT | Mod: 59 | Performed by: NURSE PRACTITIONER

## 2024-10-29 PROCEDURE — 250N000011 HC RX IP 250 OP 636: Performed by: NURSE PRACTITIONER

## 2024-10-29 PROCEDURE — 36415 COLL VENOUS BLD VENIPUNCTURE: CPT | Performed by: NURSE PRACTITIONER

## 2024-10-29 PROCEDURE — 99285 EMERGENCY DEPT VISIT HI MDM: CPT | Mod: 25 | Performed by: NURSE PRACTITIONER

## 2024-10-29 PROCEDURE — 96375 TX/PRO/DX INJ NEW DRUG ADDON: CPT | Performed by: NURSE PRACTITIONER

## 2024-10-29 PROCEDURE — 74177 CT ABD & PELVIS W/CONTRAST: CPT

## 2024-10-29 PROCEDURE — 96361 HYDRATE IV INFUSION ADD-ON: CPT | Performed by: NURSE PRACTITIONER

## 2024-10-29 RX ORDER — IOPAMIDOL 755 MG/ML
500 INJECTION, SOLUTION INTRAVASCULAR ONCE
Status: COMPLETED | OUTPATIENT
Start: 2024-10-29 | End: 2024-10-29

## 2024-10-29 RX ORDER — LORAZEPAM 2 MG/ML
0.5 INJECTION INTRAMUSCULAR ONCE
Status: COMPLETED | OUTPATIENT
Start: 2024-10-29 | End: 2024-10-29

## 2024-10-29 RX ORDER — ONDANSETRON 2 MG/ML
4 INJECTION INTRAMUSCULAR; INTRAVENOUS ONCE
Status: COMPLETED | OUTPATIENT
Start: 2024-10-29 | End: 2024-10-29

## 2024-10-29 RX ADMIN — IOPAMIDOL 76 ML: 755 INJECTION, SOLUTION INTRAVENOUS at 15:51

## 2024-10-29 RX ADMIN — ONDANSETRON 4 MG: 2 INJECTION INTRAMUSCULAR; INTRAVENOUS at 15:28

## 2024-10-29 RX ADMIN — LORAZEPAM 0.5 MG: 2 INJECTION INTRAMUSCULAR; INTRAVENOUS at 15:27

## 2024-10-29 RX ADMIN — SODIUM CHLORIDE 60 ML: 9 INJECTION, SOLUTION INTRAVENOUS at 15:50

## 2024-10-29 RX ADMIN — SODIUM CHLORIDE, POTASSIUM CHLORIDE, SODIUM LACTATE AND CALCIUM CHLORIDE 1000 ML: 600; 310; 30; 20 INJECTION, SOLUTION INTRAVENOUS at 15:18

## 2024-10-29 ASSESSMENT — ACTIVITIES OF DAILY LIVING (ADL)
ADLS_ACUITY_SCORE: 0

## 2024-10-29 NOTE — ED PROVIDER NOTES
"  History     Chief Complaint   Patient presents with    Nausea & Vomiting     HPI  Mary Valdez is a 28 year old female who presents for evaluation of nausea, vomiting, diarrhea and abdominal pain. Patient has been having cyclical gastrointestinal symptoms for the last 2 years.  She is following with her primary care provider and recently saw rheumatology for further workup.  She has not seen a gastroenterologist.  Current symptoms of nausea, vomiting, and diarrhea started again 3 days ago.  She was seen here with extensive workup.  No abnormal lab findings.  Patient was given a liter of IV normal saline and IV Zofran.  She did not feel much better.  She was then given IV LR 1 L and IV Ativan.  She did feel little bit better at time of discharge.  She states for the last 2 days she had been able to eat and drink until this morning.  Now she has been nausea, vomiting, and diarrhea again.  She is unable to keep down any fluids.  She states she has been having feelings of lightheadedness, near passing out since symptoms started 3 days ago.  Denies fevers.  Denies black or bloody stool.  She reports that stools appear \"fatty\".  Denies any urinary symptoms.    Allergies:  Allergies   Allergen Reactions    Bee Venom Anaphylaxis    No Known Drug Allergy        Problem List:    Patient Active Problem List    Diagnosis Date Noted    Encounter for triage in pregnant patient 07/11/2020     Priority: Medium    Panic attacks 05/25/2019     Priority: Medium    Delusion (H) 05/25/2019     Priority: Medium    Hallucinations 05/25/2019     Priority: Medium    Hip pain, left 10/17/2018     Priority: Medium    Hamstring tightness of left lower extremity 10/17/2018     Priority: Medium    Shoulder instability, right 06/01/2018     Priority: Medium    Stiffness of hand joint, unspecified laterality 06/01/2018     Priority: Medium    Psoriatic arthritis (H) 01/25/2018     Priority: Medium    Allergic reaction to insect sting, " accidental or unintentional, initial encounter 09/27/2017     Priority: Medium    Hypoglycemia, unspecified 07/10/2017     Priority: Medium    Sprain of lateral collateral ligament of right knee, initial encounter 06/21/2017     Priority: Medium    Segmental dysfunction of thoracic region 05/24/2017     Priority: Medium    Segmental dysfunction of lumbar region 05/24/2017     Priority: Medium    Segmental dysfunction of sacral region 05/24/2017     Priority: Medium    Flank pain 05/24/2017     Priority: Medium    Maltracking of right patella 01/05/2017     Priority: Medium    NENO (generalized anxiety disorder) 05/05/2016     Priority: Medium    PTSD (post-traumatic stress disorder) 05/05/2016     Priority: Medium    Anxiety state 07/07/2014     Priority: Medium     Problem list name updated by automated process. Provider to review      Major depression in complete remission (H) 07/07/2014     Priority: Medium    Migratory pain 10/13/2013     Priority: Medium    Psoriasis 07/23/2010     Priority: Medium        Past Medical History:    Past Medical History:   Diagnosis Date    Bell's palsy 7 y/o    Depression     Depressive disorder     Major depression in complete remission (H) 7/7/2014    Psoriasis     Psoriatic arthritis (H)        Past Surgical History:    Past Surgical History:   Procedure Laterality Date    ADENOIDECTOMY      ARTHROSCOPY KNEE WITH RETINACULAR RELEASE Right 1/20/2017    Procedure: ARTHROSCOPY KNEE WITH RETINACULAR RELEASE MEDIAL OR LATERAL;  Surgeon: Yevgeniy Graf DO;  Location: [facility] OR    ARTHROSCOPY SHOULDER SUPERIOR LABRUM ANTERIOR TO POSTERIOR REPAIR Right 7/6/2018    Procedure: ARTHROSCOPY SHOULDER SUPERIOR LABRUM ANTERIOR TO POSTERIOR REPAIR;  Right shoulder arthroscopy with arthroscopic labral repair;  Surgeon: Yevgeniy Graf DO;  Location:  OR    DENTAL SURGERY      MOUTH SURGERY      [facility] NONSPECIFIC PROCEDURE  02/12/01    adenoidectomy       Family History:   "  Family History   Problem Relation Age of Onset    Other - See Comments Father         benign nerve spinal tumor     Hypertension Father     Diabetes Maternal Grandmother     Depression Maternal Grandmother     Psoriasis Maternal Grandfather     Seizure Disorder Brother         epilepsy    Psoriasis Maternal Aunt     Psoriasis Maternal Aunt     Psoriasis Maternal Aunt        Social History:  Marital Status:  Single [1]  Social History     Tobacco Use    Smoking status: Never    Smokeless tobacco: Never   Substance Use Topics    Alcohol use: No     Alcohol/week: 0.0 standard drinks of alcohol    Drug use: No        Medications:    EPINEPHrine (EPIPEN/ADRENACLICK/OR ANY BX GENERIC EQUIV) 0.3 MG/0.3ML injection 2-pack  LORazepam (ATIVAN) 1 MG tablet  lurasidone (LATUDA) 20 MG TABS tablet  ondansetron (ZOFRAN ODT) 4 MG ODT tab  Prenatal Vit-Fe Fumarate-FA (PRENATAL PO)  Probiotic Product (PROBIOTIC PO)          Review of Systems  As mentioned above in the history present illness. All other systems were reviewed and are negative.    Physical Exam   BP: (!) 124/93  Pulse: 62  Temp: 98.6  F (37  C)  Resp: 20  Height: 175.3 cm (5' 9\")  Weight: 70.1 kg (154 lb 8 oz)  SpO2: 100 %  Lying Orthostatic BP: 114/79  Lying Orthostatic Pulse: 53 bpm  Sitting Orthostatic BP: 121/91  Sitting Orthostatic Pulse: 69 bpm  Standing Orthostatic BP: 121/87  Standing Orthostatic Pulse: 59 bpm      Physical Exam  Constitutional:       General: She is not in acute distress.     Appearance: She is well-developed. She is not ill-appearing.   HENT:      Head: Normocephalic and atraumatic.      Right Ear: External ear normal.      Left Ear: External ear normal.      Nose: Nose normal.      Mouth/Throat:      Mouth: Mucous membranes are moist.   Eyes:      Conjunctiva/sclera: Conjunctivae normal.   Cardiovascular:      Rate and Rhythm: Normal rate and regular rhythm.      Heart sounds: Normal heart sounds. No murmur heard.  Pulmonary:      Effort: " Pulmonary effort is normal. No respiratory distress.      Breath sounds: Normal breath sounds.   Abdominal:      General: Bowel sounds are normal. There is no distension.      Palpations: Abdomen is soft.      Tenderness: There is generalized abdominal tenderness.   Musculoskeletal:         General: Normal range of motion.   Skin:     General: Skin is warm and dry.      Findings: No rash.   Neurological:      General: No focal deficit present.      Mental Status: She is alert and oriented to person, place, and time.         ED Course        Procedures              Results for orders placed or performed during the hospital encounter of 10/29/24 (from the past 24 hours)   CBC with platelets differential    Narrative    The following orders were created for panel order CBC with platelets differential.  Procedure                               Abnormality         Status                     ---------                               -----------         ------                     CBC with platelets and d...[[id number]]  Abnormal            Final result                 Please view results for these tests on the individual orders.   Basic metabolic panel   Result Value Ref Range    Sodium 140 135 - 145 mmol/L    Potassium 4.0 3.4 - 5.3 mmol/L    Chloride 106 98 - 107 mmol/L    Carbon Dioxide (CO2) 23 22 - 29 mmol/L    Anion Gap 11 7 - 15 mmol/L    Urea Nitrogen 6.4 6.0 - 20.0 mg/dL    Creatinine 0.75 0.51 - 0.95 mg/dL    GFR Estimate >90 >60 mL/min/1.73m2    Calcium 9.0 8.8 - 10.4 mg/dL    Glucose 80 70 - 99 mg/dL   CBC with platelets and differential   Result Value Ref Range    WBC Count 5.5 4.0 - 11.0 10e3/uL    RBC Count 3.88 3.80 - 5.20 10e6/uL    Hemoglobin 11.6 (L) 11.7 - 15.7 g/dL    Hematocrit 34.5 (L) 35.0 - 47.0 %    MCV 89 78 - 100 fL    MCH 29.9 26.5 - 33.0 pg    MCHC 33.6 31.5 - 36.5 g/dL    RDW 12.5 10.0 - 15.0 %    Platelet Count 193 150 - 450 10e3/uL    % Neutrophils 58 %    % Lymphocytes 32 %    % Monocytes 8 %     % Eosinophils 1 %    % Basophils 1 %    % Immature Granulocytes 0 %    NRBCs per 100 WBC 0 <1 /100    Absolute Neutrophils 3.2 1.6 - 8.3 10e3/uL    Absolute Lymphocytes 1.8 0.8 - 5.3 10e3/uL    Absolute Monocytes 0.4 0.0 - 1.3 10e3/uL    Absolute Eosinophils 0.1 0.0 - 0.7 10e3/uL    Absolute Basophils 0.0 0.0 - 0.2 10e3/uL    Absolute Immature Granulocytes 0.0 <=0.4 10e3/uL    Absolute NRBCs 0.0 10e3/uL   CT Abdomen Pelvis w Contrast    Narrative    CT ABDOMEN AND PELVIS WITH CONTRAST 10/29/2024 3:58 PM    CLINICAL HISTORY: Generalized abdominal pain. Vomiting and diarrhea.    TECHNIQUE: CT scan of the abdomen and pelvis was performed following  injection of IV contrast. Multiplanar reformats were obtained. Dose  reduction techniques were used.  CONTRAST: ISOVUE-370, 76 mL    COMPARISON: CT of the abdomen and pelvis 5/10/2017.    FINDINGS:   LOWER CHEST: Unremarkable.    HEPATOBILIARY: No hepatic masses. No calcified gallstones.    PANCREAS: Normal.    SPLEEN: Normal.    ADRENAL GLANDS: Normal.    KIDNEYS/BLADDER: The urinary bladder is mildly distended. The kidneys  are unremarkable. No hydronephrosis.    BOWEL: No bowel obstruction. No evidence for colitis or  diverticulitis. Unremarkable appendix.    LYMPH NODES: No lymphadenopathy.    VASCULATURE: Unremarkable.    PELVIC ORGANS: Trace amount of free fluid in the pelvis is likely  physiologic.    ADDITIONAL FINDINGS: None.    MUSCULOSKELETAL: Unremarkable.      Impression    IMPRESSION: No acute abnormality in the abdomen or pelvis. No cause  for abdominal pain is identified. No bowel obstruction.    TE GILMAN MD         SYSTEM ID:  [id number]       Medications   lactated ringers BOLUS 1,000 mL (0 mLs Intravenous Stopped 10/29/24 1646)   ondansetron (ZOFRAN) injection 4 mg (4 mg Intravenous $Given 10/29/24 1528)   LORazepam (ATIVAN) injection 0.5 mg (0.5 mg Intravenous $Given 10/29/24 1527)   iopamidol (ISOVUE-370) solution 500 mL (76 mLs Intravenous  $Given 10/29/24 1551)   sodium chloride 0.9 % bag 100mL for CT scan flush use (60 mLs Intravenous $Given 10/29/24 1550)       Assessments & Plan (with Medical Decision Making)     28 year old female with nausea, vomiting, and diarrhea.  She has been having gastrointestinal symptoms for the last 2 years.  Appears to be cyclical, intermittent.  She was seen here 3 days ago at the onset of this current episode with no worrisome findings on her lab work.  She had felt better for the last 2 days but then symptoms started again today.  She has been unable to keep down any fluids.  She is following with her primary care provider and rheumatology for further workup.  She has not seen gastroenterology, and was provided a referral at her visit here 3 days ago.  Abdominal exam without peritoneal signs.  She has generalized tenderness.  Given her abdominal pain I did proceed with getting abdominal/pelvis imaging today.  This is unremarkable and shows no acute pathology.  Hemoglobin 11.6.  Remainder of her labs are unremarkable.  Patient was given IV LR 1 L bolus, IV Zofran, and IV Ativan.  She has had no vomiting or diarrhea here.  She has been feeling lightheaded and near syncopal intermittently.  Orthostatic BP and heart rate are normal.  She does not appear overtly dehydrated.  I did send her home with stool specimen containers given the feeling lightheaded and abdominal pains with her vomiting or diarrhea.   I have low suspicion for peptic ulcer disease or gastritis.        Plan:  Collect stool specimen and bring to lab.  Clear liquids and advance slowly as tolerated.  Follow-up with gastroenterology as planned.    Discharge Medication List as of 10/29/2024  4:48 PM          Final diagnoses:   Nausea vomiting and diarrhea   Generalized abdominal pain       10/29/2024   [facility] EMERGENCY DEPT       Miesha, MERVIN Cazares CNP  10/29/24 [id number]

## 2024-10-29 NOTE — ED TRIAGE NOTES
Pt reports abd pain with nausea and vomiting. States she was seen Saturday in the ED and was feeling better on Sunday and Monday and then last night started feeling nauseated again and today has been vomiting.    Triage Assessment (Adult)       Row Name 10/29/24 1349          Triage Assessment    Airway WDL WDL        Respiratory WDL    Respiratory WDL WDL

## 2024-10-29 NOTE — DISCHARGE INSTRUCTIONS
Collect stool specimen and bring to lab.  Clear liquids and advance slowly as tolerated.  Follow-up with gastroenterology as planned.

## 2024-10-30 ENCOUNTER — LAB (OUTPATIENT)
Dept: LAB | Facility: CLINIC | Age: 29
End: 2024-10-30
Payer: COMMERCIAL

## 2024-10-30 DIAGNOSIS — R11.2 NAUSEA VOMITING AND DIARRHEA: ICD-10-CM

## 2024-10-30 DIAGNOSIS — R10.84 GENERALIZED ABDOMINAL PAIN: ICD-10-CM

## 2024-10-30 DIAGNOSIS — R19.7 NAUSEA VOMITING AND DIARRHEA: ICD-10-CM

## 2024-10-30 LAB
ADV 40+41 DNA STL QL NAA+NON-PROBE: NEGATIVE
ASTRO TYP 1-8 RNA STL QL NAA+NON-PROBE: NEGATIVE
C CAYETANENSIS DNA STL QL NAA+NON-PROBE: NEGATIVE
CAMPYLOBACTER DNA SPEC NAA+PROBE: NEGATIVE
CRYPTOSP DNA STL QL NAA+NON-PROBE: NEGATIVE
E COLI O157 DNA STL QL NAA+NON-PROBE: NORMAL
E HISTOLYT DNA STL QL NAA+NON-PROBE: NEGATIVE
EAEC ASTA GENE ISLT QL NAA+PROBE: NEGATIVE
EC STX1+STX2 GENES STL QL NAA+NON-PROBE: NEGATIVE
EPEC EAE GENE STL QL NAA+NON-PROBE: NEGATIVE
ETEC LTA+ST1A+ST1B TOX ST NAA+NON-PROBE: NEGATIVE
G LAMBLIA DNA STL QL NAA+NON-PROBE: NEGATIVE
NOROVIRUS GI+II RNA STL QL NAA+NON-PROBE: NEGATIVE
P SHIGELLOIDES DNA STL QL NAA+NON-PROBE: NEGATIVE
RVA RNA STL QL NAA+NON-PROBE: NEGATIVE
SALMONELLA SP RPOD STL QL NAA+PROBE: NEGATIVE
SAPO I+II+IV+V RNA STL QL NAA+NON-PROBE: NEGATIVE
SHIGELLA SP+EIEC IPAH ST NAA+NON-PROBE: NEGATIVE
V CHOLERAE DNA SPEC QL NAA+PROBE: NEGATIVE
VIBRIO DNA SPEC NAA+PROBE: NEGATIVE
Y ENTEROCOL DNA STL QL NAA+PROBE: NEGATIVE

## 2024-10-30 PROCEDURE — 87507 IADNA-DNA/RNA PROBE TQ 12-25: CPT

## 2024-11-05 ENCOUNTER — TRANSFERRED RECORDS (OUTPATIENT)
Dept: HEALTH INFORMATION MANAGEMENT | Facility: CLINIC | Age: 29
End: 2024-11-05
Payer: COMMERCIAL

## 2024-11-06 ENCOUNTER — TRANSFERRED RECORDS (OUTPATIENT)
Dept: HEALTH INFORMATION MANAGEMENT | Facility: CLINIC | Age: 29
End: 2024-11-06
Payer: COMMERCIAL

## 2024-11-08 ENCOUNTER — TRANSFERRED RECORDS (OUTPATIENT)
Dept: HEALTH INFORMATION MANAGEMENT | Facility: CLINIC | Age: 29
End: 2024-11-08
Payer: COMMERCIAL

## 2024-11-13 ENCOUNTER — TRANSFERRED RECORDS (OUTPATIENT)
Dept: HEALTH INFORMATION MANAGEMENT | Facility: CLINIC | Age: 29
End: 2024-11-13
Payer: COMMERCIAL

## 2024-12-03 ENCOUNTER — TELEPHONE (OUTPATIENT)
Dept: INTERNAL MEDICINE | Facility: CLINIC | Age: 29
End: 2024-12-03
Payer: COMMERCIAL

## 2024-12-03 NOTE — TELEPHONE ENCOUNTER
[facility] Call Center    Phone Message    May a detailed message be left on voicemail: yes     Reason for Call: Other: Pt is wanting to establish care with , she is aware that he is not accepting new Pt and is wondering if he would be able to see her to do an EDS assessment? Please advise.       Action Taken: Other: PCC    Travel Screening: Not Applicable     Date of Service: 12/03/24

## 2024-12-04 ENCOUNTER — VIRTUAL VISIT (OUTPATIENT)
Dept: FAMILY MEDICINE | Facility: CLINIC | Age: 29
End: 2024-12-04
Payer: COMMERCIAL

## 2024-12-04 ENCOUNTER — TRANSFERRED RECORDS (OUTPATIENT)
Dept: HEALTH INFORMATION MANAGEMENT | Facility: CLINIC | Age: 29
End: 2024-12-04

## 2024-12-04 DIAGNOSIS — N80.9 ENDOMETRIOSIS: ICD-10-CM

## 2024-12-04 DIAGNOSIS — L40.50 PSORIATIC ARTHRITIS (H): Primary | ICD-10-CM

## 2024-12-04 DIAGNOSIS — R63.4 WEIGHT LOSS, NON-INTENTIONAL: ICD-10-CM

## 2024-12-04 DIAGNOSIS — J30.2 SEASONAL ALLERGIES: ICD-10-CM

## 2024-12-04 DIAGNOSIS — M35.7 MUSCULOSKELETAL HYPERMOBILITY: ICD-10-CM

## 2024-12-04 DIAGNOSIS — K31.84 GASTROPARESIS: ICD-10-CM

## 2024-12-04 DIAGNOSIS — F43.10 PTSD (POST-TRAUMATIC STRESS DISORDER): ICD-10-CM

## 2024-12-04 DIAGNOSIS — L40.9 PSORIASIS: ICD-10-CM

## 2024-12-04 DIAGNOSIS — I95.1 ORTHOSTATIC HYPOTENSION: ICD-10-CM

## 2024-12-04 RX ORDER — MONTELUKAST SODIUM 10 MG/1
10 TABLET ORAL AT BEDTIME
Qty: 30 TABLET | Refills: 3 | Status: SHIPPED | OUTPATIENT
Start: 2024-12-04

## 2024-12-04 RX ORDER — HYDROXYZINE HYDROCHLORIDE 10 MG/1
TABLET, FILM COATED ORAL
COMMUNITY
Start: 2023-11-20

## 2024-12-04 RX ORDER — MIRTAZAPINE 15 MG/1
15 TABLET, FILM COATED ORAL AT BEDTIME
Qty: 30 TABLET | Refills: 5 | Status: SHIPPED | OUTPATIENT
Start: 2024-12-04

## 2024-12-04 RX ORDER — BACLOFEN 10 MG/1
5-10 TABLET ORAL
COMMUNITY
Start: 2024-10-16

## 2024-12-04 RX ORDER — METOCLOPRAMIDE HYDROCHLORIDE 5 MG/1
TABLET ORAL
COMMUNITY

## 2024-12-04 ASSESSMENT — PATIENT HEALTH QUESTIONNAIRE - PHQ9
10. IF YOU CHECKED OFF ANY PROBLEMS, HOW DIFFICULT HAVE THESE PROBLEMS MADE IT FOR YOU TO DO YOUR WORK, TAKE CARE OF THINGS AT HOME, OR GET ALONG WITH OTHER PEOPLE: SOMEWHAT DIFFICULT
SUM OF ALL RESPONSES TO PHQ QUESTIONS 1-9: 5
SUM OF ALL RESPONSES TO PHQ QUESTIONS 1-9: 5

## 2024-12-04 NOTE — PROGRESS NOTES
Mary is a 29 year old who is being evaluated via a billable video visit.    How would you like to obtain your AVS? MyChart  If the video visit is dropped, the invitation should be resent by: Text to cell phone: [phone]  Will anyone else be joining your video visit? No  {If patient encounters technical issues they should call [phone] :[id number]}    {PROVIDER CHARTING PREFERENCE:[id number]}    Subjective   Mary is a 29 year old, presenting for the following health issues:  Establish Care, Weight Loss, and Referral        12/4/2024     4:23 PM   Additional Questions   Roomed by Snow WELLS LPN     Video Start Time: ***    History of Present Illness       Reason for visit:  Establish care and referrals   She is taking medications regularly.           Objective    Vitals - Patient Reported  Weight (Patient Reported): 70.3 kg (155 lb)  Pain Score: Severe Pain (7)  Pain Loc: Neck        Physical Exam   GENERAL: alert and no distress  EYES: Eyes grossly normal to inspection.  No discharge or erythema, or obvious scleral/conjunctival abnormalities.  RESP: No audible wheeze, cough, or visible cyanosis.    SKIN: Visible skin clear. No significant rash, abnormal pigmentation or lesions.  NEURO: Cranial nerves grossly intact.  Mentation and speech appropriate for age.  PSYCH: Appropriate affect, tone, and pace of words    Lab on 10/30/2024   Component Date Value Ref Range Status    Campylobacter species 10/30/2024 Negative  Negative Final    Salmonella species 10/30/2024 Negative  Negative Final    Vibrio species 10/30/2024 Negative  Negative Final    Vibrio cholerae 10/30/2024 Negative  Negative Final    Yersinia enterocolitica 10/30/2024 Negative  Negative Final    Enteropathogenic E. coli (EPEC) 10/30/2024 Negative  Negative, NA Final    Shiga-like toxin-producing E. coli* 10/30/2024 Negative  Negative Final    Shigella/Enteroinvasive E. coli (E* 10/30/2024 Negative  Negative Final    Cryptosporidium species 10/30/2024  Negative  Negative Final    Giardia lamblia 10/30/2024 Negative  Negative Final    Norovirus Gl/Gll 10/30/2024 Negative  Negative Final    Rotavirus A 10/30/2024 Negative  Negative Final    Plesiomonas shigelloides 10/30/2024 Negative  Negative Final    Enteroaggregative E. coli (EAEC) 10/30/2024 Negative  Negative Final    Enterotoxigenic E. coli (ETEC) 10/30/2024 Negative  Negative Final    E. coli O157 10/30/2024 NA  Negative, NA Final    Cyclospora cayetanensis 10/30/2024 Negative  Negative Final    Entamoeba histolytica 10/30/2024 Negative  Negative Final    Adenovirus F40/41 10/30/2024 Negative  Negative Final    Astrovirus 10/30/2024 Negative  Negative Final    Sapovirus 10/30/2024 Negative  Negative Final         Video-Visit Details    Type of service:  Video Visit   Video End Time:{video visit start/end time for provider to select:[id number]}  Originating Location (pt. Location): Home  {PROVIDER LOCATION On-site should be selected for visits conducted from your clinic location or adjoining [facility] hospital, academic office, or other nearby [facility] building. Off-site should be selected for all other provider locations, including home:[id number]}  Distant Location (provider location):  On-site  Platform used for Video Visit: Omar  Signed Electronically by: Lizeth Godinez MD  {Email feedback regarding this note to [email or web address]   :[id number]}

## 2024-12-10 ENCOUNTER — TRANSCRIBE ORDERS (OUTPATIENT)
Dept: OTHER | Age: 29
End: 2024-12-10

## 2024-12-10 ENCOUNTER — MEDICAL CORRESPONDENCE (OUTPATIENT)
Dept: HEALTH INFORMATION MANAGEMENT | Facility: CLINIC | Age: 29
End: 2024-12-10
Payer: COMMERCIAL

## 2024-12-10 DIAGNOSIS — Q79.60 EHLERS-DANLOS SYNDROME: Primary | ICD-10-CM

## 2024-12-21 ENCOUNTER — HEALTH MAINTENANCE LETTER (OUTPATIENT)
Age: 29
End: 2024-12-21

## 2024-12-22 ENCOUNTER — HOSPITAL ENCOUNTER (EMERGENCY)
Facility: CLINIC | Age: 29
Discharge: HOME | End: 2024-12-22
Attending: PHYSICIAN ASSISTANT
Payer: COMMERCIAL

## 2024-12-22 ENCOUNTER — APPOINTMENT (OUTPATIENT)
Dept: CT IMAGING | Facility: CLINIC | Age: 29
End: 2024-12-22
Attending: PHYSICIAN ASSISTANT
Payer: COMMERCIAL

## 2024-12-22 VITALS
DIASTOLIC BLOOD PRESSURE: 51 MMHG | OXYGEN SATURATION: 100 % | BODY MASS INDEX: 22.81 KG/M2 | SYSTOLIC BLOOD PRESSURE: 99 MMHG | HEART RATE: 65 BPM | WEIGHT: 154 LBS | TEMPERATURE: 98.3 F | HEIGHT: 69 IN | RESPIRATION RATE: 18 BRPM

## 2024-12-22 DIAGNOSIS — G43.909 MIGRAINE: ICD-10-CM

## 2024-12-22 LAB
ALBUMIN SERPL BCG-MCNC: 4.7 G/DL (ref 3.5–5.2)
ALP SERPL-CCNC: 41 U/L (ref 40–150)
ALT SERPL W P-5'-P-CCNC: 16 U/L (ref 0–50)
ANION GAP SERPL CALCULATED.3IONS-SCNC: 11 MMOL/L (ref 7–15)
AST SERPL W P-5'-P-CCNC: 15 U/L (ref 0–45)
BASOPHILS # BLD AUTO: 0 10E3/UL (ref 0–0.2)
BASOPHILS NFR BLD AUTO: 1 %
BILIRUB SERPL-MCNC: <0.2 MG/DL
BUN SERPL-MCNC: 13.9 MG/DL (ref 6–20)
CALCIUM SERPL-MCNC: 8.9 MG/DL (ref 8.8–10.4)
CHLORIDE SERPL-SCNC: 104 MMOL/L (ref 98–107)
CREAT SERPL-MCNC: 0.7 MG/DL (ref 0.51–0.95)
CRP SERPL-MCNC: <3 MG/L
EGFRCR SERPLBLD CKD-EPI 2021: >90 ML/MIN/1.73M2
EOSINOPHIL # BLD AUTO: 0.2 10E3/UL (ref 0–0.7)
EOSINOPHIL NFR BLD AUTO: 2 %
ERYTHROCYTE [DISTWIDTH] IN BLOOD BY AUTOMATED COUNT: 12.5 % (ref 10–15)
ERYTHROCYTE [SEDIMENTATION RATE] IN BLOOD BY WESTERGREN METHOD: 6 MM/HR (ref 0–20)
GLUCOSE SERPL-MCNC: 84 MG/DL (ref 70–99)
HCO3 SERPL-SCNC: 24 MMOL/L (ref 22–29)
HCT VFR BLD AUTO: 36.4 % (ref 35–47)
HGB BLD-MCNC: 12 G/DL (ref 11.7–15.7)
IMM GRANULOCYTES # BLD: 0 10E3/UL
IMM GRANULOCYTES NFR BLD: 0 %
LYMPHOCYTES # BLD AUTO: 2.5 10E3/UL (ref 0.8–5.3)
LYMPHOCYTES NFR BLD AUTO: 29 %
MCH RBC QN AUTO: 30.1 PG (ref 26.5–33)
MCHC RBC AUTO-ENTMCNC: 33 G/DL (ref 31.5–36.5)
MCV RBC AUTO: 91 FL (ref 78–100)
MONOCYTES # BLD AUTO: 0.6 10E3/UL (ref 0–1.3)
MONOCYTES NFR BLD AUTO: 7 %
NEUTROPHILS # BLD AUTO: 5.5 10E3/UL (ref 1.6–8.3)
NEUTROPHILS NFR BLD AUTO: 62 %
NRBC # BLD AUTO: 0 10E3/UL
NRBC BLD AUTO-RTO: 0 /100
PLATELET # BLD AUTO: 221 10E3/UL (ref 150–450)
POTASSIUM SERPL-SCNC: 4.3 MMOL/L (ref 3.4–5.3)
PROT SERPL-MCNC: 7.1 G/DL (ref 6.4–8.3)
RBC # BLD AUTO: 3.99 10E6/UL (ref 3.8–5.2)
SODIUM SERPL-SCNC: 139 MMOL/L (ref 135–145)
TSH SERPL DL<=0.005 MIU/L-ACNC: 1.58 UIU/ML (ref 0.3–4.2)
WBC # BLD AUTO: 8.8 10E3/UL (ref 4–11)

## 2024-12-22 PROCEDURE — 96375 TX/PRO/DX INJ NEW DRUG ADDON: CPT | Performed by: PHYSICIAN ASSISTANT

## 2024-12-22 PROCEDURE — 86140 C-REACTIVE PROTEIN: CPT | Performed by: PHYSICIAN ASSISTANT

## 2024-12-22 PROCEDURE — 84443 ASSAY THYROID STIM HORMONE: CPT | Performed by: PHYSICIAN ASSISTANT

## 2024-12-22 PROCEDURE — 99285 EMERGENCY DEPT VISIT HI MDM: CPT | Mod: 25 | Performed by: PHYSICIAN ASSISTANT

## 2024-12-22 PROCEDURE — 80053 COMPREHEN METABOLIC PANEL: CPT | Performed by: PHYSICIAN ASSISTANT

## 2024-12-22 PROCEDURE — 70450 CT HEAD/BRAIN W/O DYE: CPT

## 2024-12-22 PROCEDURE — 99284 EMERGENCY DEPT VISIT MOD MDM: CPT | Performed by: PHYSICIAN ASSISTANT

## 2024-12-22 PROCEDURE — 36415 COLL VENOUS BLD VENIPUNCTURE: CPT | Performed by: PHYSICIAN ASSISTANT

## 2024-12-22 PROCEDURE — 85652 RBC SED RATE AUTOMATED: CPT | Performed by: PHYSICIAN ASSISTANT

## 2024-12-22 PROCEDURE — 85025 COMPLETE CBC W/AUTO DIFF WBC: CPT | Performed by: PHYSICIAN ASSISTANT

## 2024-12-22 PROCEDURE — 96365 THER/PROPH/DIAG IV INF INIT: CPT | Performed by: PHYSICIAN ASSISTANT

## 2024-12-22 PROCEDURE — 250N000011 HC RX IP 250 OP 636: Performed by: PHYSICIAN ASSISTANT

## 2024-12-22 PROCEDURE — 258N000003 HC RX IP 258 OP 636: Performed by: PHYSICIAN ASSISTANT

## 2024-12-22 RX ORDER — KETOROLAC TROMETHAMINE 15 MG/ML
15 INJECTION, SOLUTION INTRAMUSCULAR; INTRAVENOUS ONCE
Status: COMPLETED | OUTPATIENT
Start: 2024-12-22 | End: 2024-12-22

## 2024-12-22 RX ORDER — METOCLOPRAMIDE HYDROCHLORIDE 5 MG/ML
10 INJECTION INTRAMUSCULAR; INTRAVENOUS ONCE
Status: COMPLETED | OUTPATIENT
Start: 2024-12-22 | End: 2024-12-22

## 2024-12-22 RX ORDER — MAGNESIUM SULFATE 1 G/100ML
1 INJECTION INTRAVENOUS ONCE
Status: COMPLETED | OUTPATIENT
Start: 2024-12-22 | End: 2024-12-22

## 2024-12-22 RX ORDER — DIPHENHYDRAMINE HYDROCHLORIDE 50 MG/ML
25 INJECTION INTRAMUSCULAR; INTRAVENOUS ONCE
Status: COMPLETED | OUTPATIENT
Start: 2024-12-22 | End: 2024-12-22

## 2024-12-22 RX ADMIN — KETOROLAC TROMETHAMINE 15 MG: 15 INJECTION, SOLUTION INTRAMUSCULAR; INTRAVENOUS at 16:49

## 2024-12-22 RX ADMIN — SODIUM CHLORIDE 1000 ML: 9 INJECTION, SOLUTION INTRAVENOUS at 16:45

## 2024-12-22 RX ADMIN — METOCLOPRAMIDE 10 MG: 5 INJECTION, SOLUTION INTRAMUSCULAR; INTRAVENOUS at 16:52

## 2024-12-22 RX ADMIN — MAGNESIUM SULFATE HEPTAHYDRATE 1 G: 1 INJECTION, SOLUTION INTRAVENOUS at 16:51

## 2024-12-22 RX ADMIN — DIPHENHYDRAMINE HYDROCHLORIDE 25 MG: 50 INJECTION, SOLUTION INTRAMUSCULAR; INTRAVENOUS at 16:48

## 2024-12-22 ASSESSMENT — ACTIVITIES OF DAILY LIVING (ADL)
ADLS_ACUITY_SCORE: 41

## 2024-12-22 NOTE — ED PROVIDER NOTES
"  History     Chief Complaint   Patient presents with    Headache     HPI  Mary Valdez is a 29 year old female who presents for evaluation of increased headache and spinal discomfort from her baseline.  She admits to having chronic pain.  No recent fall or head injury.  Does not take anticoagulant medication.  She states that she woke up with an increased headache with \"weird vision \", tripping a lot, whole body tingling, and feeling weak when she is walking.  She states that \"I cannot feel what I am supposed to.  \".  Troubles with dexterity.  She describes an example of even having troubles wrapping her child's present.  Headache is rated 8 on a scale of 10 and starts in her posterior neck and then comes up and over her head.  Describes it as a pressure feeling.  She has not had any peripheral visual loss.  No diplopia.  Attempting treatment with heat and ice.  She takes nightly baclofen, which does help her sleep.  Has been evaluated by a spine specialist in the [facility] system.  She has a picture of her recent MRI of the cervical and thoracic spine.  She feels like there is something serious on the MRI.  Apparently the spine specialist told her to go to the emergency department given her symptoms and MRI findings.  Please see pictures of her MRI scan results off of her phone that she pulled up out of the [facility] system.  I do not have access to these results through care everywhere.              Allergies:  Allergies   Allergen Reactions    Bee Venom Anaphylaxis    No Known Drug Allergy        Problem List:    Patient Active Problem List    Diagnosis Date Noted    Encounter for triage in pregnant patient 07/11/2020     Priority: Medium    Panic attacks 05/25/2019     Priority: Medium    Delusion (H) 05/25/2019     Priority: Medium    Hallucinations 05/25/2019     Priority: Medium    Hip pain, left 10/17/2018     Priority: Medium    Hamstring tightness of left lower extremity 10/17/2018     Priority: Medium    " Shoulder instability, right 06/01/2018     Priority: Medium    Stiffness of hand joint, unspecified laterality 06/01/2018     Priority: Medium    Psoriatic arthritis (H) 01/25/2018     Priority: Medium    Allergic reaction to insect sting, accidental or unintentional, initial encounter 09/27/2017     Priority: Medium    Hypoglycemia, unspecified 07/10/2017     Priority: Medium    Sprain of lateral collateral ligament of right knee, initial encounter 06/21/2017     Priority: Medium    Segmental dysfunction of thoracic region 05/24/2017     Priority: Medium    Segmental dysfunction of lumbar region 05/24/2017     Priority: Medium    Segmental dysfunction of sacral region 05/24/2017     Priority: Medium    Flank pain 05/24/2017     Priority: Medium    Maltracking of right patella 01/05/2017     Priority: Medium    NENO (generalized anxiety disorder) 05/05/2016     Priority: Medium    PTSD (post-traumatic stress disorder) 05/05/2016     Priority: Medium    Anxiety state 07/07/2014     Priority: Medium     Problem list name updated by automated process. Provider to review      Major depression in complete remission (H) 07/07/2014     Priority: Medium    Migratory pain 10/13/2013     Priority: Medium    Psoriasis 07/23/2010     Priority: Medium        Past Medical History:    Past Medical History:   Diagnosis Date    Bell's palsy 7 y/o    Depression     Depressive disorder     Major depression in complete remission (H) 7/7/2014    Psoriasis     Psoriatic arthritis (H)        Past Surgical History:    Past Surgical History:   Procedure Laterality Date    ADENOIDECTOMY      ARTHROSCOPY KNEE WITH RETINACULAR RELEASE Right 1/20/2017    Procedure: ARTHROSCOPY KNEE WITH RETINACULAR RELEASE MEDIAL OR LATERAL;  Surgeon: Yevgeniy Graf DO;  Location: [facility] OR    ARTHROSCOPY SHOULDER SUPERIOR LABRUM ANTERIOR TO POSTERIOR REPAIR Right 7/6/2018    Procedure: ARTHROSCOPY SHOULDER SUPERIOR LABRUM ANTERIOR TO POSTERIOR REPAIR;   "Right shoulder arthroscopy with arthroscopic labral repair;  Surgeon: Yevgeniy Graf DO;  Location: [facility] OR    DENTAL SURGERY      MOUTH SURGERY      ZZC NONSPECIFIC PROCEDURE  02/12/01    adenoidectomy       Family History:    Family History   Problem Relation Age of Onset    Other - See Comments Father         benign nerve spinal tumor     Hypertension Father     Diabetes Maternal Grandmother     Depression Maternal Grandmother     Psoriasis Maternal Grandfather     Seizure Disorder Brother         epilepsy    Psoriasis Maternal Aunt     Psoriasis Maternal Aunt     Psoriasis Maternal Aunt        Social History:  Marital Status:  Single [1]  Social History     Tobacco Use    Smoking status: Never    Smokeless tobacco: Never   Vaping Use    Vaping status: Never Used   Substance Use Topics    Alcohol use: No     Alcohol/week: 0.0 standard drinks of alcohol    Drug use: No        Medications:    baclofen (LIORESAL) 10 MG tablet  EPINEPHrine (EPIPEN/ADRENACLICK/OR ANY BX GENERIC EQUIV) 0.3 MG/0.3ML injection 2-pack  hydrOXYzine HCl (ATARAX) 10 MG tablet  mirtazapine (REMERON) 15 MG tablet  montelukast (SINGULAIR) 10 MG tablet  Prenatal Vit-Fe Fumarate-FA (PRENATAL PO)  Probiotic Product (PROBIOTIC PO)  REGLAN 5 MG tablet          Review of Systems   All other systems reviewed and are negative.      Physical Exam   BP: 129/85  Pulse: 79  Temp: 98.3  F (36.8  C)  Resp: 18  Height: 175.3 cm (5' 9\")  Weight: 69.9 kg (154 lb)  SpO2: 100 %      Physical Exam  Vitals and nursing note reviewed.   Constitutional:       General: She is not in acute distress.     Appearance: She is not diaphoretic.   HENT:      Head: Normocephalic and atraumatic.      Right Ear: Tympanic membrane, ear canal and external ear normal.      Left Ear: Tympanic membrane, ear canal and external ear normal.      Nose: Nose normal. No congestion or rhinorrhea.      Mouth/Throat:      Mouth: Mucous membranes are moist.      Pharynx: No " oropharyngeal exudate or posterior oropharyngeal erythema.   Eyes:      General: No scleral icterus.        Right eye: No discharge.         Left eye: No discharge.      Extraocular Movements: Extraocular movements intact.      Conjunctiva/sclera: Conjunctivae normal.      Pupils: Pupils are equal, round, and reactive to light.   Neck:      Thyroid: No thyromegaly.      Comments: Negative Brudzinski's and Kernig sign.  She does have some minor paravertebral muscular tenderness at about the C5-C7 level on the left.  Full range of motion.  No pain with axial loading.  Upper extremities with equal and appropriate range of motion, strength, sensation, and DTRs.  Distal pulses 2+.  Cardiovascular:      Rate and Rhythm: Normal rate and regular rhythm.      Heart sounds: Normal heart sounds. No murmur heard.  Pulmonary:      Effort: Pulmonary effort is normal. No respiratory distress.      Breath sounds: Normal breath sounds. No wheezing or rales.   Chest:      Chest wall: No tenderness.   Abdominal:      General: Bowel sounds are normal. There is no distension.      Palpations: Abdomen is soft. There is no mass.      Tenderness: There is no abdominal tenderness. There is no right CVA tenderness, left CVA tenderness, guarding or rebound.   Musculoskeletal:         General: No swelling or deformity. Normal range of motion.      Cervical back: Normal range of motion and neck supple. No rigidity.      Right lower leg: No edema.      Left lower leg: No edema.   Lymphadenopathy:      Cervical: No cervical adenopathy.   Skin:     General: Skin is warm and dry.      Capillary Refill: Capillary refill takes less than 2 seconds.      Findings: No erythema or rash.   Neurological:      General: No focal deficit present.      Mental Status: She is alert and oriented to person, place, and time.      Cranial Nerves: No cranial nerve deficit.      Comments: Neuro:  Cranial nerves II-XII grossly intact.  Romberg is steady.  Gait WNL's.   Cerebellar testing is WNL's.  Sensation is intact to light touch throughout.  Bicep, brachioradialis, patellar, and achilles DTR's 2/4 without clonus.  No acute neurological abnormality identified.    Psychiatric:         Behavior: Behavior normal.         Thought Content: Thought content normal.         ED Course        Procedures              Critical Care time:  none              Results for orders placed or performed during the hospital encounter of 12/22/24 (from the past 24 hours)   CBC with platelets differential    Narrative    The following orders were created for panel order CBC with platelets differential.  Procedure                               Abnormality         Status                     ---------                               -----------         ------                     CBC with platelets and d...[[id number]]                      Final result                 Please view results for these tests on the individual orders.   Comprehensive metabolic panel   Result Value Ref Range    Sodium 139 135 - 145 mmol/L    Potassium 4.3 3.4 - 5.3 mmol/L    Carbon Dioxide (CO2) 24 22 - 29 mmol/L    Anion Gap 11 7 - 15 mmol/L    Urea Nitrogen 13.9 6.0 - 20.0 mg/dL    Creatinine 0.70 0.51 - 0.95 mg/dL    GFR Estimate >90 >60 mL/min/1.73m2    Calcium 8.9 8.8 - 10.4 mg/dL    Chloride 104 98 - 107 mmol/L    Glucose 84 70 - 99 mg/dL    Alkaline Phosphatase 41 40 - 150 U/L    AST 15 0 - 45 U/L    ALT 16 0 - 50 U/L    Protein Total 7.1 6.4 - 8.3 g/dL    Albumin 4.7 3.5 - 5.2 g/dL    Bilirubin Total <0.2 <=1.2 mg/dL   CRP inflammation   Result Value Ref Range    CRP Inflammation <3.00 <5.00 mg/L   Erythrocyte sedimentation rate auto   Result Value Ref Range    Erythrocyte Sedimentation Rate 6 0 - 20 mm/hr   TSH with free T4 reflex   Result Value Ref Range    TSH 1.58 0.30 - 4.20 uIU/mL   CBC with platelets and differential   Result Value Ref Range    WBC Count 8.8 4.0 - 11.0 10e3/uL    RBC Count 3.99 3.80 - 5.20 10e6/uL     Hemoglobin 12.0 11.7 - 15.7 g/dL    Hematocrit 36.4 35.0 - 47.0 %    MCV 91 78 - 100 fL    MCH 30.1 26.5 - 33.0 pg    MCHC 33.0 31.5 - 36.5 g/dL    RDW 12.5 10.0 - 15.0 %    Platelet Count 221 150 - 450 10e3/uL    % Neutrophils 62 %    % Lymphocytes 29 %    % Monocytes 7 %    % Eosinophils 2 %    % Basophils 1 %    % Immature Granulocytes 0 %    NRBCs per 100 WBC 0 <1 /100    Absolute Neutrophils 5.5 1.6 - 8.3 10e3/uL    Absolute Lymphocytes 2.5 0.8 - 5.3 10e3/uL    Absolute Monocytes 0.6 0.0 - 1.3 10e3/uL    Absolute Eosinophils 0.2 0.0 - 0.7 10e3/uL    Absolute Basophils 0.0 0.0 - 0.2 10e3/uL    Absolute Immature Granulocytes 0.0 <=0.4 10e3/uL    Absolute NRBCs 0.0 10e3/uL   CT Head w/o Contrast    Narrative    EXAM: CT HEAD W/O CONTRAST  LOCATION: [facility]  DATE: 12/22/2024    INDICATION: headache, extremity numbness tingling, weakness  COMPARISON: None.  TECHNIQUE: Routine CT Head without IV contrast. Multiplanar reformats. Dose reduction techniques were used.    FINDINGS:  INTRACRANIAL CONTENTS: No intracranial hemorrhage, extraaxial collection, or mass effect.  No CT evidence of acute infarct. Normal parenchymal attenuation. Normal ventricles and sulci.     VISUALIZED ORBITS/SINUSES/MASTOIDS: No intraorbital abnormality. No paranasal sinus mucosal disease. No middle ear or mastoid effusion.    BONES/SOFT TISSUES: No acute abnormality.      Impression    IMPRESSION:  1.  No acute intracranial process.       Medications   sodium chloride 0.9% BOLUS 1,000 mL (0 mLs Intravenous Stopped 12/22/24 1724)   ketorolac (TORADOL) injection 15 mg (15 mg Intravenous $Given 12/22/24 1649)   metoclopramide (REGLAN) injection 10 mg (10 mg Intravenous $Given 12/22/24 1652)   magnesium sulfate 1 g in 100 mL D5W intermittent infusion (0 g Intravenous Stopped 12/22/24 1724)   diphenhydrAMINE (BENADRYL) injection 25 mg (25 mg Intravenous $Given 12/22/24 1648)       Assessments & Plan (with Medical  "Decision Making)  Migraine     29 year old female with a history of self-reported chronic pain who presents for evaluation of increased headache for the past 2 weeks associated with weird vision, tripping a lot, feeling weak when she is walking, and troubles with her dexterity.  Whole body tingling.  No loss of bowel/bladder function.  Headache is rated 8 on a scale of 10.  No fall or head injury.  Had an MRI of her cervical and thoracic spine recently which she has a picture of the results.  BP 99/51   Pulse 65   Temp 98.3  F (36.8  C) (Temporal)   Resp 18   Ht 1.753 m (5' 9\")   Wt 69.9 kg (154 lb)   LMP 12/17/2024 (Approximate)   SpO2 100%   BMI 22.74 kg/m     Generally healthy-appearing female in no acute distress.  Skin without rash.  Head without sign of head injury.  Neurological exam with a nonfocal exam.  Strength, sensation, DTRs, and pulses normal in the bilateral upper and lower extremities.  ENT exam otherwise negative.  Cardiopulmonary exam normal.  Abdomen soft and nontender.  IV was established.  Laboratory levels with normal CBC, TSH, CRP, ESR, and comprehensive metabolic panel.  CT of the head with no acute intracranial process.  I performed independent review of the CT and agree with the findings.  Patient was administered the IV migraine headache protocol with 1 L IV normal saline, IV Toradol, IV Reglan, IV magnesium, and IV Benadryl.  She reported resolution of her headache.  She was very pleased with the results of the treatment.  She felt like she would be able to go home and get a good night sleep.  No complications of treatment identified.  ED return instructions reviewed.     I have reviewed the nursing notes.    I have reviewed the findings, diagnosis, plan and need for follow up with the patient.           Medical Decision Making  The patient's presentation was of moderate complexity (an acute illness with systemic symptoms).    The patient's evaluation involved:  ordering and/or " review of 3+ test(s) in this encounter (see separate area of note for details)    The patient's management necessitated moderate risk (prescription drug management including medications given in the ED).        Discharge Medication List as of 12/22/2024  6:24 PM          Final diagnoses:   Migraine     Disclaimer: This note consists of symbols derived from keyboarding, dictation and/or voice recognition software. As a result, there may be errors in the script that have gone undetected. Please consider this when interpreting information found in this chart.      12/22/2024   [facility] EMERGENCY DEPT       Ruiz Negrete PA-C  12/22/24 1931

## 2024-12-22 NOTE — ED TRIAGE NOTES
"Pt presents with multiple concerns.  Pt states that she has been seen for this and had MRIs on Friday within the [facility] system.  Pt does have the report on Mychart if needed.  Pt states that for the past two weeks she has had double and blurry vision, sensitivity to light, unbalanced feeling, and a headache.  Also back pain with tingling throughout her body.  These symptoms have been for two weeks worse the past two days.  States that she has had an additional headache to the one she has now for the past three months.  Pt states that she has not taken anything for pain, has a muscle relaxer for night time.  Stated that \"pain meds do not do much for chronic pain.\"     Triage Assessment (Adult)       Row Name 12/22/24 [id number]          Triage Assessment    Airway WDL WDL        Respiratory WDL    Respiratory WDL WDL        Skin Circulation/Temperature WDL    Skin Circulation/Temperature WDL WDL        Cardiac WDL    Cardiac WDL WDL        Peripheral/Neurovascular WDL    Peripheral Neurovascular WDL WDL        Cognitive/Neuro/Behavioral WDL    Cognitive/Neuro/Behavioral WDL X                     "

## 2024-12-22 NOTE — ED NOTES
Pt states she is feeling the same pain remains 8/10. Pt was sleeping upon nurse entering room. VS monitoring.    stairs to enter home

## 2024-12-23 NOTE — DISCHARGE INSTRUCTIONS
It was a pleasure working with you today!  I am thankful you are feeling much better after the migraine treatment here in the emergency department.  Please try and get 9+ hours of sleep per night.  Make sure that you are getting at least 64+ ounces of water daily to stay hydrated.  Try some stretching exercises for your neck.  Use a heating pad to the painful neck area for 20 minutes every couple hours.  Follow-up with your primary care provider in 4-5 days for recheck to follow-up on your condition.

## 2024-12-27 ENCOUNTER — HOSPITAL ENCOUNTER (EMERGENCY)
Facility: CLINIC | Age: 29
Discharge: HOME | End: 2024-12-27
Attending: EMERGENCY MEDICINE | Admitting: EMERGENCY MEDICINE
Payer: COMMERCIAL

## 2024-12-27 VITALS
OXYGEN SATURATION: 100 % | TEMPERATURE: 98.4 F | BODY MASS INDEX: 23.24 KG/M2 | HEART RATE: 66 BPM | DIASTOLIC BLOOD PRESSURE: 61 MMHG | SYSTOLIC BLOOD PRESSURE: 146 MMHG | WEIGHT: 156.9 LBS | HEIGHT: 69 IN | RESPIRATION RATE: 16 BRPM

## 2024-12-27 DIAGNOSIS — R51.9 HEADACHE, UNSPECIFIED HEADACHE TYPE: ICD-10-CM

## 2024-12-27 DIAGNOSIS — H53.2 DIPLOPIA: ICD-10-CM

## 2024-12-27 PROCEDURE — 99283 EMERGENCY DEPT VISIT LOW MDM: CPT | Performed by: EMERGENCY MEDICINE

## 2024-12-27 ASSESSMENT — ACTIVITIES OF DAILY LIVING (ADL)
ADLS_ACUITY_SCORE: 41
ADLS_ACUITY_SCORE: 41

## 2024-12-27 ASSESSMENT — COLUMBIA-SUICIDE SEVERITY RATING SCALE - C-SSRS
6. HAVE YOU EVER DONE ANYTHING, STARTED TO DO ANYTHING, OR PREPARED TO DO ANYTHING TO END YOUR LIFE?: NO
2. HAVE YOU ACTUALLY HAD ANY THOUGHTS OF KILLING YOURSELF IN THE PAST MONTH?: NO
1. IN THE PAST MONTH, HAVE YOU WISHED YOU WERE DEAD OR WISHED YOU COULD GO TO SLEEP AND NOT WAKE UP?: NO

## 2024-12-27 NOTE — ED TRIAGE NOTES
PT here with c/o Headache, eye pain, reports that her whole body feels that it got a lot of pressure, left ankle pain and swelling, off and on for the last couple weeks, but more painful today. PT also reports mild SOB with activity but denies any Chest pain.

## 2024-12-27 NOTE — DISCHARGE INSTRUCTIONS
-You should receive a phone call to establish a new primary care provider    -Expect a call for the next few days to arrange for an MRI of your brain.  This is to evaluate for the atypical headache and double vision (diplopia) results will determine if this can be managed by primary care provider or require transfer care to a specialist.  Follow-up with your primary care provider to go over MRI results.

## 2024-12-28 NOTE — ED PROVIDER NOTES
History     Chief Complaint   Patient presents with    Ankle Pain    Joint Swelling    Eye Pain    Headache     HPI  Mary Valdez is a 29 year old female who presents with primary concern for headache, photosensitivity and intermittent diplopia.  These are new symptoms for the patient.  No fall or trauma.  No other neurologic complaints.  Arrived currently has no diplopia.  Describes horizontal diplopia.    Patient also mention that she has been having continued joint pains.  This has been extensively worked up by her primary care doctor and rheumatology.  No status change.  Just frustrated with no specific diagnosis.  No recent joint showing any inflammation erythema, joint swelling.  Also complains of general myalgias and fatigue.    Allergies:  Allergies   Allergen Reactions    Bee Venom Anaphylaxis    No Known Drug Allergy        Problem List:    Patient Active Problem List    Diagnosis Date Noted    Encounter for triage in pregnant patient 07/11/2020     Priority: Medium    Panic attacks 05/25/2019     Priority: Medium    Delusion (H) 05/25/2019     Priority: Medium    Hallucinations 05/25/2019     Priority: Medium    Hip pain, left 10/17/2018     Priority: Medium    Hamstring tightness of left lower extremity 10/17/2018     Priority: Medium    Shoulder instability, right 06/01/2018     Priority: Medium    Stiffness of hand joint, unspecified laterality 06/01/2018     Priority: Medium    Psoriatic arthritis (H) 01/25/2018     Priority: Medium    Allergic reaction to insect sting, accidental or unintentional, initial encounter 09/27/2017     Priority: Medium    Hypoglycemia, unspecified 07/10/2017     Priority: Medium    Sprain of lateral collateral ligament of right knee, initial encounter 06/21/2017     Priority: Medium    Segmental dysfunction of thoracic region 05/24/2017     Priority: Medium    Segmental dysfunction of lumbar region 05/24/2017     Priority: Medium    Segmental dysfunction of sacral  region 05/24/2017     Priority: Medium    Flank pain 05/24/2017     Priority: Medium    Maltracking of right patella 01/05/2017     Priority: Medium    NENO (generalized anxiety disorder) 05/05/2016     Priority: Medium    PTSD (post-traumatic stress disorder) 05/05/2016     Priority: Medium    Anxiety state 07/07/2014     Priority: Medium     Problem list name updated by automated process. Provider to review      Major depression in complete remission (H) 07/07/2014     Priority: Medium    Migratory pain 10/13/2013     Priority: Medium    Psoriasis 07/23/2010     Priority: Medium        Past Medical History:    Past Medical History:   Diagnosis Date    Bell's palsy 5 y/o    Depression     Depressive disorder     Major depression in complete remission (H) 7/7/2014    Psoriasis     Psoriatic arthritis (H)        Past Surgical History:    Past Surgical History:   Procedure Laterality Date    ADENOIDECTOMY      ARTHROSCOPY KNEE WITH RETINACULAR RELEASE Right 1/20/2017    Procedure: ARTHROSCOPY KNEE WITH RETINACULAR RELEASE MEDIAL OR LATERAL;  Surgeon: Yevgeniy Graf DO;  Location: [facility] OR    ARTHROSCOPY SHOULDER SUPERIOR LABRUM ANTERIOR TO POSTERIOR REPAIR Right 7/6/2018    Procedure: ARTHROSCOPY SHOULDER SUPERIOR LABRUM ANTERIOR TO POSTERIOR REPAIR;  Right shoulder arthroscopy with arthroscopic labral repair;  Surgeon: Yevgeniy Graf DO;  Location: [facility] OR    DENTAL SURGERY      MOUTH SURGERY      ZZC NONSPECIFIC PROCEDURE  02/12/01    adenoidectomy       Family History:    Family History   Problem Relation Age of Onset    Other - See Comments Father         benign nerve spinal tumor     Hypertension Father     Diabetes Maternal Grandmother     Depression Maternal Grandmother     Psoriasis Maternal Grandfather     Seizure Disorder Brother         epilepsy    Psoriasis Maternal Aunt     Psoriasis Maternal Aunt     Psoriasis Maternal Aunt        Social History:  Marital Status:  Single [1]  Social  "History     Tobacco Use    Smoking status: Never    Smokeless tobacco: Never   Vaping Use    Vaping status: Never Used   Substance Use Topics    Alcohol use: No     Alcohol/week: 0.0 standard drinks of alcohol    Drug use: No        Medications:    baclofen (LIORESAL) 10 MG tablet  EPINEPHrine (EPIPEN/ADRENACLICK/OR ANY BX GENERIC EQUIV) 0.3 MG/0.3ML injection 2-pack  hydrOXYzine HCl (ATARAX) 10 MG tablet  mirtazapine (REMERON) 15 MG tablet  montelukast (SINGULAIR) 10 MG tablet  Prenatal Vit-Fe Fumarate-FA (PRENATAL PO)  Probiotic Product (PROBIOTIC PO)  REGLAN 5 MG tablet          Review of Systems   All other systems reviewed and are negative.      Physical Exam   BP: (!) 146/61  Pulse: 66  Temp: 98.4  F (36.9  C)  Resp: 16  Height: 175.3 cm (5' 9\")  Weight: 71.2 kg (156 lb 14.4 oz)  SpO2: 100 %      Physical Exam  Vitals and nursing note reviewed. Exam conducted with a chaperone present.   Constitutional:       Appearance: She is not ill-appearing.   HENT:      Head: Normocephalic.      Right Ear: Tympanic membrane normal.      Left Ear: Tympanic membrane normal.      Nose: No congestion.      Mouth/Throat:      Mouth: Mucous membranes are moist.   Eyes:      Extraocular Movements: Extraocular movements intact.      Pupils: Pupils are equal, round, and reactive to light.      Comments: Normal gaze  No nystagmus  Visual field testing normal  Nondilated direct ophthalmoscope exam showed no abnormalities       Musculoskeletal:      Comments: Joints were evaluated with direct inspection, palpation and range of motion testing including: Shoulders, elbows, wrists, digits, knees, ankles and toes with NO signs for any soft tissue swelling, joint effusion/signs of synovitis.     Skin:     Findings: No rash.   Neurological:      General: No focal deficit present.      Mental Status: She is alert and oriented to person, place, and time.      Cranial Nerves: No cranial nerve deficit.      Sensory: No sensory deficit.      " Motor: No weakness.      Coordination: Coordination normal.      Gait: Gait normal.      Deep Tendon Reflexes: Reflexes normal.   Psychiatric:         Mood and Affect: Mood normal.         Behavior: Behavior normal.         ED Course        Procedures             No results found for this or any previous visit (from the past 24 hours).    Medications - No data to display    Assessments & Plan (with Medical Decision Making)  Mary is 29 years of age.  Her primary complaint today is that she has been having intermittent headaches with photophobia.  Also describes intermittent horizontal diplopia.  She had a normal neurological examination today.  Other complaint was continued complaint of joint pain and soft tissue discomfort.  Extensive workup was noted on her medical record that I reviewed with all primary care notes and rheumatology notes dating back over 2 years.  Reviewed all of her lab results.  Did not see anything emergent today to further investigate.  She is frustrated because of no specific recognized diagnosis.  Her primary concern is this complaint of diplopia.  Schedule an MRI of her brain and referred her back to primary care provider.  She apparently was dismissed from her previous provider so I did send a request to the clinic to set up a new clinic primary care provider.  This will be necessary to go over her MRI results pending results will determine if she needs to see neurology and/or ophthalmology..     I have reviewed the nursing notes.    I have reviewed the findings, diagnosis, plan and need for follow up with the patient.          Discharge Medication List as of 12/27/2024  5:15 PM          Final diagnoses:   Diplopia   Headache, unspecified headache type       12/27/2024   [facility] EMERGENCY DEPT       Victorino Spence, DO  12/28/24 0850

## 2024-12-30 ENCOUNTER — NURSE TRIAGE (OUTPATIENT)
Dept: FAMILY MEDICINE | Facility: OTHER | Age: 29
End: 2024-12-30

## 2024-12-30 ENCOUNTER — TRANSFERRED RECORDS (OUTPATIENT)
Dept: HEALTH INFORMATION MANAGEMENT | Facility: CLINIC | Age: 29
End: 2024-12-30

## 2024-12-30 ENCOUNTER — VIRTUAL VISIT (OUTPATIENT)
Dept: GASTROENTEROLOGY | Facility: CLINIC | Age: 29
End: 2024-12-30
Attending: PHYSICIAN ASSISTANT
Payer: COMMERCIAL

## 2024-12-30 ENCOUNTER — VIRTUAL VISIT (OUTPATIENT)
Dept: FAMILY MEDICINE | Facility: CLINIC | Age: 29
End: 2024-12-30
Attending: EMERGENCY MEDICINE
Payer: COMMERCIAL

## 2024-12-30 ENCOUNTER — TELEPHONE (OUTPATIENT)
Dept: GASTROENTEROLOGY | Facility: CLINIC | Age: 29
End: 2024-12-30

## 2024-12-30 DIAGNOSIS — R11.2 NAUSEA VOMITING AND DIARRHEA: ICD-10-CM

## 2024-12-30 DIAGNOSIS — H53.2 DIPLOPIA: ICD-10-CM

## 2024-12-30 DIAGNOSIS — R19.7 NAUSEA VOMITING AND DIARRHEA: ICD-10-CM

## 2024-12-30 DIAGNOSIS — R13.10 DYSPHAGIA, UNSPECIFIED TYPE: ICD-10-CM

## 2024-12-30 DIAGNOSIS — R10.11 RUQ ABDOMINAL PAIN: ICD-10-CM

## 2024-12-30 DIAGNOSIS — R14.0 ABDOMINAL BLOATING: Primary | ICD-10-CM

## 2024-12-30 DIAGNOSIS — R51.9 SEVERE HEADACHE: Primary | ICD-10-CM

## 2024-12-30 PROCEDURE — 99214 OFFICE O/P EST MOD 30 MIN: CPT | Mod: 95

## 2024-12-30 PROCEDURE — 99203 OFFICE O/P NEW LOW 30 MIN: CPT | Mod: 95 | Performed by: PHYSICIAN ASSISTANT

## 2024-12-30 NOTE — NURSING NOTE
Current patient location: [id number] [place] [id number]    Is the patient currently in the state of [place]? YES    Visit mode:VIDEO    If the visit is dropped, the patient can be reconnected by:VIDEO VISIT: Text to cell phone:   Telephone Information:   Mobile [phone]       Will anyone else be joining the visit? NO  (If patient encounters technical issues they should call [phone]    Are changes needed to the allergy or medication list? No    Are refills needed on medications prescribed by this physician? NO    Rooming Documentation:  Questionnaire(s) completed    Reason for visit: Consult    Emmanuel HICKMAN

## 2024-12-30 NOTE — PATIENT INSTRUCTIONS
It was a pleasure meeting with you today and discussing your healthcare plan. Below is a summary of what we covered:    --schedule abdominal x-ray.  --schedule video swallow study and esophagram.   --schedule breath testing for small intestinal bacterial overgrowth.   --continue to follow with nutrition.   --A referral was placed to our GI psychologist, Please call [phone] to schedule.  --please let me know when the results of testing from [facility] have returned.       Follow up in clinic in 3 months.        Please see below for any additional questions and scheduling guidelines.    Sign up for [employer]: [employer] patient portal serves as a secure platform for accessing your medical records from the [facility]. Additionally, [employer] facilitates easy, timely, and secure messaging with your care team. If you have not signed up, you may do so by using the provided code or calling [phone].    Coordinating your care after your visit:  There are multiple options for scheduling your follow-up care based on your provider's recommendation.    How do I schedule a follow-up clinic appointment:   After your appointment, you may receive scheduling assistance with the Clinic Coordinators by having a seat in the waiting room and a Clinic Coordinator will call you up to schedule.  Virtual visits or after you leave the clinic:  Your provider has placed a follow-up order in the [employer] portal for scheduling your return appointment. A member of the scheduling team will contact you to schedule.  [facility] Scheduling: Timely scheduling through [employer] is advised to ensure appointment availability.   Call to schedule: You may schedule your follow-up appointment(s) by calling [phone], option 1.    How do I schedule my endoscopy or colonoscopy procedure:  If a procedure, such as a colonoscopy or upper endoscopy was ordered by your provider, the scheduling team will contact you to schedule this procedure. Or you may  choose to call to schedule at   [phone], option 2.  Please allow 20-30 minutes when scheduling a procedure.    How do I get my blood work done? To get your blood work done, you need to schedule a lab appointment at an [facility]. There are multiple ways to schedule:   At the clinic: The Clinic Coordinator you meet after your visit can help you schedule a lab appointment.   [facility] scheduling: [facility] [facility] offers online lab scheduling at all [facility] laboratory locations.   Call to schedule: You can call [phone] to schedule your lab appointment.    How do I schedule my imaging study: To schedule imaging studies, such as CT scans, ultrasounds, MRIs, or X-rays, contact Imaging Services at [phone].    How do I schedule a referral to another doctor: If your provider recommended a referral to another specialist(s), the referral order was placed by your provider. You will receive a phone call to schedule this referral, or you may choose to call the number attached to the referral to self-schedule.    For Post-Visit Question(s):  For any inquiries following today's visit:  Please utilize [employer] messaging and allow 48 hours for reply or contact the Call Center during normal business hours at [phone], option 3.  For Emergent After-hours questions, contact the On-Call GI Fellow through the [facility]  at [phone].  In addition, you may contact your Nurse directly using the provided contact information.    Test Results:  Test results will be accessible via [employer] in compliance with the 21st Century Cures Act. This means that your results will be available to you at the same time as your provider. Often you may see your results before your provider does. Results are reviewed by staff within two weeks with communication follow-up. Results may be released in the patient portal prior to your care team review.    Prescription Refill(s):  Medication prescribed by your  provider will be addressed during your visit. For future refills, please coordinate with your pharmacy. If you have not had a recent clinic visit or routine labs, for your safety, your provider may not be able to refill your prescription.

## 2024-12-30 NOTE — TELEPHONE ENCOUNTER
I called the patient back to clarify where she wanted her orders sent. I faxed her imaging orders to [facility] radiology at [phone].  Alena Mcclain

## 2024-12-30 NOTE — TELEPHONE ENCOUNTER
[facility] Call Center    Phone Message    May a detailed message be left on voicemail: yes     Reason for Call: Order(s): Other:   Reason for requested: Labs to [facility]  Date needed: ASAP- 12-31-24  Provider name: Gwendolyn Call    Action Taken: Message routed to:  Adult Clinics: Gastroenterology (GI) p [id number]    Travel Screening: Not Applicable     Date of Service:

## 2024-12-30 NOTE — PROGRESS NOTES
Mary is a 29 year old who is being evaluated via a billable video visit.    How would you like to obtain your AVS? MyChart  If the video visit is dropped, the invitation should be resent by: Text to cell phone: [phone]  Will anyone else be joining your video visit? No      Assessment & Plan     (R51.9) Severe headache  (primary encounter diagnosis)  (H53.2) Diplopia  Normal head CT on 12/22 but ongoing severe headaches and worsening diplopia and darkness of vision. Possible history of migraine headaches in the past. Similar but worse. She currently is scheduled for brain MRI with and without contrast as ordered from ER on 01/08 but due to severity and worsening visionary changes will order stat imaging. Discussed importance of establish care with a primary provider in her area. She has a visit with Leon later this week, would like to make an appointment with [facility] in the future.   Plan: MR Brain w/o & w Contrast            Follow up to establish new primary care provider.     Subjective   Mary is a 29 year old, presenting for the following health issues:  Hospital F/U        12/30/2024     1:29 PM   Additional Questions   Roomed by Elina Ortega Start Time: 1:52 PM    hospitals     ED/UC Followup:  Facility:  [facility]   Date of visit: 12/22/2024 and 12/27/2024  Reason for visit: Diplopia, Headache  Current Status: intense headache, vision problem, balance problem, nose has been running liquid like water. Patient has a MRI of her brain on 1-8-2025, She wants to know if the order can be changed to STAT so she can get in sooner.       Review of Systems  Constitutional, HEENT, cardiovascular, pulmonary, gi and gu systems are negative, except as otherwise noted.        Objective             Physical Exam   GENERAL: alert and no distress  EYES: Eyes grossly normal to inspection.  No discharge or erythema, or obvious scleral/conjunctival abnormalities.  RESP: No  audible wheeze, cough, or visible cyanosis.    SKIN: Visible skin clear. No significant rash, abnormal pigmentation or lesions.  NEURO: Cranial nerves grossly intact.  Mentation and speech appropriate for age.  PSYCH: Appropriate affect, tone, and pace of words      Video-Visit Details    Type of service:  Video Visit     Video End Time:2:13 PM    Originating Location (pt. Location): Home  Distant Location (provider location):  On-site  Platform used for Video Visit: Omar    Signed Electronically by: Whitney Hamilton PA-C

## 2024-12-30 NOTE — PROGRESS NOTES
GI CLINIC VISIT    Virtual Visit Details    Type of service:  Video Visit   Video Start Time: 10:36 AM  Video End Time: 10:58AM    Originating Location (pt. Location): Home    Distant Location (provider location):  Off-site  Platform used for Video Visit: Omar IBARRA/REFERRING MD:  Ruiz Negrete      ASSESSMENT/PLAN:    #upper abdominal pain  #delayed gastric emptying  #abdominal bloating  Patient has had an extensive work up thus far for her symptoms, including CT abd/pelvis, EGD, RUQ US, HIDA scan, and GES (which did reveal mildly delayed gastric emptying). She has a few additional labs ordered through [facility] including celiac testing, fecal elastase, and fecal calprotectin that she needs to complete. She has not had improvement with prilosec. 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 At this time, will plan to obtain testing for SIBO, as she has not had this done. She has been following with a nutritionist with [facility] that has been somewhat helpful, and has been following a gastroparesis diet. Encouraged her to continue following nutrition recommendations. Could consider a trial of TCA in the future.  --obtain SIBO testing.   --continue to follow with nutrition  --f/up with GI health psychology      #inconsistent bowel pattern  #greasy stools  Patient reports an inconsistent bowel pattern for many years, often alternating between hard and loose stools. Finds that having a BM can worsen abdominal pain. She has a few additional stool studies ordered through [facility], so will await results of these (fecal calprotectin and fecal elastase). She had a colonoscopy in 2018 with random biopsies which were normal. Will plan to obtain AXR to evaluate stool burden - may benefit from a constipation plan.     #dysphagia  Reports  "dysphagia to solids and liquids, ongoing for many years. Reviewed previous EGD, otherwise unrevealing. Will obtain VSS and XR esophagram, could consider HRM pending above results.        RTC 3 months    Thank you for this consultation.  It was a pleasure to participate in the care of this patient; please contact us with any further questions.       42 minutes spent on the date of the encounter doing chart review, review of outside records, review of test results, patient visit, and documentation    This note was created with voice recognition software, and while reviewed for accuracy, typos may remain.    Gwendolyn Call PA-C  Division of Gastroenterology, Hepatology and Nutrition  [facility]  30 y/o F presents for evaluation of upper abdominal pain - previously seen by [facility] on 12/4/24.     Patient reports that she has had \"GI issues\" for some time.   She reports that she has always struggled \"to keep weight on\" - although weight is stable currently. Patient reports that as soon as she eats, she will develop upper abdominal pain, particularly on the right side of her abdomen. Generally starts after the second or third bite of eating, she will start to have pain, described as a \"sharp pressure\" feeling. Pain peaks in about 15 minutes, if she does not eat for a while, it will usually resolve within 4 hours. She also reports associated nausea and vomiting, but notes this has improved with doing a gastroparesis diet. She is taking prilosec 20mg daily - does not feel this has been helpful . She does endorse a \"burning\" sensation in her stomach, but denies any burning in her chest or throat.     Patient reports she has always struggled with inconsistent bowel patterns - alternating between constipation and diarrhea. Patient will generally have 1 BM daily, but when she is constipated stools are \"hard\" in nature. Patient feels that stools are very \"greasy and fatty " "appearing\". She will generally have 2 BM daily, described as a bristol type 6. . Denies BRBPR. She will take fiber gummies, a few times a year. She has seen a nutritionist through [facility]. Patient has previously tried miralax in the past - felt it worsened abdominal pain and wasn't helpful. She drinks 60-80 oz. She is working with a pelvic foor PT, as she does have known pelvic floor dysfunction. Patient reports painful abdominal bloating.     Patient has had an extensive work up this far for her symptoms including a CT abd/pelvis which was otherwise normal. She also had an EGD done in November 2024, which showed an irregular GE junction but biopsies were normal. Abdominal ultrasound and HIDA scan were normal as well. She did undergo a GES which showed mildly delayed gastric emptying. She had a colonoscopy in 2018 which was normal.       Denies NSAIDs or Tylenol. Does use herbal supplement called \"slipper elm bark\" and licorice root.     Rare alcohol use. Denies tobacco products. Patient uses topical THC on her abdomen - feel this helps with abdominal pain.      No family history of GI related malignancy (esophageal, gastric, pancreatic, liver or colon) or family history of IBD/celiac disease.       ROS:    No fevers or chills  No weight loss  No shortness of breath or wheezing  No chest pain or pressure  +dysphagia - has \"choked\" on both solids and liquids - has to be careful not to eat or drink too fast.   No BRBPR, hematochezia, melena  +anxiety and depression -- has been working with a therapist.      PROBLEM LIST  Patient Active Problem List    Diagnosis Date Noted    Encounter for triage in pregnant patient 07/11/2020     Priority: Medium    Panic attacks 05/25/2019     Priority: Medium    Delusion (H) 05/25/2019     Priority: Medium    Hallucinations 05/25/2019     Priority: Medium    Hip pain, left 10/17/2018     Priority: Medium    Hamstring tightness of left lower extremity 10/17/2018     Priority: Medium    " Shoulder instability, right 06/01/2018     Priority: Medium    Stiffness of hand joint, unspecified laterality 06/01/2018     Priority: Medium    Psoriatic arthritis (H) 01/25/2018     Priority: Medium    Allergic reaction to insect sting, accidental or unintentional, initial encounter 09/27/2017     Priority: Medium    Hypoglycemia, unspecified 07/10/2017     Priority: Medium    Sprain of lateral collateral ligament of right knee, initial encounter 06/21/2017     Priority: Medium    Segmental dysfunction of thoracic region 05/24/2017     Priority: Medium    Segmental dysfunction of lumbar region 05/24/2017     Priority: Medium    Segmental dysfunction of sacral region 05/24/2017     Priority: Medium    Flank pain 05/24/2017     Priority: Medium    Maltracking of right patella 01/05/2017     Priority: Medium    NENO (generalized anxiety disorder) 05/05/2016     Priority: Medium    PTSD (post-traumatic stress disorder) 05/05/2016     Priority: Medium    Anxiety state 07/07/2014     Priority: Medium     Problem list name updated by automated process. Provider to review      Major depression in complete remission (H) 07/07/2014     Priority: Medium    Migratory pain 10/13/2013     Priority: Medium    Psoriasis 07/23/2010     Priority: Medium       PERTINENT PAST MEDICAL HISTORY:    Past Medical History:   Diagnosis Date    Bell's palsy 7 y/o    Due to trauma, neg Lyme    Depression     Venice Meyer, psychologist, [facility] in [place]    Depressive disorder     Major depression in complete remission (H) 7/7/2014    Psoriasis     scalp only    Psoriatic arthritis (H)        PREVIOUS SURGERIES:    Past Surgical History:   Procedure Laterality Date    ADENOIDECTOMY      ARTHROSCOPY KNEE WITH RETINACULAR RELEASE Right 1/20/2017    Procedure: ARTHROSCOPY KNEE WITH RETINACULAR RELEASE MEDIAL OR LATERAL;  Surgeon: Yevgeniy Graf DO;  Location: [facility] OR    ARTHROSCOPY SHOULDER SUPERIOR LABRUM ANTERIOR TO POSTERIOR  REPAIR Right 7/6/2018    Procedure: ARTHROSCOPY SHOULDER SUPERIOR LABRUM ANTERIOR TO POSTERIOR REPAIR;  Right shoulder arthroscopy with arthroscopic labral repair;  Surgeon: Yevgeniy Graf DO;  Location:  OR    DENTAL SURGERY      MOUTH SURGERY      [facility] NONSPECIFIC PROCEDURE  02/12/01    adenoidectomy       PREVIOUS ENDOSCOPY:  See chart.    ALLERGIES:     Allergies   Allergen Reactions    Bee Venom Anaphylaxis    No Known Drug Allergy        PERTINENT MEDICATIONS:    Current Outpatient Medications:     baclofen (LIORESAL) 10 MG tablet, Take 5-10 mg by mouth., Disp: , Rfl:     EPINEPHrine (EPIPEN/ADRENACLICK/OR ANY BX GENERIC EQUIV) 0.3 MG/0.3ML injection 2-pack, Inject 0.3 mLs (0.3 mg) into the muscle as needed for anaphylaxis, Disp: 0.6 mL, Rfl: 1    hydrOXYzine HCl (ATARAX) 10 MG tablet, , Disp: , Rfl:     mirtazapine (REMERON) 15 MG tablet, Take 1 tablet (15 mg) by mouth at bedtime., Disp: 30 tablet, Rfl: 5    montelukast (SINGULAIR) 10 MG tablet, Take 1 tablet (10 mg) by mouth at bedtime., Disp: 30 tablet, Rfl: 3    Prenatal Vit-Fe Fumarate-FA (PRENATAL PO), , Disp: , Rfl:     Probiotic Product (PROBIOTIC PO), , Disp: , Rfl:     REGLAN 5 MG tablet, , Disp: , Rfl:     SOCIAL HISTORY:    Social History     Socioeconomic History    Marital status: Single     Spouse name: Not on file    Number of children: Not on file    Years of education: Not on file    Highest education level: Not on file   Occupational History    Not on file   Tobacco Use    Smoking status: Never    Smokeless tobacco: Never   Vaping Use    Vaping status: Never Used   Substance and Sexual Activity    Alcohol use: No     Alcohol/week: 0.0 standard drinks of alcohol    Drug use: No    Sexual activity: Yes     Partners: Male     Birth control/protection: Injection     Comment: single, no children.  work - home health care   Other Topics Concern    Parent/sibling w/ CABG, MI or angioplasty before 65F 55M? Not Asked   Social History  Narrative    Not on file     Social Drivers of Health     Financial Resource Strain: Low Risk  (12/4/2024)    Financial Resource Strain     Within the past 12 months, have you or your family members you live with been unable to get utilities (heat, electricity) when it was really needed?: No   Food Insecurity: Low Risk  (12/4/2024)    Food Insecurity     Within the past 12 months, did you worry that your food would run out before you got money to buy more?: No     Within the past 12 months, did the food you bought just not last and you didn t have money to get more?: No   Transportation Needs: Low Risk  (12/4/2024)    Transportation Needs     Within the past 12 months, has lack of transportation kept you from medical appointments, getting your medicines, non-medical meetings or appointments, work, or from getting things that you need?: No   Physical Activity: Not on File (12/13/2023)    Received from [facility]    Physical Activity     Physical Activity: 0   Stress: Not on File (12/13/2023)    Received from [facility]    Stress     Stress: 0   Social Connections: Not on File (9/15/2024)    Received from [facility]    Social Connections     Connectedness: 0   Interpersonal Safety: Not on file   Housing Stability: Low Risk  (12/4/2024)    Housing Stability     Do you have housing? : Yes     Are you worried about losing your housing?: No       FAMILY HISTORY:  FH of CRC: none  FH of IBD: none  Family History   Problem Relation Age of Onset    Other - See Comments Father         benign nerve spinal tumor     Hypertension Father     Diabetes Maternal Grandmother     Depression Maternal Grandmother     Psoriasis Maternal Grandfather     Seizure Disorder Brother         epilepsy    Psoriasis Maternal Aunt     Psoriasis Maternal Aunt     Psoriasis Maternal Aunt        Past/family/social history reviewed and no changes    PHYSICAL EXAMINATION:  General: Patient appears well in no acute distress.   Skin: No visualized rash or lesions on  visualized skin  Eyes: EOMI, no erythema, sclera icterus or discharge noted  Resp: Appears to be breathing comfortably without accessory muscle usage, speaking in full sentences, no cough  MSK: Appears to have normal range of motion based on visualized movements  Neurologic: No apparent tremors, facial movements symmetric  Psych: normal affect , alert and oriented        PERTINENT STUDIES:    Lab on 10/30/2024   Component Date Value Ref Range Status    Campylobacter species 10/30/2024 Negative  Negative Final    Salmonella species 10/30/2024 Negative  Negative Final    Vibrio species 10/30/2024 Negative  Negative Final    Vibrio cholerae 10/30/2024 Negative  Negative Final    Yersinia enterocolitica 10/30/2024 Negative  Negative Final    Enteropathogenic E. coli (EPEC) 10/30/2024 Negative  Negative, NA Final    Shiga-like toxin-producing E. coli* 10/30/2024 Negative  Negative Final    Shigella/Enteroinvasive E. coli (E* 10/30/2024 Negative  Negative Final    Cryptosporidium species 10/30/2024 Negative  Negative Final    Giardia lamblia 10/30/2024 Negative  Negative Final    Norovirus Gl/Gll 10/30/2024 Negative  Negative Final    Rotavirus A 10/30/2024 Negative  Negative Final    Plesiomonas shigelloides 10/30/2024 Negative  Negative Final    Enteroaggregative E. coli (EAEC) 10/30/2024 Negative  Negative Final    Enterotoxigenic E. coli (ETEC) 10/30/2024 Negative  Negative Final    E. coli O157 10/30/2024 NA  Negative, NA Final    Cyclospora cayetanensis 10/30/2024 Negative  Negative Final    Entamoeba histolytica 10/30/2024 Negative  Negative Final    Adenovirus F40/41 10/30/2024 Negative  Negative Final    Astrovirus 10/30/2024 Negative  Negative Final    Sapovirus 10/30/2024 Negative  Negative Final

## 2024-12-30 NOTE — TELEPHONE ENCOUNTER
S-(situation): Patient is calling and stated she was seen in the ED on 12/22/2024 and 12/27/2024 with a severe headache with additional symptoms of pressure within her body and head, multiple vision symptoms and balance symptoms.  She stated she had a CT while at the ED, but was ordered an MRI for a later time.  She stated she is unable to wait until the scheduled MRI on 1/9/2024 as her headache at this time is moderate and she is having head pressure, dizziness and her vision is getting worse.     B-(background): Patient stated headaches most of her life, recently in past 3 months getting worse with increase of other symptoms.    R-(recommendations): Per RN protocol, patient advised to go to ED/ [facility] nor or to office with PCP approval.  Patient does not have a Primary care provider with [facility] at this time.  Advised patient to seek emergency care at this time.  Patient stated understanding.  Advised patient to call back at a later time to schedule an establish care with a provider at the clinic of her choice for further follow up and treatments on her health.  Patient stated understanding.    Reason for Disposition   Patient sounds very sick or weak to the triager    Additional Information   Negative: Difficult to awaken or acting confused (e.g., disoriented, slurred speech)   Negative: Weakness of the face, arm or leg on one side of the body and new-onset   Negative: Numbness of the face, arm or leg on one side of the body and new-onset   Negative: Loss of speech or garbled speech and new-onset   Negative: Passed out (e.g., fainted, lost consciousness, blacked out and was not responding)   Negative: Sounds like a life-threatening emergency to the triager   Negative: Followed a head injury within last 3 days   Negative: Traumatic Brain Injury (TBI) is suspected   Negative: Sinus pain or congestion is main symptom(s)   Negative: Influenza suspected (i.e., cough, fever, other respiratory symptoms; probable influenza  "exposure)   Negative: Pregnant   Negative: Unable to walk without falling   Negative: Stiff neck (can't touch chin to chest)   Negative: Other family members (or people in same household) with headaches and possibility of carbon monoxide exposure   Negative: SEVERE headache, states 'worst headache' of life   Negative: SEVERE headache, sudden-onset (i.e., reaching maximum intensity within seconds to 1 hour)   Negative: Severe pain in one eye   Negative: Loss of vision or double vision (Exception: Same as previously diagnosed migraines.)    Answer Assessment - Initial Assessment Questions  1. LOCATION: \"Where does it hurt?\"   Back of Head and neck diagnal into both eyes with intense pressure    2. ONSET: \"When did the headache start?\" (e.g., minutes, hours, days)       Approximately 3 months ago    3. PATTERN: \"Does the pain come and go, or has it been constant since it started?\"      Constant    4. SEVERITY: \"How bad is the pain?\" and \"What does it keep you from doing?\"  (e.g., Scale 1-10; mild, moderate, or severe)      6-10/10    5. RECURRENT SYMPTOM: \"Have you ever had headaches before?\" If Yes, ask: \"When was the last time?\" and \"What happened that time?\"       Yes, - in ED and a throughout life    6. CAUSE: \"What do you think is causing the headache?\"      Unknown    7. MIGRAINE: \"Have you been diagnosed with migraine headaches?\" If Yes, ask: \"Is this headache similar?\"       Yes - the first ED recently    8. HEAD INJURY: \"Has there been any recent injury to the head?\"       No    9. OTHER SYMPTOMS: \"Do you have any other symptoms?\" (e.g., fever, stiff neck, eye pain, sore throat, cold symptoms)      Vision blurry, double vision, constant ORA's, vision dark, vision sensitive to light, balance, falling, dizziness, bad pressure all over body, face visibly swollen, hearing has whooshing, pulse in eyes, pressure on lungs.    10. PREGNANCY: \"Is there any chance you are pregnant?\" \"When was your last menstrual " "period?\"        Unknown    Protocols used: Headache-A-OH  Gisela Rogers RN    "

## 2024-12-30 NOTE — TELEPHONE ENCOUNTER
[facility]    Phone Message    May a detailed message be left on voicemail: yes     Reason for Call: Order(s): Other:   Reason for requested: Imaging Orders. Please Send to [facility] [place] or [place], This Location is Unable to Complete Orders  Date needed: ASAP  Provider name: Gwendolyn Call     Action Taken: Message routed to:  [facility] GI    Travel Screening: Not Applicable     Date of Service:

## 2024-12-31 ENCOUNTER — HOSPITAL ENCOUNTER (OUTPATIENT)
Dept: MRI IMAGING | Facility: CLINIC | Age: 29
Discharge: HOME | End: 2024-12-31
Payer: COMMERCIAL

## 2024-12-31 ENCOUNTER — TELEPHONE (OUTPATIENT)
Dept: GASTROENTEROLOGY | Facility: CLINIC | Age: 29
End: 2024-12-31
Payer: COMMERCIAL

## 2024-12-31 DIAGNOSIS — H53.2 DIPLOPIA: ICD-10-CM

## 2024-12-31 DIAGNOSIS — R51.9 SEVERE HEADACHE: ICD-10-CM

## 2024-12-31 PROCEDURE — 70553 MRI BRAIN STEM W/O & W/DYE: CPT

## 2024-12-31 PROCEDURE — 255N000002 HC RX 255 OP 636

## 2024-12-31 PROCEDURE — A9585 GADOBUTROL INJECTION: HCPCS

## 2024-12-31 RX ORDER — GADOBUTROL 604.72 MG/ML
7 INJECTION INTRAVENOUS ONCE
Status: COMPLETED | OUTPATIENT
Start: 2024-12-31 | End: 2024-12-31

## 2024-12-31 RX ADMIN — GADOBUTROL 7 ML: 604.72 INJECTION INTRAVENOUS at 09:04

## 2024-12-31 NOTE — TELEPHONE ENCOUNTER
I left a detailed message letting patient know that the [facility] does not do the XR Video Swallow test. The [facility] told us to fax the imaging orders to [facility] in [place] at [phone]. I let the patient know that we faxed her orders to [facility] in [place] and to call or [employer] message us if she needs the orders faxed anywhere else and include the name and fax number.  Alena Mcclain

## 2024-12-31 NOTE — TELEPHONE ENCOUNTER
I faxed imaging orders to [facility] at [phone]. I sent a tsumobi message to the patient letting her know that [facility] does not do the X-ray video swallow test and let her know that I faxed the orders to [facility] in [place].  Alena Mcclain

## 2025-01-02 ENCOUNTER — TELEPHONE (OUTPATIENT)
Dept: GASTROENTEROLOGY | Facility: CLINIC | Age: 30
End: 2025-01-02
Payer: COMMERCIAL

## 2025-01-02 NOTE — TELEPHONE ENCOUNTER
Non-Invasive GI Procedure Scheduling Questionnaire    Scheduling Reminders:  Add-on within 2 weeks require clinic approval (Manometry & HBT)  Manometry requires an in-person interrupter (not needed for HBT)      Have you had any respiratory illness or flu-like symptoms in the last 10 days?  No    Are you active on MyChart?   Yes    What insurance is in the chart?  Other:  BCBS    Ordering/Referring Provider: Gwendolyn Call PA-C in [facility]   (If ordering provider performs procedure, schedule with ordering provider unless otherwise instructed. )    (Females) Are you currently pregnant? No - Okay to continue scheduling.    Have you ever had or are you awaiting a heart or lung transplant? No - Schedule next available.    Do you need assistance transferring? No - Continue scheduling.    Do you take acid reflux medication or proton-pump inhibitors (PPI)? No - Continue scheduling.    Patient Reminders:  Please inform patients to review instructions sent via [employer] or letter two weeks before procedure.  The Manometry exam may take up to 90 minutes.  The Breath Test exam may take up to 4 hours.

## 2025-01-22 ENCOUNTER — HOSPITAL ENCOUNTER (EMERGENCY)
Facility: CLINIC | Age: 30
End: 2025-01-22
Payer: COMMERCIAL

## 2025-01-27 ENCOUNTER — TELEPHONE (OUTPATIENT)
Dept: GASTROENTEROLOGY | Facility: CLINIC | Age: 30
End: 2025-01-27
Payer: COMMERCIAL

## 2025-01-27 NOTE — TELEPHONE ENCOUNTER
Non-Invasive GI Procedure Scheduling Questionnaire    Scheduling Reminders:  Add-on within 2 weeks require clinic approval (Manometry & HBT)  Manometry requires an in-person interrupter (not needed for HBT)      Have you had any respiratory illness or flu-like symptoms in the last 10 days?  No    Are you active on MyChart?   Yes    What insurance is in the chart?  Other:  BCBS    Ordering/Referring Provider: Gwendolyn Call PA-C in [facility]     (If ordering provider performs procedure, schedule with ordering provider unless otherwise instructed. )    (Females) Are you currently pregnant? No - Okay to continue scheduling.    Have you ever had or are you awaiting a heart or lung transplant? No - Schedule next available.    Do you need assistance transferring? No - Continue scheduling.    Do you take acid reflux medication or proton-pump inhibitors (PPI)? No - Continue scheduling.    Patient Reminders:  Please inform patients to review instructions sent via [employer] or letter two weeks before procedure.  The Manometry exam may take up to 90 minutes.  The Breath Test exam may take up to 4 hours.

## 2025-02-13 ENCOUNTER — DOCUMENTATION ONLY (OUTPATIENT)
Dept: GASTROENTEROLOGY | Facility: CLINIC | Age: 30
End: 2025-02-13

## 2025-02-13 NOTE — PROGRESS NOTES
Called the patient to reschedule the HBT  appointment . Patient did not answer the call. VML with the detail reason and the provided our scheduling phone number. The writer will call in 24 hours or delegate the mandate to coworkers.    Zenon Bal MA

## 2025-02-18 ENCOUNTER — DOCUMENTATION ONLY (OUTPATIENT)
Dept: GASTROENTEROLOGY | Facility: CLINIC | Age: 30
End: 2025-02-18

## 2025-04-01 ENCOUNTER — TELEPHONE (OUTPATIENT)
Dept: CARDIOLOGY | Facility: CLINIC | Age: 30
End: 2025-04-01
Payer: COMMERCIAL

## 2025-04-01 NOTE — TELEPHONE ENCOUNTER
Left Voicemail (1st Attempt) for the patient to call back and schedule the following:    Appointment type:  [facility]   Provider: janna   Return date: 07/08/25  Specialty phone number: [phone] opt 1   Additional appointment(s) needed: n/a   Additonal Notes: reschedule next available

## 2025-04-07 ENCOUNTER — TELEPHONE (OUTPATIENT)
Dept: GASTROENTEROLOGY | Facility: CLINIC | Age: 30
End: 2025-04-07

## 2025-04-07 NOTE — TELEPHONE ENCOUNTER
RECORDS RECEIVED FROM:    DATE RECEIVED:    NOTES STATUS DETAILS   OFFICE NOTE from referring provider  Internal Dr. Godinez 12/4/24 FM   OFFICE NOTE from other cardiologists  N/A    RECORDS from hospital/ED N/A    MEDICATION LIST Internal    GENERAL CARDIO RECORDS   (ALL APPOINTMENT TYPES)     LABS (CBC,BMP,CMP, TSH) Internal    EKG (STRIPS & REPORTS) Internal 10/26/24   MONITORS (STRIPS & REPORTS) Care Everywhere 2/28/25 in [facility]   ECHOS (IMAGES AND REPORTS) N/A    STRESS TESTS (IMAGES AND REPORTS) N/A    cMRI (IMAGES AND REPORTS) N/A    Cardiac cath (IMAGES AND REPORTS) N/A    CT/CTA (IMAGES AND REPORTS) N/A

## 2025-04-07 NOTE — TELEPHONE ENCOUNTER
I left a voicemail asking the patient to reschedule today's appointment for after she completes her tests for breath test, Xray, video swallow and esophagram with phone numbers to schedule.  Alena Mcclain

## 2025-04-07 NOTE — TELEPHONE ENCOUNTER
Attempted to reach patient again to reschedule today's appointment for a later date after she completed her tests.  Left phone numbers.  Alena Mcclain

## 2025-04-21 ENCOUNTER — TELEPHONE (OUTPATIENT)
Dept: CARDIOLOGY | Facility: CLINIC | Age: 30
End: 2025-04-21
Payer: COMMERCIAL

## 2025-04-21 NOTE — TELEPHONE ENCOUNTER
Left Voicemail (1st Attempt) for the patient to call back and schedule the following:    Appointment type:  new cardio  Provider: janna  Return date: 07/02/25  Specialty phone number: [phone] opt 1   Additional appointment(s) needed: n/a   Additonal Notes:  please reschedule to next available

## 2025-05-08 ENCOUNTER — TRANSFERRED RECORDS (OUTPATIENT)
Dept: HEALTH INFORMATION MANAGEMENT | Facility: CLINIC | Age: 30
End: 2025-05-08
Payer: COMMERCIAL

## 2025-05-15 ENCOUNTER — TRANSCRIBE ORDERS (OUTPATIENT)
Dept: OTHER | Age: 30
End: 2025-05-15

## 2025-05-15 DIAGNOSIS — H50.9 STRABISMUS: Primary | ICD-10-CM

## 2025-05-19 ENCOUNTER — PATIENT OUTREACH (OUTPATIENT)
Dept: CARE COORDINATION | Facility: CLINIC | Age: 30
End: 2025-05-19
Payer: COMMERCIAL

## 2025-06-04 ENCOUNTER — HOSPITAL ENCOUNTER (EMERGENCY)
Facility: CLINIC | Age: 30
Discharge: HOME | End: 2025-06-04
Attending: EMERGENCY MEDICINE | Admitting: EMERGENCY MEDICINE
Payer: COMMERCIAL

## 2025-06-04 VITALS
WEIGHT: 145 LBS | BODY MASS INDEX: 21.48 KG/M2 | TEMPERATURE: 98.5 F | HEIGHT: 69 IN | SYSTOLIC BLOOD PRESSURE: 117 MMHG | DIASTOLIC BLOOD PRESSURE: 72 MMHG | RESPIRATION RATE: 18 BRPM | HEART RATE: 54 BPM | OXYGEN SATURATION: 100 %

## 2025-06-04 DIAGNOSIS — L40.50 PSORIATIC ARTHRITIS (H): ICD-10-CM

## 2025-06-04 DIAGNOSIS — Q79.60 EHLERS-DANLOS SYNDROME: ICD-10-CM

## 2025-06-04 DIAGNOSIS — R53.1 GENERALIZED WEAKNESS: ICD-10-CM

## 2025-06-04 LAB
ALBUMIN SERPL BCG-MCNC: 4.5 G/DL (ref 3.5–5.2)
ALP SERPL-CCNC: 39 U/L (ref 40–150)
ALT SERPL W P-5'-P-CCNC: 18 U/L (ref 0–50)
ANION GAP SERPL CALCULATED.3IONS-SCNC: 13 MMOL/L (ref 7–15)
AST SERPL W P-5'-P-CCNC: 20 U/L (ref 0–45)
BASOPHILS # BLD AUTO: 0 10E3/UL (ref 0–0.2)
BASOPHILS NFR BLD AUTO: 1 %
BILIRUB SERPL-MCNC: 0.4 MG/DL
BUN SERPL-MCNC: 11.7 MG/DL (ref 6–20)
CALCIUM SERPL-MCNC: 9.5 MG/DL (ref 8.8–10.4)
CHLORIDE SERPL-SCNC: 106 MMOL/L (ref 98–107)
CK SERPL-CCNC: 122 U/L (ref 26–192)
CREAT SERPL-MCNC: 0.74 MG/DL (ref 0.51–0.95)
CRP SERPL-MCNC: <3 MG/L
EGFRCR SERPLBLD CKD-EPI 2021: >90 ML/MIN/1.73M2
EOSINOPHIL # BLD AUTO: 0 10E3/UL (ref 0–0.7)
EOSINOPHIL NFR BLD AUTO: 0 %
ERYTHROCYTE [DISTWIDTH] IN BLOOD BY AUTOMATED COUNT: 13.2 % (ref 10–15)
ERYTHROCYTE [SEDIMENTATION RATE] IN BLOOD BY WESTERGREN METHOD: 2 MM/HR (ref 0–20)
GLUCOSE SERPL-MCNC: 88 MG/DL (ref 70–99)
HCO3 SERPL-SCNC: 20 MMOL/L (ref 22–29)
HCT VFR BLD AUTO: 36.3 % (ref 35–47)
HGB BLD-MCNC: 12 G/DL (ref 11.7–15.7)
IMM GRANULOCYTES # BLD: 0 10E3/UL
IMM GRANULOCYTES NFR BLD: 1 %
LYMPHOCYTES # BLD AUTO: 1.7 10E3/UL (ref 0.8–5.3)
LYMPHOCYTES NFR BLD AUTO: 20 %
MCH RBC QN AUTO: 29.6 PG (ref 26.5–33)
MCHC RBC AUTO-ENTMCNC: 33.1 G/DL (ref 31.5–36.5)
MCV RBC AUTO: 90 FL (ref 78–100)
MONOCYTES # BLD AUTO: 0.5 10E3/UL (ref 0–1.3)
MONOCYTES NFR BLD AUTO: 6 %
NEUTROPHILS # BLD AUTO: 6.3 10E3/UL (ref 1.6–8.3)
NEUTROPHILS NFR BLD AUTO: 73 %
NRBC # BLD AUTO: 0 10E3/UL
NRBC BLD AUTO-RTO: 0 /100
PLATELET # BLD AUTO: 171 10E3/UL (ref 150–450)
POTASSIUM SERPL-SCNC: 4 MMOL/L (ref 3.4–5.3)
PROT SERPL-MCNC: 7.2 G/DL (ref 6.4–8.3)
RBC # BLD AUTO: 4.05 10E6/UL (ref 3.8–5.2)
SODIUM SERPL-SCNC: 139 MMOL/L (ref 135–145)
WBC # BLD AUTO: 8.6 10E3/UL (ref 4–11)

## 2025-06-04 PROCEDURE — 86140 C-REACTIVE PROTEIN: CPT | Performed by: EMERGENCY MEDICINE

## 2025-06-04 PROCEDURE — 99283 EMERGENCY DEPT VISIT LOW MDM: CPT | Performed by: EMERGENCY MEDICINE

## 2025-06-04 PROCEDURE — 82550 ASSAY OF CK (CPK): CPT | Performed by: EMERGENCY MEDICINE

## 2025-06-04 PROCEDURE — 82247 BILIRUBIN TOTAL: CPT | Performed by: EMERGENCY MEDICINE

## 2025-06-04 PROCEDURE — 85652 RBC SED RATE AUTOMATED: CPT | Performed by: EMERGENCY MEDICINE

## 2025-06-04 PROCEDURE — 36415 COLL VENOUS BLD VENIPUNCTURE: CPT | Performed by: EMERGENCY MEDICINE

## 2025-06-04 PROCEDURE — 85004 AUTOMATED DIFF WBC COUNT: CPT | Performed by: EMERGENCY MEDICINE

## 2025-06-04 PROCEDURE — 99284 EMERGENCY DEPT VISIT MOD MDM: CPT | Performed by: EMERGENCY MEDICINE

## 2025-06-04 ASSESSMENT — COLUMBIA-SUICIDE SEVERITY RATING SCALE - C-SSRS
1. IN THE PAST MONTH, HAVE YOU WISHED YOU WERE DEAD OR WISHED YOU COULD GO TO SLEEP AND NOT WAKE UP?: NO
2. HAVE YOU ACTUALLY HAD ANY THOUGHTS OF KILLING YOURSELF IN THE PAST MONTH?: NO
6. HAVE YOU EVER DONE ANYTHING, STARTED TO DO ANYTHING, OR PREPARED TO DO ANYTHING TO END YOUR LIFE?: NO

## 2025-06-04 ASSESSMENT — ACTIVITIES OF DAILY LIVING (ADL)
ADLS_ACUITY_SCORE: 41
ADLS_ACUITY_SCORE: 41

## 2025-06-04 NOTE — DISCHARGE INSTRUCTIONS
It was nice to see you today.  I am sorry you are going through all of this.  I would recommend the followin.  Follow-up for MRIs as ordered tomorrow  2.  Follow-up with neuro-ophthalmology  3.  Follow-up with rheumatology and have a discussion regarding treatment of your psoriatic arthritis.  4.  Revisit your symptoms with primary care to see if anything else is recommended at this time.    I hope you have a better rest of the day.

## 2025-06-04 NOTE — ED PROVIDER NOTES
HPI    Mary presents with generalized weakness and numbness in the legs and arms, which has been progressively worsening. She reports difficulty standing and dressing, and experiences significant fatigue. She also notes hearing loss, double vision, and widespread pain, described as nerve pain originating from the spine with random shooting pains. The symptoms have been rapidly worsening, particularly the hearing loss and weakness. 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 She reports constant headaches since a chiropractor adjustment in September, which have not resolved despite treatment. Visual disturbances and double vision have been ongoing and worsening, affecting her ability to drive.    Note at last rheumatology visit:  Psoriatic arthritis  -Previous follow-up with rheumatology where she was being treated with Humira however this was discontinued after 2-3 doses due to adverse effects (despite skin improvement) including   -Currently not being managed by any rheumatologist and she prefers conservative management including tape, tiger balm, anti-inflammatories and exercise for relief   -She had mild guttate lesions on her bilateral antecubital fossa and forearms         Medications/alleviating factors    Mary has been trying to manage her symptoms by maintaining a healthy lifestyle and doing physical therapy, but reports that these efforts have not alleviated her symptoms. She previously tried Humira for psoriatic arthritis but discontinued it due to feeling unwell.    ROS: 10 point review of systems performed and is otherwise negative    Positive findings include generalized weakness, numbness in legs and arms, difficulty standing, hearing loss, double vision, widespread pain, constant headaches, and visual disturbances.    Exam    - Vitals reviewed slightly hypertensive   - GEN: Appears weak  - HEENT: Throat clear, nose clear, eyes normal  - Neck: Full  range of motion  - CV: Regular rhythm  - Respiratory: Lungs clear, no wheezes or crackles  - Abdomen: No distension, non-tender  -MSK: Moves all extremities normally, is able to walk in here, can do straight leg raise bilaterally to 5 seconds without significant difficulty.  - Neurologic: Alert and oriented x 3, motor and sensory grossly intact  - Psychiatric: Normal mood, no obvious psychiatric disturbance    pertinent past medical history    Dario-Danlos Syndrome (EDS), psoriatic arthritis, gastroparesis, positive GURU, history of chiropractor adjustment leading to persistent headaches.    Diagnostics    Labs Ordered and Resulted from Time of ED Arrival to Time of ED Departure   COMPREHENSIVE METABOLIC PANEL - Abnormal       Result Value    Sodium 139      Potassium 4.0      Carbon Dioxide (CO2) 20 (*)     Anion Gap 13      Urea Nitrogen 11.7      Creatinine 0.74      GFR Estimate >90      Calcium 9.5      Chloride 106      Glucose 88      Alkaline Phosphatase 39 (*)     AST 20      ALT 18      Protein Total 7.2      Albumin 4.5      Bilirubin Total 0.4     ERYTHROCYTE SEDIMENTATION RATE AUTO - Normal    Erythrocyte Sedimentation Rate 2     CRP INFLAMMATION - Normal    CRP Inflammation <3.00     CK TOTAL - Normal         CBC WITH PLATELETS AND DIFFERENTIAL    WBC Count 8.6      RBC Count 4.05      Hemoglobin 12.0      Hematocrit 36.3      MCV 90      MCH 29.6      MCHC 33.1      RDW 13.2      Platelet Count 171      % Neutrophils 73      % Lymphocytes 20      % Monocytes 6      % Eosinophils 0      % Basophils 1      % Immature Granulocytes 1      NRBCs per 100 WBC 0      Absolute Neutrophils 6.3      Absolute Lymphocytes 1.7      Absolute Monocytes 0.5      Absolute Eosinophils 0.0      Absolute Basophils 0.0      Absolute Immature Granulocytes 0.0      Absolute NRBCs 0.0               medical decision making    Suspected etiology: Possible rheumatologic or neurologic condition exacerbating symptoms.  Supporting evidence includes history of EDS, psoriatic arthritis, and positive GURU. Differential diagnosis includes myositis, systemic autoimmune disorder, or neurological disorder such as MS.  She is not really having any acute symptoms just progression of that diagnostically difficult medical syndrome.  Certainly I would recommend that she sees rheumatology again since she has a known diagnosis of psoriatic arthritis but currently is not on any treatment because she did not like the treatment that was given to her before and they did not offer alternatives.  She has already had significant amount of imaging and laboratory workup therefore it is difficult to say whether any laboratory workup is indicated today.  Due to her progressive weakness and tenderness palpation of her large muscle groups I decided to get a CPK which has been done before and was normal along with CBC CMP sed rate CRP.  I do not think there is much more we can offer at this time.    Laboratory evaluation was unremarkable    Assessment and plan    29-year-old female with longstanding difficult to diagnose constellation of systemic symptoms.  Her symptoms have worsened and she is uncertain as to what to do and therefore presents to the emergency department.  I have reviewed her chart extensively and she has had voluminous laboratory evaluations, most of which were normal.  Previous CPK was negative.  Patient did have positive GURU and had a full rheumatologic panel and was diagnosed with psoriatic arthritis     Etiology for constellation of symptoms is difficult to definitively say.definitively say.      1. Suspected rheumatologic or neurologic disorder   -recommend follow up with rheumatology and neurology. Find out from rheumatology what other options are available given difficulty tolerating previous medications.    2. Visual disturbances possibly related to systemic condition.     - Plan: Follow up with neuro-ophthalmology, appointment  already scheduled.    3. Follow up for MRI's tomorrow as planned.     4. Discharge home with follow-up plan.     - No acute findings requiring imaging, new treatment, admission.     - Follow up with primary care and specialists as scheduled.    return precautions    Mary should return to the ER if she experiences increased weakness, new or worsening neurological symptoms, severe pain, or any other concerning symptoms.     Victorino Lipscomb MD  06/04/25 1057

## 2025-06-04 NOTE — ED TRIAGE NOTES
PT comes in w/ weakness that has been going in for months, numbness in her buttocks and groin area, double vision, urinary incontinence. Symptoms have been going on for a few months, but have been getting worse. She has an MRI scheduled for tomorrow. Her primary provider told her to come in.

## 2025-06-12 ENCOUNTER — OFFICE VISIT (OUTPATIENT)
Dept: OPHTHALMOLOGY | Facility: CLINIC | Age: 30
End: 2025-06-12
Attending: OPHTHALMOLOGY
Payer: COMMERCIAL

## 2025-06-12 DIAGNOSIS — H53.2 DIPLOPIA: Primary | ICD-10-CM

## 2025-06-12 DIAGNOSIS — R51.9 CHRONIC DAILY HEADACHE: ICD-10-CM

## 2025-06-12 DIAGNOSIS — H53.19 VISUAL SNOW SYNDROME: ICD-10-CM

## 2025-06-12 PROCEDURE — G0463 HOSPITAL OUTPT CLINIC VISIT: HCPCS | Performed by: OPHTHALMOLOGY

## 2025-06-12 ASSESSMENT — VISUAL ACUITY
OD_CC: 20/20
CORRECTION_TYPE: GLASSES
OS_CC: 20/20

## 2025-06-12 ASSESSMENT — CONF VISUAL FIELD
OS_NORMAL: 1
OS_SUPERIOR_TEMPORAL_RESTRICTION: 0
OS_SUPERIOR_NASAL_RESTRICTION: 0
OD_NORMAL: 1
OD_INFERIOR_TEMPORAL_RESTRICTION: 0
METHOD: COUNTING FINGERS
OS_INFERIOR_NASAL_RESTRICTION: 0
OD_SUPERIOR_TEMPORAL_RESTRICTION: 0
OD_SUPERIOR_NASAL_RESTRICTION: 0
OD_INFERIOR_NASAL_RESTRICTION: 0
OS_INFERIOR_TEMPORAL_RESTRICTION: 0

## 2025-06-12 ASSESSMENT — TONOMETRY
OD_IOP_MMHG: 22
OS_IOP_MMHG: 18
IOP_METHOD: ICARE

## 2025-06-12 ASSESSMENT — REFRACTION_WEARINGRX
OS_SPHERE: -1.25
OD_CYLINDER: +0.50
OD_AXIS: 115
OS_CYLINDER: +0.50
OS_AXIS: 065
OD_SPHERE: -1.25

## 2025-06-12 ASSESSMENT — EXTERNAL EXAM - RIGHT EYE: OD_EXAM: NORMAL

## 2025-06-12 ASSESSMENT — EXTERNAL EXAM - LEFT EYE: OS_EXAM: NORMAL

## 2025-06-12 ASSESSMENT — SLIT LAMP EXAM - LIDS
COMMENTS: NORMAL
COMMENTS: NORMAL

## 2025-06-12 ASSESSMENT — CUP TO DISC RATIO
OS_RATIO: 0.15
OD_RATIO: 0.15

## 2025-06-12 NOTE — LETTER
2025     RE:  :  MRN: Mary Valdez  1995  [id number]     Dear Dr. Henriquez    Thank you for asking me to see your very pleasant patient,Mary Valdez, in neuro-ophthalmic consultation.  I would like to thank you for sending your records and I have summarized them in the history of present illness.   My assessment and plan are below.  For further details, please see my attached clinic note.      Mary Valdez is a 29 year old female with the following diagnoses:   1. Diplopia    2. Visual snow syndrome    3. Chronic daily headache       Patient was sent for consultation by Hakeem Henriquez, OD for double vision    HPI:  29 F with pmhx Ehler's Danlos, migraines with visual aura, anxiety, depression, and psoriasis presenting for evaluation of double vision. She was previously seen by Dr. Mason in 2016 (see below). Double vision started a few months after a chiropractor's appointment (24). She sees double even when one eye is closed and it does not matter which eye. She also notes that her vision gets worse overall the more that her vision is tested. Her double vision is constant. It was initially intermittent.  There is some asymmetry between the eyes. There is not much variability.      She notes tv static in her vision as well. Feels that colors are less saturated, that colors are not as vibrant as they should be.     She notes pain with rightward gaze and feels this right behind her eyes  Constant headache since chiropractor adjustment (24), describes as a baseline 4 or 5 /10 up to 10/10 with exercise or strain such as bearing down (bathroom, picking up kids), associated with aura approx 1 per hour  Endorses positive visual phenomena of things squiggling when gazing at a blank surface and palinopsias  She also states that she has worsened hearing and feels that she falls to the right sometimes and this is worsened when moving her eyes    Endorses high pitched tinnitus  and pulsatile tinnitus. Denies binocular double vision, TVO's, or changes in color vision.      Independent historians: Patient    Review of outside testing:  - MRI Brain w/w/o Contrast 12/31/24:   IMPRESSION: No focal/structural finding to account for patient's  reported symptomatology. Negative for acute intracranial hemorrhage,  infarct, hydrocephalus or mass.    My interpretation performed today of outside testing: This is unremarkable    Review of outside clinical notes:  Visit with Hakeem Henriquez, OD on 5/8/25:       - Visit with Dr. Mason on 5/12/2016:  Assessment & Plan      Mary Valdez is an 20 year old female with the following diagnoses:   1. Migraine with aura and without status migrainosus, not intractable    2. Subjective visual disturbance       3 month history of new onset migraine headaches with aura.  They are cyclical in nature, and awaken her from sleep.  She has typical migraine symptoms associated, namely photophobia and nausea/vomiting.  Awakening from sleep is concerning, but she has already had a normal MRI which I reviewed myself.     Her auras are typical in that she has bright stars that flash like a camera, usually bright orange or bright blue, sometimes followed by a central scotoma OU which expands over a few minutes and then dissipates, often followed by a headache.     She is concerned about occipital lobe epilepsy given her brother's history of epilepsy. I highly doubt this diagnosis as her visual hallucinations are not stereotypical and often cross the vertical midline (which occurs in migraine visual aura but does not typically occur in occipital lobe epilepsy).  Furthermore her post-visual disturbance headaches are typical of migraine headaches.     Recommend migraine prophylaxis and avoidance of triggers.  Discussed migraine headache medication options.  Given her history of depression, will avoid topiramate with can lead to behavioral changes and mood disturbance.   Will trial gabapentin with dose increasing by 100 mg every 3 days.  Starting 100 mg daily and increasing up to 300 mg three times a day final dose.     Will discuss her symptoms over the phone.  If this does not alleviate her symptoms would suggest seeing a headache specialist.     Past medical history:  Patient Active Problem List   Diagnosis    Psoriasis    Migratory pain    Anxiety state    Major depression in complete remission (H)    NENO (generalized anxiety disorder)    PTSD (post-traumatic stress disorder)    Maltracking of right patella    Segmental dysfunction of thoracic region    Segmental dysfunction of lumbar region    Segmental dysfunction of sacral region    Flank pain    Sprain of lateral collateral ligament of right knee, initial encounter    Hypoglycemia, unspecified    Allergic reaction to insect sting, accidental or unintentional, initial encounter    Psoriatic arthritis (H)    Shoulder instability, right    Stiffness of hand joint, unspecified laterality    Hip pain, left    Hamstring tightness of left lower extremity    Panic attacks    Delusion (H)    Hallucinations    Encounter for triage in pregnant patient     Medications:   Current Outpatient Medications   Medication Sig Dispense Refill    EPINEPHrine (EPIPEN/ADRENACLICK/OR ANY BX GENERIC EQUIV) 0.3 MG/0.3ML injection 2-pack Inject 0.3 mLs (0.3 mg) into the muscle as needed for anaphylaxis 0.6 mL 1    hydrOXYzine HCl (ATARAX) 10 MG tablet       montelukast (SINGULAIR) 10 MG tablet Take 1 tablet (10 mg) by mouth at bedtime. 30 tablet 3    baclofen (LIORESAL) 10 MG tablet Take 5-10 mg by mouth.      mirtazapine (REMERON) 15 MG tablet Take 1 tablet (15 mg) by mouth at bedtime. 30 tablet 5    Prenatal Vit-Fe Fumarate-FA (PRENATAL PO)       Probiotic Product (PROBIOTIC PO)       REGLAN 5 MG tablet      - previously on humira for psoriasis but did not tolerate this medication    Family history:  Patient's family history includes Depression in  her maternal grandmother; Diabetes in her maternal grandmother; Hypertension in her father; Other - See Comments in her father; Psoriasis in her maternal aunt, maternal aunt, maternal aunt, and maternal grandfather; Seizure Disorder in her brother.     Social history:  Patient  reports that she has never smoked. She has never used smokeless tobacco. She reports that she does not drink alcohol and does not use drugs.     Exam:  Visual acuity 20/20 right eye 20/20 left eye.  Color vision 11/11 right eye and 11/11 left eye.  Pupils PERRLA. Intraocular pressure 22 right eye and 18 left eye.  Anterior segment exam unreamarkable.  Fundus exam unremarkable- no disc edema.  Strabismus exam full / ortho.  Discussion of management / interpretation with another provider:   None    Assessment/Plan:   This patient has monocular diplopia in each eye.  The diplopia did not improve with pinhole viewing.  She did not have refractive error as the cause of her double vision.  I did not observe any cataract.  We discussed that monocular diplopia is typically optical in nature and that she might benefit from a hard contact lens refraction.  I am going to have her see our contact lens specialist to evaluate for this.    Patient has persistent positive visual phenomena of migraine also known as visual snow.  This is an entity where patients see constant or nearly constant unformed hallucinations.  The most common description that patients give is television snow, however it has been described as red dots, pixelations, and black spots.  We discussed benign blue entopic visual phenomenon.    No further workup is necessary.  I reassured the patient that this is a common phenomenon and is not vision threatening.  I also told the patient that learning to ignore it is the best strategy and that there is no treatment for it.   This was first described in an article published in Neurology. 1995 Apr;45(4):664-8. Persistent positive visual  phenomena in migraine. John GT1, Elizabeth NJ, Satinder SL, Giovani NJ, Shaista F, Estela GS. Some patients have noticed improvement temporarily from their visual  Snow by watching a video of static.  An example of this can be found here: [email or web address].com/[email or web address].  She is very distressed over the visual snow.  She also has headache that has persisted since December.  I will refer her to Dr. Fox in headache neurology who has a special interest in visual snow as well as the treatment of headache.  Hopefully, she will have some treatment that she can offer the patient.    I am always happy to see Mary back for new concerns but I did not make a follow up appointment for her today.      Again, thank you for allowing me to participate in the care of your patient.      Sincerely,    Ori La MD  Professor  Director of Neuro-Ophthalmology  [facility] Endowed Chair  Departments of Ophthalmology, Neurology, and Neurosurgery  [address]  [address]  T - [phone]  F - [phone]  [email or web address] [email or web address]      CC: Hakeem Henriquez OD  [facility]  [address]  [address] [address]  Via Fax: [phone]    DX = monocular diplopia, visual snow

## 2025-06-12 NOTE — PROGRESS NOTES
Mary Valdez is a 29 year old female with the following diagnoses:   1. Diplopia    2. Visual snow syndrome    3. Chronic daily headache         Patient was sent for consultation by Hakeem Henriquez OD for double vision    HPI:    29 F with pmhx Ehler's Danlos, migraines with visual aura, anxiety, depression, and psoriasis presenting for evaluation of double vision. She was previously seen by Dr. Mason in 2016 (see below). Double vision started a few months after a chiropractor's appointment (9/26/24). She sees double even when one eye is closed and it does not matter which eye. She also notes that her vision gets worse overall the more that her vision is tested. Her double vision is constant. It was initially intermittent.  There is some asymmetry between the eyes. There is not much variability.      She notes tv static in her vision as well. Feels that colors are less saturated, that colors are not as vibrant as they should be.     She notes pain with rightward gaze and feels this right behind her eyes  Constant headache since chiropractor adjustment (9/26/24), describes as a baseline 4 or 5 /10 up to 10/10 with exercise or strain such as bearing down (bathroom, picking up kids), associated with aura approx 1 per hour  Endorses positive visual phenomena of things squiggling when gazing at a blank surface and palinopsias  She also states that she has worsened hearing and feels that she falls to the right sometimes and this is worsened when moving her eyes    Endorses high pitched tinnitus and pulsatile tinnitus. Denies binocular double vision, TVO's, or changes in color vision.      Independent historians:  Patient    Review of outside testing:  - MRI Brain w/w/o Contrast 12/31/24:   IMPRESSION: No focal/structural finding to account for patient's  reported symptomatology. Negative for acute intracranial hemorrhage,  infarct, hydrocephalus or mass.    My interpretation performed today of outside  testing:  This is unremarkable    Review of outside clinical notes:  Visit with Hakeem Henriquez, [facility] on 5/8/25:       - Visit with Dr. Mason on 5/12/2016:  Assessment & Plan      Mary Valdez is an 20 year old female with the following diagnoses:   1. Migraine with aura and without status migrainosus, not intractable    2. Subjective visual disturbance       3 month history of new onset migraine headaches with aura.  They are cyclical in nature, and awaken her from sleep.  She has typical migraine symptoms associated, namely photophobia and nausea/vomiting.  Awakening from sleep is concerning, but she has already had a normal MRI which I reviewed myself.     Her auras are typical in that she has bright stars that flash like a camera, usually bright orange or bright blue, sometimes followed by a central scotoma OU which expands over a few minutes and then dissipates, often followed by a headache.     She is concerned about occipital lobe epilepsy given her brother's history of epilepsy. I highly doubt this diagnosis as her visual hallucinations are not stereotypical and often cross the vertical midline (which occurs in migraine visual aura but does not typically occur in occipital lobe epilepsy).  Furthermore her post-visual disturbance headaches are typical of migraine headaches.     Recommend migraine prophylaxis and avoidance of triggers.  Discussed migraine headache medication options.  Given her history of depression, will avoid topiramate with can lead to behavioral changes and mood disturbance.  Will trial gabapentin with dose increasing by 100 mg every 3 days.  Starting 100 mg daily and increasing up to 300 mg three times a day final dose.     Will discuss her symptoms over the phone.  If this does not alleviate her symptoms would suggest seeing a headache specialist.       Past medical history:  Patient Active Problem List   Diagnosis    Psoriasis    Migratory pain    Anxiety state    Major  depression in complete remission (H)    NENO (generalized anxiety disorder)    PTSD (post-traumatic stress disorder)    Maltracking of right patella    Segmental dysfunction of thoracic region    Segmental dysfunction of lumbar region    Segmental dysfunction of sacral region    Flank pain    Sprain of lateral collateral ligament of right knee, initial encounter    Hypoglycemia, unspecified    Allergic reaction to insect sting, accidental or unintentional, initial encounter    Psoriatic arthritis (H)    Shoulder instability, right    Stiffness of hand joint, unspecified laterality    Hip pain, left    Hamstring tightness of left lower extremity    Panic attacks    Delusion (H)    Hallucinations    Encounter for triage in pregnant patient     Medications:   Current Outpatient Medications   Medication Sig Dispense Refill    EPINEPHrine (EPIPEN/ADRENACLICK/OR ANY BX GENERIC EQUIV) 0.3 MG/0.3ML injection 2-pack Inject 0.3 mLs (0.3 mg) into the muscle as needed for anaphylaxis 0.6 mL 1    hydrOXYzine HCl (ATARAX) 10 MG tablet       montelukast (SINGULAIR) 10 MG tablet Take 1 tablet (10 mg) by mouth at bedtime. 30 tablet 3    baclofen (LIORESAL) 10 MG tablet Take 5-10 mg by mouth.      mirtazapine (REMERON) 15 MG tablet Take 1 tablet (15 mg) by mouth at bedtime. 30 tablet 5    Prenatal Vit-Fe Fumarate-FA (PRENATAL PO)       Probiotic Product (PROBIOTIC PO)       REGLAN 5 MG tablet      - previously on humira for psoriasis but did not tolerate this medication    Family history:  Patient's family history includes Depression in her maternal grandmother; Diabetes in her maternal grandmother; Hypertension in her father; Other - See Comments in her father; Psoriasis in her maternal aunt, maternal aunt, maternal aunt, and maternal grandfather; Seizure Disorder in her brother.     Social history:  Patient  reports that she has never smoked. She has never used smokeless tobacco. She reports that she does not drink alcohol and does  not use drugs.     Exam:  Visual acuity 20/20 right eye 20/20 left eye.  Color vision 11/11 right eye and 11/11 left eye.  Pupils PERRLA. Intraocular pressure 22 right eye and 18 left eye.  Anterior segment exam unreamarkable.  Fundus exam unremarkable- no disc edema.  Strabismus exam full / ortho.      Discussion of management / interpretation with another provider:   None    Assessment/Plan:   This patient has monocular diplopia in each eye.  The diplopia did not improve with pinhole viewing.  She did not have refractive error as the cause of her double vision.  I did not observe any cataract.  We discussed that monocular diplopia is typically optical in nature and that she might benefit from a hard contact lens refraction.  I am going to have her see our contact lens specialist to evaluate for this.    Patient has persistent positive visual phenomena of migraine also known as visual snow.  This is an entity where patients see constant or nearly constant unformed hallucinations.  The most common description that patients give is television snow, however it has been described as red dots, pixelations, and black spots.  We discussed benign blue entopic visual phenomenon.    No further workup is necessary.  I reassured the patient that this is a common phenomenon and is not vision threatening.  I also told the patient that learning to ignore it is the best strategy and that there is no treatment for it.   This was first described in an article published in Neurology. 1995 Apr;45(4):664-8. Persistent positive visual phenomena in migraine. John GT1, Elizabeth NJ, Satinder SL, Giovani NJ, Shaista F, Estela GS. Some patients have noticed improvement temporarily from their visual  Snow by watching a video of static.  An example of this can be found here: [email or web address].com/[email or web address].  She is very distressed over the visual snow.  She also has headache that has persisted since December.  I will refer her to  Dr. Fox in headache neurology who has a special interest in visual snow as well as the treatment of headache.  Hopefully, she will have some treatment that she can offer the patient.    I am always happy to see Mary back for new concerns but I did not make a follow up appointment for her today.                 Attending Physician Attestation:  Complete documentation of historical and exam elements from today's encounter can be found in the full encounter summary report (not reduplicated in this progress note).  I personally obtained the chief complaint(s) and history of present illness.  I confirmed and edited as necessary the review of systems, past medical/surgical history, family history, social history, and examination findings as documented by others; and I examined the patient myself.  I personally reviewed the relevant tests, images, and reports as documented above.  I formulated and edited as necessary the assessment and plan and discussed the findings and management plan with the patient and family. - Ori Montelongo MD  PGY-3, Ophthalmology  [facility]      Precharting:  Aditya Montelongo MD

## 2025-06-16 ENCOUNTER — PATIENT OUTREACH (OUTPATIENT)
Dept: CARE COORDINATION | Facility: CLINIC | Age: 30
End: 2025-06-16
Payer: COMMERCIAL

## 2025-07-02 ENCOUNTER — PRE VISIT (OUTPATIENT)
Dept: CARDIOLOGY | Facility: CLINIC | Age: 30
End: 2025-07-02
Payer: COMMERCIAL

## (undated) DEVICE — SYR 30ML LL W/O NDL

## (undated) DEVICE — BNDG COBAN 4"X5YDS STERILE

## (undated) DEVICE — NDL SPINAL 18GA 3.5" 405184

## (undated) DEVICE — GLOVE PROTEXIS BLUE W/NEU-THERA 6.5  2D73EB65

## (undated) DEVICE — DRAPE SHEET REV FOLD 3/4 9349

## (undated) DEVICE — DRAPE MAYO STAND 23X54 8337

## (undated) DEVICE — TAPE MEDIFIX 4"

## (undated) DEVICE — Device

## (undated) DEVICE — IMM KIT SHOULDER TMAX MASK FACE 7210559

## (undated) DEVICE — SOL NACL 0.9% IRRIG 3000ML BAG 07972-08

## (undated) DEVICE — IMM SHOULDER LG 79-84017

## (undated) DEVICE — SYR 50ML LL W/O NDL 309653

## (undated) DEVICE — PREP POVIDONE IODINE SWABS X3

## (undated) DEVICE — TAPE MEDIPORE 4"X10YD 2964

## (undated) DEVICE — GLOVE ESTEEM BLUE W/NEU-THERA 8.0  2D73PB80

## (undated) DEVICE — DRSG STERI STRIP 1/2X4" R1547

## (undated) DEVICE — DRAPE IOBAN INCISE 36X23" 6651EZ

## (undated) DEVICE — TUBING INFLOW CROSSFLOW 0450-000-100

## (undated) DEVICE — BLADE SHAVER ARTHRO 4MM TOMCAT

## (undated) DEVICE — IMM KIT SHOULDER STABILIZATION 7210573

## (undated) DEVICE — DRAPE EXTREMITY W/ARMBOARD 29405

## (undated) DEVICE — ESU ELECTRODE SERFAS ABLATION HOOK 3.5MM

## (undated) DEVICE — SYR BULB IRRIG DOVER 60 ML LATEX FREE 67000

## (undated) DEVICE — SU ETHILON 3-0 PS-2 18" 1669H

## (undated) DEVICE — DRSG GAUZE 4X4" TRAY

## (undated) DEVICE — NDL ECLIPSE 22GA 1.5"

## (undated) DEVICE — GLOVE PROTEXIS W/NEU-THERA 6.5  2D73TE65

## (undated) DEVICE — DRAPE CONVERTORS U-DRAPE 60X72" 8476

## (undated) DEVICE — DRSG ABDOMINAL 07 1/2X8" 7197D

## (undated) DEVICE — SLING ARM LG 79-99157

## (undated) DEVICE — BNDG ESMARK 6" STERILE

## (undated) DEVICE — DRAPE POUCH IRR 1016

## (undated) DEVICE — BNDG ELASTIC 6"X5YDS UNSTERILE 6611-60

## (undated) DEVICE — ARTHROSCOPIC CANNULA CLEAR-TRAC 8.0X72MM 72200425

## (undated) DEVICE — PACK OPEN SHOULDER SOP15OCFSC

## (undated) DEVICE — TUBING ARTHRO PUMP STRYKER

## (undated) DEVICE — NDL ECLIPSE 18GA 1.5"

## (undated) DEVICE — PACK KNEE ARTHROSCOPY CUSTOM

## (undated) DEVICE — GLOVE PROTEXIS W/NEU-THERA 7.5  2D73TE75

## (undated) DEVICE — DRAPE U SPLIT 74X120" 29440

## (undated) DEVICE — BLADE KNIFE SURG 11 371111

## (undated) DEVICE — DEVICE PASSER LASSO 45DEG CVD LT AR-4068-45L

## (undated) DEVICE — DRAPE STERI U 1015

## (undated) DEVICE — DRAPE STERI TOWEL SM 1000

## (undated) DEVICE — TUBING SUCTION MEDI-VAC 1/4"X20' N620A

## (undated) DEVICE — PREP CHLORAPREP 26ML TINTED ORANGE  260815

## (undated) DEVICE — SU ETHILON 3-0 PS-1 18" 1663H

## (undated) DEVICE — GLOVE PROTEXIS W/NEU-THERA 8.0  2D73TE80

## (undated) DEVICE — CAST PADDING 6" COTTON WEBRIL UNSTERILE 9086

## (undated) DEVICE — DRSG XEROFORM 1X8"

## (undated) DEVICE — ARTHROSCOPIC CANNULA 5.5X70MM GRAY  9718

## (undated) DEVICE — ESU ARTHROWAND 90DEG RF AMBIENT SUPER TURBOVAC ASHA4250-01

## (undated) RX ORDER — EPINEPHRINE 1 MG/ML
INJECTION, SOLUTION, CONCENTRATE INTRAVENOUS
Status: DISPENSED
Start: 2018-07-06

## (undated) RX ORDER — LIDOCAINE HYDROCHLORIDE AND EPINEPHRINE 10; 10 MG/ML; UG/ML
INJECTION, SOLUTION INFILTRATION; PERINEURAL
Status: DISPENSED
Start: 2018-07-06

## (undated) RX ORDER — ONDANSETRON 2 MG/ML
INJECTION INTRAMUSCULAR; INTRAVENOUS
Status: DISPENSED
Start: 2018-07-06

## (undated) RX ORDER — LIDOCAINE HYDROCHLORIDE 10 MG/ML
INJECTION, SOLUTION EPIDURAL; INFILTRATION; INTRACAUDAL; PERINEURAL
Status: DISPENSED
Start: 2017-01-20

## (undated) RX ORDER — DEXAMETHASONE SODIUM PHOSPHATE 10 MG/ML
INJECTION INTRAMUSCULAR; INTRAVENOUS
Status: DISPENSED
Start: 2018-07-06

## (undated) RX ORDER — PHENYLEPHRINE HCL IN 0.9% NACL 1 MG/10 ML
SYRINGE (ML) INTRAVENOUS
Status: DISPENSED
Start: 2018-07-06

## (undated) RX ORDER — FENTANYL CITRATE 50 UG/ML
INJECTION, SOLUTION INTRAMUSCULAR; INTRAVENOUS
Status: DISPENSED
Start: 2018-07-06

## (undated) RX ORDER — LIDOCAINE HYDROCHLORIDE 20 MG/ML
INJECTION, SOLUTION EPIDURAL; INFILTRATION; INTRACAUDAL; PERINEURAL
Status: DISPENSED
Start: 2018-07-06

## (undated) RX ORDER — EPHEDRINE SULFATE 50 MG/ML
INJECTION, SOLUTION INTRAMUSCULAR; INTRAVENOUS; SUBCUTANEOUS
Status: DISPENSED
Start: 2018-07-06

## (undated) RX ORDER — PROPOFOL 10 MG/ML
INJECTION, EMULSION INTRAVENOUS
Status: DISPENSED
Start: 2018-07-06

## (undated) RX ORDER — BUPIVACAINE HYDROCHLORIDE 2.5 MG/ML
INJECTION, SOLUTION EPIDURAL; INFILTRATION; INTRACAUDAL
Status: DISPENSED
Start: 2017-01-20

## (undated) RX ORDER — MORPHINE SULFATE 0.5 MG/ML
INJECTION, SOLUTION EPIDURAL; INTRATHECAL; INTRAVENOUS
Status: DISPENSED
Start: 2017-01-20